# Patient Record
Sex: FEMALE | Race: BLACK OR AFRICAN AMERICAN | NOT HISPANIC OR LATINO | Employment: UNEMPLOYED | URBAN - METROPOLITAN AREA
[De-identification: names, ages, dates, MRNs, and addresses within clinical notes are randomized per-mention and may not be internally consistent; named-entity substitution may affect disease eponyms.]

---

## 2020-01-10 DIAGNOSIS — N60.02 SINGLE CYST OF LEFT BREAST: Primary | ICD-10-CM

## 2020-01-24 ENCOUNTER — HOSPITAL ENCOUNTER (OUTPATIENT)
Dept: RADIOLOGY | Facility: HOSPITAL | Age: 80
Discharge: HOME | End: 2020-01-24
Attending: FAMILY MEDICINE
Payer: MEDICARE

## 2020-01-24 DIAGNOSIS — N60.02 SINGLE CYST OF LEFT BREAST: ICD-10-CM

## 2020-01-24 PROCEDURE — 77062 BREAST TOMOSYNTHESIS BI: CPT | Mod: TC,PO

## 2020-09-18 ENCOUNTER — OFFICE VISIT (OUTPATIENT)
Dept: ENDOCRINOLOGY | Facility: CLINIC | Age: 80
End: 2020-09-18
Payer: MEDICARE

## 2020-09-18 VITALS
HEART RATE: 57 BPM | TEMPERATURE: 97 F | HEIGHT: 67 IN | OXYGEN SATURATION: 97 % | DIASTOLIC BLOOD PRESSURE: 70 MMHG | SYSTOLIC BLOOD PRESSURE: 122 MMHG | WEIGHT: 256.31 LBS | BODY MASS INDEX: 40.23 KG/M2

## 2020-09-18 DIAGNOSIS — E78.5 HYPERLIPIDEMIA, UNSPECIFIED HYPERLIPIDEMIA TYPE: ICD-10-CM

## 2020-09-18 DIAGNOSIS — Z78.0 POSTMENOPAUSAL: ICD-10-CM

## 2020-09-18 DIAGNOSIS — E55.9 HYPOVITAMINOSIS D: ICD-10-CM

## 2020-09-18 DIAGNOSIS — I10 HYPERTENSION, UNSPECIFIED TYPE: ICD-10-CM

## 2020-09-18 DIAGNOSIS — T24.232A: ICD-10-CM

## 2020-09-18 DIAGNOSIS — E11.69 TYPE 2 DIABETES MELLITUS WITH OTHER SPECIFIED COMPLICATION, WITHOUT LONG-TERM CURRENT USE OF INSULIN: Primary | ICD-10-CM

## 2020-09-18 DIAGNOSIS — B35.3 TINEA PEDIS, UNSPECIFIED LATERALITY: ICD-10-CM

## 2020-09-18 PROCEDURE — 99204 OFFICE O/P NEW MOD 45 MIN: CPT | Mod: S$GLB,,, | Performed by: PHYSICIAN ASSISTANT

## 2020-09-18 PROCEDURE — 99999 PR PBB SHADOW E&M-NEW PATIENT-LVL V: ICD-10-PCS | Mod: PBBFAC,,, | Performed by: PHYSICIAN ASSISTANT

## 2020-09-18 PROCEDURE — 1125F PR PAIN SEVERITY QUANTIFIED, PAIN PRESENT: ICD-10-PCS | Mod: S$GLB,,, | Performed by: PHYSICIAN ASSISTANT

## 2020-09-18 PROCEDURE — 1125F AMNT PAIN NOTED PAIN PRSNT: CPT | Mod: S$GLB,,, | Performed by: PHYSICIAN ASSISTANT

## 2020-09-18 PROCEDURE — 1101F PT FALLS ASSESS-DOCD LE1/YR: CPT | Mod: CPTII,S$GLB,, | Performed by: PHYSICIAN ASSISTANT

## 2020-09-18 PROCEDURE — 99204 PR OFFICE/OUTPT VISIT, NEW, LEVL IV, 45-59 MIN: ICD-10-PCS | Mod: S$GLB,,, | Performed by: PHYSICIAN ASSISTANT

## 2020-09-18 PROCEDURE — 1159F PR MEDICATION LIST DOCUMENTED IN MEDICAL RECORD: ICD-10-PCS | Mod: S$GLB,,, | Performed by: PHYSICIAN ASSISTANT

## 2020-09-18 PROCEDURE — 1101F PR PT FALLS ASSESS DOC 0-1 FALLS W/OUT INJ PAST YR: ICD-10-PCS | Mod: CPTII,S$GLB,, | Performed by: PHYSICIAN ASSISTANT

## 2020-09-18 PROCEDURE — 99999 PR PBB SHADOW E&M-NEW PATIENT-LVL V: CPT | Mod: PBBFAC,,, | Performed by: PHYSICIAN ASSISTANT

## 2020-09-18 PROCEDURE — 1159F MED LIST DOCD IN RCRD: CPT | Mod: S$GLB,,, | Performed by: PHYSICIAN ASSISTANT

## 2020-09-18 RX ORDER — LATANOPROST 50 UG/ML
SOLUTION/ DROPS OPHTHALMIC
COMMUNITY
Start: 2020-08-22

## 2020-09-18 RX ORDER — METFORMIN HYDROCHLORIDE 500 MG/1
500 TABLET ORAL 2 TIMES DAILY WITH MEALS
COMMUNITY
Start: 2020-07-06 | End: 2020-09-24

## 2020-09-18 RX ORDER — CICLOPIROX OLAMINE 7.7 MG/G
CREAM TOPICAL 2 TIMES DAILY
Qty: 30 G | Refills: 1 | Status: SHIPPED | OUTPATIENT
Start: 2020-09-18 | End: 2020-09-18

## 2020-09-18 RX ORDER — GLIMEPIRIDE 2 MG/1
2 TABLET ORAL
COMMUNITY
End: 2020-09-24 | Stop reason: SDUPTHER

## 2020-09-18 RX ORDER — CICLOPIROX OLAMINE 7.7 MG/G
CREAM TOPICAL 2 TIMES DAILY
Qty: 30 G | Refills: 1 | Status: SHIPPED | OUTPATIENT
Start: 2020-09-18 | End: 2022-01-25

## 2020-09-18 RX ORDER — FUROSEMIDE 20 MG/1
20 TABLET ORAL DAILY
COMMUNITY

## 2020-09-18 RX ORDER — TELMISARTAN 80 MG/1
80 TABLET ORAL DAILY
COMMUNITY
Start: 2020-09-05

## 2020-09-18 RX ORDER — AMLODIPINE BESYLATE 5 MG/1
5 TABLET ORAL DAILY
COMMUNITY
Start: 2020-08-24

## 2020-09-18 RX ORDER — POTASSIUM CHLORIDE 750 MG/1
10 TABLET, EXTENDED RELEASE ORAL ONCE
COMMUNITY

## 2020-09-18 RX ORDER — METOPROLOL SUCCINATE 50 MG/1
50 TABLET, EXTENDED RELEASE ORAL NIGHTLY
COMMUNITY
Start: 2020-08-24

## 2020-09-18 RX ORDER — LOVASTATIN 40 MG/1
40 TABLET ORAL DAILY
COMMUNITY
Start: 2020-07-08 | End: 2022-10-03
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
## 2020-09-23 ENCOUNTER — HOSPITAL ENCOUNTER (OUTPATIENT)
Dept: RADIOLOGY | Facility: CLINIC | Age: 80
Discharge: HOME | End: 2020-09-23
Attending: PHYSICIAN ASSISTANT
Payer: MEDICARE

## 2020-09-23 DIAGNOSIS — Z78.0 POSTMENOPAUSAL: ICD-10-CM

## 2020-09-23 PROCEDURE — 77080 DXA BONE DENSITY AXIAL: CPT | Mod: TC,PO

## 2020-09-23 PROCEDURE — 77080 DXA BONE DENSITY AXIAL: CPT | Mod: 26,,, | Performed by: RADIOLOGY

## 2020-09-23 PROCEDURE — 77080 DEXA BONE DENSITY SPINE HIP: ICD-10-PCS | Mod: 26,,, | Performed by: RADIOLOGY

## 2020-09-24 DIAGNOSIS — E11.69 TYPE 2 DIABETES MELLITUS WITH OTHER SPECIFIED COMPLICATION, WITHOUT LONG-TERM CURRENT USE OF INSULIN: Primary | ICD-10-CM

## 2020-09-24 RX ORDER — GLIMEPIRIDE 2 MG/1
2 TABLET ORAL
Qty: 90 TABLET | Refills: 3 | Status: SHIPPED | OUTPATIENT
Start: 2020-09-24 | End: 2020-12-18

## 2020-09-24 RX ORDER — METFORMIN HYDROCHLORIDE 1000 MG/1
1000 TABLET ORAL 2 TIMES DAILY WITH MEALS
Qty: 180 TABLET | Refills: 3 | Status: SHIPPED | OUTPATIENT
Start: 2020-09-24 | End: 2021-09-15

## 2020-10-13 DIAGNOSIS — M79.89 LEG SWELLING: Primary | ICD-10-CM

## 2020-10-14 DIAGNOSIS — M79.604 PAIN OF RIGHT LEG: ICD-10-CM

## 2020-10-14 DIAGNOSIS — M79.89 LEFT LEG SWELLING: Primary | ICD-10-CM

## 2020-10-14 DIAGNOSIS — M79.605 LEFT LEG PAIN: ICD-10-CM

## 2020-10-19 ENCOUNTER — OFFICE VISIT (OUTPATIENT)
Dept: PODIATRY | Facility: CLINIC | Age: 80
End: 2020-10-19
Payer: MEDICARE

## 2020-10-19 VITALS — BODY MASS INDEX: 38.37 KG/M2 | WEIGHT: 244.5 LBS | HEIGHT: 67 IN | TEMPERATURE: 96 F

## 2020-10-19 DIAGNOSIS — I83.12 VARICOSE VEINS OF BOTH LOWER EXTREMITIES WITH INFLAMMATION: ICD-10-CM

## 2020-10-19 DIAGNOSIS — E11.69 TYPE 2 DIABETES MELLITUS WITH OTHER SPECIFIED COMPLICATION, WITHOUT LONG-TERM CURRENT USE OF INSULIN: Primary | ICD-10-CM

## 2020-10-19 DIAGNOSIS — R60.0 BILATERAL EDEMA OF LOWER EXTREMITY: ICD-10-CM

## 2020-10-19 DIAGNOSIS — I83.11 VARICOSE VEINS OF BOTH LOWER EXTREMITIES WITH INFLAMMATION: ICD-10-CM

## 2020-10-19 PROCEDURE — 99203 PR OFFICE/OUTPT VISIT, NEW, LEVL III, 30-44 MIN: ICD-10-PCS | Mod: S$GLB,,, | Performed by: PODIATRIST

## 2020-10-19 PROCEDURE — 1101F PR PT FALLS ASSESS DOC 0-1 FALLS W/OUT INJ PAST YR: ICD-10-PCS | Mod: CPTII,S$GLB,, | Performed by: PODIATRIST

## 2020-10-19 PROCEDURE — 1159F MED LIST DOCD IN RCRD: CPT | Mod: S$GLB,,, | Performed by: PODIATRIST

## 2020-10-19 PROCEDURE — 1159F PR MEDICATION LIST DOCUMENTED IN MEDICAL RECORD: ICD-10-PCS | Mod: S$GLB,,, | Performed by: PODIATRIST

## 2020-10-19 PROCEDURE — 1101F PT FALLS ASSESS-DOCD LE1/YR: CPT | Mod: CPTII,S$GLB,, | Performed by: PODIATRIST

## 2020-10-19 PROCEDURE — 99203 OFFICE O/P NEW LOW 30 MIN: CPT | Mod: S$GLB,,, | Performed by: PODIATRIST

## 2020-10-19 NOTE — PATIENT INSTRUCTIONS
Your Diabetes Foot Care Program    Every day you depend on your feet to keep you moving. But when you have diabetes, your feet need special care. Even a small foot problem can become very serious. So dont take your feet for granted. By working with your diabetes healthcare team, you can learn how to protect your feet and keep them healthy.  Evaluating your feet  An evaluation helps your healthcare provider check the condition of your feet. The evaluation includes a review of your diabetes history and overall health. It may also include a foot exam, X-rays, or other tests. These can help show problems beneath the skin that you cant see or feel.  Medical history  You will be asked about your overall health and any history of foot problems. Youll also discuss your diabetes history, such as whether your blood sugar level has changed over time. It also includes questions about sensations of pain, tingling, pins and needles, or numbness. Your healthcare provider will also want to know if you have high blood pressure and heart disease, or if you smoke. Be sure to mention any medicines (including over-the-counter), supplements, or herbal remedies you take.  Foot exam  A foot exam checks the condition of different parts of your foot. First, your skin and nails are examined for any signs of infection. Blood flow is checked by feeling for the pulses in each foot. You may also have tests to study the nerves in the foot. These include using a small filament (wire) to see how sensitive your feet are. In certain cases, you will be asked to walk a short distance to check for bone, joint, and muscle problems.  Diagnostic tests  If needed, your healthcare provider will suggest certain tests to learn more about your feet. These include:  · Doppler tests to measure blood flow in the feet and lower leg.  · X-rays, which can show bone or joint problems.  · Other imaging tests, such as an MRI (magnetic resonance imaging), bone scan,  and CT (computed tomography) scan. These can help show bone infections.  · Other tests, such as vascular tests, which study the blood flow in your feet and legs. You may also have nerve studies to learn how sensitive your feet are.  Creating a foot care program  Based on the evaluation, your healthcare provider will create a foot care program for you. Your program may be as simple as starting a daily self-care routine and changing the types of shoes your wear. It may also involve treating minor foot problems, such as a corn or blister. In some cases, surgery will be needed to treat an infection or mechanical problems, such as hammer toes.  Preventing problems  When you have diabetes, its easier to prevent problems than to treat them later on. So see your healthcare team for regular checkups and foot care. Your healthcare team can also help you learn more about caring for your feet at home. For example, you may be told to avoid walking barefoot. Or you may be told that special footwear is needed to protect your feet.  Have regular checkups  Foot problems can develop quickly. So be sure to follow your healthcare teams schedule for regular checkups. During office visits, take off your shoes and socks as soon as you get in the exam room. Ask your healthcare provider to examine your feet for problems. This will make it easier to find and treat small skin irritations before they get worse. Regular checkups can also help keep track of the blood flow and feeling in your feet. If you have neuropathy (lack of feeling in your feet), you will need to have checkups more often.  Learn about self-care  The more you know about diabetes and your feet, the easier it will be to prevent problems. Members of your healthcare team can teach you how to inspect your feet and teach you to look for warning signs. They can also give you other foot care tips. During office visits, be sure to ask any questions you have.  Date Last Reviewed:  7/1/2016  © 0012-4669 [employer]. [address] [place] [address], [place] [address]. All rights reserved. This information is not intended as a substitute for professional medical care. Always follow your healthcare professional's instructions.

## 2020-10-19 NOTE — LETTER
October 19, 2020      MONTY Corona PA-C  [address] [facility]  [place] [address]           [facility] - Podiatry  [address] [facility] [address]  [place] [address]  Phone: [phone]  Fax: [phone]          Patient: Delilah Williamson   MR Number: [id number]   YOB: 1940   Date of Visit: 10/19/2020       Dear MONTY Corona:    Thank you for referring Delilah Williamson to me for evaluation. Attached you will find relevant portions of my assessment and plan of care.    If you have questions, please do not hesitate to call me. I look forward to following Delilah Williamson along with you.    Sincerely,    Tye Leung, YEMI    Enclosure  CC:  No Recipients    If you would like to receive this communication electronically, please contact [email or web address] or [phone] to request more information on [employer] access.    For providers and/or their staff who would like to refer a patient to [facility], please contact us through our one-stop-shop provider referral line, [facility], at [phone].    If you feel you have received this communication in error or would no longer like to receive these types of communications, please e-mail [email or web address]

## 2020-10-19 NOTE — PROGRESS NOTES
[address]  [place] [address]  Phone: [phone]   [phone]    Patient's PCP:Primary Doctor No  Referring Provider: MONTY Corona    Subjective:      Chief Complaint:: Diabetic Foot Exam    HPI  Delilah Williamson is a 80 y.o. female who presents to the clinic for a diabetic foot exam.   Pt has seen MONTY Corona PA-C on 09/18/2020 who treats them for their diabetes.  Pt has been a diabetic for about 5 years.  Taking Metformin to treat diabetes.  Denies calf pain while walking.  Blood sugar: 114  Hemoglobin A1C: 6.8 (09/23/2020)     Patient states occasional pain in feet.     Vitals:    10/19/20 1014   Temp: 96.4 °F (35.8 °C)     Shoe Size:     Past Surgical History:   Procedure Laterality Date    CATARACT EXTRACTION      ROTATOR CUFF REPAIR       Past Medical History:   Diagnosis Date    Diabetes mellitus, type 2     Glaucoma     Hyperlipidemia     Hypertension      Family History   Problem Relation Age of Onset    Breast cancer Paternal Aunt         Social History:   Marital Status: Single  Alcohol History:  has no history on file for alcohol.  Tobacco History:  reports that she has never smoked. She has never used smokeless tobacco.  Drug History:  has no history on file for drug.    Review of patient's allergies indicates:  No Known Allergies    Current Outpatient Medications   Medication Sig Dispense Refill    acac/inulin/c-lose/mv-mn/folic (FIBER PLUS MULITVITAMIN ORAL) Take by mouth.      amLODIPine (NORVASC) 5 MG tablet Take 5 mg by mouth once daily.      ciclopirox (LOPROX) 0.77 % Crea Apply topically 2 (two) times daily. 30 g 1    furosemide (LASIX) 20 MG tablet Take 20 mg by mouth once daily.      glimepiride (AMARYL) 2 MG tablet Take 1 tablet (2 mg total) by mouth before breakfast. 90 tablet 3    latanoprost 0.005 % ophthalmic solution INSTILL 1 DROP INTO BOTH EYES AT BEDTIME AS DIRECTED INSTILL 1 DROP INTO BOTH EYES AT BEDTIME      lovastatin (MEVACOR) 40 MG  tablet Take 40 mg by mouth once daily.      metFORMIN (GLUCOPHAGE) 1000 MG tablet Take 1 tablet (1,000 mg total) by mouth 2 (two) times daily with meals. 180 tablet 3    metoprolol succinate (TOPROL-XL) 50 MG 24 hr tablet Take 50 mg by mouth every evening.      potassium chloride (KLOR-CON) 10 MEQ TbSR Take 10 mEq by mouth once.      telmisartan (MICARDIS) 80 MG Tab Take 80 mg by mouth once daily.       No current facility-administered medications for this visit.        Review of Systems   Constitutional: Negative for chills, fatigue, fever and unexpected weight change.   HENT: Negative for hearing loss and trouble swallowing.    Eyes: Negative for photophobia and visual disturbance.   Respiratory: Negative for cough, shortness of breath and wheezing.    Cardiovascular: Positive for leg swelling. Negative for chest pain and palpitations.   Gastrointestinal: Negative for abdominal pain and nausea.   Genitourinary: Negative for dysuria and frequency.   Musculoskeletal: Negative for arthralgias, back pain and joint swelling.   Skin: Negative for rash.   Neurological: Negative for tremors, seizures, weakness, numbness and headaches.   Hematological: Does not bruise/bleed easily.         Objective:        Physical Exam:   Foot Exam    General  General Appearance: appears stated age and healthy   Orientation: alert and oriented to person, place, and time   Affect: appropriate   Gait: antalgic       Right Foot/Ankle     Inspection and Palpation  Ecchymosis: none  Tenderness: none   Swelling: (Plus four pitting edema of complete lower extremity)  Arch: pes planus  Skin Exam: skin intact; (Venous stasis dermatitis secondary to venous incompetency)    Neurovascular  Dorsalis pedis: 1+  Posterior tibial: 1+  Saphenous nerve sensation: normal  Tibial nerve sensation: normal  Superficial peroneal nerve sensation: normal  Deep peroneal nerve sensation: normal  Sural nerve sensation: normal    Muscle Strength  Ankle  dorsiflexion: 5  Ankle plantar flexion: 5  Ankle inversion: 5  Ankle eversion: 5  Great toe extension: 5  Great toe flexion: 5      Left Foot/Ankle      Inspection and Palpation  Ecchymosis: none  Tenderness: none   Swelling: (Plus four pitting edema of complete lower extremity)  Arch: pes planus  Skin Exam: skin intact; (Venous incompetency with venous stasis dermatitis.  Pre ulcerative lesion lower leg)    Neurovascular  Dorsalis pedis: 1+  Posterior tibial: 1+  Saphenous nerve sensation: normal  Tibial nerve sensation: normal  Superficial peroneal nerve sensation: normal  Deep peroneal nerve sensation: normal  Sural nerve sensation: normal    Muscle Strength  Ankle dorsiflexion: 5  Ankle plantar flexion: 5  Ankle inversion: 5  Ankle eversion: 5  Great toe extension: 5  Great toe flexion: 5          Physical Exam   Cardiovascular:   Pulses:       Dorsalis pedis pulses are 1+ on the right side and 1+ on the left side.        Posterior tibial pulses are 1+ on the right side and 1+ on the left side.       Imaging:            Assessment:       1. Type 2 diabetes mellitus with other specified complication, without long-term current use of insulin    2. Bilateral edema of lower extremity    3. Varicose veins of both lower extremities with inflammation      Plan:   Type 2 diabetes mellitus with other specified complication, without long-term current use of insulin  -     Ambulatory referral/consult to Podiatry    Bilateral edema of lower extremity    Varicose veins of both lower extremities with inflammation     1.  Evaluated patient today as far as diabetes concerned she has good circulation  good sensation which puts her at low risk of ulcerations secondary to diabetes.  She is predisposed to lower extremity problems because of the severe venous incompetency and preulcerative areas.  She is going to see the vascular surgeon Dr. Tuan Radford this week.  After she visits with him we will discuss with the she should go to  [facility]hedema compression program at [facility] and then follow up with prescription compression hose.  2.  Return to see me as needed  Follow up if symptoms worsen or fail to improve.    Procedures  No notes on file       Counseling:     I provided patient education verbally regarding:   Patient diagnosis, treatment options, as well as alternatives, risks, and benefits.     This note was created using Dragon voice recognition software that occasionally misinterpreted phrases or words.

## 2020-12-17 ENCOUNTER — LAB VISIT (OUTPATIENT)
Dept: LAB | Facility: HOSPITAL | Age: 80
End: 2020-12-17
Attending: PHYSICIAN ASSISTANT
Payer: MEDICARE

## 2020-12-17 DIAGNOSIS — E11.69 TYPE 2 DIABETES MELLITUS WITH OTHER SPECIFIED COMPLICATION, WITHOUT LONG-TERM CURRENT USE OF INSULIN: ICD-10-CM

## 2020-12-17 LAB
ALBUMIN SERPL BCP-MCNC: 3.8 G/DL (ref 3.5–5.2)
ANION GAP SERPL CALC-SCNC: 6 MMOL/L (ref 8–16)
BUN SERPL-MCNC: 18 MG/DL (ref 8–23)
CALCIUM SERPL-MCNC: 9.4 MG/DL (ref 8.7–10.5)
CHLORIDE SERPL-SCNC: 101 MMOL/L (ref 95–110)
CO2 SERPL-SCNC: 32 MMOL/L (ref 23–29)
CREAT SERPL-MCNC: 0.8 MG/DL (ref 0.5–1.4)
EST. GFR  (AFRICAN AMERICAN): >60 ML/MIN/1.73 M^2
EST. GFR  (NON AFRICAN AMERICAN): >60 ML/MIN/1.73 M^2
ESTIMATED AVG GLUCOSE: 143 MG/DL (ref 68–131)
GLUCOSE SERPL-MCNC: 98 MG/DL (ref 70–110)
HBA1C MFR BLD HPLC: 6.6 % (ref 4–5.6)
PHOSPHATE SERPL-MCNC: 3.2 MG/DL (ref 2.7–4.5)
POTASSIUM SERPL-SCNC: 4.5 MMOL/L (ref 3.5–5.1)
SODIUM SERPL-SCNC: 139 MMOL/L (ref 136–145)

## 2020-12-17 PROCEDURE — 36415 COLL VENOUS BLD VENIPUNCTURE: CPT | Mod: PO

## 2020-12-17 PROCEDURE — 80069 RENAL FUNCTION PANEL: CPT

## 2020-12-17 PROCEDURE — 83036 HEMOGLOBIN GLYCOSYLATED A1C: CPT

## 2020-12-18 ENCOUNTER — OFFICE VISIT (OUTPATIENT)
Dept: ENDOCRINOLOGY | Facility: CLINIC | Age: 80
End: 2020-12-18
Payer: MEDICARE

## 2020-12-18 VITALS
HEIGHT: 67 IN | SYSTOLIC BLOOD PRESSURE: 126 MMHG | OXYGEN SATURATION: 98 % | BODY MASS INDEX: 39.17 KG/M2 | HEART RATE: 69 BPM | WEIGHT: 249.56 LBS | DIASTOLIC BLOOD PRESSURE: 80 MMHG | TEMPERATURE: 97 F

## 2020-12-18 DIAGNOSIS — E11.69 TYPE 2 DIABETES MELLITUS WITH OTHER SPECIFIED COMPLICATION, WITHOUT LONG-TERM CURRENT USE OF INSULIN: Primary | ICD-10-CM

## 2020-12-18 DIAGNOSIS — Z78.0 POSTMENOPAUSAL: ICD-10-CM

## 2020-12-18 DIAGNOSIS — I10 HYPERTENSION, UNSPECIFIED TYPE: ICD-10-CM

## 2020-12-18 DIAGNOSIS — E78.5 HYPERLIPIDEMIA, UNSPECIFIED HYPERLIPIDEMIA TYPE: ICD-10-CM

## 2020-12-18 DIAGNOSIS — B35.3 TINEA PEDIS, UNSPECIFIED LATERALITY: ICD-10-CM

## 2020-12-18 DIAGNOSIS — E55.9 HYPOVITAMINOSIS D: ICD-10-CM

## 2020-12-18 PROCEDURE — 1159F PR MEDICATION LIST DOCUMENTED IN MEDICAL RECORD: ICD-10-PCS | Mod: S$GLB,,, | Performed by: PHYSICIAN ASSISTANT

## 2020-12-18 PROCEDURE — 3288F PR FALLS RISK ASSESSMENT DOCUMENTED: ICD-10-PCS | Mod: CPTII,S$GLB,, | Performed by: PHYSICIAN ASSISTANT

## 2020-12-18 PROCEDURE — 3288F FALL RISK ASSESSMENT DOCD: CPT | Mod: CPTII,S$GLB,, | Performed by: PHYSICIAN ASSISTANT

## 2020-12-18 PROCEDURE — 1159F MED LIST DOCD IN RCRD: CPT | Mod: S$GLB,,, | Performed by: PHYSICIAN ASSISTANT

## 2020-12-18 PROCEDURE — 1126F PR PAIN SEVERITY QUANTIFIED, NO PAIN PRESENT: ICD-10-PCS | Mod: S$GLB,,, | Performed by: PHYSICIAN ASSISTANT

## 2020-12-18 PROCEDURE — 99214 OFFICE O/P EST MOD 30 MIN: CPT | Mod: S$GLB,,, | Performed by: PHYSICIAN ASSISTANT

## 2020-12-18 PROCEDURE — 1126F AMNT PAIN NOTED NONE PRSNT: CPT | Mod: S$GLB,,, | Performed by: PHYSICIAN ASSISTANT

## 2020-12-18 PROCEDURE — 99999 PR PBB SHADOW E&M-EST. PATIENT-LVL IV: ICD-10-PCS | Mod: PBBFAC,,, | Performed by: PHYSICIAN ASSISTANT

## 2020-12-18 PROCEDURE — 99214 PR OFFICE/OUTPT VISIT, EST, LEVL IV, 30-39 MIN: ICD-10-PCS | Mod: S$GLB,,, | Performed by: PHYSICIAN ASSISTANT

## 2020-12-18 PROCEDURE — 99999 PR PBB SHADOW E&M-EST. PATIENT-LVL IV: CPT | Mod: PBBFAC,,, | Performed by: PHYSICIAN ASSISTANT

## 2020-12-18 PROCEDURE — 1101F PR PT FALLS ASSESS DOC 0-1 FALLS W/OUT INJ PAST YR: ICD-10-PCS | Mod: CPTII,S$GLB,, | Performed by: PHYSICIAN ASSISTANT

## 2020-12-18 PROCEDURE — 1101F PT FALLS ASSESS-DOCD LE1/YR: CPT | Mod: CPTII,S$GLB,, | Performed by: PHYSICIAN ASSISTANT

## 2020-12-18 RX ORDER — GLIMEPIRIDE 1 MG/1
1 TABLET ORAL
Qty: 90 TABLET | Refills: 3 | Status: SHIPPED | OUTPATIENT
Start: 2020-12-18 | End: 2021-12-18

## 2020-12-18 NOTE — PROGRESS NOTES
"CC: This 80 y.o. female presents for management of Diabetes    and chronic conditions pending review including HTN, HLP    HPI: was diagnosed with T2DM in ~15 years ago. Her  recently   from penumonia.     She is seeing Dr. Radford in vascular.     Has never been hospitalized r/t DM.  Family hx of DM: sister  Fhx of thyroid disease: mother  Denies missing doses of DM medication.   hypoglycemia at home: none  monitoring BG at home:  Fastin-140    Diet:   BF-egg, multigrain bread  LH-turkey, multigrain bread  DN-turtle stew w/out rice  Snacks on whole wheat crackers. Avoids sugary beverages.     Exercise: She has been active at home.     CURRENT DM MEDS: metformin 1000 mg bid, glimepiride 2 mg qd    Previous meds:  Jardiance    Standards of Care:  Eye exam:  q six months for Dr. Smith for glaucoma  Podiatry exam: n/a  DE: never    DEXA scan:  wnl  PMHx, PSHx: reviewed in epic.  Social Hx: no E/T use.    ROS:   Gen: Appetite good, no weight gain or loss, denies fatigue and weakness.  Skin: + lower left leg rash (PAD), Skin is intact and heals well, no rashes, no hair changes  Eyes: Denies visual disturbances  Resp: no SOB or TINSLEY, no cough  Cardiac: No palpitations, chest pain, no edema   GI: No nausea or vomiting, diarrhea, constipation, or abdominal pain.  /GYN: No nocturia, burning or pain.   MS/Neuro: Denies numbness/ tingling in BLE; Gait steady, speech clear  Psych: Denies drug/ETOH abuse, no hx of depression.  Other systems: negative.    /80 (BP Location: Left arm, Patient Position: Sitting, BP Method: Large (Manual))   Pulse 69   Temp 97 °F (36.1 °C) (Temporal)   Ht 5' 7" (1.702 m)   Wt 113.2 kg (249 lb 9 oz)   SpO2 98%   BMI 39.09 kg/m²      PE:  GENERAL: Well developed, well nourished.  PSYCH: AAOx3, appropriate mood and affect, pleasant expression, conversant, appears relaxed, well groomed.   EYES: Conjunctiva, corneas clear  NECK: Supple, trachea midline,no " thyromegaly or nodules  CHEST: Resp even and unlabored, CTA bilateral.  CARDIAC: RRR, S1, S2 heard, no murmurs  ABDOMEN: Soft, non-tender, non-distended   VASCULAR: DP pulses +2/4 bilaterally, no edema.  NEURO: Gait steady  SKIN: + slight pink erythema surrounded by blistering on lower left leg, Skin warm and dry no acanthosis nigracans.  9/20  Foot Exam: no sores or macerations noted. White exudate between all digits.    Protective Sensation (w/ 10 gram monofilament):  Right: Intact  Left: Intact    Visual Inspection:  Normal -  Bilateral, Nails Intact - without Evidence of Foot Deformity- Bilateral and Dry Skin -  Bilateral    Pedal Pulses:   Right: Present  Left: Present    Posterior tibialis:   Right:Present  Left: Present     Vibratory Sensation  Right:Absent  Left:Absent    Personally reviewed labs below:    Lab Visit on 12/17/2020   Component Date Value Ref Range Status    Glucose 12/17/2020 98  70 - 110 mg/dL Final    Sodium 12/17/2020 139  136 - 145 mmol/L Final    Potassium 12/17/2020 4.5  3.5 - 5.1 mmol/L Final    Chloride 12/17/2020 101  95 - 110 mmol/L Final    CO2 12/17/2020 32* 23 - 29 mmol/L Final    BUN 12/17/2020 18  8 - 23 mg/dL Final    Calcium 12/17/2020 9.4  8.7 - 10.5 mg/dL Final    Creatinine 12/17/2020 0.8  0.5 - 1.4 mg/dL Final    Albumin 12/17/2020 3.8  3.5 - 5.2 g/dL Final    Phosphorus 12/17/2020 3.2  2.7 - 4.5 mg/dL Final    eGFR if African American 12/17/2020 >60.0  >60 mL/min/1.73 m^2 Final    eGFR if non African American 12/17/2020 >60.0  >60 mL/min/1.73 m^2 Final    Comment: Calculation used to obtain the estimated glomerular filtration  rate (eGFR) is the CKD-EPI equation.       Anion Gap 12/17/2020 6* 8 - 16 mmol/L Final    Hemoglobin A1C 12/17/2020 6.6* 4.0 - 5.6 % Final    Comment: ADA Screening Guidelines:  5.7-6.4%  Consistent with prediabetes  >or=6.5%  Consistent with diabetes  High levels of fetal hemoglobin interfere with the HbA1C  assay. Heterozygous  hemoglobin variants (HbS, HgC, etc)do  not significantly interfere with this assay.   However, presence of multiple variants may affect accuracy.      Estimated Avg Glucose 12/17/2020 143* 68 - 131 mg/dL Final         ASSESSMENT and PLAN:    1. Type 2 diabetes mellitus with other specified complication, without long-term current use of insulin  Renal Function Panel    Hemoglobin A1C   2. Hypertension, unspecified type     3. Hyperlipidemia, unspecified hyperlipidemia type     4. Postmenopausal     5. Hypovitaminosis D     6. Tinea pedis, unspecified laterality        T2DM with hyperglycemia-A1c is below goal. Decrease glimepiride to 1 mg daily. Continue metformin.   Discussed A1c and BG goals.   Reviewed  hypoglycemia, s/s and appropriate tx.   Instructed to monitor BG and bring meter/ log to every clinic visit.   - takes ASA, ACEi, statin    HTN - controlled, continue meds as previously prescribed and monitor.     HLP - LDL today , on statin therapy, LFTs WNL  Postmenopausal-DEXA scan 9/22  Hypovitaminosis d-stable-check vitamin D  Tinea pedis-continue ciclopriox between toes twice daily.     Follow-up: in 3 months with lab prior

## 2021-01-12 ENCOUNTER — IMMUNIZATION (OUTPATIENT)
Dept: PRIMARY CARE CLINIC | Facility: CLINIC | Age: 81
End: 2021-01-12
Payer: MEDICARE

## 2021-01-12 DIAGNOSIS — Z23 NEED FOR VACCINATION: ICD-10-CM

## 2021-01-12 PROCEDURE — 0001A COVID-19, MRNA, LNP-S, PF, 30 MCG/0.3 ML DOSE VACCINE: ICD-10-PCS | Mod: S$GLB,,, | Performed by: FAMILY MEDICINE

## 2021-01-12 PROCEDURE — 91300 COVID-19, MRNA, LNP-S, PF, 30 MCG/0.3 ML DOSE VACCINE: CPT | Mod: S$GLB,,, | Performed by: FAMILY MEDICINE

## 2021-01-12 PROCEDURE — 91300 COVID-19, MRNA, LNP-S, PF, 30 MCG/0.3 ML DOSE VACCINE: ICD-10-PCS | Mod: S$GLB,,, | Performed by: FAMILY MEDICINE

## 2021-01-12 PROCEDURE — 0001A COVID-19, MRNA, LNP-S, PF, 30 MCG/0.3 ML DOSE VACCINE: CPT | Mod: S$GLB,,, | Performed by: FAMILY MEDICINE

## 2021-02-02 ENCOUNTER — IMMUNIZATION (OUTPATIENT)
Dept: PRIMARY CARE CLINIC | Facility: CLINIC | Age: 81
End: 2021-02-02
Payer: MEDICARE

## 2021-02-02 DIAGNOSIS — Z23 NEED FOR VACCINATION: Primary | ICD-10-CM

## 2021-02-02 PROCEDURE — 91300 COVID-19, MRNA, LNP-S, PF, 30 MCG/0.3 ML DOSE VACCINE: CPT | Mod: S$GLB,,, | Performed by: FAMILY MEDICINE

## 2021-02-02 PROCEDURE — 91300 COVID-19, MRNA, LNP-S, PF, 30 MCG/0.3 ML DOSE VACCINE: ICD-10-PCS | Mod: S$GLB,,, | Performed by: FAMILY MEDICINE

## 2021-02-02 PROCEDURE — 0002A COVID-19, MRNA, LNP-S, PF, 30 MCG/0.3 ML DOSE VACCINE: ICD-10-PCS | Mod: CV19,S$GLB,, | Performed by: FAMILY MEDICINE

## 2021-02-02 PROCEDURE — 0002A COVID-19, MRNA, LNP-S, PF, 30 MCG/0.3 ML DOSE VACCINE: CPT | Mod: CV19,S$GLB,, | Performed by: FAMILY MEDICINE

## 2021-02-11 DIAGNOSIS — Z12.31 ENCOUNTER FOR SCREENING MAMMOGRAM FOR MALIGNANT NEOPLASM OF BREAST: Primary | ICD-10-CM

## 2021-02-26 ENCOUNTER — HOSPITAL ENCOUNTER (OUTPATIENT)
Dept: RADIOLOGY | Facility: HOSPITAL | Age: 81
Discharge: HOME | End: 2021-02-26
Attending: NURSE PRACTITIONER
Payer: MEDICARE

## 2021-02-26 DIAGNOSIS — Z12.31 ENCOUNTER FOR SCREENING MAMMOGRAM FOR MALIGNANT NEOPLASM OF BREAST: ICD-10-CM

## 2021-02-26 PROCEDURE — 77067 SCR MAMMO BI INCL CAD: CPT | Mod: TC,PO

## 2021-03-11 ENCOUNTER — LAB VISIT (OUTPATIENT)
Dept: LAB | Facility: HOSPITAL | Age: 81
End: 2021-03-11
Attending: PHYSICIAN ASSISTANT
Payer: MEDICARE

## 2021-03-11 DIAGNOSIS — E11.69 TYPE 2 DIABETES MELLITUS WITH OTHER SPECIFIED COMPLICATION, WITHOUT LONG-TERM CURRENT USE OF INSULIN: ICD-10-CM

## 2021-03-11 PROCEDURE — 83036 HEMOGLOBIN GLYCOSYLATED A1C: CPT | Performed by: PHYSICIAN ASSISTANT

## 2021-03-11 PROCEDURE — 80069 RENAL FUNCTION PANEL: CPT | Performed by: PHYSICIAN ASSISTANT

## 2021-03-11 PROCEDURE — 36415 COLL VENOUS BLD VENIPUNCTURE: CPT | Mod: PO | Performed by: PHYSICIAN ASSISTANT

## 2021-03-12 LAB
ALBUMIN SERPL BCP-MCNC: 3.8 G/DL (ref 3.5–5.2)
ANION GAP SERPL CALC-SCNC: 11 MMOL/L (ref 8–16)
BUN SERPL-MCNC: 17 MG/DL (ref 8–23)
CALCIUM SERPL-MCNC: 9.3 MG/DL (ref 8.7–10.5)
CHLORIDE SERPL-SCNC: 105 MMOL/L (ref 95–110)
CO2 SERPL-SCNC: 27 MMOL/L (ref 23–29)
CREAT SERPL-MCNC: 0.8 MG/DL (ref 0.5–1.4)
EST. GFR  (AFRICAN AMERICAN): >60 ML/MIN/1.73 M^2
EST. GFR  (NON AFRICAN AMERICAN): >60 ML/MIN/1.73 M^2
ESTIMATED AVG GLUCOSE: 146 MG/DL (ref 68–131)
GLUCOSE SERPL-MCNC: 115 MG/DL (ref 70–110)
HBA1C MFR BLD: 6.7 % (ref 4–5.6)
PHOSPHATE SERPL-MCNC: 2.7 MG/DL (ref 2.7–4.5)
POTASSIUM SERPL-SCNC: 4.3 MMOL/L (ref 3.5–5.1)
SODIUM SERPL-SCNC: 143 MMOL/L (ref 136–145)

## 2021-03-18 ENCOUNTER — OFFICE VISIT (OUTPATIENT)
Dept: ENDOCRINOLOGY | Facility: CLINIC | Age: 81
End: 2021-03-18
Payer: MEDICARE

## 2021-03-18 VITALS
SYSTOLIC BLOOD PRESSURE: 150 MMHG | WEIGHT: 256.19 LBS | TEMPERATURE: 97 F | OXYGEN SATURATION: 96 % | BODY MASS INDEX: 40.21 KG/M2 | DIASTOLIC BLOOD PRESSURE: 70 MMHG | HEART RATE: 71 BPM | HEIGHT: 67 IN

## 2021-03-18 DIAGNOSIS — R30.0 DYSURIA: ICD-10-CM

## 2021-03-18 DIAGNOSIS — Z78.0 POSTMENOPAUSAL: ICD-10-CM

## 2021-03-18 DIAGNOSIS — I10 HYPERTENSION, UNSPECIFIED TYPE: ICD-10-CM

## 2021-03-18 DIAGNOSIS — E78.5 HYPERLIPIDEMIA, UNSPECIFIED HYPERLIPIDEMIA TYPE: ICD-10-CM

## 2021-03-18 DIAGNOSIS — E11.69 TYPE 2 DIABETES MELLITUS WITH OTHER SPECIFIED COMPLICATION, WITHOUT LONG-TERM CURRENT USE OF INSULIN: Primary | ICD-10-CM

## 2021-03-18 DIAGNOSIS — E55.9 HYPOVITAMINOSIS D: ICD-10-CM

## 2021-03-18 DIAGNOSIS — B35.3 TINEA PEDIS, UNSPECIFIED LATERALITY: ICD-10-CM

## 2021-03-18 PROCEDURE — 3077F SYST BP >= 140 MM HG: CPT | Mod: CPTII,S$GLB,, | Performed by: PHYSICIAN ASSISTANT

## 2021-03-18 PROCEDURE — 1101F PR PT FALLS ASSESS DOC 0-1 FALLS W/OUT INJ PAST YR: ICD-10-PCS | Mod: CPTII,S$GLB,, | Performed by: PHYSICIAN ASSISTANT

## 2021-03-18 PROCEDURE — 3078F PR MOST RECENT DIASTOLIC BLOOD PRESSURE < 80 MM HG: ICD-10-PCS | Mod: CPTII,S$GLB,, | Performed by: PHYSICIAN ASSISTANT

## 2021-03-18 PROCEDURE — 1159F PR MEDICATION LIST DOCUMENTED IN MEDICAL RECORD: ICD-10-PCS | Mod: S$GLB,,, | Performed by: PHYSICIAN ASSISTANT

## 2021-03-18 PROCEDURE — 99214 PR OFFICE/OUTPT VISIT, EST, LEVL IV, 30-39 MIN: ICD-10-PCS | Mod: S$GLB,,, | Performed by: PHYSICIAN ASSISTANT

## 2021-03-18 PROCEDURE — 1126F AMNT PAIN NOTED NONE PRSNT: CPT | Mod: S$GLB,,, | Performed by: PHYSICIAN ASSISTANT

## 2021-03-18 PROCEDURE — 1101F PT FALLS ASSESS-DOCD LE1/YR: CPT | Mod: CPTII,S$GLB,, | Performed by: PHYSICIAN ASSISTANT

## 2021-03-18 PROCEDURE — 3077F PR MOST RECENT SYSTOLIC BLOOD PRESSURE >= 140 MM HG: ICD-10-PCS | Mod: CPTII,S$GLB,, | Performed by: PHYSICIAN ASSISTANT

## 2021-03-18 PROCEDURE — 99999 PR PBB SHADOW E&M-EST. PATIENT-LVL IV: ICD-10-PCS | Mod: PBBFAC,,, | Performed by: PHYSICIAN ASSISTANT

## 2021-03-18 PROCEDURE — 1159F MED LIST DOCD IN RCRD: CPT | Mod: S$GLB,,, | Performed by: PHYSICIAN ASSISTANT

## 2021-03-18 PROCEDURE — 99214 OFFICE O/P EST MOD 30 MIN: CPT | Mod: S$GLB,,, | Performed by: PHYSICIAN ASSISTANT

## 2021-03-18 PROCEDURE — 3078F DIAST BP <80 MM HG: CPT | Mod: CPTII,S$GLB,, | Performed by: PHYSICIAN ASSISTANT

## 2021-03-18 PROCEDURE — 1126F PR PAIN SEVERITY QUANTIFIED, NO PAIN PRESENT: ICD-10-PCS | Mod: S$GLB,,, | Performed by: PHYSICIAN ASSISTANT

## 2021-03-18 PROCEDURE — 99999 PR PBB SHADOW E&M-EST. PATIENT-LVL IV: CPT | Mod: PBBFAC,,, | Performed by: PHYSICIAN ASSISTANT

## 2021-03-18 PROCEDURE — 3288F FALL RISK ASSESSMENT DOCD: CPT | Mod: CPTII,S$GLB,, | Performed by: PHYSICIAN ASSISTANT

## 2021-03-18 PROCEDURE — 3288F PR FALLS RISK ASSESSMENT DOCUMENTED: ICD-10-PCS | Mod: CPTII,S$GLB,, | Performed by: PHYSICIAN ASSISTANT

## 2021-03-19 ENCOUNTER — TELEPHONE (OUTPATIENT)
Dept: ENDOCRINOLOGY | Facility: CLINIC | Age: 81
End: 2021-03-19

## 2021-03-19 DIAGNOSIS — N30.00 ACUTE CYSTITIS WITHOUT HEMATURIA: Primary | ICD-10-CM

## 2021-03-19 RX ORDER — LEVOFLOXACIN 500 MG/1
500 TABLET, FILM COATED ORAL DAILY
Qty: 3 TABLET | Refills: 0 | Status: SHIPPED | OUTPATIENT
Start: 2021-03-19 | End: 2021-06-24

## 2021-06-17 ENCOUNTER — LAB VISIT (OUTPATIENT)
Dept: LAB | Facility: HOSPITAL | Age: 81
End: 2021-06-17
Attending: PHYSICIAN ASSISTANT
Payer: MEDICARE

## 2021-06-17 DIAGNOSIS — E11.69 TYPE 2 DIABETES MELLITUS WITH OTHER SPECIFIED COMPLICATION, WITHOUT LONG-TERM CURRENT USE OF INSULIN: ICD-10-CM

## 2021-06-17 LAB
ALBUMIN SERPL BCP-MCNC: 3.8 G/DL (ref 3.5–5.2)
ALP SERPL-CCNC: 70 U/L (ref 55–135)
ALT SERPL W/O P-5'-P-CCNC: 11 U/L (ref 10–44)
ANION GAP SERPL CALC-SCNC: 12 MMOL/L (ref 8–16)
AST SERPL-CCNC: 14 U/L (ref 10–40)
BILIRUB SERPL-MCNC: 0.5 MG/DL (ref 0.1–1)
BUN SERPL-MCNC: 16 MG/DL (ref 8–23)
CALCIUM SERPL-MCNC: 9.5 MG/DL (ref 8.7–10.5)
CHLORIDE SERPL-SCNC: 106 MMOL/L (ref 95–110)
CHOLEST SERPL-MCNC: 112 MG/DL (ref 120–199)
CHOLEST/HDLC SERPL: 3.5 {RATIO} (ref 2–5)
CO2 SERPL-SCNC: 23 MMOL/L (ref 23–29)
CREAT SERPL-MCNC: 0.8 MG/DL (ref 0.5–1.4)
EST. GFR  (AFRICAN AMERICAN): >60 ML/MIN/1.73 M^2
EST. GFR  (NON AFRICAN AMERICAN): >60 ML/MIN/1.73 M^2
ESTIMATED AVG GLUCOSE: 143 MG/DL (ref 68–131)
GLUCOSE SERPL-MCNC: 132 MG/DL (ref 70–110)
HBA1C MFR BLD: 6.6 % (ref 4–5.6)
HDLC SERPL-MCNC: 32 MG/DL (ref 40–75)
HDLC SERPL: 28.6 % (ref 20–50)
LDLC SERPL CALC-MCNC: 65 MG/DL (ref 63–159)
NONHDLC SERPL-MCNC: 80 MG/DL
POTASSIUM SERPL-SCNC: 4.4 MMOL/L (ref 3.5–5.1)
PROT SERPL-MCNC: 7.1 G/DL (ref 6–8.4)
SODIUM SERPL-SCNC: 141 MMOL/L (ref 136–145)
TRIGL SERPL-MCNC: 75 MG/DL (ref 30–150)

## 2021-06-17 PROCEDURE — 80061 LIPID PANEL: CPT | Performed by: PHYSICIAN ASSISTANT

## 2021-06-17 PROCEDURE — 83036 HEMOGLOBIN GLYCOSYLATED A1C: CPT | Performed by: PHYSICIAN ASSISTANT

## 2021-06-17 PROCEDURE — 80053 COMPREHEN METABOLIC PANEL: CPT | Performed by: PHYSICIAN ASSISTANT

## 2021-06-17 PROCEDURE — 36415 COLL VENOUS BLD VENIPUNCTURE: CPT | Mod: PO | Performed by: PHYSICIAN ASSISTANT

## 2021-06-24 ENCOUNTER — OFFICE VISIT (OUTPATIENT)
Dept: ENDOCRINOLOGY | Facility: CLINIC | Age: 81
End: 2021-06-24
Payer: MEDICARE

## 2021-06-24 VITALS
TEMPERATURE: 98 F | HEIGHT: 67 IN | OXYGEN SATURATION: 98 % | WEIGHT: 251.56 LBS | DIASTOLIC BLOOD PRESSURE: 72 MMHG | SYSTOLIC BLOOD PRESSURE: 140 MMHG | BODY MASS INDEX: 39.48 KG/M2 | HEART RATE: 78 BPM

## 2021-06-24 DIAGNOSIS — E11.69 TYPE 2 DIABETES MELLITUS WITH OTHER SPECIFIED COMPLICATION, WITHOUT LONG-TERM CURRENT USE OF INSULIN: Primary | ICD-10-CM

## 2021-06-24 DIAGNOSIS — E55.9 HYPOVITAMINOSIS D: ICD-10-CM

## 2021-06-24 DIAGNOSIS — E78.5 HYPERLIPIDEMIA, UNSPECIFIED HYPERLIPIDEMIA TYPE: ICD-10-CM

## 2021-06-24 DIAGNOSIS — B37.31 VAGINAL YEAST INFECTION: ICD-10-CM

## 2021-06-24 DIAGNOSIS — B35.3 TINEA PEDIS, UNSPECIFIED LATERALITY: ICD-10-CM

## 2021-06-24 DIAGNOSIS — I10 HYPERTENSION, UNSPECIFIED TYPE: ICD-10-CM

## 2021-06-24 DIAGNOSIS — Z78.0 POSTMENOPAUSAL: ICD-10-CM

## 2021-06-24 PROCEDURE — 1159F MED LIST DOCD IN RCRD: CPT | Mod: S$GLB,,, | Performed by: PHYSICIAN ASSISTANT

## 2021-06-24 PROCEDURE — 1159F PR MEDICATION LIST DOCUMENTED IN MEDICAL RECORD: ICD-10-PCS | Mod: S$GLB,,, | Performed by: PHYSICIAN ASSISTANT

## 2021-06-24 PROCEDURE — 99214 OFFICE O/P EST MOD 30 MIN: CPT | Mod: S$GLB,,, | Performed by: PHYSICIAN ASSISTANT

## 2021-06-24 PROCEDURE — 3288F FALL RISK ASSESSMENT DOCD: CPT | Mod: CPTII,S$GLB,, | Performed by: PHYSICIAN ASSISTANT

## 2021-06-24 PROCEDURE — 1126F PR PAIN SEVERITY QUANTIFIED, NO PAIN PRESENT: ICD-10-PCS | Mod: S$GLB,,, | Performed by: PHYSICIAN ASSISTANT

## 2021-06-24 PROCEDURE — 3288F PR FALLS RISK ASSESSMENT DOCUMENTED: ICD-10-PCS | Mod: CPTII,S$GLB,, | Performed by: PHYSICIAN ASSISTANT

## 2021-06-24 PROCEDURE — 1101F PR PT FALLS ASSESS DOC 0-1 FALLS W/OUT INJ PAST YR: ICD-10-PCS | Mod: CPTII,S$GLB,, | Performed by: PHYSICIAN ASSISTANT

## 2021-06-24 PROCEDURE — 1126F AMNT PAIN NOTED NONE PRSNT: CPT | Mod: S$GLB,,, | Performed by: PHYSICIAN ASSISTANT

## 2021-06-24 PROCEDURE — 1101F PT FALLS ASSESS-DOCD LE1/YR: CPT | Mod: CPTII,S$GLB,, | Performed by: PHYSICIAN ASSISTANT

## 2021-06-24 PROCEDURE — 99999 PR PBB SHADOW E&M-EST. PATIENT-LVL IV: ICD-10-PCS | Mod: PBBFAC,,, | Performed by: PHYSICIAN ASSISTANT

## 2021-06-24 PROCEDURE — 99999 PR PBB SHADOW E&M-EST. PATIENT-LVL IV: CPT | Mod: PBBFAC,,, | Performed by: PHYSICIAN ASSISTANT

## 2021-06-24 PROCEDURE — 99214 PR OFFICE/OUTPT VISIT, EST, LEVL IV, 30-39 MIN: ICD-10-PCS | Mod: S$GLB,,, | Performed by: PHYSICIAN ASSISTANT

## 2021-06-24 RX ORDER — FLUCONAZOLE 150 MG/1
TABLET ORAL
Qty: 1 TABLET | Refills: 1 | Status: SHIPPED | OUTPATIENT
Start: 2021-06-24

## 2021-08-16 LAB
LEFT EYE DM RETINOPATHY: NEGATIVE
RIGHT EYE DM RETINOPATHY: NEGATIVE

## 2021-09-07 ENCOUNTER — PATIENT OUTREACH (OUTPATIENT)
Dept: ADMINISTRATIVE | Facility: HOSPITAL | Age: 81
End: 2021-09-07

## 2021-09-07 DIAGNOSIS — E11.69 TYPE 2 DIABETES MELLITUS WITH OTHER SPECIFIED COMPLICATION, WITHOUT LONG-TERM CURRENT USE OF INSULIN: Primary | ICD-10-CM

## 2021-10-07 ENCOUNTER — IMMUNIZATION (OUTPATIENT)
Dept: PRIMARY CARE CLINIC | Facility: CLINIC | Age: 81
End: 2021-10-07
Payer: MEDICARE

## 2021-10-07 DIAGNOSIS — Z23 NEED FOR VACCINATION: Primary | ICD-10-CM

## 2021-10-07 PROCEDURE — 91300 COVID-19, MRNA, LNP-S, PF, 30 MCG/0.3 ML DOSE VACCINE: ICD-10-PCS | Mod: S$GLB,,, | Performed by: FAMILY MEDICINE

## 2021-10-07 PROCEDURE — 0003A COVID-19, MRNA, LNP-S, PF, 30 MCG/0.3 ML DOSE VACCINE: CPT | Mod: S$GLB,,, | Performed by: FAMILY MEDICINE

## 2021-10-07 PROCEDURE — 0003A COVID-19, MRNA, LNP-S, PF, 30 MCG/0.3 ML DOSE VACCINE: ICD-10-PCS | Mod: S$GLB,,, | Performed by: FAMILY MEDICINE

## 2021-10-07 PROCEDURE — 91300 COVID-19, MRNA, LNP-S, PF, 30 MCG/0.3 ML DOSE VACCINE: CPT | Mod: S$GLB,,, | Performed by: FAMILY MEDICINE

## 2021-12-20 ENCOUNTER — LAB VISIT (OUTPATIENT)
Dept: LAB | Facility: HOSPITAL | Age: 81
End: 2021-12-20
Attending: PHYSICIAN ASSISTANT
Payer: MEDICARE

## 2021-12-20 DIAGNOSIS — E11.69 TYPE 2 DIABETES MELLITUS WITH OTHER SPECIFIED COMPLICATION, WITHOUT LONG-TERM CURRENT USE OF INSULIN: ICD-10-CM

## 2021-12-20 DIAGNOSIS — E55.9 HYPOVITAMINOSIS D: ICD-10-CM

## 2021-12-20 LAB
25(OH)D3+25(OH)D2 SERPL-MCNC: 39 NG/ML (ref 30–96)
ALBUMIN SERPL BCP-MCNC: 3.6 G/DL (ref 3.5–5.2)
ANION GAP SERPL CALC-SCNC: 7 MMOL/L (ref 8–16)
BUN SERPL-MCNC: 16 MG/DL (ref 8–23)
CALCIUM SERPL-MCNC: 9.4 MG/DL (ref 8.7–10.5)
CHLORIDE SERPL-SCNC: 105 MMOL/L (ref 95–110)
CO2 SERPL-SCNC: 24 MMOL/L (ref 23–29)
CREAT SERPL-MCNC: 0.7 MG/DL (ref 0.5–1.4)
EST. GFR  (AFRICAN AMERICAN): >60 ML/MIN/1.73 M^2
EST. GFR  (NON AFRICAN AMERICAN): >60 ML/MIN/1.73 M^2
ESTIMATED AVG GLUCOSE: 134 MG/DL (ref 68–131)
GLUCOSE SERPL-MCNC: 112 MG/DL (ref 70–110)
HBA1C MFR BLD: 6.3 % (ref 4–5.6)
PHOSPHATE SERPL-MCNC: 2.7 MG/DL (ref 2.7–4.5)
POTASSIUM SERPL-SCNC: 4.3 MMOL/L (ref 3.5–5.1)
SODIUM SERPL-SCNC: 136 MMOL/L (ref 136–145)
T4 FREE SERPL-MCNC: 0.93 NG/DL (ref 0.71–1.51)
TSH SERPL DL<=0.005 MIU/L-ACNC: 1.43 UIU/ML (ref 0.4–4)

## 2021-12-20 PROCEDURE — 84439 ASSAY OF FREE THYROXINE: CPT | Performed by: PHYSICIAN ASSISTANT

## 2021-12-20 PROCEDURE — 36415 COLL VENOUS BLD VENIPUNCTURE: CPT | Mod: PO | Performed by: PHYSICIAN ASSISTANT

## 2021-12-20 PROCEDURE — 82306 VITAMIN D 25 HYDROXY: CPT | Performed by: PHYSICIAN ASSISTANT

## 2021-12-20 PROCEDURE — 84443 ASSAY THYROID STIM HORMONE: CPT | Performed by: PHYSICIAN ASSISTANT

## 2021-12-20 PROCEDURE — 80069 RENAL FUNCTION PANEL: CPT | Performed by: PHYSICIAN ASSISTANT

## 2021-12-20 PROCEDURE — 83036 HEMOGLOBIN GLYCOSYLATED A1C: CPT | Performed by: PHYSICIAN ASSISTANT

## 2022-01-25 ENCOUNTER — OFFICE VISIT (OUTPATIENT)
Dept: ENDOCRINOLOGY | Facility: CLINIC | Age: 82
End: 2022-01-25
Payer: MEDICARE

## 2022-01-25 VITALS
OXYGEN SATURATION: 97 % | DIASTOLIC BLOOD PRESSURE: 70 MMHG | BODY MASS INDEX: 37.44 KG/M2 | HEART RATE: 72 BPM | WEIGHT: 238.56 LBS | TEMPERATURE: 98 F | HEIGHT: 67 IN | SYSTOLIC BLOOD PRESSURE: 130 MMHG

## 2022-01-25 DIAGNOSIS — E55.9 HYPOVITAMINOSIS D: ICD-10-CM

## 2022-01-25 DIAGNOSIS — B37.31 VAGINAL YEAST INFECTION: ICD-10-CM

## 2022-01-25 DIAGNOSIS — I10 HYPERTENSION, UNSPECIFIED TYPE: ICD-10-CM

## 2022-01-25 DIAGNOSIS — B35.3 TINEA PEDIS, UNSPECIFIED LATERALITY: ICD-10-CM

## 2022-01-25 DIAGNOSIS — Z78.0 POSTMENOPAUSAL: ICD-10-CM

## 2022-01-25 DIAGNOSIS — E78.5 HYPERLIPIDEMIA, UNSPECIFIED HYPERLIPIDEMIA TYPE: ICD-10-CM

## 2022-01-25 DIAGNOSIS — E11.69 TYPE 2 DIABETES MELLITUS WITH OTHER SPECIFIED COMPLICATION, WITHOUT LONG-TERM CURRENT USE OF INSULIN: Primary | ICD-10-CM

## 2022-01-25 PROCEDURE — 3078F PR MOST RECENT DIASTOLIC BLOOD PRESSURE < 80 MM HG: ICD-10-PCS | Mod: CPTII,S$GLB,, | Performed by: PHYSICIAN ASSISTANT

## 2022-01-25 PROCEDURE — 1160F PR REVIEW ALL MEDS BY PRESCRIBER/CLIN PHARMACIST DOCUMENTED: ICD-10-PCS | Mod: CPTII,S$GLB,, | Performed by: PHYSICIAN ASSISTANT

## 2022-01-25 PROCEDURE — 3075F SYST BP GE 130 - 139MM HG: CPT | Mod: CPTII,S$GLB,, | Performed by: PHYSICIAN ASSISTANT

## 2022-01-25 PROCEDURE — 3288F FALL RISK ASSESSMENT DOCD: CPT | Mod: CPTII,S$GLB,, | Performed by: PHYSICIAN ASSISTANT

## 2022-01-25 PROCEDURE — 1101F PT FALLS ASSESS-DOCD LE1/YR: CPT | Mod: CPTII,S$GLB,, | Performed by: PHYSICIAN ASSISTANT

## 2022-01-25 PROCEDURE — 99999 PR PBB SHADOW E&M-EST. PATIENT-LVL IV: ICD-10-PCS | Mod: PBBFAC,,, | Performed by: PHYSICIAN ASSISTANT

## 2022-01-25 PROCEDURE — 1126F PR PAIN SEVERITY QUANTIFIED, NO PAIN PRESENT: ICD-10-PCS | Mod: CPTII,S$GLB,, | Performed by: PHYSICIAN ASSISTANT

## 2022-01-25 PROCEDURE — 99999 PR PBB SHADOW E&M-EST. PATIENT-LVL IV: CPT | Mod: PBBFAC,,, | Performed by: PHYSICIAN ASSISTANT

## 2022-01-25 PROCEDURE — 1159F MED LIST DOCD IN RCRD: CPT | Mod: CPTII,S$GLB,, | Performed by: PHYSICIAN ASSISTANT

## 2022-01-25 PROCEDURE — 3075F PR MOST RECENT SYSTOLIC BLOOD PRESS GE 130-139MM HG: ICD-10-PCS | Mod: CPTII,S$GLB,, | Performed by: PHYSICIAN ASSISTANT

## 2022-01-25 PROCEDURE — 99214 PR OFFICE/OUTPT VISIT, EST, LEVL IV, 30-39 MIN: ICD-10-PCS | Mod: S$GLB,,, | Performed by: PHYSICIAN ASSISTANT

## 2022-01-25 PROCEDURE — 99214 OFFICE O/P EST MOD 30 MIN: CPT | Mod: S$GLB,,, | Performed by: PHYSICIAN ASSISTANT

## 2022-01-25 PROCEDURE — 3288F PR FALLS RISK ASSESSMENT DOCUMENTED: ICD-10-PCS | Mod: CPTII,S$GLB,, | Performed by: PHYSICIAN ASSISTANT

## 2022-01-25 PROCEDURE — 1159F PR MEDICATION LIST DOCUMENTED IN MEDICAL RECORD: ICD-10-PCS | Mod: CPTII,S$GLB,, | Performed by: PHYSICIAN ASSISTANT

## 2022-01-25 PROCEDURE — 1160F RVW MEDS BY RX/DR IN RCRD: CPT | Mod: CPTII,S$GLB,, | Performed by: PHYSICIAN ASSISTANT

## 2022-01-25 PROCEDURE — 3078F DIAST BP <80 MM HG: CPT | Mod: CPTII,S$GLB,, | Performed by: PHYSICIAN ASSISTANT

## 2022-01-25 PROCEDURE — 1101F PR PT FALLS ASSESS DOC 0-1 FALLS W/OUT INJ PAST YR: ICD-10-PCS | Mod: CPTII,S$GLB,, | Performed by: PHYSICIAN ASSISTANT

## 2022-01-25 PROCEDURE — 1126F AMNT PAIN NOTED NONE PRSNT: CPT | Mod: CPTII,S$GLB,, | Performed by: PHYSICIAN ASSISTANT

## 2022-01-25 RX ORDER — METFORMIN HYDROCHLORIDE 1000 MG/1
1000 TABLET ORAL
Qty: 90 TABLET | Refills: 3
Start: 2022-01-25

## 2022-01-25 NOTE — PROGRESS NOTES
"CC: This 81 y.o. female presents for management of Diabetes Mellitus and chronic conditions pending review including HTN, HLP    HPI: was diagnosed with T2DM in ~15 years ago. Her    from penumonia.     She is seeing Dr. Radford in vascular. She started wearing compression stockings.    Has never been hospitalized r/t DM.  Family hx of DM: sister  Fhx of thyroid disease: mother  Denies missing doses of DM medication.   hypoglycemia at home: none  monitoring BG at home:  Fastins    Diet: 3 meals daily. She has been watching her diet. Snacks on skinny popcorn. Avoids sugary beverages.     Exercise: She has been active at home.     CURRENT DM MEDS: metformin 1000 mg bid, glimepiride 1 mg qd    Previous meds:  Jardiance    Standards of Care:  Eye exam:  q six months for Dr. Demarco for glaucoma  Podiatry exam: n/a  DE: never    DEXA scan:  wnl  PMHx, PSHx: reviewed in epic.  Social Hx: no E/T use.    Wt Readings from Last 6 Encounters:   22 108.2 kg (238 lb 8.6 oz)   21 114.1 kg (251 lb 8.7 oz)   21 116.2 kg (256 lb 2.8 oz)   20 113.2 kg (249 lb 9 oz)   10/19/20 110.9 kg (244 lb 8 oz)   20 116.3 kg (256 lb 4.6 oz)      ROS:   Gen: Appetite good, + wt loss (13 lbs since ), denies fatigue and weakness.  Skin: + lower left leg rash (PAD), Skin is intact and heals well, no rashes, no hair changes  Eyes: Denies visual disturbances  Resp: no SOB or TINSLEY, no cough  Cardiac: No palpitations, chest pain, no edema   GI: No nausea or vomiting, diarrhea, constipation, or abdominal pain.  /GYN: + vaginal itching, No nocturia, burning or pain.   MS/Neuro: Denies numbness/ tingling in BLE; Gait steady, speech clear  Psych: Denies drug/ETOH abuse, no hx of depression.  Other systems: negative.    /70 (BP Location: Left arm, Patient Position: Sitting, BP Method: Large (Manual))   Pulse 72   Temp 97.9 °F (36.6 °C) (Oral)   Ht 5' 7" (1.702 m)   Wt 108.2 kg (238 lb " 8.6 oz)   SpO2 97%   BMI 37.36 kg/m²      PE:  GENERAL: elderly female, well developed, well nourished.  PSYCH: AAOx3, appropriate mood and affect, pleasant expression, conversant, appears relaxed, well groomed.   EYES: Conjunctiva, corneas clear  NECK: Supple, trachea midline,no thyromegaly or nodules  CHEST: Resp even and unlabored, CTA bilateral.  CARDIAC: RRR, S1, S2 heard, no murmurs, + gallop  VASCULAR: DP pulses +2/4 bilaterally, +1 edema bl.  NEURO: Gait steady, CN ll-Xll grossly intact  SKIN: Skin warm and dry no acanthosis nigracans.  9/20  Foot Exam: Declines foot exam. Plans to make an apt.    Personally reviewed labs below:    No visits with results within 1 Month(s) from this visit.   Latest known visit with results is:   Lab Visit on 12/20/2021   Component Date Value Ref Range Status    TSH 12/20/2021 1.427  0.400 - 4.000 uIU/mL Final    Free T4 12/20/2021 0.93  0.71 - 1.51 ng/dL Final    Glucose 12/20/2021 112* 70 - 110 mg/dL Final    Sodium 12/20/2021 136  136 - 145 mmol/L Final    Potassium 12/20/2021 4.3  3.5 - 5.1 mmol/L Final    Chloride 12/20/2021 105  95 - 110 mmol/L Final    CO2 12/20/2021 24  23 - 29 mmol/L Final    BUN 12/20/2021 16  8 - 23 mg/dL Final    Calcium 12/20/2021 9.4  8.7 - 10.5 mg/dL Final    Creatinine 12/20/2021 0.7  0.5 - 1.4 mg/dL Final    Albumin 12/20/2021 3.6  3.5 - 5.2 g/dL Final    Phosphorus 12/20/2021 2.7  2.7 - 4.5 mg/dL Final    eGFR if African American 12/20/2021 >60.0  >60 mL/min/1.73 m^2 Final    eGFR if non African American 12/20/2021 >60.0  >60 mL/min/1.73 m^2 Final    Comment: Calculation used to obtain the estimated glomerular filtration  rate (eGFR) is the CKD-EPI equation.       Anion Gap 12/20/2021 7* 8 - 16 mmol/L Final    Hemoglobin A1C 12/20/2021 6.3* 4.0 - 5.6 % Final    Comment: ADA Screening Guidelines:  5.7-6.4%  Consistent with prediabetes  >or=6.5%  Consistent with diabetes    High levels of fetal hemoglobin interfere with the  HbA1C  assay. Heterozygous hemoglobin variants (HbS, HgC, etc)do  not significantly interfere with this assay.   However, presence of multiple variants may affect accuracy.      Estimated Avg Glucose 12/20/2021 134* 68 - 131 mg/dL Final    Vit D, 25-Hydroxy 12/20/2021 39  30 - 96 ng/mL Final    Comment: Vitamin D deficiency.........<10 ng/mL                              Vitamin D insufficiency......10-29 ng/mL       Vitamin D sufficiency........> or equal to 30 ng/mL  Vitamin D toxicity............>100 ng/mL           ASSESSMENT and PLAN:    1. Type 2 diabetes mellitus with other specified complication, without long-term current use of insulin  Hemoglobin A1C    Comprehensive Metabolic Panel    Lipid Panel    metFORMIN (GLUCOPHAGE) 1000 MG tablet   2. Hypertension, unspecified type     3. Hyperlipidemia, unspecified hyperlipidemia type     4. Postmenopausal  DXA Bone Density Spine And Hip   5. Hypovitaminosis D     6. Tinea pedis, unspecified laterality     7. Vaginal yeast infection        T2DM with hyperglycemia-A1c is below goal. Decrease Metformin to 1000 mg every morning. Continue Glimepiride. Schedule a foot exam. Discussed A1c and BG goals.   Reviewed  hypoglycemia, s/s and appropriate tx.   Instructed to monitor BG and bring meter/ log to every clinic visit.   - takes ASA, ACEi, statin  HTN - controlled, continue meds as previously prescribed and monitor.   HLP - LDL today , on statin therapy, LFTs WNL  Postmenopausal-DEXA scan 9/22.  Hypovitaminosis [id number] vitamin D.     Follow-up: in 6 mths w/ labs and dexa prior

## 2022-03-28 ENCOUNTER — HOSPITAL ENCOUNTER (OUTPATIENT)
Dept: RADIOLOGY | Facility: HOSPITAL | Age: 82
Discharge: HOME | End: 2022-03-28
Attending: INTERNAL MEDICINE
Payer: MEDICARE

## 2022-03-28 DIAGNOSIS — Z12.31 VISIT FOR SCREENING MAMMOGRAM: ICD-10-CM

## 2022-03-28 PROCEDURE — 77067 SCR MAMMO BI INCL CAD: CPT | Mod: TC,PO

## 2022-03-28 PROCEDURE — 77063 BREAST TOMOSYNTHESIS BI: CPT | Mod: TC,PO

## 2022-06-09 ENCOUNTER — IMMUNIZATION (OUTPATIENT)
Dept: PRIMARY CARE CLINIC | Facility: CLINIC | Age: 82
End: 2022-06-09
Payer: MEDICARE

## 2022-06-09 DIAGNOSIS — Z23 NEED FOR VACCINATION: Primary | ICD-10-CM

## 2022-06-09 PROCEDURE — 0054A PR IMMUNIZ ADMIN, SARS-COV-2 COVID-19 VACC, TRI SUCROSE, 30MCG/0.3ML, BOOSTER DOSE: CPT | Mod: S$GLB,,, | Performed by: FAMILY MEDICINE

## 2022-06-09 PROCEDURE — 0054A PR IMMUNIZ ADMIN, SARS-COV-2 COVID-19 VACC, TRI SUCROSE, 30MCG/0.3ML, BOOSTER DOSE: ICD-10-PCS | Mod: S$GLB,,, | Performed by: FAMILY MEDICINE

## 2022-06-09 PROCEDURE — 91305 COVID-19, MRNA, LNP-S, PF, 30 MCG/0.3 ML DOSE VACCINE (PFIZER): CPT | Mod: S$GLB,,, | Performed by: FAMILY MEDICINE

## 2022-06-09 PROCEDURE — 91305 COVID-19, MRNA, LNP-S, PF, 30 MCG/0.3 ML DOSE VACCINE (PFIZER): ICD-10-PCS | Mod: S$GLB,,, | Performed by: FAMILY MEDICINE

## 2022-09-26 ENCOUNTER — HOSPITAL ENCOUNTER (OUTPATIENT)
Dept: RADIOLOGY | Facility: CLINIC | Age: 82
Discharge: HOME | End: 2022-09-26
Attending: PHYSICIAN ASSISTANT
Payer: MEDICARE

## 2022-09-26 DIAGNOSIS — Z78.0 POSTMENOPAUSAL: ICD-10-CM

## 2022-09-26 PROCEDURE — 77080 DXA BONE DENSITY AXIAL: CPT | Mod: 26,,, | Performed by: RADIOLOGY

## 2022-09-26 PROCEDURE — 77080 DXA BONE DENSITY AXIAL: CPT | Mod: TC,PO

## 2022-09-26 PROCEDURE — 77080 DEXA BONE DENSITY SPINE HIP: ICD-10-PCS | Mod: 26,,, | Performed by: RADIOLOGY

## 2022-10-03 ENCOUNTER — OFFICE VISIT (OUTPATIENT)
Dept: ENDOCRINOLOGY | Facility: CLINIC | Age: 82
End: 2022-10-03
Payer: MEDICARE

## 2022-10-03 VITALS
BODY MASS INDEX: 35.19 KG/M2 | HEART RATE: 69 BPM | SYSTOLIC BLOOD PRESSURE: 140 MMHG | WEIGHT: 224.19 LBS | TEMPERATURE: 98 F | DIASTOLIC BLOOD PRESSURE: 60 MMHG | HEIGHT: 67 IN | OXYGEN SATURATION: 97 %

## 2022-10-03 DIAGNOSIS — E78.5 HYPERLIPIDEMIA, UNSPECIFIED HYPERLIPIDEMIA TYPE: ICD-10-CM

## 2022-10-03 DIAGNOSIS — I10 HYPERTENSION, UNSPECIFIED TYPE: ICD-10-CM

## 2022-10-03 DIAGNOSIS — E55.9 HYPOVITAMINOSIS D: ICD-10-CM

## 2022-10-03 DIAGNOSIS — E11.65 TYPE 2 DIABETES MELLITUS WITH HYPERGLYCEMIA, WITHOUT LONG-TERM CURRENT USE OF INSULIN: Primary | ICD-10-CM

## 2022-10-03 DIAGNOSIS — Z78.0 POSTMENOPAUSAL: ICD-10-CM

## 2022-10-03 PROCEDURE — 3288F FALL RISK ASSESSMENT DOCD: CPT | Mod: CPTII,S$GLB,, | Performed by: PHYSICIAN ASSISTANT

## 2022-10-03 PROCEDURE — 3078F DIAST BP <80 MM HG: CPT | Mod: CPTII,S$GLB,, | Performed by: PHYSICIAN ASSISTANT

## 2022-10-03 PROCEDURE — 99214 OFFICE O/P EST MOD 30 MIN: CPT | Mod: S$GLB,,, | Performed by: PHYSICIAN ASSISTANT

## 2022-10-03 PROCEDURE — 3077F SYST BP >= 140 MM HG: CPT | Mod: CPTII,S$GLB,, | Performed by: PHYSICIAN ASSISTANT

## 2022-10-03 PROCEDURE — 3288F PR FALLS RISK ASSESSMENT DOCUMENTED: ICD-10-PCS | Mod: CPTII,S$GLB,, | Performed by: PHYSICIAN ASSISTANT

## 2022-10-03 PROCEDURE — 1101F PT FALLS ASSESS-DOCD LE1/YR: CPT | Mod: CPTII,S$GLB,, | Performed by: PHYSICIAN ASSISTANT

## 2022-10-03 PROCEDURE — 3078F PR MOST RECENT DIASTOLIC BLOOD PRESSURE < 80 MM HG: ICD-10-PCS | Mod: CPTII,S$GLB,, | Performed by: PHYSICIAN ASSISTANT

## 2022-10-03 PROCEDURE — 3077F PR MOST RECENT SYSTOLIC BLOOD PRESSURE >= 140 MM HG: ICD-10-PCS | Mod: CPTII,S$GLB,, | Performed by: PHYSICIAN ASSISTANT

## 2022-10-03 PROCEDURE — 1125F PR PAIN SEVERITY QUANTIFIED, PAIN PRESENT: ICD-10-PCS | Mod: CPTII,S$GLB,, | Performed by: PHYSICIAN ASSISTANT

## 2022-10-03 PROCEDURE — 1160F RVW MEDS BY RX/DR IN RCRD: CPT | Mod: CPTII,S$GLB,, | Performed by: PHYSICIAN ASSISTANT

## 2022-10-03 PROCEDURE — 99214 PR OFFICE/OUTPT VISIT, EST, LEVL IV, 30-39 MIN: ICD-10-PCS | Mod: S$GLB,,, | Performed by: PHYSICIAN ASSISTANT

## 2022-10-03 PROCEDURE — 1160F PR REVIEW ALL MEDS BY PRESCRIBER/CLIN PHARMACIST DOCUMENTED: ICD-10-PCS | Mod: CPTII,S$GLB,, | Performed by: PHYSICIAN ASSISTANT

## 2022-10-03 PROCEDURE — 1159F PR MEDICATION LIST DOCUMENTED IN MEDICAL RECORD: ICD-10-PCS | Mod: CPTII,S$GLB,, | Performed by: PHYSICIAN ASSISTANT

## 2022-10-03 PROCEDURE — 1125F AMNT PAIN NOTED PAIN PRSNT: CPT | Mod: CPTII,S$GLB,, | Performed by: PHYSICIAN ASSISTANT

## 2022-10-03 PROCEDURE — 1159F MED LIST DOCD IN RCRD: CPT | Mod: CPTII,S$GLB,, | Performed by: PHYSICIAN ASSISTANT

## 2022-10-03 PROCEDURE — 99999 PR PBB SHADOW E&M-EST. PATIENT-LVL IV: CPT | Mod: PBBFAC,,, | Performed by: PHYSICIAN ASSISTANT

## 2022-10-03 PROCEDURE — 1101F PR PT FALLS ASSESS DOC 0-1 FALLS W/OUT INJ PAST YR: ICD-10-PCS | Mod: CPTII,S$GLB,, | Performed by: PHYSICIAN ASSISTANT

## 2022-10-03 PROCEDURE — 99999 PR PBB SHADOW E&M-EST. PATIENT-LVL IV: ICD-10-PCS | Mod: PBBFAC,,, | Performed by: PHYSICIAN ASSISTANT

## 2022-10-03 RX ORDER — EZETIMIBE 10 MG/1
10 TABLET ORAL DAILY
Qty: 90 TABLET | Refills: 3 | Status: SHIPPED | OUTPATIENT
Start: 2022-10-03 | End: 2023-10-03

## 2022-10-03 RX ORDER — GABAPENTIN 300 MG/1
CAPSULE ORAL
Qty: 90 CAPSULE | Refills: 3 | Status: SHIPPED | OUTPATIENT
Start: 2022-10-03

## 2022-10-03 NOTE — PATIENT INSTRUCTIONS
Start Gabapentin 300 mg nightly for nerve pain. Start zetia 10 mg daily to decrease cholesterol. Start vitamin d 2000 IU daily.

## 2022-10-03 NOTE — PROGRESS NOTES
"CC: This 82 y.o. female presents for management of Diabetes Mellitus and chronic conditions pending review including HTN, HLP    HPI: was diagnosed with T2DM in ~15 years ago. Her    from penumonia.     She is seeing Dr. Radford in vascular. She started wearing compression stockings.    Has never been hospitalized r/t DM.  Family hx of DM: sister  Fhx of thyroid disease: mother  Denies missing doses of DM medication.   hypoglycemia at home: none  monitoring BG at home:  Fastins    Diet: 3 meals daily. She has been watching her diet. Snacks on skinny popcorn. Avoids sugary beverages.     Exercise: She has been active at home.     CURRENT DM MEDS: metformin 1000 mg bid, glimepiride 1 mg qd    Previous meds:  Jardiance    Standards of Care:  Eye exam:   Dr. Demarco for glaucoma  Podiatry exam: n/a  DE: never    DEXA scan:  wnl  PMHx, PSHx: reviewed in epic.  Social Hx: no E/T use.    Wt Readings from Last 6 Encounters:   10/03/22 101.7 kg (224 lb 3.3 oz)   22 108.2 kg (238 lb 8.6 oz)   21 114.1 kg (251 lb 8.7 oz)   21 116.2 kg (256 lb 2.8 oz)   20 113.2 kg (249 lb 9 oz)   10/19/20 110.9 kg (244 lb 8 oz)      ROS:   Gen: Appetite good, + wt loss (14 lbs since ), denies fatigue and weakness.  Skin: + lower left leg rash (PAD), Skin is intact and heals well, no rashes, no hair changes  Eyes: Denies visual disturbances  Resp: no SOB or TINSLEY, no cough  Cardiac: No palpitations, chest pain, no edema   GI: No nausea or vomiting, diarrhea, constipation, or abdominal pain.  /GYN: + vaginal itching, No nocturia, burning or pain.   MS/Neuro: + numbness/ tingling in BLE; Gait steady, speech clear  Psych: Denies drug/ETOH abuse, no hx of depression.  Other systems: negative.    BP (!) 140/60 (BP Location: Left arm, Patient Position: Sitting, BP Method: Large (Manual))   Pulse 69   Temp 98.3 °F (36.8 °C) (Oral)   Ht 5' 7" (1.702 m)   Wt 101.7 kg (224 lb 3.3 oz)   SpO2 " 97%   BMI 35.12 kg/m²      PE:  GENERAL: elderly female, well developed, well nourished.  PSYCH: AAOx3, appropriate mood and affect, pleasant expression, conversant, appears relaxed, well groomed.   EYES: Conjunctiva, corneas clear  NECK: Supple, trachea midline,no thyromegaly or nodules  CHEST: Resp even and unlabored, CTA bilateral.  CARDIAC: RRR, S1, S2 heard, no murmurs, + gallop  VASCULAR: DP pulses +2/4 bilaterally, +1 edema bl.  NEURO: Gait steady, CN ll-Xll grossly intact  SKIN: Skin warm and dry no acanthosis nigracans.  9/22  Foot Exam: no sores or macerations noted. Right hallux nail is bent back and growing toward her second toe. Left hallux has an ingrown nail.     Protective Sensation (w/ 10 gram monofilament):  Right: Intact  Left: Intact    Visual Inspection:  Normal -  Bilateral, Nails Intact - without Evidence of Foot Deformity- Bilateral, Dry Skin -  Bilateral, and Onychomycosis -  Bilateral    Pedal Pulses:   Right: Present  Left: Present    Posterior tibialis:   Right:Present  Left: Present     Vibratory Sensation  Right:Positive  Left:Positive     Personally reviewed labs below:    9/20/22 Dr. Abarca  CMP wnl  CBC wnl  VD 37  PTH 16  TSH w/ reflex to FT4 0.83  Chol 190  HDL 37    MAC 4    No visits with results within 1 Month(s) from this visit.   Latest known visit with results is:   Lab Visit on 12/20/2021   Component Date Value Ref Range Status    TSH 12/20/2021 1.427  0.400 - 4.000 uIU/mL Final    Free T4 12/20/2021 0.93  0.71 - 1.51 ng/dL Final    Glucose 12/20/2021 112 (H)  70 - 110 mg/dL Final    Sodium 12/20/2021 136  136 - 145 mmol/L Final    Potassium 12/20/2021 4.3  3.5 - 5.1 mmol/L Final    Chloride 12/20/2021 105  95 - 110 mmol/L Final    CO2 12/20/2021 24  23 - 29 mmol/L Final    BUN 12/20/2021 16  8 - 23 mg/dL Final    Calcium 12/20/2021 9.4  8.7 - 10.5 mg/dL Final    Creatinine 12/20/2021 0.7  0.5 - 1.4 mg/dL Final    Albumin 12/20/2021 3.6  3.5 - 5.2 g/dL Final     Phosphorus 12/20/2021 2.7  2.7 - 4.5 mg/dL Final    eGFR if African American 12/20/2021 >60.0  >60 mL/min/1.73 m^2 Final    eGFR if non African American 12/20/2021 >60.0  >60 mL/min/1.73 m^2 Final    Comment: Calculation used to obtain the estimated glomerular filtration  rate (eGFR) is the CKD-EPI equation.       Anion Gap 12/20/2021 7 (L)  8 - 16 mmol/L Final    Hemoglobin A1C 12/20/2021 6.3 (H)  4.0 - 5.6 % Final    Comment: ADA Screening Guidelines:  5.7-6.4%  Consistent with prediabetes  >or=6.5%  Consistent with diabetes    High levels of fetal hemoglobin interfere with the HbA1C  assay. Heterozygous hemoglobin variants (HbS, HgC, etc)do  not significantly interfere with this assay.   However, presence of multiple variants may affect accuracy.      Estimated Avg Glucose 12/20/2021 134 (H)  68 - 131 mg/dL Final    Vit D, 25-Hydroxy 12/20/2021 39  30 - 96 ng/mL Final    Comment: Vitamin D deficiency.........<10 ng/mL                              Vitamin D insufficiency......10-29 ng/mL       Vitamin D sufficiency........> or equal to 30 ng/mL  Vitamin D toxicity............>100 ng/mL         EXAMINATION:  DEXA BONE DENSITY SPINE HIP     CLINICAL HISTORY:  Asymptomatic menopausal state     TECHNIQUE:  DXA scanning was performed over the left hip and lumbar spine.  Review of the images confirms satisfactory positioning and technique.     COMPARISON:  09/23/2020     FINDINGS:  The L1 to L4 vertebral bone mineral density is equal to 1.415 g/cm squared with a T score of 2.4.     The left femoral neck bone mineral density is equal to 1.121 g/cm squared with a T score of 1.2.     There is a 1.8% risk of a major osteoporotic fracture and a 0.1% risk of hip fracture in the next 10 years (FRAX).     Impression:     No evidence of significant bone density loss.     Consider FDA approved medical therapies in postmenopausal women and men aged 50 years and older, based on the following:     *A hip or vertebral (clinical or  morphometric) fracture  *T score less than or equal to -2.5 at the femoral neck or spine after appropriate evaluation to exclude secondary causes.  *Low bone mass -- also known as osteopenia (T score between -1.0 and -2.5 at the femoral neck or spine) and a 10 year probability of hip fracture greater than or equal to 3% or a 10 year probability of major osteoporosis-related fracture greater than or equal to 20% based on the [place]-adapted WHO algorithm.  *Clinicians judgment and/or patient preference may indicate treatment for people with 10 year fracture probabilities is above or below these levels.        Electronically signed by: Zacarias Duran  Date:                                            09/26/2022  Time:                                           09:15  ASSESSMENT and PLAN:    1. Type 2 diabetes mellitus with hyperglycemia, without long-term current use of insulin  gabapentin (NEURONTIN) 300 MG capsule    Hemoglobin A1C    Renal Function Panel    Ambulatory referral/consult to Podiatry      2. Hypertension, unspecified type        3. Hyperlipidemia, unspecified hyperlipidemia type        4. Postmenopausal        5. Hypovitaminosis D           T2DM with hyperglycemia, neuropathy-A1c is below goal. Decrease Metformin to 1000 mg every morning. Continue Glimepiride. Referral to podiatry. Start gabapentin 300 mg nightly. Discussed A1c and BG goals.   Reviewed  hypoglycemia, s/s and appropriate tx.   Instructed to monitor BG and bring meter/ log to every clinic visit.   - takes ASA, ACEi,   HTN - controlled, continue meds as previously prescribed and monitor.   HLP - elevated LDL , did not tolerate statin, LFTs WNL. Start zetia 10 mg daily.  Postmenopausal-DEXA scan 9/24.  Hypovitaminosis d-low normal-start taking 2000 IU of vd daily.    Cmp, lp in two months  Referral to podiatry   Follow-up: in 6 mths w/ labs

## 2022-10-07 NOTE — PROGRESS NOTES
Spoke with Ms. Williamson & informed her that bempedoic acid 180 mg Tab has been sent to the [facility] & she verbalized an understanding.

## 2022-10-11 ENCOUNTER — TELEPHONE (OUTPATIENT)
Dept: PHARMACY | Facility: CLINIC | Age: 82
End: 2022-10-11
Payer: MEDICARE

## 2022-10-11 DIAGNOSIS — E78.5 HYPERLIPIDEMIA, UNSPECIFIED HYPERLIPIDEMIA TYPE: Primary | ICD-10-CM

## 2022-10-12 ENCOUNTER — TELEPHONE (OUTPATIENT)
Dept: ENDOCRINOLOGY | Facility: CLINIC | Age: 82
End: 2022-10-12
Payer: MEDICARE

## 2022-10-12 ENCOUNTER — TELEPHONE (OUTPATIENT)
Dept: PHARMACY | Facility: CLINIC | Age: 82
End: 2022-10-12
Payer: MEDICARE

## 2022-10-12 NOTE — TELEPHONE ENCOUNTER
Read message to patient about medication being sent to pharmacy assistance. Advised they we are trying to get this for her free or at a cheaper price. We will reach back out to her as we hear more information.

## 2022-10-12 NOTE — TELEPHONE ENCOUNTER
----- Message from MONTY Corona PA-C sent at 10/11/2022 10:34 PM CDT -----  They sent for pt assistance.  ----- Message -----  From: Leana Hagan MA  Sent: 10/11/2022   4:31 PM CDT  To: MONTY Corona PA-C    Patient asked if something cheaper , the medication was 100.00  ----- Message -----  From: Chrissy Hamlin  Sent: 10/11/2022  12:02 PM CDT  To: Chloe Ramos Staff    .Type:  Patient Call Back    Who Called: PT       Does the patient know what this is regarding?: Pt needs to speak with the office about the medication bempedoic acid 180 mg Tab    Would the patient rather a call back yes     Best Call Back Number: [phone]    Additional Information: Thank You

## 2022-10-12 NOTE — TELEPHONE ENCOUNTER
Spoke with patient about referral for Nexitol.  Explained to the patient the only program available for this medication is the [employer] [facility].  The program is closed at the moment.  I was able to put the patient on the waiting list and as soon as it opens back up I will receive an email to put the patient on it.Patient understands.

## 2022-10-18 ENCOUNTER — TELEPHONE (OUTPATIENT)
Dept: ENDOCRINOLOGY | Facility: CLINIC | Age: 82
End: 2022-10-18
Payer: MEDICARE

## 2022-10-18 NOTE — TELEPHONE ENCOUNTER
----- Message from Rosibel Caldwell sent at 10/18/2022  1:33 PM CDT -----  Contact: Patient  Type:  Needs Medical Advice    Who Called:  Patient     Would the patient rather a call back or a response via [facility]? Call     Best Call Back Number: [phone] (home)      Additional Information:  Patient called and would like to speak with nurse in regards to a question that she has about a medication.     Please call to advise

## 2022-11-08 ENCOUNTER — OFFICE VISIT (OUTPATIENT)
Dept: PODIATRY | Facility: CLINIC | Age: 82
End: 2022-11-08
Payer: MEDICARE

## 2022-11-08 VITALS — WEIGHT: 216 LBS | HEIGHT: 67 IN | BODY MASS INDEX: 33.9 KG/M2

## 2022-11-08 DIAGNOSIS — E11.51 TYPE II DIABETES MELLITUS WITH PERIPHERAL CIRCULATORY DISORDER: ICD-10-CM

## 2022-11-08 DIAGNOSIS — E11.49 TYPE II DIABETES MELLITUS WITH NEUROLOGICAL MANIFESTATIONS: Primary | ICD-10-CM

## 2022-11-08 DIAGNOSIS — I83.893 VARICOSE VEINS OF LEG WITH EDEMA, BILATERAL: ICD-10-CM

## 2022-11-08 PROCEDURE — 1101F PR PT FALLS ASSESS DOC 0-1 FALLS W/OUT INJ PAST YR: ICD-10-PCS | Mod: CPTII,S$GLB,, | Performed by: PODIATRIST

## 2022-11-08 PROCEDURE — 3288F FALL RISK ASSESSMENT DOCD: CPT | Mod: CPTII,S$GLB,, | Performed by: PODIATRIST

## 2022-11-08 PROCEDURE — 99213 PR OFFICE/OUTPT VISIT, EST, LEVL III, 20-29 MIN: ICD-10-PCS | Mod: S$GLB,,, | Performed by: PODIATRIST

## 2022-11-08 PROCEDURE — 99213 OFFICE O/P EST LOW 20 MIN: CPT | Mod: S$GLB,,, | Performed by: PODIATRIST

## 2022-11-08 PROCEDURE — 1159F MED LIST DOCD IN RCRD: CPT | Mod: CPTII,S$GLB,, | Performed by: PODIATRIST

## 2022-11-08 PROCEDURE — 1160F RVW MEDS BY RX/DR IN RCRD: CPT | Mod: CPTII,S$GLB,, | Performed by: PODIATRIST

## 2022-11-08 PROCEDURE — 1126F PR PAIN SEVERITY QUANTIFIED, NO PAIN PRESENT: ICD-10-PCS | Mod: CPTII,S$GLB,, | Performed by: PODIATRIST

## 2022-11-08 PROCEDURE — 1160F PR REVIEW ALL MEDS BY PRESCRIBER/CLIN PHARMACIST DOCUMENTED: ICD-10-PCS | Mod: CPTII,S$GLB,, | Performed by: PODIATRIST

## 2022-11-08 PROCEDURE — 1126F AMNT PAIN NOTED NONE PRSNT: CPT | Mod: CPTII,S$GLB,, | Performed by: PODIATRIST

## 2022-11-08 PROCEDURE — 1159F PR MEDICATION LIST DOCUMENTED IN MEDICAL RECORD: ICD-10-PCS | Mod: CPTII,S$GLB,, | Performed by: PODIATRIST

## 2022-11-08 PROCEDURE — 1101F PT FALLS ASSESS-DOCD LE1/YR: CPT | Mod: CPTII,S$GLB,, | Performed by: PODIATRIST

## 2022-11-08 PROCEDURE — 3288F PR FALLS RISK ASSESSMENT DOCUMENTED: ICD-10-PCS | Mod: CPTII,S$GLB,, | Performed by: PODIATRIST

## 2022-11-08 RX ORDER — ATORVASTATIN CALCIUM 20 MG/1
20 TABLET, FILM COATED ORAL DAILY
COMMUNITY
Start: 2022-10-20

## 2022-11-08 NOTE — PROGRESS NOTES
"  [address] [place] [address]  Phone: [phone]   [phone]    Patient's PCP:Primary Doctor No  Referring Provider: No ref. provider found    Subjective:      Chief Complaint:: Diabetic Foot Exam    HPI  Delilah Williamson is a 82 y.o. female who presents today for a diabetic foot exam.  Pt has seen Dr. Torre who treats them for their diabetes.  Pt has been a diabetic for 20 years.  Taking Metformin to treat diabetes.    Blood sugar: 115  Hemoglobin A1C: 6.3        Vitals:    11/08/22 1117   Weight: 98 kg (216 lb)   Height: 5' 7" (1.702 m)   PainSc: 0-No pain      Shoe Size: 10    Past Surgical History:   Procedure Laterality Date    CATARACT EXTRACTION      ROTATOR CUFF REPAIR       Past Medical History:   Diagnosis Date    Diabetes mellitus, type 2     Glaucoma     Hyperlipidemia     Hypertension      Family History   Problem Relation Age of Onset    Breast cancer Paternal Aunt         Social History:   Marital Status: Single  Alcohol History:  has no history on file for alcohol use.  Tobacco History:  reports that she has never smoked. She has never used smokeless tobacco.  Drug History:  has no history on file for drug use.    Review of patient's allergies indicates:  No Known Allergies    Current Outpatient Medications   Medication Sig Dispense Refill    acac/inulin/c-lose/mv-mn/folic (FIBER PLUS MULITVITAMIN ORAL) Take by mouth.      amLODIPine (NORVASC) 5 MG tablet Take 5 mg by mouth once daily.      atorvastatin (LIPITOR) 20 MG tablet Take 20 mg by mouth once daily.      bempedoic acid 180 mg Tab Take 1 tablet (180 mg total) by mouth once daily. 90 tablet 0    ezetimibe (ZETIA) 10 mg tablet Take 1 tablet (10 mg total) by mouth once daily. 90 tablet 3    fluconazole (DIFLUCAN) 150 MG Tab Take one tablet once. Repeat in seven days if symptoms continue. 1 tablet 1    furosemide (LASIX) 20 MG tablet Take 20 mg by mouth once daily.      gabapentin (NEURONTIN) 300 MG capsule Take one " capsule nightly for nerve pain. 90 capsule 3    latanoprost 0.005 % ophthalmic solution INSTILL 1 DROP INTO BOTH EYES AT BEDTIME AS DIRECTED INSTILL 1 DROP INTO BOTH EYES AT BEDTIME      metFORMIN (GLUCOPHAGE) 1000 MG tablet Take 1 tablet (1,000 mg total) by mouth daily with breakfast. 90 tablet 3    metoprolol succinate (TOPROL-XL) 50 MG 24 hr tablet Take 50 mg by mouth every evening.      potassium chloride (KLOR-CON) 10 MEQ TbSR Take 10 mEq by mouth once.      telmisartan (MICARDIS) 80 MG Tab Take 80 mg by mouth once daily.      glimepiride (AMARYL) 1 MG tablet Take 1 tablet (1 mg total) by mouth before breakfast. 90 tablet 3     No current facility-administered medications for this visit.       Review of Systems      Objective:        Physical Exam:   Foot Exam    General  General Appearance: appears stated age and healthy   Orientation: alert and oriented to person, place, and time   Affect: appropriate   Gait: antalgic       Right Foot/Ankle     Inspection and Palpation  Ecchymosis: none  Tenderness: none   Swelling: (Pitting edema lower extremity)  Arch: pes planus  Hammertoes: second toe, third toe and fourth toe  Hallux valgus: yes  Skin Exam: skin intact;   Fungus Toenails: present    Neurovascular  Dorsalis pedis: absent  Posterior tibial: 1+  Capillary Refill: 3+  Varicose veins: present  Saphenous nerve sensation: diminished  Tibial nerve sensation: diminished  Superficial peroneal nerve sensation: diminished  Deep peroneal nerve sensation: diminished  Sural nerve sensation: diminished      Left Foot/Ankle      Inspection and Palpation  Ecchymosis: none  Tenderness: none   Swelling: (Pitting edema lower extremity)  Arch: pes planus  Hammertoes: second toe, third toe and fourth toe  Hallux valgus: yes  Skin Exam: skin intact;   Fungus Toenails: present    Neurovascular  Dorsalis pedis: absent  Posterior tibial: 1+  Capillary refill: 3+  Varicose veins: present  Saphenous nerve sensation:  diminished  Tibial nerve sensation: diminished  Superficial peroneal nerve sensation: diminished  Deep peroneal nerve sensation: diminished  Sural nerve sensation: diminished      Physical Exam  Cardiovascular:      Pulses:           Dorsalis pedis pulses are absent on the right side and absent on the left side.        Posterior tibial pulses are 1+ on the right side and 1+ on the left side.   Musculoskeletal:      Right foot: Bunion present.      Left foot: Bunion present.   Feet:      Right foot:      Toenail Condition: Fungal disease present.     Left foot:      Toenail Condition: Fungal disease present.      Imaging:            Assessment:       1. Type II diabetes mellitus with neurological manifestations    2. Type II diabetes mellitus with peripheral circulatory disorder    3. Varicose veins of leg with edema, bilateral      Plan:   Type II diabetes mellitus with neurological manifestations    Type II diabetes mellitus with peripheral circulatory disorder    Varicose veins of leg with edema, bilateral    1. Evaluated patient today she has no pre ulcerative lesions she does have weak pulses and some loss of sensation.  I see no high risk problems with her feet today.  She is to monitor for any changes she is continue wide mouth shoes diabetic shoes no barefoot.  She is return 1 year or as needed for evaluation.    Counseling/Education:  I provided patient education verbally regarding:   The aspects of diabetes and how it pertains to the feet. I explained the importance of proper diabetic foot care and how it is essential for the health of their feet.    I discussed the importance of knowing their Hemoglobin A1c and that the level needs to be as close to 6 as possible. I discussed the increase complications of high blood sugar including stroke, blindness, heart attack, kidney failure and loss of limb secondary to neuropathy and PVD.     With neuropathy, beware of any breaks in the skin or redness. These areas  are not recognized early due to the numbness.    I discussed Diabetes, lower back issues, metabolic disorders, systemic causes, chemotherapy, vitamin deficiency, heavy metal exposure, as some of the causes. I also explained that as much as 40% of the time we can not find a cause. I discussed different treatments available to control the symptoms but which may not cure the problem.         Procedures          Counseling:     I provided patient education verbally regarding:   Patient diagnosis, treatment options, as well as alternatives, risks, and benefits.     This note was created using Dragon voice recognition software that occasionally misinterpreted phrases or words.

## 2023-05-03 DIAGNOSIS — Z12.31 ENCOUNTER FOR SCREENING MAMMOGRAM FOR MALIGNANT NEOPLASM OF BREAST: Primary | ICD-10-CM

## 2023-05-10 ENCOUNTER — HOSPITAL ENCOUNTER (OUTPATIENT)
Dept: RADIOLOGY | Facility: HOSPITAL | Age: 83
Discharge: HOME | End: 2023-05-10
Attending: FAMILY MEDICINE
Payer: MEDICARE

## 2023-05-10 VITALS — HEIGHT: 67 IN | BODY MASS INDEX: 33.91 KG/M2 | WEIGHT: 216.06 LBS

## 2023-05-10 DIAGNOSIS — Z12.31 ENCOUNTER FOR SCREENING MAMMOGRAM FOR MALIGNANT NEOPLASM OF BREAST: ICD-10-CM

## 2023-05-10 PROCEDURE — 77067 SCR MAMMO BI INCL CAD: CPT | Mod: TC,PO

## 2023-10-05 ENCOUNTER — NURSE TRIAGE (OUTPATIENT)
Dept: ADMINISTRATIVE | Facility: CLINIC | Age: 83
End: 2023-10-05
Payer: MEDICARE

## 2023-10-05 NOTE — TELEPHONE ENCOUNTER
Patient called to schedule a new Covid-19 Booster. Patient advised that the supply of the new Covid-19 Booster is not available at this time. Patient advised to call back in mid-late October, 2023. Patient states understanding at this time.       Reason for Disposition   General information question, no triage required and triager able to answer question    Additional Information   Negative: New-onset or worsening symptoms, see that protocol (e.g., diarrhea, runny nose, sore throat)   Negative: Medicine question not related to refill or renewal   Negative: Requesting a renewal or refill of a medicine patient is currently taking   Negative: Questions or concerns about high blood pressure   Negative: Nursing judgment   Negative: Nursing judgment   Negative: Nursing judgment   Negative: Requesting lab results and adult stable (no new symptoms, not worsening)   Negative: Requesting referral to a specialist   Negative: Questions about durable medical equipment ordered and triager unable to answer   Negative: Requesting regular office appointment and adult stable (no new symptoms, not worsening)   Negative: Health information question, no triage required and triager able to answer question    Protocols used: Information Only Call - No Triage-A-[place]

## 2023-12-04 ENCOUNTER — PATIENT OUTREACH (OUTPATIENT)
Dept: ADMINISTRATIVE | Facility: HOSPITAL | Age: 83
End: 2023-12-04
Payer: MEDICARE

## 2023-12-04 LAB
LEFT EYE DM RETINOPATHY: NEGATIVE
RIGHT EYE DM RETINOPATHY: NEGATIVE

## 2024-04-17 DIAGNOSIS — Z12.31 BREAST CANCER SCREENING BY MAMMOGRAM: Primary | ICD-10-CM

## 2024-05-20 ENCOUNTER — TELEPHONE (OUTPATIENT)
Dept: PODIATRY | Facility: CLINIC | Age: 84
End: 2024-05-20
Payer: MEDICARE

## 2024-05-20 NOTE — TELEPHONE ENCOUNTER
----- Message from Najma Dash sent at 5/20/2024  8:33 AM CDT -----  Type: Needs Medical Advice  Who Called: pt  Best Call Back Number: [phone]    Additional Information: Pt is calling the office wont be able to make appt today needs to speak with the nurse to reschedule.Please call back and advise.

## 2024-05-27 NOTE — PATIENT INSTRUCTIONS
Your Diabetes Foot Care Program    Every day you depend on your feet to keep you moving. But when you have diabetes, your feet need special care. Even a small foot problem can become very serious. So dont take your feet for granted. By working with your diabetes healthcare team, you can learn how to protect your feet and keep them healthy.  Evaluating your feet  An evaluation helps your healthcare provider check the condition of your feet. The evaluation includes a review of your diabetes history and overall health. It may also include a foot exam, X-rays, or other tests. These can help show problems beneath the skin that you cant see or feel.  Medical history  You will be asked about your overall health and any history of foot problems. Youll also discuss your diabetes history, such as whether your blood sugar level has changed over time. It also includes questions about sensations of pain, tingling, pins and needles, or numbness. Your healthcare provider will also want to know if you have high blood pressure and heart disease, or if you smoke. Be sure to mention any medicines (including over-the-counter), supplements, or herbal remedies you take.  Foot exam  A foot exam checks the condition of different parts of your foot. First, your skin and nails are examined for any signs of infection. Blood flow is checked by feeling for the pulses in each foot. You may also have tests to study the nerves in the foot. These include using a small filament (wire) to see how sensitive your feet are. In certain cases, you will be asked to walk a short distance to check for bone, joint, and muscle problems.  Diagnostic tests  If needed, your healthcare provider will suggest certain tests to learn more about your feet. These include:  Doppler tests to measure blood flow in the feet and lower leg.  X-rays, which can show bone or joint problems.  Other imaging tests, such as an MRI (magnetic resonance imaging), bone scan, and  CT (computed tomography) scan. These can help show bone infections.  Other tests, such as vascular tests, which study the blood flow in your feet and legs. You may also have nerve studies to learn how sensitive your feet are.  Creating a foot care program  Based on the evaluation, your healthcare provider will create a foot care program for you. Your program may be as simple as starting a daily self-care routine and changing the types of shoes your wear. It may also involve treating minor foot problems, such as a corn or blister. In some cases, surgery will be needed to treat an infection or mechanical problems, such as hammer toes.  Preventing problems  When you have diabetes, its easier to prevent problems than to treat them later on. So see your healthcare team for regular checkups and foot care. Your healthcare team can also help you learn more about caring for your feet at home. For example, you may be told to avoid walking barefoot. Or you may be told that special footwear is needed to protect your feet.  Have regular checkups  Foot problems can develop quickly. So be sure to follow your healthcare teams schedule for regular checkups. During office visits, take off your shoes and socks as soon as you get in the exam room. Ask your healthcare provider to examine your feet for problems. This will make it easier to find and treat small skin irritations before they get worse. Regular checkups can also help keep track of the blood flow and feeling in your feet. If you have neuropathy (lack of feeling in your feet), you will need to have checkups more often.  Learn about self-care  The more you know about diabetes and your feet, the easier it will be to prevent problems. Members of your healthcare team can teach you how to inspect your feet and teach you to look for warning signs. They can also give you other foot care tips. During office visits, be sure to ask any questions you have.  Date Last Reviewed:  7/1/2016  © 2411-5237 [employer]. [address] [place] [address], [place] [address]. All rights reserved. This information is not intended as a substitute for professional medical care. Always follow your healthcare professional's instructions.

## 2024-06-06 ENCOUNTER — HOSPITAL ENCOUNTER (OUTPATIENT)
Dept: RADIOLOGY | Facility: HOSPITAL | Age: 84
Discharge: HOME | End: 2024-06-06
Attending: NURSE PRACTITIONER
Payer: MEDICARE

## 2024-06-06 VITALS — HEIGHT: 67 IN | WEIGHT: 216 LBS | BODY MASS INDEX: 33.9 KG/M2

## 2024-06-06 DIAGNOSIS — Z12.31 BREAST CANCER SCREENING BY MAMMOGRAM: ICD-10-CM

## 2024-06-06 PROCEDURE — 77067 SCR MAMMO BI INCL CAD: CPT | Mod: TC,PO

## 2024-06-06 PROCEDURE — 77067 SCR MAMMO BI INCL CAD: CPT | Mod: 26,,, | Performed by: RADIOLOGY

## 2024-06-06 PROCEDURE — 77063 BREAST TOMOSYNTHESIS BI: CPT | Mod: 26,,, | Performed by: RADIOLOGY

## 2024-06-07 RX ORDER — CEFUROXIME AXETIL 250 MG/1
250 TABLET ORAL EVERY 12 HOURS
COMMUNITY
Start: 2024-04-22

## 2024-06-07 RX ORDER — NITROFURANTOIN MONOHYDRATE/MACROCRYSTALLINE 25; 75 MG/1; MG/1
CAPSULE ORAL
COMMUNITY
Start: 2024-06-04 | End: 2024-06-14

## 2024-06-10 ENCOUNTER — OFFICE VISIT (OUTPATIENT)
Dept: PODIATRY | Facility: CLINIC | Age: 84
End: 2024-06-10
Payer: MEDICARE

## 2024-06-10 VITALS — HEART RATE: 64 BPM | OXYGEN SATURATION: 97 % | HEIGHT: 67 IN | WEIGHT: 213.88 LBS | BODY MASS INDEX: 33.57 KG/M2

## 2024-06-10 DIAGNOSIS — I83.893 VARICOSE VEINS OF LEG WITH EDEMA, BILATERAL: ICD-10-CM

## 2024-06-10 DIAGNOSIS — E11.9 ENCOUNTER FOR DIABETIC FOOT EXAM: ICD-10-CM

## 2024-06-10 DIAGNOSIS — E11.51 TYPE II DIABETES MELLITUS WITH PERIPHERAL CIRCULATORY DISORDER: ICD-10-CM

## 2024-06-10 DIAGNOSIS — M20.41 HAMMER TOES OF BOTH FEET: ICD-10-CM

## 2024-06-10 DIAGNOSIS — M20.42 HAMMER TOES OF BOTH FEET: ICD-10-CM

## 2024-06-10 DIAGNOSIS — E11.49 TYPE II DIABETES MELLITUS WITH NEUROLOGICAL MANIFESTATIONS: Primary | ICD-10-CM

## 2024-06-10 PROCEDURE — 99999 PR PBB SHADOW E&M-EST. PATIENT-LVL IV: CPT | Mod: PBBFAC,,, | Performed by: PODIATRIST

## 2024-06-10 NOTE — PROGRESS NOTES
"    [address]  [place] [address]  Phone: [phone]   [phone]    Patient's PCP:No, Primary Doctor  Referring Provider: No ref. provider found    Subjective:      Chief Complaint:: Diabetic Foot Exam    HPI  Delilah Williamson is a 84 y.o. female who presents today for a diabetic foot exam.  Pt has seen MONTY Corona PA-C  on 10/18/2022 who treats them for their diabetes.  Pt has been a diabetic for a long time.  Taking Glimepiride and metformin to treat diabetes.    Blood sugar: 86  Hemoglobin A1C: 6.3 (12/20/2021)        Vitals:    06/10/24 0937   Pulse: 64   SpO2: 97%   Weight: 97 kg (213 lb 13.5 oz)   Height: 5' 7" (1.702 m)   PainSc: 0-No pain      Shoe Size: 10    Past Surgical History:   Procedure Laterality Date    CATARACT EXTRACTION      ROTATOR CUFF REPAIR       Past Medical History:   Diagnosis Date    Diabetes mellitus, type 2     Glaucoma     Hyperlipidemia     Hypertension      Family History   Problem Relation Name Age of Onset    Breast cancer Paternal Aunt          Social History:   Marital Status: Single  Alcohol History:  has no history on file for alcohol use.  Tobacco History:  reports that she has never smoked. She has never used smokeless tobacco.  Drug History:  has no history on file for drug use.    Review of patient's allergies indicates:  No Known Allergies    Current Outpatient Medications   Medication Sig Dispense Refill    acac/inulin/c-lose/mv-mn/folic (FIBER PLUS MULITVITAMIN ORAL) Take by mouth.      amLODIPine (NORVASC) 5 MG tablet Take 5 mg by mouth once daily.      atorvastatin (LIPITOR) 20 MG tablet Take 20 mg by mouth once daily.      furosemide (LASIX) 20 MG tablet Take 20 mg by mouth once daily.      glimepiride (AMARYL) 1 MG tablet Take 1 tablet (1 mg total) by mouth before breakfast. 90 tablet 3    latanoprost 0.005 % ophthalmic solution INSTILL 1 DROP INTO BOTH EYES AT BEDTIME AS DIRECTED INSTILL 1 DROP INTO BOTH EYES AT BEDTIME      MACROBID " 100 mg capsule 1 capsule with food Orally every 12 hrs for 10 days      metFORMIN (GLUCOPHAGE) 1000 MG tablet Take 1 tablet (1,000 mg total) by mouth daily with breakfast. 90 tablet 3    metoprolol succinate (TOPROL-XL) 50 MG 24 hr tablet Take 50 mg by mouth every evening.      potassium chloride (KLOR-CON) 10 MEQ TbSR Take 10 mEq by mouth once.      telmisartan (MICARDIS) 80 MG Tab Take 80 mg by mouth once daily.      bempedoic acid 180 mg Tab Take 1 tablet (180 mg total) by mouth once daily. (Patient not taking: Reported on 6/10/2024) 90 tablet 0    cefUROXime (CEFTIN) 250 MG tablet Take 250 mg by mouth every 12 (twelve) hours. (Patient not taking: Reported on 6/10/2024)      ezetimibe (ZETIA) 10 mg tablet Take 1 tablet (10 mg total) by mouth once daily. 90 tablet 3    fluconazole (DIFLUCAN) 150 MG Tab Take one tablet once. Repeat in seven days if symptoms continue. (Patient not taking: Reported on 6/10/2024) 1 tablet 1    gabapentin (NEURONTIN) 300 MG capsule Take one capsule nightly for nerve pain. (Patient not taking: Reported on 6/10/2024) 90 capsule 3     No current facility-administered medications for this visit.       Review of Systems   Constitutional:  Negative for chills, fatigue, fever and unexpected weight change.   HENT:  Negative for hearing loss and trouble swallowing.    Eyes:  Negative for photophobia and visual disturbance.   Respiratory:  Negative for cough, shortness of breath and wheezing.    Cardiovascular:  Positive for leg swelling. Negative for chest pain and palpitations.   Gastrointestinal:  Negative for abdominal pain and nausea.   Genitourinary:  Negative for dysuria and frequency.   Musculoskeletal:  Negative for arthralgias, back pain, gait problem, joint swelling and myalgias.   Skin:  Negative for rash and wound.   Neurological:  Negative for tremors, seizures, speech difficulty, weakness and headaches.   Hematological:  Does not bruise/bleed easily.         Objective:         Physical Exam:   Foot Exam    General  General Appearance: appears stated age and healthy   Orientation: alert and oriented to person, place, and time   Affect: appropriate   Gait: antalgic       Right Foot/Ankle     Inspection and Palpation  Ecchymosis: none  Tenderness: none   Swelling: (Pitting edema lower extremity)  Arch: pes planus  Hammertoes: second toe, third toe and fourth toe  Hallux valgus: yes  Skin Exam: skin intact;   Fungus Toenails: present    Neurovascular  Dorsalis pedis: absent  Posterior tibial: 1+  Capillary Refill: 3+  Varicose veins: present  Saphenous nerve sensation: diminished  Tibial nerve sensation: diminished  Superficial peroneal nerve sensation: diminished  Deep peroneal nerve sensation: diminished  Sural nerve sensation: diminished    Edema  Type of edema: pitting  Edema grade: 2+     Muscle Strength  Ankle dorsiflexion: 5  Ankle plantar flexion: 5  Ankle inversion: 5  Ankle eversion: 5  Great toe extension: 5  Great toe flexion: 5    Tests  Anterior drawer: negative   Talar tilt: negative   Too many toes: negative   Paresthesia: positive    Left Foot/Ankle      Inspection and Palpation  Ecchymosis: none  Tenderness: none   Swelling: (Pitting edema lower extremity)  Arch: pes planus  Hammertoes: second toe, third toe and fourth toe  Hallux valgus: yes  Skin Exam: skin intact;   Fungus Toenails: present    Neurovascular  Dorsalis pedis: absent  Posterior tibial: 1+  Capillary refill: 3+  Varicose veins: present  Saphenous nerve sensation: diminished  Tibial nerve sensation: diminished  Superficial peroneal nerve sensation: diminished  Deep peroneal nerve sensation: diminished  Sural nerve sensation: diminished    Edema  Type of edema: pitting  Edema grade: 2+    Muscle Strength  Ankle dorsiflexion: 5  Ankle plantar flexion: 5  Ankle inversion: 5  Ankle eversion: 5  Great toe extension: 5  Great toe flexion: 5    Tests  Anterior drawer: negative   Talar tilt: negative   Too many  toes: negative   Paresthesia: positive      Physical Exam  Cardiovascular:      Pulses:           Dorsalis pedis pulses are absent on the right side and absent on the left side.        Posterior tibial pulses are 1+ on the right side and 1+ on the left side.   Musculoskeletal:      Right foot: Bunion present.      Left foot: Bunion present.   Feet:      Right foot:      Toenail Condition: Fungal disease present.     Left foot:      Toenail Condition: Fungal disease present.        Imaging: none            Assessment:       1. Type II diabetes mellitus with neurological manifestations    2. Type II diabetes mellitus with peripheral circulatory disorder    3. Varicose veins of leg with edema, bilateral    4. Hammer toes of both feet    5. Encounter for diabetic foot exam      Plan:   Type II diabetes mellitus with neurological manifestations  -      DIABETES FOOT EXAM  -     DIABETIC SHOES FOR HOME USE    Type II diabetes mellitus with peripheral circulatory disorder  -      DIABETES FOOT EXAM  -     DIABETIC SHOES FOR HOME USE    Varicose veins of leg with edema, bilateral  -     DIABETIC SHOES FOR HOME USE    Hammer toes of both feet  -     DIABETIC SHOES FOR HOME USE    Encounter for diabetic foot exam  -      DIABETES FOOT EXAM  -     DIABETIC SHOES FOR HOME USE      Follow up in about 1 year (around 6/10/2025), or if symptoms worsen or fail to improve.    Procedures        Counseled patient on the aspects of diabetes and how it pertains to the feet.  I explained the importance of proper diabetic foot care and how it is essential for the health of their feet.    I discussed the importance of knowing their HGA1c and that the level needs to be as close to 6 as possible.  I discussed the increase complications of high blood sugar including stroke, blindness, heart attack, kidney failure and loss of limb secondary to neuropathy and PVD.    Patient  was made aware of inspecting their feet.  Patient was told to be  aware of any breaks in the skin or redness.  With neuropathy, these areas are not recognized early due to the numbness.  I discussed different treatments available to control the symptoms but whcih may not cure the problem.      Shoe inspection. Patient instructed on proper foot hygeine. We discussed wearing proper shoe gear, daily foot inspections, never walking without protective shoe gear, never putting sharp instruments to feet.      Counseling:     I provided patient education verbally regarding:   Patient diagnosis, treatment options, as well as alternatives, risks, and benefits.     This note was created using Dragon voice recognition software that occasionally misinterpreted phrases or words.

## 2024-06-21 NOTE — PROGRESS NOTES
"[facility] Note    PCP: No, Primary Doctor    Chief Complaint: OAB    SUBJECTIVE:       History of Present Illness:  Delilah Williamson is a 84 y.o. female who presents to clinic for OAB. She is New  to our clinic.     Had a UTI on 5/30/24 which grew Klebsiella. She was treated with 2 rounds of abx, most recently macrobid.   This was discovered after sustaining a fall.   No gross hematuria.   No dysuria.   No lower abdominal pressure.     She does have baseline urinary urgency and some urge incontinence. Complains of frequency as well.   She states that she leaks all the time without awareness.   No definitive ANTONIETA.   She does leak at night.   She wears adult diapers.     She does have a BM every day.   Drinks 4 bottles of water, and cranberry juice. 1 cup of coffee in the am.       UA today: negative for blood, small - asymptomatic   PVR today: 0 cc    Last urine culture: no documented UTIs    Lab Results   Component Value Date    CREATININE 0.7 12/20/2021     Hx of DM, HTN, HLD    Past medical, family, and social history reviewed as documented in chart with pertinent positive medical, family, and social history detailed in HPI.    Review of patient's allergies indicates:  No Known Allergies    Past Medical History:   Diagnosis Date    Diabetes mellitus, type 2     Glaucoma     Hyperlipidemia     Hypertension      Past Surgical History:   Procedure Laterality Date    CATARACT EXTRACTION      ROTATOR CUFF REPAIR       Family History   Problem Relation Name Age of Onset    Breast cancer Paternal Aunt       Social History     Tobacco Use    Smoking status: Never    Smokeless tobacco: Never        Review of Systems    OBJECTIVE:     Anticoagulation:  no    Estimated body mass index is 33.49 kg/m² as calculated from the following:    Height as of 6/10/24: 5' 7" (1.702 m).    Weight as of 6/10/24: 97 kg (213 lb 13.5 oz).    Vital Signs (Most Recent)       Physical Exam  Vitals reviewed. "   Constitutional:       General: She is not in acute distress.     Appearance: Normal appearance. She is obese. She is not ill-appearing.   HENT:      Head: Normocephalic and atraumatic.   Eyes:      General: No scleral icterus.  Pulmonary:      Effort: Pulmonary effort is normal. No respiratory distress.   Skin:     Coloration: Skin is not jaundiced.   Neurological:      General: No focal deficit present.      Mental Status: She is alert and oriented to person, place, and time.   Psychiatric:         Mood and Affect: Mood normal.         Behavior: Behavior normal.         BMP  Lab Results   Component Value Date     12/20/2021    K 4.3 12/20/2021     12/20/2021    CO2 24 12/20/2021    BUN 16 12/20/2021    CREATININE 0.7 12/20/2021    CALCIUM 9.4 12/20/2021    ANIONGAP 7 (L) 12/20/2021    ESTGFRAFRICA >60.0 12/20/2021    EGFRNONAA >60.0 12/20/2021       Lab Results   Component Value Date    WBC 9.06 09/23/2020    HGB 11.8 (L) 09/23/2020    HCT 40.7 09/23/2020    MCV 87 09/23/2020     09/23/2020       Imaging:  No pertinent recent imaging available.    ASSESSMENT     1. Overactive bladder    2. Urge incontinence of urine      PLAN:     - Appears as though she had 1 UTI which was discovered following a fall and she was asymptomatic from this   - Urine should not be checked in absence of UTI symptoms.   - We discussed prevention of UTIs including making sure her bowels are moving regularly, drinking at least 2 L of water a day and probotics   - Cut our cranberry juice   - She would like to try OAB medication for her incontinence   - New script for Gemtesa 75 mg sent to pharmacy. She is on BP medication, do not want to start anticholinergic given her age and hx of falls.   - Will have her follow up with NP in 3 months        Kacey Stallings MD     Letter to Juan Abarca MD

## 2024-06-24 ENCOUNTER — TELEPHONE (OUTPATIENT)
Dept: UROLOGY | Facility: CLINIC | Age: 84
End: 2024-06-24

## 2024-06-24 ENCOUNTER — OFFICE VISIT (OUTPATIENT)
Dept: UROLOGY | Facility: CLINIC | Age: 84
End: 2024-06-24
Payer: MEDICARE

## 2024-06-24 DIAGNOSIS — N32.81 OVERACTIVE BLADDER: Primary | ICD-10-CM

## 2024-06-24 DIAGNOSIS — N39.41 URGE INCONTINENCE OF URINE: ICD-10-CM

## 2024-06-24 LAB
BILIRUBIN, UA POC OHS: NEGATIVE
BLOOD, UA POC OHS: NEGATIVE
CLARITY, UA POC OHS: CLEAR
COLOR, UA POC OHS: YELLOW
GLUCOSE, UA POC OHS: NEGATIVE
KETONES, UA POC OHS: NEGATIVE
LEUKOCYTES, UA POC OHS: ABNORMAL
NITRITE, UA POC OHS: POSITIVE
PH, UA POC OHS: 5.5
PROTEIN, UA POC OHS: NEGATIVE
SPECIFIC GRAVITY, UA POC OHS: >=1.03
UROBILINOGEN, UA POC OHS: 1

## 2024-06-24 PROCEDURE — 1159F MED LIST DOCD IN RCRD: CPT | Mod: CPTII,S$GLB,, | Performed by: STUDENT IN AN ORGANIZED HEALTH CARE EDUCATION/TRAINING PROGRAM

## 2024-06-24 PROCEDURE — 3288F FALL RISK ASSESSMENT DOCD: CPT | Mod: CPTII,S$GLB,, | Performed by: STUDENT IN AN ORGANIZED HEALTH CARE EDUCATION/TRAINING PROGRAM

## 2024-06-24 PROCEDURE — 81003 URINALYSIS AUTO W/O SCOPE: CPT | Mod: QW,S$GLB,, | Performed by: STUDENT IN AN ORGANIZED HEALTH CARE EDUCATION/TRAINING PROGRAM

## 2024-06-24 PROCEDURE — 99999 PR PBB SHADOW E&M-EST. PATIENT-LVL III: CPT | Mod: PBBFAC,,, | Performed by: STUDENT IN AN ORGANIZED HEALTH CARE EDUCATION/TRAINING PROGRAM

## 2024-06-24 PROCEDURE — 99204 OFFICE O/P NEW MOD 45 MIN: CPT | Mod: S$GLB,,, | Performed by: STUDENT IN AN ORGANIZED HEALTH CARE EDUCATION/TRAINING PROGRAM

## 2024-06-24 PROCEDURE — 1101F PT FALLS ASSESS-DOCD LE1/YR: CPT | Mod: CPTII,S$GLB,, | Performed by: STUDENT IN AN ORGANIZED HEALTH CARE EDUCATION/TRAINING PROGRAM

## 2024-06-24 PROCEDURE — 1126F AMNT PAIN NOTED NONE PRSNT: CPT | Mod: CPTII,S$GLB,, | Performed by: STUDENT IN AN ORGANIZED HEALTH CARE EDUCATION/TRAINING PROGRAM

## 2024-06-24 RX ORDER — VIBEGRON 75 MG/1
75 TABLET, FILM COATED ORAL DAILY
Qty: 30 TABLET | Refills: 11 | Status: SHIPPED | OUTPATIENT
Start: 2024-06-24 | End: 2025-06-24

## 2024-06-24 NOTE — TELEPHONE ENCOUNTER
Spoke with patient informed her tier exception submitted to insurance awaiting response. Patient verbally voiced understanding.

## 2024-06-24 NOTE — TELEPHONE ENCOUNTER
----- Message from Lewis Skaggs sent at 6/24/2024 12:22 PM CDT -----  Type: Needs Medical Advice  Who Called:  pt  Symptoms (please be specific):  pt said she need to speak the provider-- the meds she gave her today was $300 and she said she need to see if she can get w/ the insurance co to lower the price---said it's the vibegron (GEMTESA) 75 mg Tab for 3 months--please call and advise--said the provider told her to call if it was to high  Pharmacy name and phone #:    [id number] - [place], [place] - [address]  [address] [address]  Phone: [phone] Fax: [phone]      Best Call Back Number: [phone] (home)     Additional Information: thank you

## 2024-06-24 NOTE — TELEPHONE ENCOUNTER
Phoned patient no answer no voicemail to leave a message.  Tier reduction formcompleted and faxed awaiting decision.

## 2024-06-25 ENCOUNTER — TELEPHONE (OUTPATIENT)
Dept: UROLOGY | Facility: CLINIC | Age: 84
End: 2024-06-25
Payer: MEDICARE

## 2024-06-25 NOTE — TELEPHONE ENCOUNTER
Spoke w pt she was informed no answer will call as soon as received by fax for approval    Maldonado garcía

## 2024-06-25 NOTE — TELEPHONE ENCOUNTER
----- Message from Darcy Elise sent at 6/25/2024  4:25 PM CDT -----  Contact: Patient  Type:  Needs Medical Advice    Who Called: Patient    Pharmacy name and phone #:    [id number] - [place] - [address]  [address] [address]  Phone: [phone] Fax: [phone]      Would the patient rather a call back or a response via [facility]? Call    Best Call Back Number: [phone] (home)     Additional Information: Patient is requesting a call regarding a call to her insurance for meds.

## 2024-09-27 ENCOUNTER — TELEPHONE (OUTPATIENT)
Dept: UROLOGY | Facility: CLINIC | Age: 84
End: 2024-09-27
Payer: MEDICARE

## 2024-09-27 NOTE — TELEPHONE ENCOUNTER
----- Message from Miley Trujillo sent at 9/27/2024 11:13 AM CDT -----  Type:  Needs Medical Advice    Who Called: ptb  Would the patient rather a call back or a response via [facility]? call  Best Call Back Number:  [phone]  Additional Information: pt would like to speak with office regarding questions about medication vibegron (GEMTESA) 75 mg Tab

## 2024-09-27 NOTE — TELEPHONE ENCOUNTER
Spoke with patient, she states the Gemtesa is making her very lifeless and zombie like. She wants to stop taking it. Informed will give message to provider and contact her back with further advisement, she verbally understood.

## 2024-09-30 ENCOUNTER — TELEPHONE (OUTPATIENT)
Dept: UROLOGY | Facility: CLINIC | Age: 84
End: 2024-09-30
Payer: MEDICARE

## 2024-09-30 NOTE — TELEPHONE ENCOUNTER
Call placed to give patient advisement to make an appt to discuss treatment options since unable to take medication, no answer, unable to leave a message.

## 2024-10-02 ENCOUNTER — TELEPHONE (OUTPATIENT)
Dept: UROLOGY | Facility: CLINIC | Age: 84
End: 2024-10-02
Payer: MEDICARE

## 2024-10-02 NOTE — TELEPHONE ENCOUNTER
"----- Message from Joanne sent at 10/2/2024  1:45 PM CDT -----  Contact: self  Type: Patient Returning Call    Who Called: pt    Who Left Message for Patient: Sophie    Does the patient know what this is regarding?: medication    Would the patient rather a call back or a response via [facility]? Call back    Best Call Back Number:     Additional Information: in first message her house number was given, called to update to her cell phone #.medication makes her "feel like she is losing her mind"    Please call to advise    Thanks  "

## 2024-10-02 NOTE — TELEPHONE ENCOUNTER
Return calls, no answer, unable to leave message, will attempt again. Patient needs appt made with the doctor.

## 2024-10-04 ENCOUNTER — TELEPHONE (OUTPATIENT)
Dept: UROLOGY | Facility: CLINIC | Age: 84
End: 2024-10-04
Payer: MEDICARE

## 2024-10-04 NOTE — TELEPHONE ENCOUNTER
----- Message from Gena sent at 10/4/2024 11:23 AM CDT -----  Type: Needs Medical Advice  Who Called:  pt     Best Call Back Number: [phone] (home)     Additional Information: pt requesting call back in regards to medication she does not want to take please advise

## 2024-10-04 NOTE — TELEPHONE ENCOUNTER
Spoke with patient she states she can not take gemtesa due to her not liking the way it makes her feel, it makes her feel like a zombie. Patient also states medication is too expensive

## 2024-10-07 ENCOUNTER — TELEPHONE (OUTPATIENT)
Dept: UROLOGY | Facility: CLINIC | Age: 84
End: 2024-10-07
Payer: MEDICARE

## 2024-10-07 NOTE — TELEPHONE ENCOUNTER
----- Message from Darcy sent at 10/7/2024  1:07 PM CDT -----  Contact: Patient  Type:  Needs Medical Advice    Who Called: Patient      Would the patient rather a call back or a response via [facility]? Call    Best Call Back Number:  [phone]        [phone] (home)     Additional Information: Patient states that she has been waiting for a call back from the office. Please call to advise

## 2024-11-04 NOTE — TELEPHONE ENCOUNTER
----- Message from Melanie sent at 11/4/2024 10:27 AM CST -----  Regarding: Refill request  Contact: pt  Type:  RX Refill Request    Who Called: pt    Refill or New Rx:   refill    RX Name and Strength   vibegron (GEMTESA) 75 mg Tab    How is the patient currently taking it? (ex. 1XDay):1/day    Is this a 30 day or 90 day RX:30    Preferred Pharmacy with phone number:  [id number] - [place] - [address] [facility]  [address] [address]  Phone: [phone] Fax: [phone]    Local or Mail Order:local    Ordering Provider:axel     Would the patient rather a call back or a response via [facility]? Call back    Best Call Back Number:[phone]    Additional Information: pt unable to take the vibegron (GEMTESA)  pt also sts that the cost is too high.  The rx makes her feel like a zombie.  Requesting an alternative more affordable medicine.

## 2024-11-05 RX ORDER — VIBEGRON 75 MG/1
75 TABLET, FILM COATED ORAL DAILY
Qty: 30 TABLET | Refills: 11 | OUTPATIENT
Start: 2024-11-05 | End: 2025-11-05

## 2024-11-11 ENCOUNTER — HOSPITAL ENCOUNTER (EMERGENCY)
Facility: HOSPITAL | Age: 84
Discharge: HOME | End: 2024-11-12
Attending: EMERGENCY MEDICINE
Payer: MEDICARE
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
## 2024-11-11 PROCEDURE — 25000003 PHARM REV CODE 250: Performed by: EMERGENCY MEDICINE

## 2024-11-11 PROCEDURE — 99283 EMERGENCY DEPT VISIT LOW MDM: CPT | Mod: 25

## 2024-11-11 RX ORDER — METOPROLOL SUCCINATE 50 MG/1
50 TABLET, EXTENDED RELEASE ORAL ONCE
Status: COMPLETED | OUTPATIENT
Start: 2024-11-11 | End: 2024-11-11

## 2024-11-11 RX ADMIN — METOPROLOL SUCCINATE 50 MG: 50 TABLET, FILM COATED, EXTENDED RELEASE ORAL at 08:11

## 2024-11-12 VITALS
WEIGHT: 226 LBS | OXYGEN SATURATION: 99 % | HEIGHT: 67 IN | TEMPERATURE: 99 F | DIASTOLIC BLOOD PRESSURE: 70 MMHG | RESPIRATION RATE: 17 BRPM | SYSTOLIC BLOOD PRESSURE: 161 MMHG | BODY MASS INDEX: 35.47 KG/M2 | HEART RATE: 60 BPM
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
## 2024-11-12 NOTE — DISCHARGE INSTRUCTIONS
Please follow-up with your primary care physician within the week for a recheck.  You may take over-the-counter Tylenol as needed, as directed for aches and pains.

## 2025-04-23 ENCOUNTER — ANESTHESIA EVENT (OUTPATIENT)
Dept: INTERVENTIONAL RADIOLOGY/VASCULAR | Facility: HOSPITAL | Age: 85
End: 2025-04-23
Payer: MEDICARE

## 2025-04-23 ENCOUNTER — HOSPITAL ENCOUNTER (EMERGENCY)
Facility: HOSPITAL | Age: 85
Discharge: ANOTHER HOSPITAL | End: 2025-04-23
Attending: EMERGENCY MEDICINE
Payer: MEDICARE

## 2025-04-23 ENCOUNTER — HOSPITAL ENCOUNTER (INPATIENT)
Facility: HOSPITAL | Age: 85
LOS: 7 days | Discharge: NURSING, REHAB OR LONG-TERM CARE | DRG: 023 | End: 2025-04-30
Attending: STUDENT IN AN ORGANIZED HEALTH CARE EDUCATION/TRAINING PROGRAM | Admitting: PSYCHIATRY & NEUROLOGY
Payer: MEDICARE

## 2025-04-23 VITALS
SYSTOLIC BLOOD PRESSURE: 174 MMHG | BODY MASS INDEX: 35.24 KG/M2 | OXYGEN SATURATION: 99 % | RESPIRATION RATE: 18 BRPM | WEIGHT: 225 LBS | DIASTOLIC BLOOD PRESSURE: 76 MMHG | HEART RATE: 64 BPM

## 2025-04-23 DIAGNOSIS — I63.412 EMBOLIC STROKE INVOLVING LEFT MIDDLE CEREBRAL ARTERY: ICD-10-CM

## 2025-04-23 DIAGNOSIS — R29.818 ACUTE FOCAL NEUROLOGICAL DEFICIT: ICD-10-CM

## 2025-04-23 DIAGNOSIS — I63.9 STROKE: ICD-10-CM

## 2025-04-23 DIAGNOSIS — I63.9 ACUTE CVA (CEREBROVASCULAR ACCIDENT): Primary | ICD-10-CM

## 2025-04-23 DIAGNOSIS — I63.9 CEREBRAL INFARCTION, UNSPECIFIED MECHANISM: ICD-10-CM

## 2025-04-23 DIAGNOSIS — N20.1 URETEROLITHIASIS: ICD-10-CM

## 2025-04-23 PROBLEM — G93.6 CYTOTOXIC BRAIN EDEMA: Status: ACTIVE | Noted: 2025-04-23

## 2025-04-23 PROBLEM — R47.01 APHASIA: Status: ACTIVE | Noted: 2025-04-23

## 2025-04-23 PROBLEM — E78.5 HYPERLIPIDEMIA: Status: ACTIVE | Noted: 2025-04-23

## 2025-04-23 PROBLEM — R53.1 ACUTE RIGHT-SIDED WEAKNESS: Status: ACTIVE | Noted: 2025-04-23

## 2025-04-23 PROBLEM — I10 HTN (HYPERTENSION): Status: ACTIVE | Noted: 2025-04-23

## 2025-04-23 PROBLEM — E11.9 TYPE 2 DIABETES MELLITUS: Status: ACTIVE | Noted: 2025-04-23

## 2025-04-23 LAB
ABORH RETYPE: NORMAL
ABSOLUTE EOSINOPHIL (SMH): 0.05 K/UL
ABSOLUTE MONOCYTE (SMH): 0.52 K/UL (ref 0.3–1)
ABSOLUTE NEUTROPHIL COUNT (SMH): 6.1 K/UL (ref 1.8–7.7)
ALBUMIN SERPL-MCNC: 3.6 G/DL (ref 3.5–5.2)
ALP SERPL-CCNC: 52 UNIT/L (ref 55–135)
ALT SERPL-CCNC: 52 UNIT/L (ref 10–44)
ANION GAP (SMH): 8 MMOL/L (ref 8–16)
ASCENDING AORTA: 3 CM
AST SERPL-CCNC: 64 UNIT/L (ref 10–40)
AV AREA BY CONTINUOUS VTI: 2.4 CM2
AV INDEX (PROSTH): 0.85
AV LVOT MEAN GRADIENT: 3 MMHG
AV LVOT PEAK GRADIENT: 6 MMHG
AV MEAN GRADIENT: 5 MMHG
AV PEAK GRADIENT: 9 MMHG
AV VALVE AREA BY VELOCITY RATIO: 2.3 CM²
AV VALVE AREA: 2.4 CM2
AV VELOCITY RATIO: 0.8
BACTERIA #/AREA URNS AUTO: ABNORMAL /HPF
BASOPHILS # BLD AUTO: 0.02 K/UL
BASOPHILS NFR BLD AUTO: 0.3 %
BILIRUB SERPL-MCNC: 0.7 MG/DL (ref 0.1–1)
BILIRUB UR QL STRIP.AUTO: NEGATIVE
BUN SERPL-MCNC: 33 MG/DL (ref 8–23)
CALCIUM SERPL-MCNC: 10.6 MG/DL (ref 8.7–10.5)
CHLORIDE SERPL-SCNC: 104 MMOL/L (ref 95–110)
CHOLEST SERPL-MCNC: 129 MG/DL (ref 120–199)
CHOLEST SERPL-MCNC: 130 MG/DL (ref 120–199)
CHOLEST/HDLC SERPL: 3.3 {RATIO} (ref 2–5)
CHOLEST/HDLC SERPL: 3.8 {RATIO} (ref 2–5)
CLARITY UR: CLEAR
CO2 SERPL-SCNC: 25 MMOL/L (ref 23–29)
COLOR UR AUTO: YELLOW
CREAT SERPL-MCNC: 1.1 MG/DL (ref 0.5–1.4)
CREAT SERPL-MCNC: 1.2 MG/DL (ref 0.5–1.4)
CV ECHO LV RWT: 0.28 CM
DOP CALC AO PEAK VEL: 1.5 M/S
DOP CALC AO VTI: 32.4 CM
DOP CALC LVOT AREA: 2.8 CM2
DOP CALC LVOT DIAMETER: 1.9 CM
DOP CALC LVOT PEAK VEL: 1.2 M/S
DOP CALC LVOT STROKE VOLUME: 78.2 CM3
DOP CALCLVOT PEAK VEL VTI: 27.6 CM
E WAVE DECELERATION TIME: 227 MS
E/A RATIO: 1.17
E/E' RATIO: 7 M/S
EAG (OHS): 94 MG/DL (ref 68–131)
ECHO EF ESTIMATED: 75 %
ECHO LV POSTERIOR WALL: 0.7 CM (ref 0.6–1.1)
ERYTHROCYTE [DISTWIDTH] IN BLOOD BY AUTOMATED COUNT: 14.5 % (ref 11.5–14.5)
FRACTIONAL SHORTENING: 44 % (ref 28–44)
GFR SERPLBLD CREATININE-BSD FMLA CKD-EPI: 49 ML/MIN/1.73/M2
GLUCOSE SERPL-MCNC: 74 MG/DL (ref 70–110)
GLUCOSE UR QL STRIP: NEGATIVE
HBA1C MFR BLD: 4.9 % (ref 4–5.6)
HCT VFR BLD AUTO: 36.9 % (ref 37–48.5)
HDLC SERPL-MCNC: 34 MG/DL (ref 40–75)
HDLC SERPL-MCNC: 39 MG/DL (ref 40–75)
HDLC SERPL: 26.2 % (ref 20–50)
HDLC SERPL: 30.2 % (ref 20–50)
HGB BLD-MCNC: 11.7 GM/DL (ref 12–16)
HGB UR QL STRIP: ABNORMAL
HOLD SPECIMEN: NORMAL
IMM GRANULOCYTES # BLD AUTO: 0.05 K/UL (ref 0–0.04)
IMM GRANULOCYTES NFR BLD AUTO: 0.6 % (ref 0–0.5)
INDIRECT COOMBS: NORMAL
INR PPP: 1 (ref 0.8–1.2)
INTERVENTRICULAR SEPTUM: 0.9 CM (ref 0.6–1.1)
IVC DIAMETER: 1.22 CM
KETONES UR QL STRIP: ABNORMAL
LA MAJOR: 6.1 CM
LA MINOR: 5.8 CM
LA WIDTH: 5 CM
LDLC SERPL CALC-MCNC: 72.6 MG/DL (ref 63–159)
LDLC SERPL CALC-MCNC: 77.6 MG/DL (ref 63–159)
LEFT ATRIUM SIZE: 2.8 CM
LEFT ATRIUM VOLUME MOD: 87 ML
LEFT ATRIUM VOLUME: 71 CM3
LEFT INTERNAL DIMENSION IN SYSTOLE: 2.8 CM (ref 2.1–4)
LEFT VENTRICLE DIASTOLIC VOLUME: 120 ML
LEFT VENTRICLE SYSTOLIC VOLUME: 30 ML
LEFT VENTRICULAR INTERNAL DIMENSION IN DIASTOLE: 5 CM (ref 3.5–6)
LEFT VENTRICULAR MASS: 135.8 G
LEUKOCYTE ESTERASE UR QL STRIP: ABNORMAL
LV LATERAL E/E' RATIO: 6
LV SEPTAL E/E' RATIO: 7.7
LYMPHOCYTES # BLD AUTO: 1.15 K/UL (ref 1–4.8)
MCH RBC QN AUTO: 26.1 PG (ref 27–31)
MCHC RBC AUTO-ENTMCNC: 31.7 G/DL (ref 32–36)
MCV RBC AUTO: 82 FL (ref 82–98)
MICROSCOPIC COMMENT: ABNORMAL
MV PEAK A VEL: 0.46 M/S
MV PEAK E VEL: 0.54 M/S
NITRITE UR QL STRIP: POSITIVE
NONHDLC SERPL-MCNC: 90 MG/DL
NONHDLC SERPL-MCNC: 96 MG/DL
NUCLEATED RBC (/100WBC) (SMH): 0 /100 WBC
PH UR STRIP: 6 [PH]
PISA TR MAX VEL: 2.9 M/S
PLATELET # BLD AUTO: 281 K/UL (ref 150–450)
PMV BLD AUTO: 10.4 FL (ref 9.2–12.9)
POCT GLUCOSE: 125 MG/DL (ref 70–110)
POCT GLUCOSE: 68 MG/DL (ref 70–110)
POCT GLUCOSE: 81 MG/DL (ref 70–110)
POCT GLUCOSE: 90 MG/DL (ref 70–110)
POTASSIUM SERPL-SCNC: 4 MMOL/L (ref 3.5–5.1)
PROT SERPL-MCNC: 6.7 GM/DL (ref 6–8.4)
PROT UR QL STRIP: NEGATIVE
PROTHROMBIN TIME: 11.4 SECONDS (ref 9–12.5)
RA MAJOR: 4.52 CM
RA PRESSURE ESTIMATED: 3 MMHG
RA WIDTH: 3.65 CM
RBC # BLD AUTO: 4.48 M/UL (ref 4–5.4)
RBC #/AREA URNS AUTO: 10 /HPF (ref 0–4)
RELATIVE EOSINOPHIL (SMH): 0.6 % (ref 0–8)
RELATIVE LYMPHOCYTE (SMH): 14.5 % (ref 18–48)
RELATIVE MONOCYTE (SMH): 6.6 % (ref 4–15)
RELATIVE NEUTROPHIL (SMH): 77.4 % (ref 38–73)
RH BLD: NORMAL
RIGHT ATRIAL AREA: 14.2 CM2
RV TB RVSP: 6 MMHG
RV TISSUE DOPPLER FREE WALL SYSTOLIC VELOCITY 1 (APICAL 4 CHAMBER VIEW): 27.41 CM/S
SAMPLE: NORMAL
SINUS: 3.37 CM
SODIUM SERPL-SCNC: 137 MMOL/L (ref 136–145)
SP GR UR STRIP: 1.01
SPECIMEN OUTDATE: NORMAL
STJ: 2.5 CM
TDI LATERAL: 0.09 M/S
TDI SEPTAL: 0.07 M/S
TDI: 0.08 M/S
TRICUSPID ANNULAR PLANE SYSTOLIC EXCURSION: 3.2 CM
TRIGL SERPL-MCNC: 87 MG/DL (ref 30–150)
TRIGL SERPL-MCNC: 92 MG/DL (ref 30–150)
TSH SERPL-ACNC: 1.75 UIU/ML (ref 0.34–5.6)
TSH SERPL-ACNC: 1.75 UIU/ML (ref 0.4–4)
TV PEAK GRADIENT: 33 MMHG
TV REST PULMONARY ARTERY PRESSURE: 37 MMHG
UROBILINOGEN UR STRIP-ACNC: NEGATIVE EU/DL
WBC # BLD AUTO: 7.91 K/UL (ref 3.9–12.7)
WBC #/AREA URNS AUTO: 55 /HPF (ref 0–5)

## 2025-04-23 PROCEDURE — 99285 EMERGENCY DEPT VISIT HI MDM: CPT | Mod: 25

## 2025-04-23 PROCEDURE — 82565 ASSAY OF CREATININE: CPT

## 2025-04-23 PROCEDURE — 25500020 PHARM REV CODE 255: Performed by: STUDENT IN AN ORGANIZED HEALTH CARE EDUCATION/TRAINING PROGRAM

## 2025-04-23 PROCEDURE — 93005 ELECTROCARDIOGRAM TRACING: CPT | Performed by: INTERNAL MEDICINE

## 2025-04-23 PROCEDURE — 63600175 PHARM REV CODE 636 W HCPCS: Performed by: PHYSICIAN ASSISTANT

## 2025-04-23 PROCEDURE — 84443 ASSAY THYROID STIM HORMONE: CPT | Performed by: EMERGENCY MEDICINE

## 2025-04-23 PROCEDURE — 87088 URINE BACTERIA CULTURE: CPT | Performed by: PHYSICIAN ASSISTANT

## 2025-04-23 PROCEDURE — 99291 CRITICAL CARE FIRST HOUR: CPT | Mod: FS,,, | Performed by: STUDENT IN AN ORGANIZED HEALTH CARE EDUCATION/TRAINING PROGRAM

## 2025-04-23 PROCEDURE — 99900035 HC TECH TIME PER 15 MIN (STAT)

## 2025-04-23 PROCEDURE — B31DYZZ FLUOROSCOPY OF RIGHT VERTEBRAL ARTERY USING OTHER CONTRAST: ICD-10-PCS | Performed by: PSYCHIATRY & NEUROLOGY

## 2025-04-23 PROCEDURE — 93010 ELECTROCARDIOGRAM REPORT: CPT | Mod: ,,, | Performed by: INTERNAL MEDICINE

## 2025-04-23 PROCEDURE — 80061 LIPID PANEL: CPT | Performed by: PHYSICIAN ASSISTANT

## 2025-04-23 PROCEDURE — 25500020 PHARM REV CODE 255: Performed by: EMERGENCY MEDICINE

## 2025-04-23 PROCEDURE — 94761 N-INVAS EAR/PLS OXIMETRY MLT: CPT

## 2025-04-23 PROCEDURE — 25000003 PHARM REV CODE 250: Performed by: PHYSICIAN ASSISTANT

## 2025-04-23 PROCEDURE — B313YZZ FLUOROSCOPY OF RIGHT COMMON CAROTID ARTERY USING OTHER CONTRAST: ICD-10-PCS | Performed by: PSYCHIATRY & NEUROLOGY

## 2025-04-23 PROCEDURE — D9220A PRA ANESTHESIA: Mod: ANES,,, | Performed by: ANESTHESIOLOGY

## 2025-04-23 PROCEDURE — 84443 ASSAY THYROID STIM HORMONE: CPT | Performed by: PHYSICIAN ASSISTANT

## 2025-04-23 PROCEDURE — 81003 URINALYSIS AUTO W/O SCOPE: CPT | Performed by: PHYSICIAN ASSISTANT

## 2025-04-23 PROCEDURE — 63600175 PHARM REV CODE 636 W HCPCS: Performed by: NURSE ANESTHETIST, CERTIFIED REGISTERED

## 2025-04-23 PROCEDURE — 85610 PROTHROMBIN TIME: CPT | Performed by: EMERGENCY MEDICINE

## 2025-04-23 PROCEDURE — 20000000 HC ICU ROOM

## 2025-04-23 PROCEDURE — G0425 INPT/ED TELECONSULT30: HCPCS | Mod: GT,,, | Performed by: PSYCHIATRY & NEUROLOGY

## 2025-04-23 PROCEDURE — 36415 COLL VENOUS BLD VENIPUNCTURE: CPT | Performed by: EMERGENCY MEDICINE

## 2025-04-23 PROCEDURE — 82465 ASSAY BLD/SERUM CHOLESTEROL: CPT | Performed by: EMERGENCY MEDICINE

## 2025-04-23 PROCEDURE — 82962 GLUCOSE BLOOD TEST: CPT

## 2025-04-23 PROCEDURE — 99499 UNLISTED E&M SERVICE: CPT | Mod: ,,, | Performed by: PSYCHIATRY & NEUROLOGY

## 2025-04-23 PROCEDURE — 83036 HEMOGLOBIN GLYCOSYLATED A1C: CPT | Performed by: PHYSICIAN ASSISTANT

## 2025-04-23 PROCEDURE — 03CG3ZZ EXTIRPATION OF MATTER FROM INTRACRANIAL ARTERY, PERCUTANEOUS APPROACH: ICD-10-PCS | Performed by: PSYCHIATRY & NEUROLOGY

## 2025-04-23 PROCEDURE — 99291 CRITICAL CARE FIRST HOUR: CPT | Mod: ,,, | Performed by: PHYSICIAN ASSISTANT

## 2025-04-23 PROCEDURE — D9220A PRA ANESTHESIA: Mod: CRNA,,, | Performed by: NURSE ANESTHETIST, CERTIFIED REGISTERED

## 2025-04-23 PROCEDURE — 25000003 PHARM REV CODE 250: Performed by: NURSE ANESTHETIST, CERTIFIED REGISTERED

## 2025-04-23 PROCEDURE — 84132 ASSAY OF SERUM POTASSIUM: CPT | Performed by: EMERGENCY MEDICINE

## 2025-04-23 PROCEDURE — 85025 COMPLETE CBC W/AUTO DIFF WBC: CPT | Performed by: EMERGENCY MEDICINE

## 2025-04-23 PROCEDURE — 27000221 HC OXYGEN, UP TO 24 HOURS

## 2025-04-23 PROCEDURE — 86900 BLOOD TYPING SEROLOGIC ABO: CPT | Performed by: PHYSICIAN ASSISTANT

## 2025-04-23 PROCEDURE — B31BYZZ FLUOROSCOPY OF LEFT EXTERNAL CAROTID ARTERY USING OTHER CONTRAST: ICD-10-PCS | Performed by: PSYCHIATRY & NEUROLOGY

## 2025-04-23 RX ORDER — SODIUM CHLORIDE 0.9 % (FLUSH) 0.9 %
10 SYRINGE (ML) INJECTION
Status: DISCONTINUED | OUTPATIENT
Start: 2025-04-23 | End: 2025-04-30 | Stop reason: HOSPADM

## 2025-04-23 RX ORDER — NAPROXEN SODIUM 220 MG/1
81 TABLET, FILM COATED ORAL EVERY 24 HOURS
Status: DISCONTINUED | OUTPATIENT
Start: 2025-04-24 | End: 2025-04-23

## 2025-04-23 RX ORDER — ONDANSETRON HYDROCHLORIDE 2 MG/ML
INJECTION, SOLUTION INTRAVENOUS
Status: DISCONTINUED | OUTPATIENT
Start: 2025-04-23 | End: 2025-04-23

## 2025-04-23 RX ORDER — LABETALOL HCL 20 MG/4 ML
10 SYRINGE (ML) INTRAVENOUS EVERY 4 HOURS PRN
Status: DISCONTINUED | OUTPATIENT
Start: 2025-04-24 | End: 2025-04-30 | Stop reason: HOSPADM

## 2025-04-23 RX ORDER — POTASSIUM CHLORIDE 750 MG/1
10 TABLET, EXTENDED RELEASE ORAL DAILY
Status: ON HOLD | COMMUNITY
Start: 2025-04-07

## 2025-04-23 RX ORDER — HYDRALAZINE HYDROCHLORIDE 20 MG/ML
10 INJECTION INTRAMUSCULAR; INTRAVENOUS EVERY 4 HOURS PRN
Status: DISCONTINUED | OUTPATIENT
Start: 2025-04-24 | End: 2025-04-30 | Stop reason: HOSPADM

## 2025-04-23 RX ORDER — AMOXICILLIN 250 MG
1 CAPSULE ORAL 2 TIMES DAILY
Status: DISCONTINUED | OUTPATIENT
Start: 2025-04-23 | End: 2025-04-30 | Stop reason: HOSPADM

## 2025-04-23 RX ORDER — ROCURONIUM BROMIDE 10 MG/ML
INJECTION, SOLUTION INTRAVENOUS
Status: DISCONTINUED | OUTPATIENT
Start: 2025-04-23 | End: 2025-04-23

## 2025-04-23 RX ORDER — ATORVASTATIN CALCIUM 40 MG/1
40 TABLET, FILM COATED ORAL DAILY
Status: DISCONTINUED | OUTPATIENT
Start: 2025-04-23 | End: 2025-04-30 | Stop reason: HOSPADM

## 2025-04-23 RX ORDER — LATANOPROST 50 UG/ML
1 SOLUTION/ DROPS OPHTHALMIC NIGHTLY
Status: DISCONTINUED | OUTPATIENT
Start: 2025-04-23 | End: 2025-04-30 | Stop reason: HOSPADM

## 2025-04-23 RX ORDER — LABETALOL HYDROCHLORIDE 5 MG/ML
INJECTION, SOLUTION INTRAVENOUS
Status: DISCONTINUED | OUTPATIENT
Start: 2025-04-23 | End: 2025-04-23

## 2025-04-23 RX ORDER — NAPROXEN SODIUM 220 MG/1
81 TABLET, FILM COATED ORAL DAILY
Status: DISCONTINUED | OUTPATIENT
Start: 2025-04-23 | End: 2025-04-23

## 2025-04-23 RX ORDER — NAPROXEN SODIUM 220 MG/1
81 TABLET, FILM COATED ORAL EVERY 24 HOURS
Status: DISCONTINUED | OUTPATIENT
Start: 2025-04-23 | End: 2025-04-23

## 2025-04-23 RX ORDER — HYDRALAZINE HYDROCHLORIDE 20 MG/ML
10 INJECTION INTRAMUSCULAR; INTRAVENOUS EVERY 6 HOURS PRN
Status: DISCONTINUED | OUTPATIENT
Start: 2025-04-23 | End: 2025-04-23

## 2025-04-23 RX ORDER — PROPOFOL 10 MG/ML
VIAL (ML) INTRAVENOUS
Status: DISCONTINUED | OUTPATIENT
Start: 2025-04-23 | End: 2025-04-23

## 2025-04-23 RX ORDER — LIDOCAINE HYDROCHLORIDE 20 MG/ML
INJECTION, SOLUTION EPIDURAL; INFILTRATION; INTRACAUDAL; PERINEURAL
Status: DISCONTINUED | OUTPATIENT
Start: 2025-04-23 | End: 2025-04-23

## 2025-04-23 RX ORDER — LABETALOL HCL 20 MG/4 ML
10 SYRINGE (ML) INTRAVENOUS EVERY 6 HOURS PRN
Status: DISCONTINUED | OUTPATIENT
Start: 2025-04-23 | End: 2025-04-23

## 2025-04-23 RX ORDER — FENTANYL CITRATE 50 UG/ML
INJECTION, SOLUTION INTRAMUSCULAR; INTRAVENOUS
Status: DISCONTINUED | OUTPATIENT
Start: 2025-04-23 | End: 2025-04-23

## 2025-04-23 RX ORDER — INSULIN ASPART 100 [IU]/ML
0-10 INJECTION, SOLUTION INTRAVENOUS; SUBCUTANEOUS EVERY 6 HOURS PRN
Status: DISCONTINUED | OUTPATIENT
Start: 2025-04-23 | End: 2025-04-30 | Stop reason: HOSPADM

## 2025-04-23 RX ORDER — DEXAMETHASONE SODIUM PHOSPHATE 4 MG/ML
INJECTION, SOLUTION INTRA-ARTICULAR; INTRALESIONAL; INTRAMUSCULAR; INTRAVENOUS; SOFT TISSUE
Status: DISCONTINUED | OUTPATIENT
Start: 2025-04-23 | End: 2025-04-23

## 2025-04-23 RX ORDER — GLUCAGON 1 MG
1 KIT INJECTION
Status: DISCONTINUED | OUTPATIENT
Start: 2025-04-23 | End: 2025-04-30 | Stop reason: HOSPADM

## 2025-04-23 RX ORDER — ONDANSETRON HYDROCHLORIDE 2 MG/ML
4 INJECTION, SOLUTION INTRAVENOUS EVERY 8 HOURS PRN
Status: DISCONTINUED | OUTPATIENT
Start: 2025-04-23 | End: 2025-04-25

## 2025-04-23 RX ORDER — IODIXANOL 320 MG/ML
100 INJECTION, SOLUTION INTRAVASCULAR
Status: COMPLETED | OUTPATIENT
Start: 2025-04-23 | End: 2025-04-23

## 2025-04-23 RX ORDER — ATORVASTATIN CALCIUM 40 MG/1
40 TABLET, FILM COATED ORAL DAILY
Status: ON HOLD | COMMUNITY
Start: 2025-03-27

## 2025-04-23 RX ADMIN — PROPOFOL 80 MG: 10 INJECTION, EMULSION INTRAVENOUS at 11:04

## 2025-04-23 RX ADMIN — ROCURONIUM BROMIDE 50 MG: 10 INJECTION INTRAVENOUS at 11:04

## 2025-04-23 RX ADMIN — SODIUM CHLORIDE, SODIUM GLUCONATE, SODIUM ACETATE, POTASSIUM CHLORIDE, MAGNESIUM CHLORIDE, SODIUM PHOSPHATE, DIBASIC, AND POTASSIUM PHOSPHATE: .53; .5; .37; .037; .03; .012; .00082 INJECTION, SOLUTION INTRAVENOUS at 11:04

## 2025-04-23 RX ADMIN — DEXAMETHASONE SODIUM PHOSPHATE 4 MG: 4 INJECTION, SOLUTION INTRAMUSCULAR; INTRAVENOUS at 11:04

## 2025-04-23 RX ADMIN — LATANOPROST 1 DROP: 50 SOLUTION OPHTHALMIC at 08:04

## 2025-04-23 RX ADMIN — IOHEXOL 100 ML: 350 INJECTION, SOLUTION INTRAVENOUS at 09:04

## 2025-04-23 RX ADMIN — LABETALOL HYDROCHLORIDE 5 MG: 5 INJECTION, SOLUTION INTRAVENOUS at 12:04

## 2025-04-23 RX ADMIN — FENTANYL CITRATE 100 MCG: 50 INJECTION, SOLUTION INTRAMUSCULAR; INTRAVENOUS at 11:04

## 2025-04-23 RX ADMIN — IODIXANOL 100 ML: 320 INJECTION, SOLUTION INTRAVASCULAR at 12:04

## 2025-04-23 RX ADMIN — LIDOCAINE HYDROCHLORIDE 100 MG: 20 INJECTION, SOLUTION EPIDURAL; INFILTRATION; INTRACAUDAL; PERINEURAL at 11:04

## 2025-04-23 RX ADMIN — ONDANSETRON 4 MG: 2 INJECTION INTRAMUSCULAR; INTRAVENOUS at 11:04

## 2025-04-23 RX ADMIN — HYDRALAZINE HYDROCHLORIDE 10 MG: 20 INJECTION, SOLUTION INTRAMUSCULAR; INTRAVENOUS at 01:04

## 2025-04-23 NOTE — H&P
"Interventional Neuroradiology Pre-procedure Note    History of Present Illness:  Per chart" 85 y.o. female with HTN, HLD, DM, brought in with acute onset R sided weakness, R face droop, L gaze preference, and inability to talk. "   "Head CT and CTA Head & Neck, and MRI brain  Study Interpretation: Ill defined low attenuation L insula. L M2 occlusion.  MRI brain with acute ischemic changes L frontal and BG regions with some corresponding changes already noted in FLAIR images.   "    Past Medical History:   Diagnosis Date    Diabetes mellitus, type 2     Glaucoma     Hyperlipidemia     Hypertension      Past Surgical History:   Procedure Laterality Date    CATARACT EXTRACTION      ROTATOR CUFF REPAIR         Review of Systems:   As documented in primary team H&P    Home Meds:   Prior to Admission medications    Medication Sig Start Date End Date Taking? Authorizing Provider   acac/inulin/c-lose/mv-mn/folic (FIBER PLUS MULITVITAMIN ORAL) Take by mouth.    Provider, Historical   amLODIPine (NORVASC) 5 MG tablet Take 5 mg by mouth once daily. 8/24/20   Provider, Historical   atorvastatin (LIPITOR) 20 MG tablet Take 20 mg by mouth once daily. 10/20/22   Provider, Historical   bempedoic acid 180 mg Tab Take 1 tablet (180 mg total) by mouth once daily. 10/7/22   MONTY Corona PA-C   cefUROXime (CEFTIN) 250 MG tablet Take 250 mg by mouth every 12 (twelve) hours. 4/22/24   Provider, Historical   ezetimibe (ZETIA) 10 mg tablet Take 1 tablet (10 mg total) by mouth once daily. 10/3/22 10/3/23  MONTY Corona PA-C   fluconazole (DIFLUCAN) 150 MG Tab Take one tablet once. Repeat in seven days if symptoms continue. 6/24/21   MONTY Corona PA-C   furosemide (LASIX) 20 MG tablet Take 20 mg by mouth once daily.    Provider, Historical   gabapentin (NEURONTIN) 300 MG capsule Take one capsule nightly for nerve pain. 10/3/22   MONTY Corona PA-C   glimepiride (AMARYL) 1 MG tablet Take 1 tablet (1 mg total) by mouth before " breakfast. 12/18/20 6/10/24  MONTY Corona PA-C   latanoprost 0.005 % ophthalmic solution INSTILL 1 DROP INTO BOTH EYES AT BEDTIME AS DIRECTED INSTILL 1 DROP INTO BOTH EYES AT BEDTIME 8/22/20   Provider, Historical   metFORMIN (GLUCOPHAGE) 1000 MG tablet Take 1 tablet (1,000 mg total) by mouth daily with breakfast. 1/25/22   MONTY Corona PA-C   metoprolol succinate (TOPROL-XL) 50 MG 24 hr tablet Take 50 mg by mouth every evening. 8/24/20   Provider, Historical   potassium chloride (KLOR-CON) 10 MEQ TbSR Take 10 mEq by mouth once.    Provider, Historical   telmisartan (MICARDIS) 80 MG Tab Take 80 mg by mouth once daily. 9/5/20   Provider, Historical   vibegron (GEMTESA) 75 mg Tab Take 1 tablet (75 mg total) by mouth once daily. 6/24/24 6/24/25  Kacey Stallings MD     Scheduled Meds:   Continuous Infusions:   PRN Meds:  Anticoagulants/Antiplatelets: no anticoagulation    Allergies: Review of patient's allergies indicates:  No Known Allergies  Sedation Hx: have not been any systemic reactions    Labs:  Recent Labs   Lab 04/23/25  0925   INR 1.0       Recent Labs   Lab 04/23/25  0925   WBC 7.91   HGB 11.7*   HCT 36.9*   MCV 82         Recent Labs   Lab 04/23/25  1024      K 4.0   CO2 25   BUN 33*   CREATININE 1.1   CALCIUM 10.6*   ALT 52*   AST 64*   ALBUMIN 3.6   BILITOT 0.7         Vitals:  Pulse: 64 (04/23/25 1011)  Resp: 18 (04/23/25 0913)  BP: (!) 174/76 (04/23/25 1011)  SpO2: 99 % (04/23/25 1011)     Physical Exam:  ASA: 3  Mallampati: 2  Per initial NIHSS- 1a. Level of Consciousness: 1-->Not alert, but arousable by minor stimulation to obey, answer, or respond  1b. LOC Questions: 1-->Answers one question correctly  1c. LOC Commands: 0-->Performs both tasks correctly  2. Best Gaze: 2-->Forced deviation, or total gaze paresis not overcome by the oculocephalic maneuver  3. Visual: 0-->No visual loss  4. Facial Palsy: 2-->Partial paralysis (total or near-total paralysis of lower face)  5a.  Motor Arm, Left: 0-->No drift, limb holds 90 (or 45) degrees for full 10 secs  5b. Motor Arm, Right: 1-->Drift, limb holds 90 (or 45) degrees, but drifts down before full 10 secs, does not hit bed or other support  6a. Motor Leg, Left: 0-->No drift, leg holds 30 degree position for full 5 secs  6b. Motor Leg, Right: 3-->No effort against gravity, leg falls to bed immediately  7. Limb Ataxia: 0-->Absent  8. Sensory: 0-->Normal, no sensory loss  9. Best Language: 2-->Severe aphasia, all communication is through fragmentary expression, great need for inference, questioning, and guessing by the listener. Range of information that can be exchanged is limited, listener carries burden of. . . (see row details)  10. Dysarthria: 0-->Normal  11. Extinction and Inattention (formerly Neglect): 0-->No abnormality  Total (NIH Stroke Scale): 12     NIHSS-14 on arrival here- 0/5 RUE/RLE      Plan:  Sedation Plan: general anesthesia  Patient will undergo: Patient will undergo cerebral angiogram +/  thrombectomy    Informed consent obtained from patient's sister. Risk vs benefits explained. She agrees to procedure.      Shankar Leyva MD  Fellow, NeuroEndovascular Surgery, [facility] [place] [facility]

## 2025-04-23 NOTE — ED PROVIDER NOTES
Encounter Date: 4/23/2025       History     Chief Complaint   Patient presents with    Transfer     Arrives via EMS coming [facility] for IR, right sided weakness, aphasia, and right facial droop      85-year-old female with a history of type 2 diabetes, hypertension, hyperlipidemia, and glaucoma presents as a transfer from [facility] for concern of acute CVA.  Upon arrival here by EMS she has a right-sided facial droop, expressive aphasia, left gaze deviation, and right arm and leg weakness.  Initial NIH performed at outside facility is 12.  I evaluated the patient in conjunction with the stroke team upon arrival here by EMS.  EMS reports no changes in her mentation or her overall status during transport.        Review of patient's allergies indicates:  No Known Allergies  Past Medical History:   Diagnosis Date    Diabetes mellitus, type 2     Glaucoma     Hyperlipidemia     Hypertension      Past Surgical History:   Procedure Laterality Date    CATARACT EXTRACTION      ROTATOR CUFF REPAIR       Family History   Problem Relation Name Age of Onset    Breast cancer Paternal Aunt       Social History[1]  Review of Systems   Unable to perform ROS: Mental status change       Physical Exam     Initial Vitals [04/23/25 1103]   BP Pulse Resp Temp SpO2   (!) 167/67 64 16 -- 99 %      MAP       --         Physical Exam    Nursing note and vitals reviewed.  Constitutional: She appears well-developed and well-nourished. She is not diaphoretic. No distress.   HENT:   Head: Normocephalic and atraumatic.   Eyes: EOM are normal. Pupils are equal, round, and reactive to light.   Neck: Neck supple.   Normal range of motion.  Cardiovascular:  Normal rate and regular rhythm.           Pulmonary/Chest: Breath sounds normal.   Abdominal: Abdomen is soft. Bowel sounds are normal. She exhibits no distension. There is no abdominal tenderness. There is no rebound.   Musculoskeletal:         General: No tenderness or  edema. Normal range of motion.      Cervical back: Normal range of motion and neck supple.     Neurological: She is alert and oriented to person, place, and time.   Right sided facial droop. Left gaze deviation, does not cross midline with prompting or command. Follows commands. Left leg and arm with mild drift. Right leg no movement. Right arm resists gravity briefly. Productive aphasia   Skin: Skin is warm and dry. Capillary refill takes less than 2 seconds.         ED Course   Critical Care    Date/Time: 4/23/2025 11:17 AM    Performed by: Magen Orona MD  Authorized by: Magen Orona MD  Total critical care time (exclusive of procedural time) : 10 minutes  Critical care time was exclusive of separately billable procedures and treating other patients and teaching time.  Critical care was necessary to treat or prevent imminent or life-threatening deterioration of the following conditions: CNS failure or compromise.  Critical care was time spent personally by me on the following activities: discussions with consultants, examination of patient, ordering and review of radiographic studies, ordering and review of laboratory studies, pulse oximetry and review of old charts.        Labs Reviewed - No data to display       Imaging Results    None          Medications - No data to display  Medical Decision Making  85-year-old female with a history of type 2 diabetes, hypertension, hyperlipidemia, and glaucoma presents as a transfer from [facility] for concern of acute CVA. Imaging results as below    CTA head/neck    CLINICAL HISTORY:  ([id number])86 y/o  (1940) F     Neuro deficit, acute, stroke suspected;     TECHNIQUE:  (A#[id number], exam time 4/23/2025 9:37)     CTA HEAD AND NECK (XPD) [id number]     CT angiography of the head and neck was performed using thin axial slices respectively and reviewed in multiple planes. Two and three dimensional multiplanar reformatted images were  generated and submitted to PACS.     POST PROCESSING:     (Vitrea) 3D advanced post-processing was performed by the interpreting physician using an independent workstation utilizing a combination of MIP and MPR techniques to better evaluate anatomy and disease process. 3D rendering was performed with physician participation and supervision.     CONTRAST:     100  mL Omni 350 was injected intravenously.     CMS MANDATED QUALITY DATA - CT RADIATION - 436     All CT scans at this facility utilize dose modulation, iterative reconstruction, and/or weight based dosing when appropriate to reduce radiation dose to as low as reasonably achievable.     CMS MANDATED QUALITY DATA - CAROTID - 195     All measurements and percent stenosis described below were determined using NASCET criteria or criteria similar to NASCET, as defined by the Society of Radiologists in Ultrasound Consensus Conference, Radiology, 2003     COMPARISON:  None available.     FINDINGS:  Limited essentially nondiagnostic study for evaluation of the vascular structures; the technologist reported that the IV tubing came undone, contrast leak, and an indeterminate suboptimal bolus of IV contrast was administered during the initial scan, subsequently a repeat CTA was performed, with adequate contrast bolus and timing.     CTA of the [place]: There are calcified mural plaques in the intracranial internal carotid arteries, causing less than 20% stenosis in any given area.  There is patency of the intracranial circulation including the bilateral internal carotid arteries, the carotid terminus, the A1 and M1 segments, the bilateral intracranial vertebral arteries, the basilar artery and its distal branches.  There is a likely focal high-grade stenosis/narrowing/thrombus in the proximal M2 segment of the left MCA (axial image 267 series 12), this is best seen on the reconstructed coronal image 177 series 12).  No aneurysm is identified within the limits of  the technique. Conventional angiography is more sensitive for the detection of small aneurysms.     CTA of the Neck:     There is a three-vessel aortic arch. CTA of the neck demonstrates that the origins of the great vessels are widely patent. No aneurysm is seen.     RIGHT:     Common carotid artery origin: Patent.     Cervical segment: Patent.     External carotid origin characteristics: Patent.     Carotid bifurcation: Patent.     Internal carotid origin characteristics: No focal stenosis.  Calcified plaque is seen in the intracranial internal carotid artery with no measurable stenosis.     Vertebral artery: within normal limits.     LEFT     Common carotid artery origin: Patent.     Cervical segment: Patent.     External carotid origin characteristics: Patent.     Carotid bifurcation: Patent.  Calcified plaque is seen in the left carotid bulb extending into the proximal left internal carotid artery with no measurable stenosis.     Internal carotid origin characteristics: No focal stenosis.     Vertebral artery: within normal limits.     Additional:     -mediastinal lymphadenopathy is present, for example:     -22 x 16 mm pretracheal node (image 42)     -27 x 18 mm precarinal node (image 13)     -17 x 12 mm prevascular node (image 16)     -15 x 10 mm left hilar node (image 1)     -degenerative change throughout the spine.     Impression:     1.  Focal high-grade stenosis/narrowing, with suspected thrombus/soft plaque in the left MCA     2.  Suspicious/pathologic mediastinal lymphadenopathy, while this may be related to granulomatous disease, lymphoma, metastasis/malignancy is a consideration and correlation with the laboratory values, history and CT chest follow-up would be warranted.     3.  No clinically significant stenosis or large vessel occlusion in the neck.     RESULT NOTIFICATION: These observations were discussed by the dictating physician, by phone with, and acknowledged by Max Abad at 09:48  on 4/23/2025.        Electronically signed by:Tye Jenkins  Date:                                            04/23/2025  Time:                                           09:53      Exam Ended: 04/23/25 09:35 CDT Last Resulted: 04/23/25 09:53 CDT    MRI Brain without Contrast    EXAMINATION:  MRI BRAIN WITHOUT CONTRAST     CLINICAL HISTORY:  Neuro deficit, acute, stroke suspected;.     TECHNIQUE:  Multiplanar noninfusion imaging was performed.     COMPARISON:  CT brain, April 23, 2025     FINDINGS:  Diffusion-weighted images demonstrate regions of diffusion restriction involving the left basal ganglia and left insular cortex compatible with acute ischemic involvement.  There is no evidence of associated hemorrhage or mass effect.     There is no midline shift.  Ventricles and sulci are mildly prominent.  There are no pathologic extra-axial fluid collections.     FLAIR images demonstrate findings of mild chronic microvascular white matter disease.  The cerebellum and brainstem are unremarkable.     The orbits, paranasal sinuses, skull base, and sella turcica are within normal limits.     Impression:     1. Regions of diffusion restriction involving the left basal ganglia and left insular cortex compatible with acute ischemia.  No evidence of associated hemorrhage or mass effect.  2. Involutional changes as above.        Electronically signed by:Felipe Hess  Date:                                            04/23/2025  Time:                                           10:17    Risk  Decision regarding hospitalization.               ED Course as of 04/23/25 1119   Wed Apr 23, 2025   1116 Vascular neurology team at bedside, evaluated the patient and agree with plans for emergent IR intervention.  IR suite is now ready for the patient and she is going upstairs. [MB]      ED Course User Index  [MB] Magen Orona MD                           Clinical Impression:  Final diagnoses:  [I63.9] Acute CVA  (cerebrovascular accident) (Primary)          ED Disposition Condition    Admit                   [1]   Social History  Tobacco Use    Smoking status: Never    Smokeless tobacco: Never   Substance Use Topics    Alcohol use: Not Currently    Drug use: Never        Magen Orona MD  04/23/25 1117

## 2025-04-23 NOTE — PROCEDURES
Interventional Neuroradiology Post-Procedure Note    Pre Op Diagnosis: Left MCA ischemic stroke    Post Op Diagnosis: Same    Procedure: Thrombectomy + Diagnostic cerebral angiogram    Procedure performed by: Mary TRACY, Theo; Sweetie TRACY, Skip; Gordon TRACY, Shankar    Written Informed Consent Obtained: Yes    Specimen Removed: NO    Estimated Blood Loss: Minimal    Procedure report:   An 8 F sheath was placed into the right femoral artery and a 5F Roel catheter within access catheter was advanced into the aortic arch.  Bilateral CCA/ICA and right vertebral arteries were subselected and angiography of the brain was performed after injection into each of these vessels.    Preliminary interpretation: Left M1 occlusion s/p 2 passes resulting in TICI 2-C recanalization.      Left M 1- 1 st pass- BMX 91, [id number]  Secondary M2-MeVO- 2nd pass- BMX91, RED43 and Synchro    Please see Imaging report for full details.    A right femoral artery angiogram was performed, the sheath removed and hemostasis achieved using 8F Angioseal device.  No hematoma was present at the time of hemostasis.    The patient tolerated the procedure well.     Plan:  -To [facility] for monitoring  -Bed rest for 2h  -Groin check and pulse check q2h   -Avoid carrying heavy weights > 10 lbs x 24 hrs   -Remove groin dressing tomorrow       Shankar Leyva MD  Fellow, NeuroEndovascular Surgery, [facility] [place] [facility]

## 2025-04-23 NOTE — HPI
86 y/o female with HTN, HLD, DM presents to [facility] as an emergent transfer from [facility] due to a L M1-M2 occlusion requiring thrombectomy. The patient initially presented to the OSH today, 4/23 with acute onset R sided weakness, R face droop, L gaze preference, and inability to talk. LKN 1700 yesterday, 4/22.

## 2025-04-23 NOTE — ED NOTES
Pt returned to er 11. Tele neuro on monitor. Pt on cm, pulse ox, nibp. Redraw for lab in progress.

## 2025-04-23 NOTE — PLAN OF CARE
Pt arrived to IR 4 (200)  for cerebral angiogram +/- thrombectomy before procedure nurse was in room. IR procedure nurse unable to complete neuro assessment before patient was intubated  because anesthesia had begun before IR nurse arrived to the room. MD completed neuro assessment prior to anesthesia. Pt prepped and draped utilizing standard sterile technique. Timeouts completed utilizing standard universal time-out, per department and facility policy.  Anesthesia at bedside; Refer to anesthesia record regarding sedation and vital signs.

## 2025-04-23 NOTE — SUBJECTIVE & OBJECTIVE
Past Medical History:   Diagnosis Date    Diabetes mellitus, type 2     Glaucoma     Hyperlipidemia     Hypertension      Past Surgical History:   Procedure Laterality Date    CATARACT EXTRACTION      ROTATOR CUFF REPAIR        Medications Ordered Prior to Encounter[1]   Allergies: Patient has no known allergies.  Family History   Problem Relation Name Age of Onset    Breast cancer Paternal Aunt       Social History[2]  Review of Systems   Reason unable to perform ROS: aphasia, dysarthria.     Objective:     Vitals:    Pulse: 64  BP: (!) 167/67  Resp: 16  SpO2: 99 %    Pulse  Min: 62  Max: 73  BP  Min: 150/54  Max: 188/76  MAP (mmHg)  Min: 109  Max: 109  Resp  Min: 16  Max: 18  SpO2  Min: 98 %  Max: 100 %    No intake/output data recorded.            Physical Exam  Vitals and nursing note reviewed.   HENT:      Head: Normocephalic and atraumatic.      Mouth/Throat:      Mouth: Mucous membranes are moist.   Eyes:      Extraocular Movements: Extraocular movements intact.      Conjunctiva/sclera: Conjunctivae normal.      Pupils: Pupils are equal, round, and reactive to light.   Cardiovascular:      Rate and Rhythm: Normal rate and regular rhythm.   Pulmonary:      Effort: Pulmonary effort is normal.   Abdominal:      General: Abdomen is flat. There is no distension.   Skin:     General: Skin is warm.      Findings: Bruising present.   Neurological:      Mental Status: She is alert.      Comments:   --GCS:  15  -- severe dysarthria, difficult to assess speech but able to say yes/no and name some objects  --PERRL   --Motor: RSW but antigravity   --sensory: SILT              Current NIH Stroke Score Values      Flowsheet Row Most Recent Value   Interval --  [NCC arrival]   1a. Level of Consciousness 0-->Alert, keenly responsive   1b. LOC Questions 1-->Answers one question correctly   1c. LOC Commands 0-->Performs both tasks correctly   2. Best Gaze 0-->Normal   3. Visual 1-->Partial hemianopia   4. Facial Palsy  2-->Partial paralysis (total or near-total paralysis of lower face)   5a. Motor Arm, Left 0-->No drift, limb holds 90 (or 45) degrees for full 10 secs   5b. Motor Arm, Right 1-->Drift, limb holds 90 (or 45) degrees, but drifts down before full 10 secs, does not hit bed or other support   6a. Motor Leg, Left 0-->No drift, leg holds 30 degree position for full 5 secs   6b. Motor Leg, Right 2-->Some effort against gravity, leg falls to bed by 5 secs, but has some effort against gravity   7. Limb Ataxia 0-->Absent   8. Sensory 0-->Normal, no sensory loss   9. Best Language 1-->Mild-to-moderate aphasia, some obvious loss of fluency or facility of comprehension, without significant limitation on ideas expressed or form of expression. Reduction of speech and/or comprehension, however, makes conversation. . . (see row details)   10. Dysarthria 2-->Severe dysarthria, patients speech is so slurred as to be unintelligible in the absence of or out of proportion to any dysphasia, or is mute/anarthric   11. Extinction and Inattention (formerly Neglect) 0-->No abnormality   Total (NIH Stroke Scale) 10            Unable to test language, memory, judgment, insight, fund of knowledge, hearing, shoulder shrug, tongue protrusion, coordination, gait due to level of consciousness.       Today I personally reviewed pertinent medications, lines/drains/airways, imaging, cardiology results, laboratory results,             [1]   Current Facility-Administered Medications on File Prior to Encounter   Medication Dose Route Frequency Provider Last Rate Last Admin    [COMPLETED] iohexoL (OMNIPAQUE 350) injection 100 mL  100 mL Intravenous ONCE PRN Max Abad MD   100 mL at 04/23/25 1397     Current Outpatient Medications on File Prior to Encounter   Medication Sig Dispense Refill    atorvastatin (LIPITOR) 40 MG tablet Take 40 mg by mouth once daily.      potassium chloride SA (K-DUR,KLOR-CON M) 10 MEQ tablet Take 10 mEq by mouth once  daily.      acac/inulin/c-lose/mv-mn/folic (FIBER PLUS MULITVITAMIN ORAL) Take by mouth.      amLODIPine (NORVASC) 5 MG tablet Take 5 mg by mouth once daily.      ezetimibe (ZETIA) 10 mg tablet Take 1 tablet (10 mg total) by mouth once daily. 90 tablet 3    furosemide (LASIX) 20 MG tablet Take 20 mg by mouth once daily.      glimepiride (AMARYL) 1 MG tablet Take 1 tablet (1 mg total) by mouth before breakfast. 90 tablet 3    latanoprost 0.005 % ophthalmic solution INSTILL 1 DROP INTO BOTH EYES AT BEDTIME AS DIRECTED INSTILL 1 DROP INTO BOTH EYES AT BEDTIME      metoprolol succinate (TOPROL-XL) 50 MG 24 hr tablet Take 50 mg by mouth every evening.      telmisartan (MICARDIS) 80 MG Tab Take 80 mg by mouth once daily.      [DISCONTINUED] atorvastatin (LIPITOR) 20 MG tablet Take 20 mg by mouth once daily.      [DISCONTINUED] bempedoic acid 180 mg Tab Take 1 tablet (180 mg total) by mouth once daily. 90 tablet 0    [DISCONTINUED] cefUROXime (CEFTIN) 250 MG tablet Take 250 mg by mouth every 12 (twelve) hours.      [DISCONTINUED] fluconazole (DIFLUCAN) 150 MG Tab Take one tablet once. Repeat in seven days if symptoms continue. 1 tablet 1    [DISCONTINUED] gabapentin (NEURONTIN) 300 MG capsule Take one capsule nightly for nerve pain. 90 capsule 3    [DISCONTINUED] metFORMIN (GLUCOPHAGE) 1000 MG tablet Take 1 tablet (1,000 mg total) by mouth daily with breakfast. 90 tablet 3    [DISCONTINUED] potassium chloride (KLOR-CON) 10 MEQ TbSR Take 10 mEq by mouth once.      [DISCONTINUED] vibegron (GEMTESA) 75 mg Tab Take 1 tablet (75 mg total) by mouth once daily. 30 tablet 11   [2]   Social History  Tobacco Use    Smoking status: Never    Smokeless tobacco: Never   Substance Use Topics    Alcohol use: Not Currently    Drug use: Never

## 2025-04-23 NOTE — SUBJECTIVE & OBJECTIVE
HPI:  85 y.o. female with HTN, HLD, DM, brought in with acute onset R sided weakness, R face droop, L gaze preference, and inability to talk.  No improving.     Images personally reviewed and interpreted:  Study: Head CT and CTA Head & Neck, and MRI brain  Study Interpretation: Ill defined low attenuation L insula. L M2 occlusion.  MRI brain with acute ischemic changes L frontal and BG regions with some corresponding changes already noted in FLAIR images.     Assessment and plan:  Acute L MCA territory infarct as above.  No TNK candidate.  Favorable ASPECTS, thus recommended transfer to [facility] for possible MT.      Lytics recommendation: Thrombolytic therapy not recommended due to Out of window based on MRI Protocol (Wakeup/Unknown Onset)    Thrombectomy recommendation: Yes  Placement recommendation: transfer to [facility] by  ground  stat

## 2025-04-23 NOTE — H&P
[facility] - Neuro Critical Care  Neurocritical Care  History & Physical    Admit Date: 4/23/2025  Service Date: 04/23/2025  Length of Stay: 0    Subjective:     Chief Complaint: Embolic stroke involving left middle cerebral artery    History of Present Illness: Pt is an 85 y.o. femamle with PMHx of DM, HTN, HLD, and glaucoma who presents for acute LMCA stroke. She initially presented to OSH for RSW, facial droop and aphasia. Her last known normal was 1700 yesterday. Telestroke was performed and initial NIH 12. Patient was out of the window for TNK but CTA revealed L M1 occlusion so patient was transferred to C for possible intervention. She was taken to IR where thrombectomy was completed with TICI2C reperfusion. She will be admitted to [facility] for higher level care and neuro monitoring.       Past Medical History:   Diagnosis Date    Diabetes mellitus, type 2     Glaucoma     Hyperlipidemia     Hypertension      Past Surgical History:   Procedure Laterality Date    CATARACT EXTRACTION      ROTATOR CUFF REPAIR        Medications Ordered Prior to Encounter[1]   Allergies: Patient has no known allergies.  Family History   Problem Relation Name Age of Onset    Breast cancer Paternal Aunt       Social History[2]  Review of Systems   Reason unable to perform ROS: aphasia, dysarthria.     Objective:     Vitals:    Pulse: 64  BP: (!) 167/67  Resp: 16  SpO2: 99 %    Pulse  Min: 62  Max: 73  BP  Min: 150/54  Max: 188/76  MAP (mmHg)  Min: 109  Max: 109  Resp  Min: 16  Max: 18  SpO2  Min: 98 %  Max: 100 %    No intake/output data recorded.            Physical Exam  Vitals and nursing note reviewed.   HENT:      Head: Normocephalic and atraumatic.      Mouth/Throat:      Mouth: Mucous membranes are moist.   Eyes:      Extraocular Movements: Extraocular movements intact.      Conjunctiva/sclera: Conjunctivae normal.      Pupils: Pupils are equal, round, and reactive to light.   Cardiovascular:      Rate and Rhythm: Normal rate and  regular rhythm.   Pulmonary:      Effort: Pulmonary effort is normal.   Abdominal:      General: Abdomen is flat. There is no distension.   Skin:     General: Skin is warm.      Findings: Bruising present.   Neurological:      Mental Status: She is alert.      Comments:   --GCS:  15  -- severe dysarthria, difficult to assess speech but able to say yes/no and name some objects  --PERRL   --Motor: RSW but antigravity   --sensory: SILT              Current NIH Stroke Score Values      Flowsheet Row Most Recent Value   Interval --  [NCC arrival]   1a. Level of Consciousness 0-->Alert, keenly responsive   1b. LOC Questions 1-->Answers one question correctly   1c. LOC Commands 0-->Performs both tasks correctly   2. Best Gaze 0-->Normal   3. Visual 1-->Partial hemianopia   4. Facial Palsy 2-->Partial paralysis (total or near-total paralysis of lower face)   5a. Motor Arm, Left 0-->No drift, limb holds 90 (or 45) degrees for full 10 secs   5b. Motor Arm, Right 1-->Drift, limb holds 90 (or 45) degrees, but drifts down before full 10 secs, does not hit bed or other support   6a. Motor Leg, Left 0-->No drift, leg holds 30 degree position for full 5 secs   6b. Motor Leg, Right 2-->Some effort against gravity, leg falls to bed by 5 secs, but has some effort against gravity   7. Limb Ataxia 0-->Absent   8. Sensory 0-->Normal, no sensory loss   9. Best Language 1-->Mild-to-moderate aphasia, some obvious loss of fluency or facility of comprehension, without significant limitation on ideas expressed or form of expression. Reduction of speech and/or comprehension, however, makes conversation. . . (see row details)   10. Dysarthria 2-->Severe dysarthria, patients speech is so slurred as to be unintelligible in the absence of or out of proportion to any dysphasia, or is mute/anarthric   11. Extinction and Inattention (formerly Neglect) 0-->No abnormality   Total (NIH Stroke Scale) 10            Unable to test language, memory,  judgment, insight, fund of knowledge, hearing, shoulder shrug, tongue protrusion, coordination, gait due to level of consciousness.       Today I personally reviewed pertinent medications, lines/drains/airways, imaging, cardiology results, laboratory results,        Assessment/Plan:     Neuro  * Embolic stroke involving left middle cerebral artery  L M1 occlusion, no TNK 2/2 to OOW, thrombectomy TICI 2C  -admit to [facility]  -vascular neurology following   -Q 1 hour neuro checks and vitals   Antithrombotics for secondary stroke prevention: Antiplatelets: None: pending post procedure imaging and size of infarct    Statins for secondary stroke prevention and hyperlipidemia, if present:   Statins: Atorvastatin- 40 mg daily    Aggressive risk factor modification: HTN, DM, HLD, Diet, Exercise     Rehab efforts: The patient has been evaluated by a stroke team provider and the therapy needs have been fully considered based off the presenting complaints and exam findings. The following therapy evaluations are needed: PT evaluate and treat, OT evaluate and treat, SLP evaluate and treat, PM&R evaluate for appropriate placement    Diagnostics ordered/pending: HgbA1C to assess blood glucose levels, Lipid Profile to assess cholesterol levels, MRI head without contrast to assess brain parenchyma, TTE to assess cardiac function/status , TSH to assess thyroid function    VTE prophylaxis: Mechanical prophylaxis: Place SCDs    BP parameters: Infarct: Post sucessful thrombectomy, SBP <160        Cytotoxic brain edema  See stroke    Aphasia  SLP eval and treat    Acute right-sided weakness  PT/OT eval and treat     Cardiac/Vascular  Hyperlipidemia  Daily statin  Lipid panel    HTN (hypertension)  SBP goal <160,  Prn hydralazine, labetalol     Endocrine  Type 2 diabetes mellitus  SSI protocol   A1c          The patient is being Prophylaxed for:  Venous Thromboembolism with: Mechanical  Stress Ulcer with: Not Applicable   Ventilator Pneumonia  with: not applicable    Activity Orders            Progressive Mobility Protocol (mobilize patient to their highest level of functioning at least twice daily) starting at 04/23 2000    Turn patient starting at 04/23 1400    Elevate HOB starting at 04/23 1211    Diet NPO: NPO starting at 04/23 1211          Full Code    45 minutes of Critical care time was spent personally by me on the following activities: development of treatment plan with patient or surrogate and bedside caregivers, discussions with consultants, evaluation of patient's response to treatment, examination of patient, ordering and performing treatments and interventions, ordering and review of laboratory studies, ordering and review of radiographic studies, pulse oximetry, antibiotic titration if applicable, vasopressor titration if applicable, re-evaluation of patient's condition. This critical care time did not overlap with that of any other provider or involve time for any procedures. There is high probability for acute neurological change leading to clinical and possibly life-threatening deterioration requiring highest level of provider preparedness for urgent intervention.           Celeste Bryant PA-C  Neurocritical Care  [facility] - Neuro Critical Care       [1]   Current Facility-Administered Medications on File Prior to Encounter   Medication Dose Route Frequency Provider Last Rate Last Admin    [COMPLETED] iohexoL (OMNIPAQUE 350) injection 100 mL  100 mL Intravenous ONCE PRN Max Abad MD   100 mL at 04/23/25 0927     Current Outpatient Medications on File Prior to Encounter   Medication Sig Dispense Refill    atorvastatin (LIPITOR) 40 MG tablet Take 40 mg by mouth once daily.      potassium chloride SA (K-DUR,KLOR-CON M) 10 MEQ tablet Take 10 mEq by mouth once daily.      acac/inulin/c-lose/mv-mn/folic (FIBER PLUS MULITVITAMIN ORAL) Take by mouth.      amLODIPine (NORVASC) 5 MG tablet Take 5 mg by mouth once daily.       ezetimibe (ZETIA) 10 mg tablet Take 1 tablet (10 mg total) by mouth once daily. 90 tablet 3    furosemide (LASIX) 20 MG tablet Take 20 mg by mouth once daily.      glimepiride (AMARYL) 1 MG tablet Take 1 tablet (1 mg total) by mouth before breakfast. 90 tablet 3    latanoprost 0.005 % ophthalmic solution INSTILL 1 DROP INTO BOTH EYES AT BEDTIME AS DIRECTED INSTILL 1 DROP INTO BOTH EYES AT BEDTIME      metoprolol succinate (TOPROL-XL) 50 MG 24 hr tablet Take 50 mg by mouth every evening.      telmisartan (MICARDIS) 80 MG Tab Take 80 mg by mouth once daily.      [DISCONTINUED] atorvastatin (LIPITOR) 20 MG tablet Take 20 mg by mouth once daily.      [DISCONTINUED] bempedoic acid 180 mg Tab Take 1 tablet (180 mg total) by mouth once daily. 90 tablet 0    [DISCONTINUED] cefUROXime (CEFTIN) 250 MG tablet Take 250 mg by mouth every 12 (twelve) hours.      [DISCONTINUED] fluconazole (DIFLUCAN) 150 MG Tab Take one tablet once. Repeat in seven days if symptoms continue. 1 tablet 1    [DISCONTINUED] gabapentin (NEURONTIN) 300 MG capsule Take one capsule nightly for nerve pain. 90 capsule 3    [DISCONTINUED] metFORMIN (GLUCOPHAGE) 1000 MG tablet Take 1 tablet (1,000 mg total) by mouth daily with breakfast. 90 tablet 3    [DISCONTINUED] potassium chloride (KLOR-CON) 10 MEQ TbSR Take 10 mEq by mouth once.      [DISCONTINUED] vibegron (GEMTESA) 75 mg Tab Take 1 tablet (75 mg total) by mouth once daily. 30 tablet 11   [2]   Social History  Tobacco Use    Smoking status: Never    Smokeless tobacco: Never   Substance Use Topics    Alcohol use: Not Currently    Drug use: Never

## 2025-04-23 NOTE — ANESTHESIA PROCEDURE NOTES
Intubation    Date/Time: 4/23/2025 11:28 AM    Performed by: Radha Vale CRNA  Authorized by: Tyson Delgado MD    Intubation:     Induction:  Rapid sequence induction    Intubated:  Postinduction    Mask Ventilation:  Not attempted    Attempts:  1    Attempted By:  CRNA    Method of Intubation:  Video laryngoscopy    Blade:  Valencia 3    Laryngeal View Grade: Grade I - full view of cords      Difficult Airway Encountered?: No      Complications:  None    Airway Device:  Oral endotracheal tube    Airway Device Size:  7.5    Style/Cuff Inflation:  Cuffed (inflated to minimal occlusive pressure)    Inflation Amount (mL):  5    Tube secured:  21    Secured at:  The lips    Placement Verified By:  Capnometry    Complicating Factors:  None    Findings Post-Intubation:  BS equal bilateral and atraumatic/condition of teeth unchanged

## 2025-04-23 NOTE — PT/OT/SLP PROGRESS
Speech Language Pathology      Delilah Williamson  MRN: [id number]    Patient not seen today secondary to RN hold 2/2 HOB restriction this afternoon. Will follow-up next available service date.

## 2025-04-23 NOTE — PLAN OF CARE
"[facility] Care Plan    POC reviewed with Delilah Williamson and family at 1500. Patient verbalized understanding. Questions and concerns addressed. No acute events today. Pt progressing toward goals. Will continue to monitor. See below and flowsheets for full assessment and VS info.     - Post IR monitoring  - PRN Hydralazine given x1      Is this a stroke patient? yes- Stroke booklet reviewed with patient and family, risk factors identified for patient and stroke booklet remains at bedside for ongoing education.     Care individualization: lightweight blankets, television     Neuro:  Margoth Coma Scale  Best Eye Response: 4-->(E4) spontaneous  Best Motor Response: 6-->(M6) obeys commands  Best Verbal Response: 4-->(V4) confused  Linden Coma Scale Score: 14  Assessment Qualifiers: patient not sedated/intubated, no eye obstruction present  Pupil PERRLA: yes     24 hr Temp:  [96.9 °F (36.1 °C)-99 °F (37.2 °C)]     CV:   Rhythm: (P) normal sinus rhythm  BP goals:   SBP < 160  MAP > 65    Resp:           Plan: N/A    GI/:     Diet/Nutrition Received: NPO  Last Bowel Movement: 04/22/25  Voiding Characteristics: external catheter    Intake/Output Summary (Last 24 hours) at 4/23/2025 1757  Last data filed at 4/23/2025 1245  Gross per 24 hour   Intake 750 ml   Output --   Net 750 ml          Labs/Accuchecks:  Recent Labs   Lab 04/23/25  0925   WBC 7.91   RBC 4.48   HGB 11.7*   HCT 36.9*         Recent Labs   Lab 04/23/25  1024      K 4.0   CO2 25   BUN 33*   CREATININE 1.1   ALKPHOS 52*   ALT 52*   AST 64*   BILITOT 0.7      Recent Labs   Lab 04/23/25  0925   PROTIME 11.4   INR 1.0    No results for input(s): "CPK", "CPKMB", "TROPONINI", "MB" in the last 168 hours.    Electrolytes: N/A - electrolytes WDL  Accuchecks: Q6H    Gtts:      LDA/Wounds:    Yaw Risk Assessment  Sensory Perception: 3-->slightly limited  Moisture: 3-->occasionally moist  Activity: 1-->bedfast  Mobility: 2-->very limited  Nutrition: " 3-->adequate  Friction and Shear: 2-->potential problem  Yaw Score: 14  Is your yaw score 12 or less? no        WCTM       Problem: Adult Inpatient Plan of Care  Goal: Plan of Care Review  Outcome: Progressing  Goal: Patient-Specific Goal (Individualized)  Outcome: Progressing  Goal: Absence of Hospital-Acquired Illness or Injury  Outcome: Progressing  Goal: Optimal Comfort and Wellbeing  Outcome: Progressing  Goal: Readiness for Transition of Care  Outcome: Progressing     Problem: Stroke, Ischemic (Includes Transient Ischemic Attack)  Goal: Optimal Coping  Outcome: Progressing  Goal: Effective Bowel Elimination  Outcome: Progressing  Goal: Optimal Cerebral Tissue Perfusion  Outcome: Progressing  Goal: Optimal Cognitive Function  Outcome: Progressing  Goal: Improved Communication Skills  Outcome: Progressing  Goal: Optimal Functional Ability  Outcome: Progressing  Goal: Optimal Nutrition Intake  Outcome: Progressing  Goal: Effective Oxygenation and Ventilation  Outcome: Progressing  Goal: Improved Sensorimotor Function  Outcome: Progressing  Goal: Safe and Effective Swallow  Outcome: Progressing  Goal: Effective Urinary Elimination  Outcome: Progressing     Problem: Wound  Goal: Optimal Coping  Outcome: Progressing  Goal: Optimal Functional Ability  Outcome: Progressing  Goal: Absence of Infection Signs and Symptoms  Outcome: Progressing  Goal: Improved Oral Intake  Outcome: Progressing  Goal: Optimal Pain Control and Function  Outcome: Progressing  Goal: Skin Health and Integrity  Outcome: Progressing  Goal: Optimal Wound Healing  Outcome: Progressing

## 2025-04-23 NOTE — ED NOTES
Bed: 02  Expected date: 4/23/25  Expected time: 10:49 AM  Means of arrival:   Comments:  [facility] STROKE

## 2025-04-23 NOTE — NURSING
Patient arrived to [facility] from IR     Report received from: JAKE RN and CRNA    Type of stroke/diagnosis:  L MCA    start and end time (if applicable) na    Thrombectomy start and end time (if applicable) 1222    Current symptoms: R facial droop, expressive aphasia, dysarthria, follows commands, MIX , NIH 10    Skin Assessment done: Y/N yes  Wounds noted:sacrum moisture asso dermatitis with breakdown, rt abd skinfold reddness   *If wounds noted, was Wound Care consulted? Y/N yes  *If wounds noted, LDA placed? Y/N yes  Skin Assessment Verified by:  Shelli Henriquez Completed? Y/N - pending     Patient Belongings on Admit: (be specific) pajamas, upper dentures, hair rollers, socks, glimepiride 1mg 63 tabs, metformin 1000mg 35 tabs, temisartan 80mg 24 tabs, Kcl 10 meq 34 tabs, Metoprolol 50mg 66 tab, Amlodipine 5mg 36 tab, Lasix 20 mg 2 tab    NCC notified: name of person notified JASMIN DAVIS

## 2025-04-23 NOTE — ANESTHESIA PREPROCEDURE EVALUATION
04/23/2025  Delilah Williamson is a 85 y.o., female.      Stroke to IR 4 emergently      Pre-op Assessment          Review of Systems         Anesthesia Plan  Type of Anesthesia, risks & benefits discussed:    Anesthesia Type: Gen ETT  Intra-op Monitoring Plan: Standard ASA Monitors  Induction:  IV  Airway Plan: Video  ASA Score: 4 Emergent    Ready For Surgery From Anesthesia Perspective.     .

## 2025-04-23 NOTE — TRANSFER OF CARE
Anesthesia Transfer of Care Note    Patient: Delilah Williamson    Procedure(s) Performed: * No procedures listed *    Patient location: ICU    Anesthesia Type: general    Transport from OR: Continuous ECG monitoring in transport. Continuous SpO2 monitoring in transport. Transported from OR on 100% O2 by closed face mask with adequate spontaneous ventilation    Post pain: adequate analgesia    Post assessment: no apparent anesthetic complications and tolerated procedure well    Post vital signs: stable    Level of consciousness: awake and alert    Nausea/Vomiting: no nausea/vomiting    Complications: none    Transfer of care protocol was followed    Last vitals: Visit Vitals  BP (!) 167/67   Pulse 64   Resp 16   SpO2 99%

## 2025-04-23 NOTE — H&P
"Interventional Neuroradiology Pre-procedure Note     History of Present Illness:  Per chart" 85 y.o. female with HTN, HLD, DM, brought in with acute onset R sided weakness, R face droop, L gaze preference, and inability to talk. "   "Head CT and CTA Head & Neck, and MRI brain  Study Interpretation: Ill defined low attenuation L insula. L M2 occlusion.  MRI brain with acute ischemic changes L frontal and BG regions with some corresponding changes already noted in FLAIR images.   "          Past Medical History:   Diagnosis Date    Diabetes mellitus, type 2      Glaucoma      Hyperlipidemia      Hypertension              Past Surgical History:   Procedure Laterality Date    CATARACT EXTRACTION        ROTATOR CUFF REPAIR             Review of Systems:   As documented in primary team H&P     Home Meds:           Prior to Admission medications    Medication Sig Start Date End Date Taking? Authorizing Provider   acac/inulin/c-lose/mv-mn/folic (FIBER PLUS MULITVITAMIN ORAL) Take by mouth.       Provider, Historical   amLODIPine (NORVASC) 5 MG tablet Take 5 mg by mouth once daily. 8/24/20     Provider, Historical   atorvastatin (LIPITOR) 20 MG tablet Take 20 mg by mouth once daily. 10/20/22     Provider, Historical   bempedoic acid 180 mg Tab Take 1 tablet (180 mg total) by mouth once daily. 10/7/22     MONTY Corona PA-C   cefUROXime (CEFTIN) 250 MG tablet Take 250 mg by mouth every 12 (twelve) hours. 4/22/24     Provider, Historical   ezetimibe (ZETIA) 10 mg tablet Take 1 tablet (10 mg total) by mouth once daily. 10/3/22 10/3/23   MONTY Corona PA-C   fluconazole (DIFLUCAN) 150 MG Tab Take one tablet once. Repeat in seven days if symptoms continue. 6/24/21     MONTY Corona PA-C   furosemide (LASIX) 20 MG tablet Take 20 mg by mouth once daily.       Provider, Historical   gabapentin (NEURONTIN) 300 MG capsule Take one capsule nightly for nerve pain. 10/3/22     MONTY Corona PA-C   glimepiride (AMARYL) 1 MG " tablet Take 1 tablet (1 mg total) by mouth before breakfast. 12/18/20 6/10/24   MONTY Corona PA-C   latanoprost 0.005 % ophthalmic solution INSTILL 1 DROP INTO BOTH EYES AT BEDTIME AS DIRECTED INSTILL 1 DROP INTO BOTH EYES AT BEDTIME 8/22/20     Provider, Historical   metFORMIN (GLUCOPHAGE) 1000 MG tablet Take 1 tablet (1,000 mg total) by mouth daily with breakfast. 1/25/22     MONTY Corona PA-C   metoprolol succinate (TOPROL-XL) 50 MG 24 hr tablet Take 50 mg by mouth every evening. 8/24/20     Provider, Historical   potassium chloride (KLOR-CON) 10 MEQ TbSR Take 10 mEq by mouth once.       Provider, Historical   telmisartan (MICARDIS) 80 MG Tab Take 80 mg by mouth once daily. 9/5/20     Provider, Historical   vibegron (GEMTESA) 75 mg Tab Take 1 tablet (75 mg total) by mouth once daily. 6/24/24 6/24/25   Kacey Stallings MD      Scheduled Meds:   Continuous Infusions:   PRN Meds:  Anticoagulants/Antiplatelets: no anticoagulation     Allergies: Review of patient's allergies indicates:  No Known Allergies  Sedation Hx: have not been any systemic reactions     Labs:      Recent Labs   Lab 04/23/25  0925   INR 1.0           Recent Labs   Lab 04/23/25  0925   WBC 7.91   HGB 11.7*   HCT 36.9*   MCV 82             Recent Labs   Lab 04/23/25  1024      K 4.0   CO2 25   BUN 33*   CREATININE 1.1   CALCIUM 10.6*   ALT 52*   AST 64*   ALBUMIN 3.6   BILITOT 0.7            Vitals:  Pulse: 64 (04/23/25 1011)  Resp: 18 (04/23/25 0913)  BP: (!) 174/76 (04/23/25 1011)  SpO2: 99 % (04/23/25 1011)      Physical Exam:  ASA: 3  Mallampati: 2  Per initial NIHSS- 1a. Level of Consciousness: 1-->Not alert, but arousable by minor stimulation to obey, answer, or respond  1b. LOC Questions: 1-->Answers one question correctly  1c. LOC Commands: 0-->Performs both tasks correctly  2. Best Gaze: 2-->Forced deviation, or total gaze paresis not overcome by the oculocephalic maneuver  3. Visual: 0-->No visual loss  4. Facial  Palsy: 2-->Partial paralysis (total or near-total paralysis of lower face)  5a. Motor Arm, Left: 0-->No drift, limb holds 90 (or 45) degrees for full 10 secs  5b. Motor Arm, Right: 1-->Drift, limb holds 90 (or 45) degrees, but drifts down before full 10 secs, does not hit bed or other support  6a. Motor Leg, Left: 0-->No drift, leg holds 30 degree position for full 5 secs  6b. Motor Leg, Right: 3-->No effort against gravity, leg falls to bed immediately  7. Limb Ataxia: 0-->Absent  8. Sensory: 0-->Normal, no sensory loss  9. Best Language: 2-->Severe aphasia, all communication is through fragmentary expression, great need for inference, questioning, and guessing by the listener. Range of information that can be exchanged is limited, listener carries burden of. . . (see row details)  10. Dysarthria: 0-->Normal  11. Extinction and Inattention (formerly Neglect): 0-->No abnormality  Total (NIH Stroke Scale): 12      NIHSS-14 on arrival here- 0/5 RUE/RLE        Plan:  Sedation Plan: general anesthesia  Patient will undergo: Patient will undergo cerebral angiogram +/  thrombectomy     Informed consent obtained from patient's sister. Risk vs benefits explained. She agrees to procedure.        Shankar Leyva MD  Fellow, NeuroEndovascular Surgery, [facility] [place] [facility]

## 2025-04-23 NOTE — TELEMEDICINE CONSULT
[facility]  Vascular Neurology  Comprehensive Stroke Center  TeleVascular Neurology Acute Consultation Note        Consult Information  Consults    Consulting Provider: MICHELLE HERNANDEZ   Current Providers  No providers found    Patient Location:  [facility] EMERGENCY DEPARTMENT Emergency Department    Spoke hospital nurse at bedside with patient assisting consultant.  Patient information was obtained from past medical records and primary team.       Stroke Documentation  Acute Stroke Times   Last Known Normal Date: 04/22/25  Last Known Normal Time: 1700  Symptom Onset Date: 04/23/25  Symptom Onset Time: 0700  Stroke Team Called Date: 04/23/25  Stroke Team Called Time: 0915  Stroke Team Arrival Date: 04/23/25  Stroke Team Arrival Time: 0923  CT Interpretation Time: 0923  Thrombolytic Therapy Recommended: No  CTA Interpretation Time: 0945  Thrombectomy Recommended: Yes  MRI Acute Stroke Protocol Interpretation Time: 1000  Decision to Treat Time for IR: 1000    NIH Scale:  Interval: baseline  1a. Level of Consciousness: 1-->Not alert, but arousable by minor stimulation to obey, answer, or respond  1b. LOC Questions: 1-->Answers one question correctly  1c. LOC Commands: 0-->Performs both tasks correctly  2. Best Gaze: 2-->Forced deviation, or total gaze paresis not overcome by the oculocephalic maneuver  3. Visual: 0-->No visual loss  4. Facial Palsy: 2-->Partial paralysis (total or near-total paralysis of lower face)  5a. Motor Arm, Left: 0-->No drift, limb holds 90 (or 45) degrees for full 10 secs  5b. Motor Arm, Right: 1-->Drift, limb holds 90 (or 45) degrees, but drifts down before full 10 secs, does not hit bed or other support  6a. Motor Leg, Left: 0-->No drift, leg holds 30 degree position for full 5 secs  6b. Motor Leg, Right: 3-->No effort against gravity, leg falls to bed immediately  7. Limb Ataxia: 0-->Absent  8. Sensory: 0-->Normal, no sensory loss  9. Best Language: 2-->Severe aphasia,  all communication is through fragmentary expression, great need for inference, questioning, and guessing by the listener. Range of information that can be exchanged is limited, listener carries burden of. . . (see row details)  10. Dysarthria: 0-->Normal  11. Extinction and Inattention (formerly Neglect): 0-->No abnormality  Total (NIH Stroke Scale): 12      Modified [place]: Score: 0  [place] Coma Scale: 11   ABCD2 Score:    [id number] Score:    HAS -BLED Score:    ICH Score:    Hunt & Bazzi Classification:      Blood pressure (!) 150/54, pulse 63, resp. rate 18, weight 102.1 kg (225 lb), SpO2 98%.      In my opinion, this was a: Tier 2; VAN Stroke Assessment: Positive     Medical Decision Making  HPI:  85 y.o. female with HTN, HLD, DM, brought in with acute onset R sided weakness, R face droop, L gaze preference, and inability to talk.  No improving.     Images personally reviewed and interpreted:  Study: Head CT and CTA Head & Neck, and MRI brain  Study Interpretation: Ill defined low attenuation L insula. L M2 occlusion.  MRI brain with acute ischemic changes L frontal and BG regions with some corresponding changes already noted in FLAIR images.     Assessment and plan:  Acute L MCA territory infarct as above.  No TNK candidate.  Favorable ASPECTS, thus recommended transfer to [facility] for possible MT.      Lytics recommendation: Thrombolytic therapy not recommended due to Out of window based on MRI Protocol ([place]/Unknown Onset)    Thrombectomy recommendation: Yes  Placement recommendation: transfer to [facility] by  ground  stat               ROS  Physical Exam  Past Medical History:   Diagnosis Date    Diabetes mellitus, type 2     Glaucoma     Hyperlipidemia     Hypertension      Past Surgical History:   Procedure Laterality Date    CATARACT EXTRACTION      ROTATOR CUFF REPAIR       Family History   Problem Relation Name Age of Onset    Breast cancer Paternal Aunt         Diagnoses  Problem Noted    Acute Right-Sided Weakness 4/23/2025       Ministerio Landaverde MD      Emergent/Acute neurological consultation requested by spoke provider due to critical concerns for possible cerebrovascular event that could result in permanent loss of neurologic/bodily function, severe disability or death of this patient.  Immediate/timely evaluation by a highly prepared expert is paramount for optimal outcomes  High risk for neurological deterioration if not properly diagnosed  High risk for neurological deterioration if not treated promplty/as soon as possible  Complex diagnostic evaluation may be required (advanced imaging)  High risk treatment options (thrombolytics and/or thrombectomy)    Patient care was coordinated with spoke provider, including but not limted to    Discussing likely diagnosis/etiology of symptoms  Making recommendations for further diagnostic studies  Making recommendations for intravenous thrombolytics or other advanced therapies  Making recommendations for disposition (admission/transfer for higher level of care)      Neurology consultation requested by spoke provider. Audiovisual encounter with the patient performed using a secure connection.  Results and impressions from the visit are documented on this note and were communicated to the consulting provider/team via direct communication. The note has been shared for addition to the patients electronic medical record.

## 2025-04-23 NOTE — ASSESSMENT & PLAN NOTE
86 y/o female with HTN, HLD, DM presents to [facility] as an emergent transfer from [facility] due to a L M1-M2 occlusion requiring thrombectomy. The patient initially presented to the OSH today, 4/23 with acute onset R sided weakness, R face droop, L gaze preference, and inability to talk. LKN 1700 yesterday, 4/22. Telestroke consulted, NIH 12. CTH with no acute hemorrhage. CTA with L M1-M2 occlusion. MRI with FLAIR changes. TNK not given due to being out of window (LKN>4.5 hrs) and FLAIR changes. CTA with L M1-M2 occlusion. Decision made to transfer patient to [facility] for a possible thrombectomy.     -Patient arrived to [facility] ED at 1102. On arrival to [facility], NIH 17, favorable ASPECTS.   -Patient taken to IR at 1115 and is s/p TICI 2C with 2 passes per IR.  -Patient will transfer to [facility] post thrombectomy for post IR monitoring and higher level of care    Antithrombotics for secondary stroke prevention: Antiplatelets: Aspirin: 81 mg daily  Clopidogrel: 75 mg daily    Statins for secondary stroke prevention and hyperlipidemia, if present:   Statins: Atorvastatin- 40 mg daily    Aggressive risk factor modification: HTN, DM, HLD     Rehab efforts: The patient has been evaluated by a stroke team provider and the therapy needs have been fully considered based off the presenting complaints and exam findings. The following therapy evaluations are needed: PT evaluate and treat, OT evaluate and treat, SLP evaluate and treat, PM&R evaluate for appropriate placement    Diagnostics ordered/pending: MRI head without contrast to assess brain parenchyma, TTE to assess cardiac function/status     VTE prophylaxis: Heparin 5000 units SQ every 8 hours    BP parameters: Infarct: Post sucessful thrombectomy, SBP <140    Thank you for your consult. Vascular neurology will follow on consulting service. Please do not hesitate to contact us at [phone] with any questions/concerns.

## 2025-04-23 NOTE — EICU
Virtual ICU Admission    Admit Date: 2025  LOS: 0  Code Status: Full Code   : 1939  Bed: 9084/9084 A:     Diagnosis: Embolic stroke involving left middle cerebral artery    Patient  has a past medical history of Diabetes mellitus, type 2, Glaucoma, Hyperlipidemia, and Hypertension.    Last VS: BP (!) 158/66   Pulse 63   Temp 99 °F (37.2 °C) (Axillary)   Resp 17   SpO2 100%       VICU Review

## 2025-04-23 NOTE — CONSULTS
[facility] - Neuro Critical Care  Vascular Neurology  Comprehensive Stroke Center  Consult Note    Consults- Transfer from [place]  Assessment/Plan:     Patient is a 85 y.o. year old female with:    * Embolic stroke involving left middle cerebral artery  84 y/o female with HTN, HLD, DM presents to [facility] as an emergent transfer from [facility] due to a L M1-M2 occlusion requiring thrombectomy. The patient initially presented to the OSH today, 4/23 with acute onset R sided weakness, R face droop, L gaze preference, and inability to talk. LKN 1700 yesterday, 4/22. Telestroke consulted, NIH 12. CTH with no acute hemorrhage. CTA with L M1-M2 occlusion. MRI with FLAIR changes. TNK not given due to being out of window (LKN>4.5 hrs) and FLAIR changes. CTA with L M1-M2 occlusion. Decision made to transfer patient to [facility] for a possible thrombectomy.     -Patient arrived to [facility] ED at 1102. On arrival to [facility], NIH 17, favorable ASPECTS.   -Patient taken to IR at 1115 and is s/p TICI 2C with 2 passes per IR.  -Patient will transfer to [facility] post thrombectomy for post IR monitoring and higher level of care    Antithrombotics for secondary stroke prevention: Antiplatelets: Aspirin: 81 mg daily  Clopidogrel: 75 mg daily    Statins for secondary stroke prevention and hyperlipidemia, if present:   Statins: Atorvastatin- 40 mg daily    Aggressive risk factor modification: HTN, DM, HLD     Rehab efforts: The patient has been evaluated by a stroke team provider and the therapy needs have been fully considered based off the presenting complaints and exam findings. The following therapy evaluations are needed: PT evaluate and treat, OT evaluate and treat, SLP evaluate and treat, PM&R evaluate for appropriate placement    Diagnostics ordered/pending: MRI head without contrast to assess brain parenchyma, TTE to assess cardiac function/status     VTE prophylaxis: Heparin 5000 units SQ every 8 hours    BP parameters: Infarct: Post  sucessful thrombectomy, SBP <140    Thank you for your consult. Vascular neurology will follow on consulting service. Please do not hesitate to contact us at [phone] with any questions/concerns.           Type 2 diabetes mellitus  Stroke risk factor   Pending A1C  -180 while inpatient  SSI      Hyperlipidemia  -Stroke risk factor.    -LDL 72.6; goal <70  -Continue home Atorvastatin 40 mg daily.      HTN (hypertension)  Stroke risk factor.    SBP<140 post successful thrombectomy.  Hold all home antihypertensives  Cardene as needed      Aphasia  Due to stroke   Therapy to evaluate and treat       Acute right-sided weakness  Due to stroke  Therapy to evaluate and treat          STROKE DOCUMENTATION     Acute Stroke Times   Last Known Normal Date: 04/22/25  Last Known Normal Time: 1700  Stroke Team Called Date: 04/23/25  Stroke Team Called Time: 0915 (Telestroke at OSH)  Stroke Team Arrival Date: 04/23/25  Stroke Team Arrival Time: 0923 (OSH)  CT Interpretation Time: 0923  Thrombolytic Therapy Recommended: No  CTA Interpretation Time: 0945 (OSH)  Thrombectomy Recommended: Yes  MRI Acute Stroke Protocol Interpretation Time: 1000 (OSH)  Decision to Treat Time for IR: 1000 (Telestroke)    NIH Scale:  Interval: baseline  1a. Level of Consciousness: 0-->Alert, keenly responsive  1b. LOC Questions: 2-->Answers neither question correctly  1c. LOC Commands: 1-->Performs one task correctly  2. Best Gaze: 2-->Forced deviation, or total gaze paresis not overcome by the oculocephalic maneuver  3. Visual: 0-->No visual loss  4. Facial Palsy: 2-->Partial paralysis (total or near-total paralysis of lower face)  5a. Motor Arm, Left: 0-->No drift, limb holds 90 (or 45) degrees for full 10 secs  5b. Motor Arm, Right: 3-->No effort against gravity, limb falls  6a. Motor Leg, Left: 0-->No drift, leg holds 30 degree position for full 5 secs  6b. Motor Leg, Right: 3-->No effort against gravity, leg falls to bed immediately  7. Limb  Ataxia: 0-->Absent  8. Sensory: 0-->Normal, no sensory loss  9. Best Language: 2-->Severe aphasia, all communication is through fragmentary expression, great need for inference, questioning, and guessing by the listener. Range of information that can be exchanged is limited, listener carries burden of. . . (see row details)  10. Dysarthria: 2-->Severe dysarthria, patients speech is so slurred as to be unintelligible in the absence of or out of proportion to any dysphasia, or is mute/anarthric  11. Extinction and Inattention (formerly Neglect): 0-->No abnormality  Total (NIH Stroke Scale): 17    Modified [place] Score: 0  [place] Coma Scale:12   ABCD2 Score:    [id number] Score:   HAS -BLED Score:   ICH Score:   Hunt & Bazzi Classification:       Thrombolysis Candidate? No, Out of window - Symptom onset > 4.5 hours    Delays to Thrombolysis?  Not Applicable    Interventional Revascularization Candidate?   Is the patient eligible for mechanical endovascular reperfusion (ALBA)?  Yes    Delays to Thrombectomy? No    Hemorrhagic change of an Ischemic Stroke: Does this patient have an ischemic stroke with hemorrhagic changes? No     Subjective:     History of Present Illness:  86 y/o female with HTN, HLD, DM presents to [facility] as an emergent transfer from [facility] due to a L M1-M2 occlusion requiring thrombectomy. The patient initially presented to the OSH today, 4/23 with acute onset R sided weakness, R face droop, L gaze preference, and inability to talk. LKN 1700 yesterday, 4/22.           Past Medical History:   Diagnosis Date    Diabetes mellitus, type 2     Glaucoma     Hyperlipidemia     Hypertension      Past Surgical History:   Procedure Laterality Date    CATARACT EXTRACTION      ROTATOR CUFF REPAIR       Social History[1]  Review of patient's allergies indicates:  No Known Allergies    Medications: I have reviewed the current medication administration record.    Prescriptions Prior to  Admission[2]    Review of Systems   Unable to perform ROS: Acuity of condition (severe aphasia)     Objective:     Vital Signs (Most Recent):  Pulse: 63 (04/23/25 1352)  Resp: 14 (04/23/25 1352)  BP: (!) 193/78 (04/23/25 1352)  SpO2: 100 % (04/23/25 1352)    Vital Signs Range (Last 24H):  Pulse:  [60-73]   Resp:  [14-30]   BP: (150-193)/(54-79)   SpO2:  [98 %-100 %]        Physical Exam  Vitals reviewed.   Constitutional:       General: She is not in acute distress.  HENT:      Head: Normocephalic and atraumatic.      Mouth/Throat:      Mouth: Mucous membranes are moist.   Eyes:      Pupils: Pupils are equal, round, and reactive to light.   Cardiovascular:      Rate and Rhythm: Normal rate.   Pulmonary:      Effort: Pulmonary effort is normal. No respiratory distress.   Abdominal:      Tenderness: There is no guarding.   Skin:     General: Skin is warm and dry.   Neurological:      Mental Status: She is alert.      Cranial Nerves: Cranial nerve deficit present.      Sensory: Sensory deficit present.      Motor: Weakness present.   Psychiatric:         Mood and Affect: Mood normal.         Behavior: Behavior normal.              Neurological Exam:   LOC: alert  Attention Span: poor  Language: Expressive aphasia  Articulation: Untestable due to severe aphasia   Orientation: Untestable due to severe aphasia   Visual Fields: Visual neglect  EOM (CN III, IV, VI): Gaze preference  left  Pupils (CN II, III): PERRL  Facial Movement (CN VII): Unable to test   Motor: Arm left  Normal 5/5  Leg left  Normal 5/5  Arm right  Paresis: 1/5  Leg right Paresis: 1/5  Cerebellum: unable to test  Sensation: Lion-anesthesia right      Laboratory:  CMP:   Recent Labs   Lab 04/23/25  1024   GLUCOSE 74   CALCIUM 10.6*   ALBUMIN 3.6      K 4.0   CO2 25   BUN 33*   CREATININE 1.1   ALKPHOS 52*   ALT 52*   AST 64*   BILITOT 0.7     CBC:   Recent Labs   Lab 04/23/25  0925   WBC 7.91   RBC 4.48   HGB 11.7*   HCT 36.9*      MCV 82  "  MCH 26.1*   MCHC 31.7*     Lipid Panel:   Recent Labs   Lab 04/23/25  1024   CHOL 129   HDL 39*   TRIG 87     Coagulation:   Recent Labs   Lab 04/23/25  0925   INR 1.0     Hgb A1C: No results for input(s): "HGBA1C" in the last 168 hours.  TSH:   Recent Labs   Lab 04/23/25  1024   TSH 1.754       Diagnostic Results:      Brain imaging/Vessel Imaging:    MRI Brain WO  @ OSH - 4/23/2025    Impression:     1. Regions of diffusion restriction involving the left basal ganglia and left insular cortex compatible with acute ischemia.  No evidence of associated hemorrhage or mass effect.  2. Involutional changes as above.    CTA Head and Neck @ OSH - 4/23/2025    Impression:     1.  Focal high-grade stenosis/narrowing, with suspected thrombus/soft plaque in the left MCA     2.  Suspicious/pathologic mediastinal lymphadenopathy, while this may be related to granulomatous disease, lymphoma, metastasis/malignancy is a consideration and correlation with the laboratory values, history and CT chest follow-up would be warranted.     3.  No clinically significant stenosis or large vessel occlusion in the neck.    CTH WO @ OSH - 4/23/2025    Impression:     1.  No acute intracranial hemorrhage, no hydrocephalus, herniation or midline shift.     2.  Ill-defined low-attenuation in the left insular cortex, and adjacent basal ganglia, possibly from small vessel ischemic disease and or edema from a subacute infarct, consider correlation with MRI and history.    Cardiac Evaluation:     TTE - Pending         Anh Loya [facility]  Department of Vascular Neurology   [facility] - Neuro Critical Care        [1]   Social History  Tobacco Use    Smoking status: Never    Smokeless tobacco: Never   Substance Use Topics    Alcohol use: Not Currently    Drug use: Never   [2]   Medications Prior to Admission   Medication Sig Dispense Refill Last Dose/Taking    atorvastatin (LIPITOR) 40 MG tablet Take 40 mg by mouth " once daily.   Taking    potassium chloride SA (K-DUR,KLOR-CON M) 10 MEQ tablet Take 10 mEq by mouth once daily.   Taking    acac/inulin/c-lose/mv-mn/folic (FIBER PLUS MULITVITAMIN ORAL) Take by mouth.       amLODIPine (NORVASC) 5 MG tablet Take 5 mg by mouth once daily.       ezetimibe (ZETIA) 10 mg tablet Take 1 tablet (10 mg total) by mouth once daily. 90 tablet 3     furosemide (LASIX) 20 MG tablet Take 20 mg by mouth once daily.       glimepiride (AMARYL) 1 MG tablet Take 1 tablet (1 mg total) by mouth before breakfast. 90 tablet 3     latanoprost 0.005 % ophthalmic solution INSTILL 1 DROP INTO BOTH EYES AT BEDTIME AS DIRECTED INSTILL 1 DROP INTO BOTH EYES AT BEDTIME       metoprolol succinate (TOPROL-XL) 50 MG 24 hr tablet Take 50 mg by mouth every evening.       telmisartan (MICARDIS) 80 MG Tab Take 80 mg by mouth once daily.

## 2025-04-23 NOTE — SUBJECTIVE & OBJECTIVE
Past Medical History:   Diagnosis Date    Diabetes mellitus, type 2     Glaucoma     Hyperlipidemia     Hypertension      Past Surgical History:   Procedure Laterality Date    CATARACT EXTRACTION      ROTATOR CUFF REPAIR       Social History[1]  Review of patient's allergies indicates:  No Known Allergies    Medications: I have reviewed the current medication administration record.    Prescriptions Prior to Admission[2]    Review of Systems   Unable to perform ROS: Acuity of condition (severe aphasia)     Objective:     Vital Signs (Most Recent):  Pulse: 63 (04/23/25 1352)  Resp: 14 (04/23/25 1352)  BP: (!) 193/78 (04/23/25 1352)  SpO2: 100 % (04/23/25 1352)    Vital Signs Range (Last 24H):  Pulse:  [60-73]   Resp:  [14-30]   BP: (150-193)/(54-79)   SpO2:  [98 %-100 %]        Physical Exam  Vitals reviewed.   Constitutional:       General: She is not in acute distress.  HENT:      Head: Normocephalic and atraumatic.      Mouth/Throat:      Mouth: Mucous membranes are moist.   Eyes:      Pupils: Pupils are equal, round, and reactive to light.   Cardiovascular:      Rate and Rhythm: Normal rate.   Pulmonary:      Effort: Pulmonary effort is normal. No respiratory distress.   Abdominal:      Tenderness: There is no guarding.   Skin:     General: Skin is warm and dry.   Neurological:      Mental Status: She is alert.      Cranial Nerves: Cranial nerve deficit present.      Sensory: Sensory deficit present.      Motor: Weakness present.   Psychiatric:         Mood and Affect: Mood normal.         Behavior: Behavior normal.              Neurological Exam:   LOC: alert  Attention Span: poor  Language: Expressive aphasia  Articulation: Untestable due to severe aphasia   Orientation: Untestable due to severe aphasia   Visual Fields: Visual neglect  EOM (CN III, IV, VI): Gaze preference  left  Pupils (CN II, III): PERRL  Facial Movement (CN VII): Unable to test   Motor: Arm left  Normal 5/5  Leg left  Normal 5/5  Arm  "right  Paresis: 1/5  Leg right Paresis: 1/5  Cerebellum: unable to test  Sensation: Lion-anesthesia right      Laboratory:  CMP:   Recent Labs   Lab 04/23/25  1024   GLUCOSE 74   CALCIUM 10.6*   ALBUMIN 3.6      K 4.0   CO2 25   BUN 33*   CREATININE 1.1   ALKPHOS 52*   ALT 52*   AST 64*   BILITOT 0.7     CBC:   Recent Labs   Lab 04/23/25  0925   WBC 7.91   RBC 4.48   HGB 11.7*   HCT 36.9*      MCV 82   MCH 26.1*   MCHC 31.7*     Lipid Panel:   Recent Labs   Lab 04/23/25  1024   CHOL 129   HDL 39*   TRIG 87     Coagulation:   Recent Labs   Lab 04/23/25  0925   INR 1.0     Hgb A1C: No results for input(s): "HGBA1C" in the last 168 hours.  TSH:   Recent Labs   Lab 04/23/25  1024   TSH 1.754       Diagnostic Results:      Brain imaging/Vessel Imaging:    MRI Brain WO  @ OS - 4/23/2025    Impression:     1. Regions of diffusion restriction involving the left basal ganglia and left insular cortex compatible with acute ischemia.  No evidence of associated hemorrhage or mass effect.  2. Involutional changes as above.    CTA Head and Neck @ OSH - 4/23/2025    Impression:     1.  Focal high-grade stenosis/narrowing, with suspected thrombus/soft plaque in the left MCA     2.  Suspicious/pathologic mediastinal lymphadenopathy, while this may be related to granulomatous disease, lymphoma, metastasis/malignancy is a consideration and correlation with the laboratory values, history and CT chest follow-up would be warranted.     3.  No clinically significant stenosis or large vessel occlusion in the neck.    CTH WO @ OSH - 4/23/2025    Impression:     1.  No acute intracranial hemorrhage, no hydrocephalus, herniation or midline shift.     2.  Ill-defined low-attenuation in the left insular cortex, and adjacent basal ganglia, possibly from small vessel ischemic disease and or edema from a subacute infarct, consider correlation with MRI and history.    Cardiac Evaluation:     TTE - Pending            [1]   Social " History  Tobacco Use    Smoking status: Never    Smokeless tobacco: Never   Substance Use Topics    Alcohol use: Not Currently    Drug use: Never   [2]   Medications Prior to Admission   Medication Sig Dispense Refill Last Dose/Taking    atorvastatin (LIPITOR) 40 MG tablet Take 40 mg by mouth once daily.   Taking    potassium chloride SA (K-DUR,KLOR-CON M) 10 MEQ tablet Take 10 mEq by mouth once daily.   Taking    acac/inulin/c-lose/mv-mn/folic (FIBER PLUS MULITVITAMIN ORAL) Take by mouth.       amLODIPine (NORVASC) 5 MG tablet Take 5 mg by mouth once daily.       ezetimibe (ZETIA) 10 mg tablet Take 1 tablet (10 mg total) by mouth once daily. 90 tablet 3     furosemide (LASIX) 20 MG tablet Take 20 mg by mouth once daily.       glimepiride (AMARYL) 1 MG tablet Take 1 tablet (1 mg total) by mouth before breakfast. 90 tablet 3     latanoprost 0.005 % ophthalmic solution INSTILL 1 DROP INTO BOTH EYES AT BEDTIME AS DIRECTED INSTILL 1 DROP INTO BOTH EYES AT BEDTIME       metoprolol succinate (TOPROL-XL) 50 MG 24 hr tablet Take 50 mg by mouth every evening.       telmisartan (MICARDIS) 80 MG Tab Take 80 mg by mouth once daily.

## 2025-04-23 NOTE — PLAN OF CARE
Per Dr. Skip Walker, upon arrival to IR 4, patient had left gaze, right facial droop, no movement from right extremity, some movement from right foot, normal movement left upper and lower extremity, pupils 2 brisk and equal, reactive to light. Pt had global aphasia-no speech.

## 2025-04-23 NOTE — ED NOTES
Unable to obtain neuro/head to toe assessment on pt. Due to pt. Immediately being transport on EMS stretcher to IR. Pt. Transported with RN and stroke team.

## 2025-04-23 NOTE — ASSESSMENT & PLAN NOTE
Stroke risk factor.    SBP<140 post successful thrombectomy.  Hold all home antihypertensives  Cardene as needed

## 2025-04-23 NOTE — PLAN OF CARE
Cerebral angiogram with thrombectomy procedure completed. Angioseal closure device deployed in right  femoral artery; hemostasis achieved at 1222. Patient to lay flat for 2 hours until 1422 time on 04/23/25  per MD. Right groin site clean, dry, and intact; no bleeding or hematoma noted. Patient to be transferred to [facility] for post-procedural recovery per MD.  Report to be given at bedside to RN.

## 2025-04-23 NOTE — ASSESSMENT & PLAN NOTE
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 The following therapy evaluations are needed: PT evaluate and treat, OT evaluate and treat, SLP evaluate and treat, PM&R evaluate for appropriate placement    Diagnostics ordered/pending: HgbA1C to assess blood glucose levels, Lipid Profile to assess cholesterol levels, MRI head without contrast to assess brain parenchyma, TTE to assess cardiac function/status , TSH to assess thyroid function    VTE prophylaxis: Mechanical prophylaxis: Place SCDs    BP parameters: Infarct: Post sucessful thrombectomy, SBP <160

## 2025-04-23 NOTE — ED PROVIDER NOTES
Encounter Date: 4/23/2025       History     Chief Complaint   Patient presents with    Extremity Weakness     HPI patient is a 85-year-old woman who presents emergency department for evaluation of right-sided weakness and aphasia found it 8:00 a.m. this morning.  Last seen normal was 5:00 p.m. last night.  Patient lives alone and girlfriend of a family member came to give her a ride to a doctor's appointment and found her lying beside the couch where she sleeps on the floor.  Blood glucose in the 80s per EMS.  Review of patient's allergies indicates:  No Known Allergies  Past Medical History:   Diagnosis Date    Diabetes mellitus, type 2     Glaucoma     Hyperlipidemia     Hypertension      Past Surgical History:   Procedure Laterality Date    CATARACT EXTRACTION      ROTATOR CUFF REPAIR       Family History   Problem Relation Name Age of Onset    Breast cancer Paternal Aunt       Social History[1]  Review of Systems   Unable to perform ROS: Mental status change       Physical Exam     Initial Vitals [04/23/25 0913]   BP Pulse Resp Temp SpO2   (!) 150/54 63 18 -- 98 %      MAP       --         Physical Exam    Nursing note and vitals reviewed.  Constitutional: She appears well-developed and well-nourished. No distress.   HENT:   Head: Normocephalic and atraumatic.   Eyes: Pupils are equal, round, and reactive to light.   Left-sided gaze preference, not crossing eyes past midline to the right   Neck: Neck supple.   Cardiovascular:  Normal rate and regular rhythm.           Pulmonary/Chest: Breath sounds normal. No respiratory distress.   Abdominal: Abdomen is soft. She exhibits distension. There is no abdominal tenderness.   Musculoskeletal:         General: Normal range of motion.      Cervical back: Neck supple.     Neurological: She is alert. A cranial nerve deficit is present. GCS score is 15. GCS eye subscore is 4. GCS verbal subscore is 5. GCS motor subscore is 6.   Aphasia, incomprehensible speech.  Unable to  test orientation.  Right-sided neglect  3/5 right lower extremity strength, 0/5 right upper extremity strength.  VAN positive     Skin: Skin is warm and dry.         ED Course   Procedures  Labs Reviewed   COMPREHENSIVE METABOLIC PANEL - Abnormal       Result Value    Sodium 137      Potassium 4.0      Chloride 104      CO2 25      Glucose 74      BUN 33 (*)     Creatinine 1.1      Calcium 10.6 (*)     Protein Total 6.7      Albumin 3.6      Bilirubin Total 0.7      ALP 52 (*)     AST 64 (*)     ALT 52 (*)     Anion Gap 8      eGFR 49 (*)    LIPID PANEL - Abnormal    Cholesterol Total 129      Triglyceride 87      HDL Cholesterol 39 (*)     LDL Cholesterol 72.6      HDL/Cholesterol Ratio 30.2      Cholesterol/HDL Ratio 3.3      Non HDL Cholesterol 90     CBC WITH DIFFERENTIAL - Abnormal    WBC 7.91      RBC 4.48      Hgb 11.7 (*)     Hct 36.9 (*)     MCV 82      MCH 26.1 (*)     MCHC 31.7 (*)     RDW 14.5      Platelet Count 281      MPV 10.4      Nucleated RBC 0      Neut % 77.4 (*)     Lymph % 14.5 (*)     Mono % 6.6      Eos % 0.6      Basophil % 0.3      Imm Grans % 0.6 (*)     Neut # 6.1      Lymph # 1.15      Mono # 0.52      Eos # 0.05      Baso # 0.02      Imm Grans # 0.05 (*)    PROTIME-INR - Normal    PT 11.4      INR 1.0     TSH - Normal    TSH 1.754     CBC W/ AUTO DIFFERENTIAL    Narrative:     The following orders were created for panel order CBC W/ AUTO DIFFERENTIAL.  Procedure                               Abnormality         Status                     ---------                               -----------         ------                     CBC with Differential[[id number]]       Abnormal            Final result                 Please view results for these tests on the individual orders.   EXTRA TUBES    Narrative:     The following orders were created for panel order EXTRA TUBES.  Procedure                               Abnormality         Status                     ---------                                -----------         ------                     Lavender Top Hold[[id number]]                               In process                   Please view results for these tests on the individual orders.   LAVENDER TOP HOLD   EXTRA TUBES    Narrative:     The following orders were created for panel order EXTRA TUBES.  Procedure                               Abnormality         Status                     ---------                               -----------         ------                     Light Blue Top Hold[[id number]]                             In process                 Light Green Top Hold[[id number]]                            In process                 Lavender Top Hold[[id number]]                               In process                 Lavender Top Hold[[id number]]                               In process                 Gold Top Hold[[id number]]                                   In process                   Please view results for these tests on the individual orders.   LIGHT BLUE TOP HOLD   LIGHT GREEN TOP HOLD   LAVENDER TOP HOLD   LAVENDER TOP HOLD   GOLD TOP HOLD   POCT GLUCOSE    POCT Glucose 81     ISTAT CREATININE    POC Creatinine 1.2      Sample VENOUS          ECG Results              ECG 12 lead (In process)        Collection Time Result Time QRS Duration OHS QTC Calculation    04/23/25 10:04:05 04/23/25 10:16:53 98 414                     In process by Interface, Lab In [facility] (04/23/25 10:16:59)                   Narrative:    Test Reason : R29.818,    Vent. Rate :  60 BPM     Atrial Rate :  60 BPM     P-R Int : 134 ms          QRS Dur :  98 ms      QT Int : 414 ms       P-R-T Axes :  86  59  42 degrees    QTcB Int : 414 ms    Sinus rhythm with marked sinus arrythmia  Otherwise normal ECG  No previous ECGs available    Referred By: AAAREFERRAL SELF           Confirmed By:                                   Imaging Results              MRI Brain Without Contrast (Final result)  Result time  04/23/25 10:17:34      Final result by Felipe Hess MD (04/23/25 10:17:34)                   Impression:      1. Regions of diffusion restriction involving the left basal ganglia and left insular cortex compatible with acute ischemia.  No evidence of associated hemorrhage or mass effect.  2. Involutional changes as above.      Electronically signed by: Felipe Hess  Date:    04/23/2025  Time:    10:17               Narrative:    EXAMINATION:  MRI BRAIN WITHOUT CONTRAST    CLINICAL HISTORY:  Neuro deficit, acute, stroke suspected;.    TECHNIQUE:  Multiplanar noninfusion imaging was performed.    COMPARISON:  CT brain, April 23, 2025    FINDINGS:  Diffusion-weighted images demonstrate regions of diffusion restriction involving the left basal ganglia and left insular cortex compatible with acute ischemic involvement.  There is no evidence of associated hemorrhage or mass effect.    There is no midline shift.  Ventricles and sulci are mildly prominent.  There are no pathologic extra-axial fluid collections.    FLAIR images demonstrate findings of mild chronic microvascular white matter disease.  The cerebellum and brainstem are unremarkable.    The orbits, paranasal sinuses, skull base, and sella turcica are within normal limits.                                       CTA Head and Neck (xpd) (Final result)  Result time 04/23/25 09:53:39      Final result by Tye Jenkins MD (04/23/25 09:53:39)                   Impression:      1.  Focal high-grade stenosis/narrowing, with suspected thrombus/soft plaque in the left MCA    2.  Suspicious/pathologic mediastinal lymphadenopathy, while this may be related to granulomatous disease, lymphoma, metastasis/malignancy is a consideration and correlation with the laboratory values, history and CT chest follow-up would be warranted.    3.  No clinically significant stenosis or large vessel occlusion in the neck.    RESULT NOTIFICATION: These observations were  discussed by the dictating physician, by phone with, and acknowledged by Max Abad at 09:48 on 4/23/2025.      Electronically signed by: Tye Jenkins  Date:    04/23/2025  Time:    09:53               Narrative:    CLINICAL HISTORY:  ([id number])84 y/o  (1940) F    Neuro deficit, acute, stroke suspected;    TECHNIQUE:  (A#[id number], exam time 4/23/2025 9:37)    CTA HEAD AND NECK (XPD) [id number]    CT angiography of the head and neck was performed using thin axial slices respectively and reviewed in multiple planes. Two and three dimensional multiplanar reformatted images were generated and submitted to PACS.    POST PROCESSING:    (Vitrea) 3D advanced post-processing was performed by the interpreting physician using an independent workstation utilizing a combination of MIP and MPR techniques to better evaluate anatomy and disease process. 3D rendering was performed with physician participation and supervision.    CONTRAST:    100  mL Omni 350 was injected intravenously.    CMS MANDATED QUALITY DATA - CT RADIATION - 436    All CT scans at this facility utilize dose modulation, iterative reconstruction, and/or weight based dosing when appropriate to reduce radiation dose to as low as reasonably achievable.    CMS MANDATED QUALITY DATA - CAROTID - 195    All measurements and percent stenosis described below were determined using NASCET criteria or criteria similar to NASCET, as defined by the Society of Radiologists in Ultrasound Consensus Conference, Radiology, 2003    COMPARISON:  None available.    FINDINGS:  Limited essentially nondiagnostic study for evaluation of the vascular structures; the technologist reported that the IV tubing came undone, contrast leak, and an indeterminate suboptimal bolus of IV contrast was administered during the initial scan, subsequently a repeat CTA was performed, with adequate contrast bolus and timing.    CTA of the [place]: There are calcified mural plaques in  the intracranial internal carotid arteries, causing less than 20% stenosis in any given area.  There is patency of the intracranial circulation including the bilateral internal carotid arteries, the carotid terminus, the A1 and M1 segments, the bilateral intracranial vertebral arteries, the basilar artery and its distal branches.  There is a likely focal high-grade stenosis/narrowing/thrombus in the proximal M2 segment of the left MCA (axial image 267 series 12), this is best seen on the reconstructed coronal image 177 series 12).  No aneurysm is identified within the limits of the technique. Conventional angiography is more sensitive for the detection of small aneurysms.    CTA of the Neck:    There is a three-vessel aortic arch. CTA of the neck demonstrates that the origins of the great vessels are widely patent. No aneurysm is seen.    RIGHT:    Common carotid artery origin: Patent.    Cervical segment: Patent.    External carotid origin characteristics: Patent.    Carotid bifurcation: Patent.    Internal carotid origin characteristics: No focal stenosis.  Calcified plaque is seen in the intracranial internal carotid artery with no measurable stenosis.    Vertebral artery: within normal limits.    LEFT    Common carotid artery origin: Patent.    Cervical segment: Patent.    External carotid origin characteristics: Patent.    Carotid bifurcation: Patent.  Calcified plaque is seen in the left carotid bulb extending into the proximal left internal carotid artery with no measurable stenosis.    Internal carotid origin characteristics: No focal stenosis.    Vertebral artery: within normal limits.    Additional:    -mediastinal lymphadenopathy is present, for example:    -22 x 16 mm pretracheal node (image 42)    -27 x 18 mm precarinal node (image 13)    -17 x 12 mm prevascular node (image 16)    -15 x 10 mm left hilar node (image 1)    -degenerative change throughout the spine.                                       CT  HEAD FOR CODE STROKE (Final result)  Result time 04/23/25 09:21:56      Final result by Tye Jenkins MD (04/23/25 09:21:56)                   Impression:      1.  No acute intracranial hemorrhage, no hydrocephalus, herniation or midline shift.    2.  Ill-defined low-attenuation in the left insular cortex, and adjacent basal ganglia, possibly from small vessel ischemic disease and or edema from a subacute infarct, consider correlation with MRI and history.    RESULT NOTIFICATION: These observations were discussed by the dictating physician, by phone with, and acknowledged by Max Abad at 09:19 on 4/23/2025.      Electronically signed by: Tye Jenkins  Date:    04/23/2025  Time:    09:21               Narrative:    CLINICAL HISTORY:  ([id number])84 y/o  (1940) F    Neuro deficit, acute, stroke suspected;    TECHNIQUE:  (A#[id number], exam time 4/23/2025 9:18)    CT HEAD FOR CODE STROKE [id number]    Axial CT of the brain without contrast using soft tissue and bone algorithm. Please note in the acute setting if there is a clinical concern for an acute stroke MRI would be more sensitive/specific for evaluation of ischemia.    CMS MANDATED QUALITY DATA - CT RADIATION - 436    All CT scans at this facility utilize dose modulation, iterative reconstruction, and/or weight based dosing when appropriate to reduce radiation dose to as low as reasonably achievable.    COMPARISON:  None available.    FINDINGS:  No acute intracranial hemorrhage, hydrocephalus, herniation or midline shift in the basal/suprasellar cisterns are patent.    There is an ill-defined area of loss of the normal gray-white differentiation in the left mid insular cortex (image 28), with mild low-attenuation in the adjacent centrum semiovale.    Mild diffuse cerebral and cerebellar atrophy for age with periventricular deep cerebral white matter low attenuation  nonspecific findings which can be seen in any diffuse white matter process but  most commonly associated with chronic microvascular ischemic disease. There are scattered atheromatous calcifications in the intracranial internal carotid arteries.    The orbits appear within normal limits noting likely bilateral lens replacement. External auditory canals are unremarkable. The visualized paranasal sinuses and mastoid air cells are essentially clear.                                       Medications   iohexoL (OMNIPAQUE 350) injection 100 mL (100 mLs Intravenous Given 4/23/25 0927)     Medical Decision Making  85-year-old woman who presents emergency department for evaluation of right-sided weakness and aphasia found it 8:00 a.m. this morning.  Last seen normal was 5:00 p.m. last night.  Patient lives alone and girlfriend of a family member came to give her a ride to a doctor's appointment and found her lying beside the couch where she sleeps on the floor.  Blood glucose in the 80s per EMS.        Thrombolysis Candidate? No, Out of window based on MRI STAT Protocol (Wake-up/Unknown Onset)    Delays to Thrombolysis?  Not Applicable  CT head interpreted myself showed no evidence of head bleed.  Glucose is 74 creatinine 1.1 hemoglobin 11.7.    Patient VANpositive with M2 occlusion with changes in FLAIR already.  Vascular Neurology recommends no TNK but is a direct thrombectomy candidate.  Patient will be emergently transferred to [place] for thrombectomy.      Amount and/or Complexity of Data Reviewed  Labs: ordered.  Radiology: ordered.    Risk  Prescription drug management.      Additional MDM:     NIH Stroke Scale:   Interval = initial 15 minute assessment from arrival  Level of consciousness = 0 - alert  LOC questions = 2 - answers none correctly  LOC commands = 2 - performs neither correctly  Best gaze = 0 - normal  Visual = 0 - no visual loss  Facial palsy = 3 - complete  Motor left arm =  0 - no drift  Motor right arm =  1 - drift  Motor left leg = 0 - no drift  Motor right leg =  2 - can't  resist gravity  Limb ataxia = 2 - present in two limbs  Sensory = 0 - normal  Best language = 2 - severe aphasia  Dysarthria = 2 - near to unintelligible  Extinction and inattention = 1 - partial neglect  NIH Stroke Scale Total = 17             Attending Attestation:             Attending ED Notes:   Critical Care Time MDM    The high probability of sudden, clinically significant deterioration in the patient's condition required the highest level of my preparedness to intervene urgently.    The services I provided to this patient were to treat and/or prevent clinically significant deterioration that could result in permanent disability, chronic pain and/or death.     Services included the following: chart data review, reviewing nursing notes and/or old charts, documentation time, consultant collaboration regarding findings and treatment options, medication orders and management, direct patient care, vital sign assessments and ordering, interpreting and reviewing diagnostic studies/lab tests.    Aggregate critical care time was 50 minutes, which includes only time during which I was engaged in work directly related to the patient's care, as described above, whether at the bedside or elsewhere in the Emergency Department.     Max Abad M.D.                                      Clinical Impression:  Final diagnoses:  [R29.818] Acute focal neurological deficit          ED Disposition Condition    Transfer to Another Facility                     [1]   Social History  Tobacco Use    Smoking status: Never    Smokeless tobacco: Never   Substance Use Topics    Alcohol use: Not Currently    Drug use: Never        Max Abad MD  04/23/25 8323

## 2025-04-23 NOTE — HPI
Pt is an 85 y.o. femamle with PMHx of DM, HTN, HLD, and glaucoma who presents for acute LMCA stroke. She initially presented to OSH for RSW, facial droop and aphasia. Her last known normal was 1700 yesterday. Telestroke was performed and initial NIH 12. Patient was out of the window for TNK but CTA revealed L M1 occlusion so patient was transferred to C for possible intervention. She was taken to IR where thrombectomy was completed with TICI2C reperfusion. She will be admitted to [facility] for higher level care and neuro monitoring.

## 2025-04-24 PROBLEM — R59.1 LYMPHADENOPATHY: Status: ACTIVE | Noted: 2025-04-24

## 2025-04-24 LAB
ABSOLUTE EOSINOPHIL (OHS): 0.03 K/UL
ABSOLUTE MONOCYTE (OHS): 0.53 K/UL (ref 0.3–1)
ABSOLUTE NEUTROPHIL COUNT (OHS): 5.39 K/UL (ref 1.8–7.7)
ALBUMIN SERPL BCP-MCNC: 2.6 G/DL (ref 3.5–5.2)
ALP SERPL-CCNC: 52 UNIT/L (ref 40–150)
ALT SERPL W/O P-5'-P-CCNC: 45 UNIT/L (ref 10–44)
ANION GAP (OHS): 13 MMOL/L (ref 8–16)
AST SERPL-CCNC: 47 UNIT/L (ref 11–45)
BASOPHILS # BLD AUTO: 0.02 K/UL
BASOPHILS NFR BLD AUTO: 0.3 %
BILIRUB SERPL-MCNC: 0.5 MG/DL (ref 0.1–1)
BUN SERPL-MCNC: 28 MG/DL (ref 8–23)
CALCIUM SERPL-MCNC: 9.4 MG/DL (ref 8.7–10.5)
CHLORIDE SERPL-SCNC: 109 MMOL/L (ref 95–110)
CO2 SERPL-SCNC: 19 MMOL/L (ref 23–29)
CREAT SERPL-MCNC: 0.9 MG/DL (ref 0.5–1.4)
ERYTHROCYTE [DISTWIDTH] IN BLOOD BY AUTOMATED COUNT: 14.4 % (ref 11.5–14.5)
GFR SERPLBLD CREATININE-BSD FMLA CKD-EPI: >60 ML/MIN/1.73/M2
GLUCOSE SERPL-MCNC: 93 MG/DL (ref 70–110)
HCT VFR BLD AUTO: 31.4 % (ref 37–48.5)
HGB BLD-MCNC: 10.1 GM/DL (ref 12–16)
IMM GRANULOCYTES # BLD AUTO: 0.03 K/UL (ref 0–0.04)
IMM GRANULOCYTES NFR BLD AUTO: 0.4 % (ref 0–0.5)
LYMPHOCYTES # BLD AUTO: 1.31 K/UL (ref 1–4.8)
MAGNESIUM SERPL-MCNC: 1.2 MG/DL (ref 1.6–2.6)
MCH RBC QN AUTO: 26 PG (ref 27–31)
MCHC RBC AUTO-ENTMCNC: 32.2 G/DL (ref 32–36)
MCV RBC AUTO: 81 FL (ref 82–98)
NUCLEATED RBC (/100WBC) (OHS): 0 /100 WBC
OHS QRS DURATION: 114 MS
OHS QTC CALCULATION: 456 MS
PHOSPHATE SERPL-MCNC: 3.1 MG/DL (ref 2.7–4.5)
PLATELET # BLD AUTO: 257 K/UL (ref 150–450)
PMV BLD AUTO: 10.6 FL (ref 9.2–12.9)
POCT GLUCOSE: 100 MG/DL (ref 70–110)
POCT GLUCOSE: 130 MG/DL (ref 70–110)
POCT GLUCOSE: 180 MG/DL (ref 70–110)
POTASSIUM SERPL-SCNC: 4.1 MMOL/L (ref 3.5–5.1)
PROT SERPL-MCNC: 5.6 GM/DL (ref 6–8.4)
RBC # BLD AUTO: 3.89 M/UL (ref 4–5.4)
RELATIVE EOSINOPHIL (OHS): 0.4 %
RELATIVE LYMPHOCYTE (OHS): 17.9 % (ref 18–48)
RELATIVE MONOCYTE (OHS): 7.3 % (ref 4–15)
RELATIVE NEUTROPHIL (OHS): 73.7 % (ref 38–73)
SODIUM SERPL-SCNC: 141 MMOL/L (ref 136–145)
WBC # BLD AUTO: 7.31 K/UL (ref 3.9–12.7)

## 2025-04-24 PROCEDURE — 63600175 PHARM REV CODE 636 W HCPCS

## 2025-04-24 PROCEDURE — 83735 ASSAY OF MAGNESIUM: CPT | Performed by: PHYSICIAN ASSISTANT

## 2025-04-24 PROCEDURE — 25500020 PHARM REV CODE 255: Performed by: PSYCHIATRY & NEUROLOGY

## 2025-04-24 PROCEDURE — 84100 ASSAY OF PHOSPHORUS: CPT | Performed by: PHYSICIAN ASSISTANT

## 2025-04-24 PROCEDURE — 92610 EVALUATE SWALLOWING FUNCTION: CPT

## 2025-04-24 PROCEDURE — 25000003 PHARM REV CODE 250: Performed by: PHYSICIAN ASSISTANT

## 2025-04-24 PROCEDURE — 80053 COMPREHEN METABOLIC PANEL: CPT | Performed by: PHYSICIAN ASSISTANT

## 2025-04-24 PROCEDURE — 99233 SBSQ HOSP IP/OBS HIGH 50: CPT | Mod: ,,, | Performed by: PSYCHIATRY & NEUROLOGY

## 2025-04-24 PROCEDURE — 97162 PT EVAL MOD COMPLEX 30 MIN: CPT

## 2025-04-24 PROCEDURE — 20000000 HC ICU ROOM

## 2025-04-24 PROCEDURE — 25000003 PHARM REV CODE 250: Performed by: PSYCHIATRY & NEUROLOGY

## 2025-04-24 PROCEDURE — 85025 COMPLETE CBC W/AUTO DIFF WBC: CPT | Performed by: PHYSICIAN ASSISTANT

## 2025-04-24 PROCEDURE — 97165 OT EVAL LOW COMPLEX 30 MIN: CPT

## 2025-04-24 PROCEDURE — 99232 SBSQ HOSP IP/OBS MODERATE 35: CPT | Mod: ,,, | Performed by: PSYCHIATRY & NEUROLOGY

## 2025-04-24 PROCEDURE — 63600175 PHARM REV CODE 636 W HCPCS: Performed by: PSYCHIATRY & NEUROLOGY

## 2025-04-24 PROCEDURE — 94761 N-INVAS EAR/PLS OXIMETRY MLT: CPT

## 2025-04-24 PROCEDURE — 97112 NEUROMUSCULAR REEDUCATION: CPT

## 2025-04-24 PROCEDURE — 97535 SELF CARE MNGMENT TRAINING: CPT

## 2025-04-24 PROCEDURE — 97530 THERAPEUTIC ACTIVITIES: CPT

## 2025-04-24 PROCEDURE — 92523 SPEECH SOUND LANG COMPREHEN: CPT

## 2025-04-24 RX ORDER — NAPROXEN SODIUM 220 MG/1
81 TABLET, FILM COATED ORAL DAILY
Status: DISCONTINUED | OUTPATIENT
Start: 2025-04-24 | End: 2025-04-30 | Stop reason: HOSPADM

## 2025-04-24 RX ORDER — ENOXAPARIN SODIUM 100 MG/ML
40 INJECTION SUBCUTANEOUS EVERY 24 HOURS
Status: DISCONTINUED | OUTPATIENT
Start: 2025-04-24 | End: 2025-04-27

## 2025-04-24 RX ORDER — CLOPIDOGREL BISULFATE 75 MG/1
75 TABLET ORAL DAILY
Status: DISCONTINUED | OUTPATIENT
Start: 2025-04-24 | End: 2025-04-30 | Stop reason: HOSPADM

## 2025-04-24 RX ORDER — MAGNESIUM SULFATE HEPTAHYDRATE 40 MG/ML
2 INJECTION, SOLUTION INTRAVENOUS
Status: DISCONTINUED | OUTPATIENT
Start: 2025-04-24 | End: 2025-04-25

## 2025-04-24 RX ADMIN — IOHEXOL 75 ML: 350 INJECTION, SOLUTION INTRAVENOUS at 09:04

## 2025-04-24 RX ADMIN — ASPIRIN 81 MG CHEWABLE TABLET 81 MG: 81 TABLET CHEWABLE at 09:04

## 2025-04-24 RX ADMIN — ATORVASTATIN CALCIUM 40 MG: 40 TABLET, FILM COATED ORAL at 09:04

## 2025-04-24 RX ADMIN — SENNOSIDES AND DOCUSATE SODIUM 1 TABLET: 50; 8.6 TABLET ORAL at 09:04

## 2025-04-24 RX ADMIN — SENNOSIDES AND DOCUSATE SODIUM 1 TABLET: 50; 8.6 TABLET ORAL at 08:04

## 2025-04-24 RX ADMIN — ENOXAPARIN SODIUM 40 MG: 40 INJECTION SUBCUTANEOUS at 04:04

## 2025-04-24 RX ADMIN — WHITE PETROLATUM: 1.75 OINTMENT TOPICAL at 01:04

## 2025-04-24 RX ADMIN — MAGNESIUM SULFATE HEPTAHYDRATE 2 G: 40 INJECTION, SOLUTION INTRAVENOUS at 06:04

## 2025-04-24 RX ADMIN — MAGNESIUM SULFATE HEPTAHYDRATE 2 G: 40 INJECTION, SOLUTION INTRAVENOUS at 09:04

## 2025-04-24 RX ADMIN — CLOPIDOGREL 75 MG: 75 TABLET ORAL at 09:04

## 2025-04-24 RX ADMIN — LATANOPROST 1 DROP: 50 SOLUTION OPHTHALMIC at 09:04

## 2025-04-24 NOTE — EICU
Virtual ICU Quality Rounds    Admit Date: 4/23/2025  Hospital Day: 1    ICU Day: 20h    24H Vital Sign Range:  Temp:  [96.9 °F (36.1 °C)-99 °F (37.2 °C)]   Pulse:  [47-73]   Resp:  [7-30]   BP: (100-193)/(50-98)   SpO2:  [99 %-100 %]     VICU Surveillance Screening    LDA reconciliation : Yes

## 2025-04-24 NOTE — CONSULTS
Inpatient consult to Physical Medicine Rehab  Consult performed by: Jaycee Negro NP  Consult ordered by: Celeste Bryant PA-C  Reason for consult: Rehab      Consult received.     DEBRA Jones, FNP-C  Physical Medicine & Rehabilitation   04/24/2025

## 2025-04-24 NOTE — ASSESSMENT & PLAN NOTE
86 y/o female with HTN, HLD, DM presents to [facility] as an emergent transfer from [facility] due to a L M1-M2 occlusion requiring thrombectomy. The patient initially presented to the OSH today, 4/23 with acute onset R sided weakness, R face droop, L gaze preference, and inability to talk. LKN 1700 yesterday, 4/22. Telestroke consulted, NIH 12. CTH with no acute hemorrhage. CTA with L M1-M2 occlusion. MRI with FLAIR changes. TNK not given due to being out of window (LKN>4.5 hrs) and FLAIR changes. CTA with L M1-M2 occlusion. Decision made to transfer patient to [facility] for a possible thrombectomy, s/p TICI 2C and admitted to [facility] post IR for post thrombectomy care.     Repeat MRI Brain without contrast overnight with stable infarct, no hemorrhage. Ok to continue DAPT for secondary stroke prevention. TTE with EF 55-60%, +LAE, normal wall motion. Etiology of stroke likely cardioembolic. Patient will need cardiac event monitor at discharge. Exam overall improved post IR with TICI 2C. Ok to stepdown to NPU.     Antithrombotics for secondary stroke prevention: Antiplatelets: Aspirin: 81 mg daily  Clopidogrel: 75 mg daily    Statins for secondary stroke prevention and hyperlipidemia, if present:   Statins: Atorvastatin- 40 mg daily    Aggressive risk factor modification: HTN, DM, HLD     Rehab efforts: The patient has been evaluated by a stroke team provider and the therapy needs have been fully considered based off the presenting complaints and exam findings. The following therapy evaluations are needed: PT evaluate and treat, OT evaluate and treat, SLP evaluate and treat, PM&R evaluate for appropriate placement    Diagnostics ordered/pending: Other: Stat CTH with any neuro change    VTE prophylaxis: Heparin 5000 units SQ every 8 hours    BP parameters: Infarct: Post sucessful thrombectomy, SBP <140

## 2025-04-24 NOTE — PLAN OF CARE
Problem: Occupational Therapy  Goal: Occupational Therapy Goal  Description: Goals to be met by: 5/24/25     Patient will increase functional independence with ADLs by performing:    UE Dressing with Stand-by Assistance.  LE Dressing with Minimal Assistance.  Grooming while standing at sink with Minimal Assistance.  Toileting from toilet with Contact Guard Assistance for hygiene and clothing management.   Sitting at edge of bed x10 minutes with Supervision.  Supine to sit with Minimal Assistance.  Step transfer with Minimal Assistance and RW prn  Toilet transfer to toilet with Minimal Assistance.  Increased functional strength to 4/5 for RUE.  Upper extremity exercise program x10 reps per handout, with assistance as needed.    Outcome: Progressing

## 2025-04-24 NOTE — PROGRESS NOTES
Andre Carver - Neuro Critical Care  Vascular Neurology  Comprehensive Stroke Center  Progress Note    Assessment/Plan:     * Embolic stroke involving left middle cerebral artery  86 y/o female with HTN, HLD, DM presents to [facility] as an emergent transfer from [facility] due to a L M1-M2 occlusion requiring thrombectomy. The patient initially presented to the OSH today, 4/23 with acute onset R sided weakness, R face droop, L gaze preference, and inability to talk. LKN 1700 yesterday, 4/22. Telestroke consulted, NIH 12. CTH with no acute hemorrhage. CTA with L M1-M2 occlusion. MRI with FLAIR changes. TNK not given due to being out of window (LKN>4.5 hrs) and FLAIR changes. CTA with L M1-M2 occlusion. Decision made to transfer patient to [facility] for a possible thrombectomy, s/p TICI 2C and admitted to [facility] post IR for post thrombectomy care.     Repeat MRI Brain without contrast overnight with stable infarct, no hemorrhage. Ok to continue DAPT for secondary stroke prevention. TTE with EF 55-60%, +LAE, normal wall motion. Etiology of stroke likely cardioembolic. Patient will need cardiac event monitor at discharge. Exam overall improved post IR with TICI 2C. Ok to stepdown to NPU.     Antithrombotics for secondary stroke prevention: Antiplatelets: Aspirin: 81 mg daily  Clopidogrel: 75 mg daily    Statins for secondary stroke prevention and hyperlipidemia, if present:   Statins: Atorvastatin- 40 mg daily    Aggressive risk factor modification: HTN, DM, HLD     Rehab efforts: The patient has been evaluated by a stroke team provider and the therapy needs have been fully considered based off the presenting complaints and exam findings. The following therapy evaluations are needed: PT evaluate and treat, OT evaluate and treat, SLP evaluate and treat, PM&R evaluate for appropriate placement    Diagnostics ordered/pending: Other: Stat CTH with any neuro change    VTE prophylaxis: Heparin 5000 units SQ every 8 hours    BP  parameters: Infarct: Post sucessful thrombectomy, SBP <140          Cytotoxic brain edema  -As seen on imaging  -Admitted to [facility] for close neuro monitoring and observation due to high risk for worsening edema, on hemicrani watch  -Continue frequent q1hr neuro checks as any acute changes or worsening neuro status may signify expansion of the insult and/or area of the edema, which may require acute interventions to prevent loss of function and/or death.  -Obtain stat CTH for any new or worsening neuro changes and notify primary team immediately      Type 2 diabetes mellitus  Stroke risk factor   A1C 4.9%  -180 while inpatient  SSI      Hyperlipidemia  -Stroke risk factor.    -LDL 72.6; goal <70  -Continue home Atorvastatin 40 mg daily.      HTN (hypertension)  Stroke risk factor.    SBP<140 post successful thrombectomy.  Hold all home antihypertensives  Prn antihypertensives      Aphasia  Due to stroke   Ongoing therapy       Acute right-sided weakness  Due to stroke  Improved post thrombectomy  Ongoing therapy           04/24/2025 Repeat MRI Brain without contrast overnight with stable infarct, no hemorrhage. Ok to continue DAPT for secondary stroke prevention. TTE with EF 55-60%, +LAE, normal wall motion. Etiology of stroke likely cardioembolic. Patient will need cardiac event monitor at discharge. Exam overall improved post IR with TICI 2C. Ok to stepdown to NPU.     STROKE DOCUMENTATION   Acute Stroke Times   Last Known Normal Date: 04/22/25  Last Known Normal Time: 1700  Stroke Team Called Date: 04/23/25  Stroke Team Called Time: 0915 (Telestroke at OSH)  Stroke Team Arrival Date: 04/23/25  Stroke Team Arrival Time: 0923 (OSH)  CT Interpretation Time: 0923  Thrombolytic Therapy Recommended: No  CTA Interpretation Time: 0945 (OSH)  Thrombectomy Recommended: Yes  MRI Acute Stroke Protocol Interpretation Time: 1000 (OSH)  Decision to Treat Time for IR: 1000 (Telestroke)    NIH Scale:  1a. Level of  Consciousness: 0-->Alert, keenly responsive  1b. LOC Questions: 1-->Answers one question correctly  1c. LOC Commands: 0-->Performs both tasks correctly  2. Best Gaze: 1-->Partial gaze palsy, gaze is abnormal in one or both eyes, but forced deviation or total gaze paresis is not present  3. Visual: 0-->No visual loss  4. Facial Palsy: 2-->Partial paralysis (total or near-total paralysis of lower face)  5a. Motor Arm, Left: 0-->No drift, limb holds 90 (or 45) degrees for full 10 secs  5b. Motor Arm, Right: 1-->Drift, limb holds 90 (or 45) degrees, but drifts down before full 10 secs, does not hit bed or other support  6a. Motor Leg, Left: 0-->No drift, leg holds 30 degree position for full 5 secs  6b. Motor Leg, Right: 1-->Drift, leg falls by the end of the 5-sec period but does not hit bed  7. Limb Ataxia: 0-->Absent  8. Sensory: 0-->Normal, no sensory loss  9. Best Language: 1-->Mild-to-moderate aphasia, some obvious loss of fluency or facility of comprehension, without significant limitation on ideas expressed or form of expression. Reduction of speech and/or comprehension, however, makes conversation. . . (see row details)  10. Dysarthria: 2-->Severe dysarthria, patients speech is so slurred as to be unintelligible in the absence of or out of proportion to any dysphasia, or is mute/anarthric  11. Extinction and Inattention (formerly Neglect): 0-->No abnormality  Total (NIH Stroke Scale): 9       Modified Cumberland Score: 0  Margoth Coma Scale:    ABCD2 Score:    [id number] Score:   HAS -BLED Score:   ICH Score:   Hunt & Bazzi Classification:      Hemorrhagic change of an Ischemic Stroke: Does this patient have an ischemic stroke with hemorrhagic changes? No     Neurologic Chief Complaint: Distal L M1 Occlusion    Subjective:     Interval History: Patient is seen for follow-up neurological assessment and treatment recommendations: See hospital course.    HPI, Past Medical, Family, and Social History remains the same  as documented in the initial encounter.     Review of Systems   Unable to perform ROS: Other (Severe dysarthria)     Scheduled Meds:   aspirin  81 mg Oral Daily    atorvastatin  40 mg Oral Daily    clopidogreL  75 mg Oral Daily    enoxparin  40 mg Subcutaneous Q24H (prophylaxis, 1700)    latanoprost  1 drop Both Eyes QHS    senna-docusate  1 tablet Oral BID    white petrolatum   Topical (Top) Daily     Continuous Infusions:  PRN Meds:  Current Facility-Administered Medications:     calcium gluconate IVPB, 1 g, Intravenous, PRN    calcium gluconate IVPB, 1 g, Intravenous, PRN    calcium gluconate IVPB, 1 g, Intravenous, PRN    dextrose 50%, 12.5 g, Intravenous, PRN    glucagon (human recombinant), 1 mg, Intramuscular, PRN    hydrALAZINE, 10 mg, Intravenous, Q4H PRN    insulin aspart U-100, 0-10 Units, Subcutaneous, Q6H PRN    labetalol, 10 mg, Intravenous, Q4H PRN    magnesium sulfate 2 g IVPB, 2 g, Intravenous, PRN    magnesium sulfate 2 g IVPB, 2 g, Intravenous, PRN    ondansetron, 4 mg, Intravenous, Q8H PRN    sodium chloride 0.9%, 10 mL, Intravenous, PRN    sodium chloride 0.9%, 10 mL, Intravenous, PRN    Objective:     Vital Signs (Most Recent):  Temp: 97.9 °F (36.6 °C) (04/24/25 1502)  Pulse: (!) 56 (04/24/25 1602)  Resp: 18 (04/24/25 1602)  BP: (!) 152/67 (04/24/25 1602)  SpO2: 100 % (04/24/25 1602)  BP Location: Left arm    Vital Signs Range (Last 24H):  Temp:  [97.6 °F (36.4 °C)-98.2 °F (36.8 °C)]   Pulse:  [47-67]   Resp:  [7-29]   BP: (100-171)/(50-98)   SpO2:  [98 %-100 %]   BP Location: Left arm       Physical Exam  Vitals and nursing note reviewed.   Constitutional:       General: She is not in acute distress.  HENT:      Head: Normocephalic and atraumatic.      Mouth/Throat:      Mouth: Mucous membranes are moist.   Eyes:      Extraocular Movements: Extraocular movements intact.      Pupils: Pupils are equal, round, and reactive to light.   Cardiovascular:      Rate and Rhythm: Bradycardia present.  "  Pulmonary:      Effort: Pulmonary effort is normal. No respiratory distress.   Abdominal:      Tenderness: There is no guarding.   Skin:     General: Skin is warm and dry.   Neurological:      Mental Status: She is alert.      Cranial Nerves: Cranial nerve deficit present.      Motor: Weakness present.   Psychiatric:         Mood and Affect: Mood normal.         Behavior: Behavior normal.              Neurological Exam:   LOC: alert  Attention Span: Good   Language: Expressive aphasia  Articulation: Dysarthria  Orientation: Untestable due to severe aphasia   Visual Fields: Full  EOM (CN III, IV, VI): Gaze preference  left  Pupils (CN II, III): PERRL  Facial Movement (CN VII): Upper & lower facial weakness on the Right  Motor: Arm left  Normal 5/5  Leg left  Normal 5/5  Arm right  Paresis: 4/5  Leg right Paresis: 4/5  Sensation: Intact to light touch, temperature and vibration    Laboratory:  CMP:   Recent Labs   Lab 04/24/25  0146   GLUCOSE 93   CALCIUM 9.4   ALBUMIN 2.6*      K 4.1   CO2 19*      BUN 28*   CREATININE 0.9   ALKPHOS 52   ALT 45*   AST 47*   BILITOT 0.5     BMP:   Recent Labs   Lab 04/24/25  0146   GLUCOSE 93      K 4.1      CO2 19*   BUN 28*   CREATININE 0.9   CALCIUM 9.4     CBC:   Recent Labs   Lab 04/24/25  0146   WBC 7.31   RBC 3.89*   HGB 10.1*   HCT 31.4*      MCV 81*   MCH 26.0*   MCHC 32.2     Lipid Panel:   Recent Labs   Lab 04/23/25  1340   CHOL 130   HDL 34*   TRIG 92     Coagulation:   Recent Labs   Lab 04/23/25  0925   INR 1.0     Platelet Aggregation Study: No results for input(s): "PLTAGG", "PLTAGINTERP", "PLTAGREGLACO", "ADPPLTAGGREG" in the last 168 hours.  Hgb A1C:   Recent Labs   Lab 04/23/25  1340   HGBA1C 4.9     TSH:   Recent Labs   Lab 04/23/25  1340   TSH 1.747       Diagnostic Results     MRI Brain WO  @ OS - 4/24/2025  Impression:     Stable distribution of acute infarction in the MCA territory centered within the left subinsular cortex and " the left basal ganglia.  Hemosiderin deposition within the left basal ganglia.  The findings may reflect microscopic blood products.  Attention on follow-up suggested.    MRI Brain WO  @ OSH - 4/23/2025  Impression:     1. Regions of diffusion restriction involving the left basal ganglia and left insular cortex compatible with acute ischemia.  No evidence of associated hemorrhage or mass effect.  2. Involutional changes as above.     CTA Head and Neck @ OSH - 4/23/2025  Impression:     1.  Focal high-grade stenosis/narrowing, with suspected thrombus/soft plaque in the left MCA     2.  Suspicious/pathologic mediastinal lymphadenopathy, while this may be related to granulomatous disease, lymphoma, metastasis/malignancy is a consideration and correlation with the laboratory values, history and CT chest follow-up would be warranted.     3.  No clinically significant stenosis or large vessel occlusion in the neck.     CTH WO @ OSH - 4/23/2025  Impression:     1.  No acute intracranial hemorrhage, no hydrocephalus, herniation or midline shift.     2.  Ill-defined low-attenuation in the left insular cortex, and adjacent basal ganglia, possibly from small vessel ischemic disease and or edema from a subacute infarct, consider correlation with MRI and history.     Cardiac Evaluation:      TTE -4/24/2025  Summary      Left Ventricle: The left ventricle is normal in size. Normal wall thickness. Normal wall motion. There is normal systolic function with a visually estimated ejection fraction of 55 - 60%. There is normal diastolic function.    Right Ventricle: Right ventricular enlargement. Wall thickness is normal. Systolic function is normal.    Left Atrium: Severely dilated    Mitral Valve: There is mild regurgitation.    Tricuspid Valve: There is mild regurgitation.    Pulmonic Valve: There is mild regurgitation.    Pulmonary Artery: The estimated pulmonary artery systolic pressure is 37 mmHg.    IVC/SVC: Normal venous  pressure at 3 mmHg.        Anh Loya DNP  [facility]  Department of Vascular Neurology   Andre Carver - Neuro Critical Care

## 2025-04-24 NOTE — HOSPITAL COURSE
04/24/2025 Repeat MRI Brain without contrast overnight with stable infarct, no hemorrhage. Ok to continue DAPT for secondary stroke prevention. TTE with EF 55-60%, +LAE, normal wall motion. Etiology of stroke likely ESUS. Patient will need cardiac event monitor at discharge. Exam overall improved post IR with TICI 2C. Ok to stepdown to NPU.   4/25/2025 NAEON, neuro exam stable and continues to have RSW and speech difficulty. Started on rocephin for abx for UTI, urine cx with GNR and susceptibilities pending. Stepped down to NPU this morning. CT chest yesterday shows mediastinal and bilateral hilar lymphadenopathy, will obtain CT C/A/P for malignancy eval. Dispo planning ongoing for IPR, pending auth/acceptance for [facility] rehab.  4/26/2025 NAEON, neuro exam stable. Continuing antibiotics for UTI. CT C/A/P for malignancy eval complete, continued to show right lung nodule and lymphadenopathy. Also showed evidence of right obstructing renal stone. Dispo pending IPR placement.   4/27/2025 NAEON. Patient will complete antibiotics for UTI today. Urology consulted for obstructing right stone, plan for stent placement today. Dispo pending IPR placement.   4/28/2025 NAEON. Ureteral stent placed yesterday. Antibiotics extended to 12d course following stent placement. Escalating bowel regimen. Stable medical and neurological status. Dispo pending IPR placement.  04/29/2025 NAEON. Day 5/12 of antibiotics. Continue to escalate bowel regimen. Had hypertension today up to ; starting home amlodipine.  04/30/2025 NAEON. Day 6/12. Bowel movement yesterday with suppository. Blood pressure better controlled after starting amlodipine; will restart half-dose home telmisartan on discharge for further monitoring while at rehab. Patient to follow up with PCP concerning restarting full dose antiHTN, lasix, and potassium supplementation. Patient will continue on DAPT for at least 30 days and will continue ASA indefinitely. Patient scheduled  to receive cardiac event monitor post-discharge from [facility]. Follow up with Vascular Neurology.

## 2025-04-24 NOTE — ASSESSMENT & PLAN NOTE
-As seen on imaging  -Admitted to [facility] for close neuro monitoring and observation due to high risk for worsening edema, on hemicrani watch  -Continue frequent q1hr neuro checks as any acute changes or worsening neuro status may signify expansion of the insult and/or area of the edema, which may require acute interventions to prevent loss of function and/or death.  -Obtain stat CTH for any new or worsening neuro changes and notify primary team immediately

## 2025-04-24 NOTE — ASSESSMENT & PLAN NOTE
Stroke risk factor.    SBP<140 post successful thrombectomy.  Hold all home antihypertensives  Prn antihypertensives

## 2025-04-24 NOTE — PT/OT/SLP EVAL
Occupational Therapy  Co Evaluation/ Treatment    Name: Delilah Williamson  MRN: [id number]  Admitting Diagnosis: Embolic stroke involving left middle cerebral artery  Recent Surgery: * No surgery found *      Recommendations:     Discharge Recommendations: High Intensity Therapy  Discharge Equipment Recommendations:  wheelchair  Barriers to discharge:  Other (Comment) (increased skilled A needed)    Assessment:     Delilah Williamson is a 85 y.o. female with a medical diagnosis of Embolic stroke involving left middle cerebral artery.  She presents with decreased self-care and functional mobility independence 2/2 AMS. Performance deficits affecting function: weakness, gait instability, decreased upper extremity function, decreased ROM, impaired endurance, impaired balance, decreased lower extremity function, impaired coordination, decreased safety awareness, impaired fine motor, impaired self care skills, impaired functional mobility, decreased coordination.  This date pt A&O x2 (person and place) with pt responding to yes/no questions as pt with significant dysarthria. Pt following 75% of one step commands, benefiting from visual and tactile cues 2/2 communication difficulties. Pt with RUE weakness observed with limited shoulder flexion AROM, though with formal strength assessment limited due to command following difficulty. Pt benefiting from significant assistance with mobility and therefore would continue to benefit from skilled OT services to increase independence. Patient presents with good participation and motivation to return to prior level of function with high intensity therapy.  The patient demonstrates appropriate endurance to participate in up to 3 hours or 15hrs of combined therapy post acute.     Co-treatment with PT performed for patient safety, education, and facilitation of treatment to maximize activity tolerance and progression towards goals from two skilled therapy disciplines.     Rehab Prognosis: Good;  "patient would benefit from acute skilled OT services to address these deficits and reach maximum level of function.       Plan:     Patient to be seen 4 x/week to address the above listed problems via self-care/home management, therapeutic activities, therapeutic exercises, neuromuscular re-education  Plan of Care Expires: 05/24/25  Plan of Care Reviewed with: patient    Subjective     Chief Complaint: "two" (pt verbalizing number of fingers held by therapist)  Patient/Family Comments/goals: unknown    Occupational Profile: PLOF gathered through pt head nods to yes/no questions with writing therapist unsure of pt's historian accuracy with PLOF  Living Environment: Pt lives alone in mobile home with 0 JAYSHREE, t/s   Previous level of function: modified independent with cane  Roles and Routines: unknown   Equipment Used at Home: cane, straight  Assistance upon Discharge: unknown    Pain/Comfort:  Pain Rating 1: 0/10    Patients cultural, spiritual, Anabaptism conflicts given the current situation: no    Objective:     Communicated with: nursing prior to session.  Patient found HOB elevated with telemetry, pulse ox (continuous), blood pressure cuff, SCD, pressure relief boots, PureWick upon OT entry to room.    General Precautions: Standard, aspiration, aphasia, fall  Orthopedic Precautions: N/A  Braces: N/A  Respiratory Status: Room air    Occupational Performance:    Bed Mobility:    Patient completed Scooting/Bridging with maximal assistance  Patient completed Supine to Sit with maximal assistance and 2 persons  Patient completed Sit to Supine with maximal assistance and 2 persons  Pt seated EOB with minimal assistance to CGA     Functional Mobility/Transfers:  Patient completed Sit <> Stand Transfer with minimum assistance and of 2 persons  with  hand-held assist   Functional Mobility: Pt engaged in functional mobility to simulate household/community distances of ~3 R lateral steps and 1 forward/backward step with " moderate assistance of 2 persons with maximal assistance at RLE for step progression in order to maximize functional endurance and standing balance required for engagement in occupations of choice - pt with significant assistance needed for RLE progression with increased assistance needed to maintain midline/upright posture    Activities of Daily Living:  Lower Body Dressing: maximal assistance to hemalatha footwear while pt supine with HOB elevated    Cognitive/Visual Perceptual:  Cognitive/Psychosocial Skills:  -       Oriented to: Person, Place, and pt responding to yes/no dual option questions   -       Follows Commands/attention:Follows one-step commands and 75% of the time with pt benefiting from tactile and v/cs  -       Communication: expressive aphasia and dysarthria  -       Memory: Impaired LTM and difficult to formally assess  -       Safety awareness/insight to disability: mildly impaired   -       Mood/Affect/Coping skills/emotional control: Pleasant  Visual/Perceptual:      -Intact - pt tracking across midline, acuity intact (pt identifying number of fingers held by therapist)    Physical Exam:  Balance:    -       fair sitting balance, poor dynamic standing balance  Sensation:    -       Intact  Upper Extremity Range of Motion:     -       Right Upper Extremity: AROM shoulder flexion ~90*, elbow flexion AROM assessment limited 2/2 command following; PROM WFL  -       Left Upper Extremity: WNL  Upper Extremity Strength:    -       Right Upper Extremity: Deficits: 3-/5 shoulder flexion, biceps/triceps MMT limited  -       Left Upper Extremity: WFL   Strength:    -       Right Upper Extremity: Deficits: 3/5  -       Left Upper Extremity: WFL  Fine Motor Coordination:    -       Impaired  Right hand, finger to nose - significantly impaired, Right hand thumb/finger opposition skills    Gross motor coordination:   impaired RUE    AMPAC 6 Click ADL:  AMPAC Total Score: 12    Treatment & Education:  Session  this date targeted initial OT evaluation, therapeutic activities, and self-care activities to increase pt's independence  Pt educated on OT roles, POC, call button for assistance  Pt educated on importance of RUE AROM t/o day    Patient left HOB elevated with all lines intact, call button in reach, and nursing notified    GOALS:   Multidisciplinary Problems       Occupational Therapy Goals          Problem: Occupational Therapy    Goal Priority Disciplines Outcome Interventions   Occupational Therapy Goal     OT, PT/OT Progressing    Description: Goals to be met by: 5/24/25     Patient will increase functional independence with ADLs by performing:    UE Dressing with Stand-by Assistance.  LE Dressing with Minimal Assistance.  Grooming while standing at sink with Minimal Assistance.  Toileting from toilet with Contact Guard Assistance for hygiene and clothing management.   Sitting at edge of bed x10 minutes with Supervision.  Supine to sit with Minimal Assistance.  Step transfer with Minimal Assistance and RW prn  Toilet transfer to toilet with Minimal Assistance.  Increased functional strength to 4/5 for RUE.  Upper extremity exercise program x10 reps per handout, with assistance as needed.                         DME Justifications:  Delilah Williamson has a mobility limitation that significantly impairs her ability to participate in one or more mobility related activities of daily living (MRADLs) such as toileting, feeding, dressing, grooming, and bathing in customary locations in the home.  The mobility limitation cannot be sufficiently resolved by the use of a cane or walker.   The use of a manual wheelchair will significantly improve the patients ability to participate in MRADLS and the patient will use it on regular basis in the home.  Delilah Williamson has expressed her willingness to use a manual wheelchair in the home. Patients upper body strength is sufficient for propulsion.  She also has a caregiver who is  available, willing, and able to provide assistance with the wheelchair when needed.      History:     Past Medical History:   Diagnosis Date    Diabetes mellitus, type 2     Glaucoma     Hyperlipidemia     Hypertension          Past Surgical History:   Procedure Laterality Date    CATARACT EXTRACTION      ROTATOR CUFF REPAIR         Time Tracking:     OT Date of Treatment: 04/24/25  OT Start Time: 1153  OT Stop Time: 1218  OT Total Time (min): 25 min    Billable Minutes:Evaluation 8  Therapeutic Activity 17    4/24/2025

## 2025-04-24 NOTE — PLAN OF CARE
[facility] - Neuro Critical Care  Initial Discharge Assessment       Primary Care Provider: Juan Abarca MD    Admission Diagnosis: Stroke [I63.9]  Acute CVA (cerebrovascular accident) [I63.9]    Admission Date: 4/23/2025  Expected Discharge Date:          Payor: [employer] MGD [facility] / Plan: [employer] CHOICES / Product Type: Medicare Advantage /     Extended Emergency Contact Information  Primary Emergency Contact: Samreen Delgado  Address: [address] [place] [address]           [place] [address] [facility]  Home Phone: [phone]  Mobile Phone: [phone]  Relation: Sister  Preferred language: English   needed? No    Discharge Plan A: Rehab  Discharge Plan B: Skilled Nursing Facility, Home Health      [id number] [place] - [address]  [address] [place] [address]  Phone: [phone] Fax: [phone]    [facility] - [address] [address]  Phone: [phone] Fax: [phone]      Initial Assessment (most recent)       Adult Discharge Assessment - 04/24/25 1332          Discharge Assessment    Assessment Type Discharge Planning Assessment     Confirmed/corrected address, phone number and insurance Yes     Confirmed Demographics Correct on Facesheet     Source of Information family     If unable to respond/provide information was family/caregiver contacted? Yes     Contact Name/Number brother at bed side     Does patient/caregiver understand observation status Yes     Communicated ALINA with patient/caregiver Yes;Date not available/Unable to determine     Reason For Admission Embolic stroke involving left middle cerebral artery     People in Home alone     Facility Arrived From: [facility]     Do you expect to return to your current living situation? Yes     Do you have help at home or someone to help you manage your care at home? No     Walking or  Climbing Stairs Difficulty yes     Walking or Climbing Stairs ambulation difficulty, requires equipment     Dressing/Bathing Difficulty no     Equipment Currently Used at Home cane, straight     Readmission within 30 days? No     Patient currently being followed by outpatient case management? No     Do you currently have service(s) that help you manage your care at home? No     Do you take prescription medications? Yes     Do you have prescription coverage? Yes     Coverage Payor: [employer] MGD [facility] -     Do you have any problems affording any of your prescribed medications? No     Is the patient taking medications as prescribed? yes     How do you get to doctors appointments? car, drives self;family or friend will provide     Are you on dialysis? No     Do you take coumadin? No     Discharge Plan A Rehab     Discharge Plan B Skilled Nursing Facility;Home Health     DME Needed Upon Discharge  other (see comments)   TBD    Discharge Plan discussed with: Sibling     Name(s) and Number(s) Partha Delgado Brother [phone]                   Spoke to patients brother at bed side. Patient lives at home alone. Post hospital stay,  patients family will be patients support person. Patient has transportation at d/c with her family. There have been no hospitalizations within the last 30 days. Verified patients PCP and preferred pharmacy. Patient not on Coumadin and is not receiving dialysis. All questions answered regarding case management/ discharge planning , patient verbalized understanding. Discharge booklet with SW contact information given to patient.    Discharge Plan A and Plan B have been determined by review of patient's clinical status, future medical and therapeutic needs, and coverage/benefits for post-acute care in coordination with multidisciplinary team members.      REVA Marte, MSW  Case Management  [facility]

## 2025-04-24 NOTE — PLAN OF CARE
[facility] Care Plan    POC reviewed with Delilah Williamson at 0300. Patient verbalized understanding. Questions and concerns addressed.  See below and flowsheets for full assessment and VS info.     - Post thrombectomy monitoring. R groin site soft and dressing clean and dry. +1 pedal pulses.  - MRI completed - see PA note regarding holding ASA and sub q heparin overnight  - EKG performed As pt ran sinus matheus overnight. BP stable. PA states that 40 is WDL.Will call back if pt becomes symptomatic or hemodynamically unstable.  - Gown and complete linen change performed.  - Mag 1.2 this am. PA notified. IV replacement started. Bag 1 of 2 infusing now.  - o2 titrated off to room air.  - Turned q2 hours      Is this a stroke patient? yes- Stroke booklet reviewed with patient, risk factors identified for patient and stroke booklet remains at bedside for ongoing education.     Care individualization:  Pt likes multiple blankets.    Neuro:  [place] Coma Scale  Best Eye Response: 4-->(E4) spontaneous  Best Motor Response: 6-->(M6) obeys commands  Best Verbal Response: 4-->(V4) confused  [place] Coma Scale Score: 14  Assessment Qualifiers: patient not sedated/intubated, no eye obstruction present  Pupil PERRLA: yes     24 hr Temp:  [96.9 °F (36.1 °C)-99 °F (37.2 °C)]     CV:   Rhythm: sinus bradycardia  BP goals:   SBP < 160  MAP > 65    Resp:           Plan: N/A    GI/:     Diet/Nutrition Received: NPO  Last Bowel Movement: 04/22/25  Voiding Characteristics: external catheter, incontinence    Intake/Output Summary (Last 24 hours) at 4/24/2025 0643  Last data filed at 4/24/2025 0635  Gross per 24 hour   Intake 750 ml   Output 725 ml   Net 25 ml          Labs/Accuchecks:  Recent Labs   Lab 04/24/25  0146   WBC 7.31   RBC 3.89*   HGB 10.1*   HCT 31.4*         Recent Labs   Lab 04/24/25  0146      K 4.1   CO2 19*      BUN 28*   CREATININE 0.9   ALKPHOS 52   ALT 45*   AST 47*   BILITOT 0.5      Recent Labs   Lab  "04/23/25  0925   PROTIME 11.4   INR 1.0    No results for input(s): "CPK", "CPKMB", "TROPONINI", "MB" in the last 168 hours.    Electrolytes: Electrolytes replaced  Accuchecks: Q6H    Gtts:      LDA/Wounds:    Yaw Risk Assessment  Sensory Perception: 3-->slightly limited  Moisture: 3-->occasionally moist  Activity: 1-->bedfast  Mobility: 2-->very limited  Nutrition: 2-->probably inadequate  Friction and Shear: 2-->potential problem  Yaw Score: 13  Is your yaw score 12 or less? no          Restraints:        WCTM   "

## 2025-04-24 NOTE — PLAN OF CARE
Patient unable to participate in assessment. SW called patients contact which is her sister Samreen Delgado [phone]. No answer.     SW went to patient room and saw an additional family member number on the white board. Eula [phone]. Also no answer.     SW will re-attempt at a later time.

## 2025-04-24 NOTE — CONSULTS
[facility] - Neuro Critical Care  Wound Care    Patient Name:  Delliah Williamson   MRN:  [id number]  Date: 4/24/2025  Diagnosis: Embolic stroke involving left middle cerebral artery    History:     Past Medical History:   Diagnosis Date    Diabetes mellitus, type 2     Glaucoma     Hyperlipidemia     Hypertension        Social History[1]    Precautions:     Allergies as of 04/23/2025    (No Known Allergies)       WO Assessment Details/Treatment     Wound care consult received for sacrum, head to toe assessment completed for Yaw score of 13.  Chart reviewed for this encounter.  See flowsheets for findings.    Pt found lying in bed, agreeable to care at this time.  Pt turned into lateral position, scar tissue appreciated to R buttocks, pt w/ well defined, pink linear wound to the center of her gluteal cleft, likely r/t moisture associated dermatitis. Pt also w/ pink linear wound to R lower abdominal fold, will order Triad for moisture barrier protection for gluteal cleft and skin fold.  Pt w/ dry, crusted abrasions to BL elbows, R cheek - will order Aquaphor for healing properties.    RECOMMENDATIONS:  BID & PRN - gluteal cleft, R lower ABD fold - cleanse gently w/ no rinse bath wipes, pat dry and apply Triad to affected area.    Daily - BL elbows, R cheek - cleanse gently w/ NS, pat dry and apply Aquaphor to crusted abrasions.      04/24/25 1034   WOCN Assessment   WOCN Total Time (mins) 30   Visit Date 04/24/25   Visit Time 1034   Consult Type New   WOCN Speciality Wound   Intervention assessed;changed;applied;chart review;coordination of care;orders   Teaching on-going        Wound 04/23/25 1813 Moisture associated dermatitis Gluteal cleft   Date First Assessed/Time First Assessed: 04/23/25 1813   Present on Original Admission: Yes  Primary Wound Type: Moisture associated dermatitis  Location: Gluteal cleft   Wound Image    Dressing Appearance Dry;Intact;Clean   Drainage Amount None   Drainage Characteristics/Odor No  odor   Appearance Pink;Moist   Tissue loss description Partial thickness   Periwound Area Intact;Moist;Scar tissue   Wound Edges Open;Defined   Care Applied:;Skin Barrier        Wound 04/23/25 1815 Moisture associated dermatitis Right lower Abdomen   Date First Assessed/Time First Assessed: 04/23/25 1815   Present on Original Admission: Yes  Primary Wound Type: Moisture associated dermatitis  Side: Right  Orientation: lower  Location: Abdomen   Wound Image    Dressing Appearance Open to air   Drainage Amount None   Drainage Characteristics/Odor No odor   Appearance Moist;Pink   Tissue loss description Partial thickness   Periwound Area Intact;Moist   Wound Edges Defined;Open        Wound 04/24/25 1034 Abrasion(s) Left Elbow   Date First Assessed/Time First Assessed: 04/24/25 1034   Primary Wound Type: Abrasion(s)  Side: Left  Location: Elbow   Wound Image    Dressing Appearance Open to air   Drainage Amount None   Drainage Characteristics/Odor No odor   Appearance Dry;Red   Periwound Area Intact;Dry        Wound 04/24/25 1034 Abrasion(s) Right Elbow   Date First Assessed/Time First Assessed: 04/24/25 1034   Primary Wound Type: Abrasion(s)  Side: Right  Location: Elbow   Wound Image    Dressing Appearance Open to air   Drainage Amount None   Drainage Characteristics/Odor No odor   Appearance Intact;Maroon;Dry   Periwound Area Intact;Dry          [1]   Social History  Socioeconomic History    Marital status: Single   Tobacco Use    Smoking status: Never    Smokeless tobacco: Never   Substance and Sexual Activity    Alcohol use: Not Currently    Drug use: Never    Sexual activity: Not Currently

## 2025-04-24 NOTE — PROGRESS NOTES
[facility] - Neuro Critical Care  Neurocritical Care  Progress Note    Admit Date: 4/23/2025  Service Date: 04/24/2025  Length of Stay: 1    Subjective:     Chief Complaint: Embolic stroke involving left middle cerebral artery    History of Present Illness: Pt is an 85 y.o. femamle with PMHx of DM, HTN, HLD, and glaucoma who presents for acute LMCA stroke. She initially presented to OSH for RSW, facial droop and aphasia. Her last known normal was 1700 yesterday. Telestroke was performed and initial NIH 12. Patient was out of the window for TNK but CTA revealed L M1 occlusion so patient was transferred to [facility] for possible intervention. She was taken to IR where thrombectomy was completed with TICI2C reperfusion. She will be admitted to [facility] for higher level care and neuro monitoring.     Hospital Course: 04/24/2025 NIH 7 this morning. MRI with stable distribution of acute infarction in the MCA territory centered within the left subinsular cortex and the left basal ganglia. Hemosiderin deposition in L basal ganglia, though very small. Started DAPT and dvt ppx. CTA neck noted mediastinal LAD. Designated chest CT ordered for additional eval. UA c/f possible UTI, follow urine culture. SD to stroke.      Objective:     Vitals:  Temp: 98.2 °F (36.8 °C)  Pulse: (!) 59  Rhythm: sinus bradycardia  BP: 127/60  MAP (mmHg): 87  Resp: 17  SpO2: 100 %    Temp  Min: 96.9 °F (36.1 °C)  Max: 99 °F (37.2 °C)  Pulse  Min: 47  Max: 69  BP  Min: 100/56  Max: 193/78  MAP (mmHg)  Min: 70  Max: 114  Resp  Min: 7  Max: 30  SpO2  Min: 99 %  Max: 100 %    04/23 0701 - 04/24 0700  In: 750   Out: 725 [Urine:725]   Unmeasured Output  Urine Occurrence: 1  Pad Count: 2        Physical Exam  Vitals and nursing note reviewed.   HENT:      Head: Normocephalic and atraumatic.      Mouth/Throat:      Mouth: Mucous membranes are moist.   Eyes:      Extraocular Movements: Extraocular movements intact.      Conjunctiva/sclera: Conjunctivae normal.       Pupils: Pupils are equal, round, and reactive to light.   Cardiovascular:      Rate and Rhythm: Normal rate and regular rhythm.   Pulmonary:      Effort: Pulmonary effort is normal.   Abdominal:      General: Abdomen is flat. There is no distension.   Skin:     General: Skin is warm.      Findings: Bruising present.   Neurological:      Mental Status: She is alert.      Comments: -- dysarthria, expressive aphasia  -- intermittently following commands  --PERRL   --Motor: RSW but antigravity                   Medications:  Continuous Scheduledaspirin, 81 mg, Daily  atorvastatin, 40 mg, Daily  clopidogreL, 75 mg, Daily  enoxparin, 40 mg, Q24H (prophylaxis, 1700)  latanoprost, 1 drop, QHS  senna-docusate, 1 tablet, BID    PRNcalcium gluconate IVPB, 1 g, PRN  calcium gluconate IVPB, 1 g, PRN  calcium gluconate IVPB, 1 g, PRN  dextrose 50%, 12.5 g, PRN  glucagon (human recombinant), 1 mg, PRN  hydrALAZINE, 10 mg, Q4H PRN  insulin aspart U-100, 0-10 Units, Q6H PRN  labetalol, 10 mg, Q4H PRN  magnesium sulfate 2 g IVPB, 2 g, PRN  magnesium sulfate 2 g IVPB, 2 g, PRN  ondansetron, 4 mg, Q8H PRN  sodium chloride 0.9%, 10 mL, PRN  sodium chloride 0.9%, 10 mL, PRN      Today I personally reviewed pertinent medications, lines/drains/airways, imaging, cardiology results, laboratory results, microbiology results, notably:    Diet  Diet NPO  Diet NPO      Assessment/Plan:     Neuro  * Embolic stroke involving left middle cerebral artery  L M1 occlusion, no TNK 2/2 to OOW, thrombectomy TICI 2C  -admit to [facility]  -vascular neurology following   -Q 1 hour neuro checks and vitals   - MRI with  stable distribution of acute infarction in the MCA territory centered within the left subinsular cortex and the left basal ganglia. Hemosiderin deposition in L basal ganglia, though very small.     Antithrombotics for secondary stroke prevention: Antiplatelets: Aspirin: 81 mg daily  Clopidogrel: 75 mg daily    Statins for secondary stroke prevention  and hyperlipidemia, if present:   Statins: Atorvastatin- 40 mg daily    Aggressive risk factor modification: HTN, DM, HLD, Diet, Exercise     Rehab efforts: The patient has been evaluated by a stroke team provider and the therapy needs have been fully considered based off the presenting complaints and exam findings. The following therapy evaluations are needed: PT evaluate and treat, OT evaluate and treat, SLP evaluate and treat, PM&R evaluate for appropriate placement    Diagnostics ordered/pending: HgbA1C to assess blood glucose levels, Lipid Profile to assess cholesterol levels, MRI head without contrast to assess brain parenchyma, TTE to assess cardiac function/status , TSH to assess thyroid function    VTE prophylaxis: Enoxaparin 40 mg SQ every 24 hours    BP parameters: Infarct: Post sucessful thrombectomy, SBP <160        Cytotoxic brain edema  See stroke    Aphasia  SLP eval and treat    Acute right-sided weakness  PT/OT eval and treat     Cardiac/Vascular  Hyperlipidemia  Daily statin  Lipid panel reviewed    HTN (hypertension)  SBP goal <160,  Prn hydralazine, labetalol     Endocrine  Type 2 diabetes mellitus  SSI protocol   A1c 4.9    Other  Lymphadenopathy  CTA head and neck noted to have mediastinal lymphadenopathy. Designated chest CT obtained and revealed mediastinal and bilateral hilar lymphadenopathy, mild bilateral smooth interlobular septal thickening and trace bilateral pleural effusions.  While nonspecific, this can be seen with pulmonary edema. 3. 2 mm right lower lobe nodule.  No prior imaging available to compare. Per chart review, no significant smoking history or known h/o cancer. Unclear whether malignant vs autoimmune/inflammatory process. No apparent signs of infection at this time.    - Consider pulmonology consult vs outpatient follow referral for further eval            The patient is being Prophylaxed for:  Venous Thromboembolism with: Chemical  Stress Ulcer with: None  Ventilator  Pneumonia with: none    Activity Orders            Progressive Mobility Protocol (mobilize patient to their highest level of functioning at least twice daily) starting at 04/23 2000    Turn patient starting at 04/23 1400    Elevate HOB starting at 04/23 1211    Diet NPO: NPO starting at 04/23 1211          Full Code    Miley Small MD  Neurocritical Care  [facility] - Neuro Critical Care

## 2025-04-24 NOTE — NURSING
Some differences and in documentation noted from day time nurse's assessment for example grasp reported to right hand during day and none noted now. Pt not able to dorsi chao and plantarflex right foot. Left side drift present upper and lower. During bedside handoff RN states these are not neuro changes. ELIZABETH Zendejas notified of changes of documentation in neuro assessment. MRI scheduled for 21:00. No new orders at this time.

## 2025-04-24 NOTE — HOSPITAL COURSE
04/24/2025 NIH 7 this morning. MRI with stable distribution of acute infarction in the MCA territory centered within the left subinsular cortex and the left basal ganglia. Hemosiderin deposition in L basal ganglia, though very small. Started DAPT and dvt ppx. CTA neck noted mediastinal LAD. Designated chest CT ordered for additional eval. UA c/f possible UTI, follow urine culture. SD to stroke.  04/25/2025- exam improved this morning. Ucx with GNR. Started 3 day course of CTX for UTI. SD to stroke.

## 2025-04-24 NOTE — NURSING
Spoke with MRI tech Qi about stat MRI. Qi states they are unable to perform MRI without rad clearance as pt is not oriented and family is not at bedside. Lucian NP notified of delay in stat scan. Telephone order placed for stat KUB and chest xray portable. Order read back.

## 2025-04-24 NOTE — PLAN OF CARE
Problem: Physical Therapy  Goal: Physical Therapy Goal  Description: Goals to be met by: 25     Patient will increase functional independence with mobility by performin. Supine to sit with [place]  2. Sit to supine with [place]  3. Sit to stand transfer with [place]  4. Bed to chair transfer with [place] using LRAD  5. Gait  x 150 feet with Modified [place] using LRAD  6. Follow 100% of 1-step verbal commands.     Outcome: Progressing   PT eval completed and goals established. Pt will begin PT POC, progressing as tolerated.

## 2025-04-24 NOTE — SIGNIFICANT EVENT
MRI w/o complete. Notified by radiology of stable infarct though with hemosiderin deposition within the left basal ganglia, concerning for microscopic blood products. Will hold ASA and SQH overnight.

## 2025-04-24 NOTE — PLAN OF CARE
04/24/25 1337   Post-Acute Status   Post-Acute Authorization Placement   Post-Acute Placement Status Referrals Sent   Coverage Payor: [facility] [employer] [facility] CHOICES -   Discharge Plan   Discharge Plan A Rehab   Discharge Plan B Skilled Nursing Facility;Home Health     Met with patients brother at bed side to review discharge recommendation of rehab and is agreeable to plan    Patient/family provided list of facilities in-network with patient's payor plan. Providers that are owned, operated, or affiliated with [facility] are included on the list.     Notified that referral sent to below listed facilities from in-network list based on proximity to home/family support:   [facility]     Patient/family instructed to identify preference.    Preferred Facility: (if more than 1, listed in order of descending preference)  [facility]    Patient has declined to select a preferred provider and elects placement with the first accepting in network provider that is available to provide services as ordered by the physician       If an additional preferred facility not listed above is identified, additional referral to be sent. If above facilities unable to accept, will send additional referrals to in-network providers.

## 2025-04-24 NOTE — NURSING
MRI notified stat kub and chest xray completed. MRI stated they will call me when she is cleared to come down.

## 2025-04-24 NOTE — SUBJECTIVE & OBJECTIVE
Objective:     Vitals:  Temp: 98.2 °F (36.8 °C)  Pulse: (!) 59  Rhythm: sinus bradycardia  BP: 127/60  MAP (mmHg): 87  Resp: 17  SpO2: 100 %    Temp  Min: 96.9 °F (36.1 °C)  Max: 99 °F (37.2 °C)  Pulse  Min: 47  Max: 69  BP  Min: 100/56  Max: 193/78  MAP (mmHg)  Min: 70  Max: 114  Resp  Min: 7  Max: 30  SpO2  Min: 99 %  Max: 100 %    04/23 0701 - 04/24 0700  In: 750   Out: 725 [Urine:725]   Unmeasured Output  Urine Occurrence: 1  Pad Count: 2        Physical Exam  Vitals and nursing note reviewed.   HENT:      Head: Normocephalic and atraumatic.      Mouth/Throat:      Mouth: Mucous membranes are moist.   Eyes:      Extraocular Movements: Extraocular movements intact.      Conjunctiva/sclera: Conjunctivae normal.      Pupils: Pupils are equal, round, and reactive to light.   Cardiovascular:      Rate and Rhythm: Normal rate and regular rhythm.   Pulmonary:      Effort: Pulmonary effort is normal.   Abdominal:      General: Abdomen is flat. There is no distension.   Skin:     General: Skin is warm.      Findings: Bruising present.   Neurological:      Mental Status: She is alert.      Comments: -- dysarthria, expressive aphasia  -- intermittently following commands  --PERRL   --Motor: RSW but antigravity                   Medications:  Continuous Scheduledaspirin, 81 mg, Daily  atorvastatin, 40 mg, Daily  clopidogreL, 75 mg, Daily  enoxparin, 40 mg, Q24H (prophylaxis, 1700)  latanoprost, 1 drop, QHS  senna-docusate, 1 tablet, BID    PRNcalcium gluconate IVPB, 1 g, PRN  calcium gluconate IVPB, 1 g, PRN  calcium gluconate IVPB, 1 g, PRN  dextrose 50%, 12.5 g, PRN  glucagon (human recombinant), 1 mg, PRN  hydrALAZINE, 10 mg, Q4H PRN  insulin aspart U-100, 0-10 Units, Q6H PRN  labetalol, 10 mg, Q4H PRN  magnesium sulfate 2 g IVPB, 2 g, PRN  magnesium sulfate 2 g IVPB, 2 g, PRN  ondansetron, 4 mg, Q8H PRN  sodium chloride 0.9%, 10 mL, PRN  sodium chloride 0.9%, 10 mL, PRN      Today I personally reviewed pertinent  medications, lines/drains/airways, imaging, cardiology results, laboratory results, microbiology results, notably:    Diet  Diet NPO  Diet NPO

## 2025-04-24 NOTE — EICU
Intervention Initiated From:  COR / RITUU    Julian intervened regarding:  Rounding (Video assessment)    VICU Night Rounds Checklist  24H Vital Sign Range:  Temp:  [96.9 °F (36.1 °C)-99 °F (37.2 °C)]   Pulse:  [57-73]   Resp:  [10-30]   BP: (121-193)/(54-79)   SpO2:  [98 %-100 %]     Video rounds and LDA reconciliation

## 2025-04-24 NOTE — ASSESSMENT & PLAN NOTE
CTA head and neck noted to have mediastinal lymphadenopathy. Designated chest CT obtained and revealed mediastinal and bilateral hilar lymphadenopathy, mild bilateral smooth interlobular septal thickening and trace bilateral pleural effusions.  While nonspecific, this can be seen with pulmonary edema. 3. 2 mm right lower lobe nodule.  No prior imaging available to compare. Per chart review, no significant smoking history or known h/o cancer. Unclear whether malignant vs autoimmune/inflammatory process. No apparent signs of infection at this time.    - Consider pulmonology consult vs outpatient follow referral for further eval

## 2025-04-24 NOTE — PLAN OF CARE
"[facility] Care Plan    POC reviewed with Delilah Williamson and family at 1500. Patient and Family verbalized understanding. Questions and concerns addressed. No acute events today. Pt progressing toward goals. Will continue to monitor. See below and flowsheets for full assessment and VS info.     - CT Chest done  - TTF  - Mg given      Is this a stroke patient? yes- Stroke booklet reviewed with patient and family, risk factors identified for patient and stroke booklet remains at bedside for ongoing education.     Care individualization: lightweight blankets, television    Neuro:  Margoth Coma Scale  Best Eye Response: 4-->(E4) spontaneous  Best Motor Response: 6-->(M6) obeys commands  Best Verbal Response: 4-->(V4) confused  Morton Coma Scale Score: 14  Assessment Qualifiers: patient not sedated/intubated, no eye obstruction present  Pupil PERRLA: yes     24 hr Temp:  [97.6 °F (36.4 °C)-98.2 °F (36.8 °C)]     CV:   Rhythm: sinus bradycardia  BP goals:   SBP < 160  MAP > 65    Resp:           Plan: N/A    GI/:     Diet/Nutrition Received: NPO  Last Bowel Movement: 04/22/25  Voiding Characteristics: external catheter    Intake/Output Summary (Last 24 hours) at 4/24/2025 1559  Last data filed at 4/24/2025 1102  Gross per 24 hour   Intake 79.63 ml   Output 725 ml   Net -645.37 ml     Unmeasured Output  Urine Occurrence: 1  Pad Count: 2    Labs/Accuchecks:  Recent Labs   Lab 04/24/25  0146   WBC 7.31   RBC 3.89*   HGB 10.1*   HCT 31.4*         Recent Labs   Lab 04/24/25  0146      K 4.1   CO2 19*      BUN 28*   CREATININE 0.9   ALKPHOS 52   ALT 45*   AST 47*   BILITOT 0.5      Recent Labs   Lab 04/23/25  0925   PROTIME 11.4   INR 1.0    No results for input(s): "CPK", "CPKMB", "TROPONINI", "MB" in the last 168 hours.    Electrolytes: Electrolytes replaced  Accuchecks: Q6H    Gtts:      LDA/Wounds:    Yaw Risk Assessment  Sensory Perception: 3-->slightly limited  Moisture: 3-->occasionally " moist  Activity: 1-->bedfast  Mobility: 2-->very limited  Nutrition: 2-->probably inadequate  Friction and Shear: 2-->potential problem  Yaw Score: 13  Is your yaw score 12 or less? no         WCTM       Problem: Adult Inpatient Plan of Care  Goal: Plan of Care Review  Outcome: Progressing  Goal: Patient-Specific Goal (Individualized)  Outcome: Progressing  Goal: Absence of Hospital-Acquired Illness or Injury  Outcome: Progressing  Goal: Optimal Comfort and Wellbeing  Outcome: Progressing  Goal: Readiness for Transition of Care  Outcome: Progressing     Problem: Stroke, Ischemic (Includes Transient Ischemic Attack)  Goal: Optimal Coping  Outcome: Progressing  Goal: Effective Bowel Elimination  Outcome: Progressing  Goal: Optimal Cerebral Tissue Perfusion  Outcome: Progressing  Goal: Optimal Cognitive Function  Outcome: Progressing  Goal: Improved Communication Skills  Outcome: Progressing  Goal: Optimal Functional Ability  Outcome: Progressing  Goal: Optimal Nutrition Intake  Outcome: Progressing  Goal: Effective Oxygenation and Ventilation  Outcome: Progressing  Goal: Improved Sensorimotor Function  Outcome: Progressing  Goal: Safe and Effective Swallow  Outcome: Progressing  Goal: Effective Urinary Elimination  Outcome: Progressing     Problem: Diabetes Comorbidity  Goal: Blood Glucose Level Within Targeted Range  Outcome: Progressing     Problem: Wound  Goal: Optimal Coping  Outcome: Progressing  Goal: Optimal Functional Ability  Outcome: Progressing  Goal: Absence of Infection Signs and Symptoms  Outcome: Progressing  Goal: Improved Oral Intake  Outcome: Progressing  Goal: Optimal Pain Control and Function  Outcome: Progressing  Goal: Skin Health and Integrity  Outcome: Progressing  Goal: Optimal Wound Healing  Outcome: Progressing

## 2025-04-24 NOTE — ASSESSMENT & PLAN NOTE
L M1 occlusion, no TNK 2/2 to OOW, thrombectomy TICI 2C  -admit to [facility]  -vascular neurology following   -Q 1 hour neuro checks and vitals   - MRI with  stable distribution of acute infarction in the MCA territory centered within the left subinsular cortex and the left basal ganglia. Hemosiderin deposition in L basal ganglia, though very small.     Antithrombotics for secondary stroke prevention: Antiplatelets: Aspirin: 81 mg daily  Clopidogrel: 75 mg daily    Statins for secondary stroke prevention and hyperlipidemia, if present:   Statins: Atorvastatin- 40 mg daily    Aggressive risk factor modification: HTN, DM, HLD, Diet, Exercise     Rehab efforts: The patient has been evaluated by a stroke team provider and the therapy needs have been fully considered based off the presenting complaints and exam findings. The following therapy evaluations are needed: PT evaluate and treat, OT evaluate and treat, SLP evaluate and treat, PM&R evaluate for appropriate placement    Diagnostics ordered/pending: HgbA1C to assess blood glucose levels, Lipid Profile to assess cholesterol levels, MRI head without contrast to assess brain parenchyma, TTE to assess cardiac function/status , TSH to assess thyroid function    VTE prophylaxis: Enoxaparin 40 mg SQ every 24 hours    BP parameters: Infarct: Post sucessful thrombectomy, SBP <160

## 2025-04-24 NOTE — PT/OT/SLP EVAL
Speech Language Pathology Evaluation  Cognitive-Linguistic/Bedside Swallow    Patient Name:  Delilah Williamson   MRN:  [id number]  Admitting Diagnosis: Embolic stroke involving left middle cerebral artery    Recommendations:                  General Recommendations:  Dysphagia therapy and Speech/language therapy   Diet recommendations:  Puree Diet - IDDSI Level 4, Thin liquids - IDDSI Level 0   Aspiration Precautions: 1 bite/sip at a time, Alternating bites/sips, Assistance with meals, Check for pocketing/oral residue, Eliminate distractions, Feed only when awake/alert, HOB to 90 degrees, Meds crushed in puree, and Standard aspiration precautions   General Precautions: Standard, fall, aspiration, aphasia  Communication strategies:  provide increased time to answer and go to room if call light pushed    Assessment:     Delilah Williamson is a 85 y.o. female with an SLP diagnosis of Aphasia, Dysphagia, and Dysarthria.  She presents with dysphagia c/b difficulty with low level trials with patient demonstrating multiple swallows and slow AP bolus transit across all low level trials, however functional for PO intake of a puree diet with thin liquids. Dysarthria c/b slurred speech with impaired intelligibility, right sided facial droop and generally weak articulators. Aphasia c/b difficulty following simple directions, and difficulty with automatic speech tasks. Patient does well answering questions related to self with yes/no answers. SLP to continue to follow.    History:     Past Medical History:   Diagnosis Date    Diabetes mellitus, type 2     Glaucoma     Hyperlipidemia     Hypertension        Past Surgical History:   Procedure Laterality Date    CATARACT EXTRACTION      ROTATOR CUFF REPAIR         Prior Intubation HX:  4/23    Modified Barium Swallow: none on file    Chest X-Rays: 4/23/25  FINDINGS:  Cardiac wires overlie the chest.  Cardiomediastinal silhouette is stable.  Atherosclerotic calcifications overlie the aortic  arch.  No confluent area of consolidation.  No large pleural effusion.  No pneumothorax.  Possible mild pulmonary emphysema.  Operative changes in the left shoulder.  No unexpected metallic foreign body identified in the field of view.    Prior diet: reg/thin with dentures.    Subjective     Patient found laying in bed.  Patient goals: None stated     Pain/Comfort:  Pain Rating 1: 0/10    Respiratory Status: Room air    Objective:   Cognition:  Sustained good attn without persistent cues  Patient maintained good wakefulness throughout session without need for cues    Receptive Language:   Patient able to point to preferred food item in field of 2 in trial x1. Patient requiring maximum cues in order to correctly identify food items in field of 2 in 2/2 trials.  Patient answered simple yes no questions related to self with 100% accuracy in trials x5.   Patient answered yes/no questions related to surroundings independently in 2/4 trials, increasing to 4/4 trials given maximum verbal cues.  Patient followed simple one step commands independently in 3/5 commands, increasing to 5/5 with min verbal cues.   Patient with some difficulty following oral mechanism directions. Pt followed 2/5 directions independently, and required visual model to increase to 4/5 correct trials, unable to puff lips on this day despite modeling and verbal/tactile cueing.  Pragmatics:    Patient attempting to appropriately greet and say by to clinician  Difficulty maintaining visual attention at the beginning of session, likely due to lethargy which resolved by middle of session.    Expressive Language:  Patient verbally expressed orientation to place and name.  Patient performed automatic speech task of 1-5 independently x1, though one instance of repetition of the number 3  Performed automatic speech task of counting 1-10 with 50% accuracy independently with need for moderate visual and verbal cues in order to increase accuracy to 80%. Repeated  numbers 3 and 8 one time each during trials.      Motor Speech:   Slurred speech noted during intermittent phonation attempts. Patiently largely unintelligible, difficult to discern at this time if use of jargon or dysarthria is more significantly effecting intelligibility. Patient intelligible to unknown listener in quiet room in unknown contexts ~15% of the time. In structured tasks patient is about 60% intelligible to unknown listener in a quiet room.  No evidence of groping though patient benefiting from models during oral motor tasks likely 2/2 receptive language deficits. Will continue to assess for apraxic features in future sessions.    Voice:   Voice with adequate intensity in a quiet room, with good phonation during attempts, however speech remains primary barrier to intelligibility     Visual-Spatial:  TBA - Patient with clear left side gaze preference throughout session, though able to cross midline and find clinician on right side with moderate verbal cues.    Reading:   TBA    Written Expression:   TBA    Oral Musculature Evaluation  Oral Musculature: general weakness  Dentition: scattered dentition, teeth in poor condition  Secretion Management: adequate  Mucosal Quality: dry  Mandibular Strength and Mobility: flaccid  Oral Labial Strength and Mobility: impaired retraction, impaired pursing, impaired seal, impaired coordination  Lingual Strength and Mobility: impaired strength, impaired protrusion, impaired right lateral movement, impaired left lateral movement  Buccal Strength and Mobility: flaccid  Oral Musculature Comments: right sided facial droop    Bedside Swallow Eval:   Consistencies Assessed:  Ice chips: x3  Thin liquids by 1/2 tsp x2, 1 tsp x3, cup sip x2 and straw sip x2  Nectar thick liquids by cup sip x3, straw sip x5  Puree by 1/2 tsp x2, 1 tsp x3  Soft solids x1    Oral Phase:   Slightly slowed AP bolus transit across puree and semisolid trials  Trace oral residue during puree trials  cleared with liquid rinse  Patient without rotary chew on semisolid trial, instead electing to swallow semisolids whole with success, though warranting discontinuation of semi solid and solid trials on this day.     Pharyngeal Phase:   no overt clinical signs/symptoms of aspiration  no overt clinical signs/symptoms of pharyngeal dysphagia    Compensatory Strategies  None    Treatment:   SLP cleared for session prior to entry. Pt seen for bedside swallow eval and cognitive linguistic evaluation on this day. Patient initially lethargic, but easily roused with turning on of lights and elevation of head of bed. MD present for part of session to complete physical exam. Educated patient on SLP POC, SLP role, current diet, and aphasia. Pt would benefit from continued education. Whiteboard updated, Discussed impressions and recommendations with nursing.    Goals:   Multidisciplinary Problems       SLP Goals          Problem: SLP    Goal Priority Disciplines Outcome   SLP Goal     SLP Progressing   Description: 1. Patient will follow simple one step directions with min verbal cues with 50% accuracy.  2. Pt will participate in ongoing diagnostic dysphagia therapy   3. Patient will complete automatic speech tasks with 80% accuracy given verbal model from clinician.  4. Patient will asnwer simple yes/no questions with 80% accuracy given min verbal cues from clinician.                         Plan:     Patient to be seen:  4 x/week   Plan of Care expires:     Plan of Care reviewed with:  patient   SLP Follow-Up:  Yes       Discharge recommendations:  Therapy Intensity Recommendations at Discharge: High Intensity Therapy   Barriers to Discharge:  None    Time Tracking:     SLP Treatment Date:   04/24/25  Speech Start Time:  0846  Speech Stop Time:  0922     Speech Total Time (min):  36 min    Billable Minutes: Eval 20 , Eval Swallow and Oral Function 8, and Self Care/Home Management Training 8    04/24/2025

## 2025-04-24 NOTE — SUBJECTIVE & OBJECTIVE
Neurologic Chief Complaint: Distal L M1 Occlusion    Subjective:     Interval History: Patient is seen for follow-up neurological assessment and treatment recommendations: See hospital course.    HPI, Past Medical, Family, and Social History remains the same as documented in the initial encounter.     Review of Systems   Unable to perform ROS: Other (Severe dysarthria)     Scheduled Meds:   aspirin  81 mg Oral Daily    atorvastatin  40 mg Oral Daily    clopidogreL  75 mg Oral Daily    enoxparin  40 mg Subcutaneous Q24H (prophylaxis, 1700)    latanoprost  1 drop Both Eyes QHS    senna-docusate  1 tablet Oral BID    white petrolatum   Topical (Top) Daily     Continuous Infusions:  PRN Meds:  Current Facility-Administered Medications:     calcium gluconate IVPB, 1 g, Intravenous, PRN    calcium gluconate IVPB, 1 g, Intravenous, PRN    calcium gluconate IVPB, 1 g, Intravenous, PRN    dextrose 50%, 12.5 g, Intravenous, PRN    glucagon (human recombinant), 1 mg, Intramuscular, PRN    hydrALAZINE, 10 mg, Intravenous, Q4H PRN    insulin aspart U-100, 0-10 Units, Subcutaneous, Q6H PRN    labetalol, 10 mg, Intravenous, Q4H PRN    magnesium sulfate 2 g IVPB, 2 g, Intravenous, PRN    magnesium sulfate 2 g IVPB, 2 g, Intravenous, PRN    ondansetron, 4 mg, Intravenous, Q8H PRN    sodium chloride 0.9%, 10 mL, Intravenous, PRN    sodium chloride 0.9%, 10 mL, Intravenous, PRN    Objective:     Vital Signs (Most Recent):  Temp: 97.9 °F (36.6 °C) (04/24/25 1502)  Pulse: (!) 56 (04/24/25 1602)  Resp: 18 (04/24/25 1602)  BP: (!) 152/67 (04/24/25 1602)  SpO2: 100 % (04/24/25 1602)  BP Location: Left arm    Vital Signs Range (Last 24H):  Temp:  [97.6 °F (36.4 °C)-98.2 °F (36.8 °C)]   Pulse:  [47-67]   Resp:  [7-29]   BP: (100-171)/(50-98)   SpO2:  [98 %-100 %]   BP Location: Left arm       Physical Exam  Vitals and nursing note reviewed.   Constitutional:       General: She is not in acute distress.  HENT:      Head: Normocephalic and  "atraumatic.      Mouth/Throat:      Mouth: Mucous membranes are moist.   Eyes:      Extraocular Movements: Extraocular movements intact.      Pupils: Pupils are equal, round, and reactive to light.   Cardiovascular:      Rate and Rhythm: Bradycardia present.   Pulmonary:      Effort: Pulmonary effort is normal. No respiratory distress.   Abdominal:      Tenderness: There is no guarding.   Skin:     General: Skin is warm and dry.   Neurological:      Mental Status: She is alert.      Cranial Nerves: Cranial nerve deficit present.      Motor: Weakness present.   Psychiatric:         Mood and Affect: Mood normal.         Behavior: Behavior normal.              Neurological Exam:   LOC: alert  Attention Span: Good   Language: Expressive aphasia  Articulation: Dysarthria  Orientation: Untestable due to severe aphasia   Visual Fields: Full  EOM (CN III, IV, VI): Gaze preference  left  Pupils (CN II, III): PERRL  Facial Movement (CN VII): Upper & lower facial weakness on the Right  Motor: Arm left  Normal 5/5  Leg left  Normal 5/5  Arm right  Paresis: 4/5  Leg right Paresis: 4/5  Sensation: Intact to light touch, temperature and vibration    Laboratory:  CMP:   Recent Labs   Lab 04/24/25  0146   GLUCOSE 93   CALCIUM 9.4   ALBUMIN 2.6*      K 4.1   CO2 19*      BUN 28*   CREATININE 0.9   ALKPHOS 52   ALT 45*   AST 47*   BILITOT 0.5     BMP:   Recent Labs   Lab 04/24/25  0146   GLUCOSE 93      K 4.1      CO2 19*   BUN 28*   CREATININE 0.9   CALCIUM 9.4     CBC:   Recent Labs   Lab 04/24/25  0146   WBC 7.31   RBC 3.89*   HGB 10.1*   HCT 31.4*      MCV 81*   MCH 26.0*   MCHC 32.2     Lipid Panel:   Recent Labs   Lab 04/23/25  1340   CHOL 130   HDL 34*   TRIG 92     Coagulation:   Recent Labs   Lab 04/23/25  0925   INR 1.0     Platelet Aggregation Study: No results for input(s): "PLTAGG", "PLTAGINTERP", "PLTAGREGLACO", "ADPPLTAGGREG" in the last 168 hours.  Hgb A1C:   Recent Labs   Lab " 04/23/25  1340   HGBA1C 4.9     TSH:   Recent Labs   Lab 04/23/25  1340   TSH 1.747       Diagnostic Results     MRI Brain WO  @ OSH - 4/24/2025  Impression:     Stable distribution of acute infarction in the MCA territory centered within the left subinsular cortex and the left basal ganglia.  Hemosiderin deposition within the left basal ganglia.  The findings may reflect microscopic blood products.  Attention on follow-up suggested.    MRI Brain WO  @ [facility]SH - 4/23/2025  Impression:     1. Regions of diffusion restriction involving the left basal ganglia and left insular cortex compatible with acute ischemia.  No evidence of associated hemorrhage or mass effect.  2. Involutional changes as above.     CTA Head and Neck @ OSH - 4/23/2025  Impression:     1.  Focal high-grade stenosis/narrowing, with suspected thrombus/soft plaque in the left MCA     2.  Suspicious/pathologic mediastinal lymphadenopathy, while this may be related to granulomatous disease, lymphoma, metastasis/malignancy is a consideration and correlation with the laboratory values, history and CT chest follow-up would be warranted.     3.  No clinically significant stenosis or large vessel occlusion in the neck.     CTH WO @ [facility]SH - 4/23/2025  Impression:     1.  No acute intracranial hemorrhage, no hydrocephalus, herniation or midline shift.     2.  Ill-defined low-attenuation in the left insular cortex, and adjacent basal ganglia, possibly from small vessel ischemic disease and or edema from a subacute infarct, consider correlation with MRI and history.     Cardiac Evaluation:      TTE -4/24/2025  Summary      Left Ventricle: The left ventricle is normal in size. Normal wall thickness. Normal wall motion. There is normal systolic function with a visually estimated ejection fraction of 55 - 60%. There is normal diastolic function.    Right Ventricle: Right ventricular enlargement. Wall thickness is normal. Systolic function is normal.    Left Atrium:  Severely dilated    Mitral Valve: There is mild regurgitation.    Tricuspid Valve: There is mild regurgitation.    Pulmonic Valve: There is mild regurgitation.    Pulmonary Artery: The estimated pulmonary artery systolic pressure is 37 mmHg.    IVC/SVC: Normal venous pressure at 3 mmHg.

## 2025-04-24 NOTE — PT/OT/SLP EVAL
Physical Therapy Co-Evaluation with OT     Patient Name:  Delilah Williamson   MRN:  [id number]    Co-evaluation with OT performed due to patient complexity and deficits, requiring two skilled therapists to appropriately and safely assess patient's strength and endurance while facilitating functional tasks in addition to accommodating for patient's activity tolerance.    Recommendations:     Discharge Recommendations: High Intensity Therapy   Discharge Equipment Recommendations: wheelchair   Barriers to discharge: Inaccessible home and Decreased caregiver support    Assessment:     Delilah Williamson is a 85 y.o. female admitted with a medical diagnosis of Embolic stroke involving left middle cerebral artery.  She presents with the following impairments/functional limitations: weakness, impaired self care skills, impaired functional mobility, gait instability, impaired balance, impaired cognition, decreased upper extremity function, decreased lower extremity function. Patient participated well in session, following ~75% of commands with increased success with tactile cueing/demonstration. She was able to take several steps with moderate assistance x2, displaying difficulty with RLE sequencing and impaired balance. Patient nodding for communication with attempts at verbalization however dysarthria/aphasia noted. Recommending high intensity post-acute therapy after discharge to maximize benefits; patient could tolerate 3xhours/day of combined therapies.       Rehab Prognosis: Good; patient would benefit from acute skilled PT services to address these deficits and reach maximum level of function.    Recent Surgery: * No surgery found *      Plan:     During this hospitalization, patient to be seen 4 x/week to address the identified rehab impairments via gait training, therapeutic activities, therapeutic exercises, neuromuscular re-education and progress toward the following goals:    Plan of Care Expires:  05/22/25    Subjective      Chief Complaint: not stated  Patient/Family Comments/goals: Patient communicating with nodding and attempts at verbal communication.   Pain/Comfort:  Pain Rating 1: 0/10  Pain Rating Post-Intervention 1: 0/10    Patients cultural, spiritual, Anabaptist conflicts given the current situation: no    Living Environment: *needs to be confirmed. Patient lives alone in mobile home, 0STE, tub shower.   Prior Level of Function: modified independent with cane/walker (?)  DME used: none (needs to be confirmed)  DME owned (not currently used): none  Assistance upon Discharge: not specified      Objective:     Communicated with RN prior to session.  Patient found HOB elevated with telemetry, pulse ox (continuous), blood pressure cuff, SCD, pressure relief boots, PureWick  upon PT entry to room.    General Precautions: Standard, fall, aspiration, aphasia  Orthopedic Precautions:N/A   Braces: N/A  Respiratory Status: Room air    Cognition:   Orientation: person and place (with options)   Affect: pleasant and cooperative  Alertness: eyes open 100 % of session  Insight: decreased safety awareness  Attention: attends to task  Command Following: patient follows 75 % of 1-step commands  Increased success with tactile cues and demonstration vs verbal only   Communication: nodding appropriately, dysarthric     Exams:  Cognitive Exam:  Patient is oriented to Person and Place  Sensation:    -       Intact  RLE ROM: WNL  RLE Strength: 3/5 Knee extension, ankle DF/PF    LLE ROM: WNL  LLE Strength: 4+/5 Knee extension, ankle DF/PF      Functional Mobility:  Bed Mobility:     Scooting: maximal assistance  Supine to Sit: maximal assistance and of 2 persons  Sit to Supine: maximal assistance  Transfers:     Sit to Stand:  minimum assistance and of 2 persons with hand-held assist  Gait: patient ambulated 4 steps with modA x2 and HHA   Deviations Noted: decreased gait speed, decreased step height R,L, and decreased step length R, L, difficulty  sequencing LLE  Balance:   Sitting static: CGA-Georgette   R lean  Sitting dynamic: Georgette  Standing static: minAx2      AM-PAC 6 CLICK MOBILITY  Total Score:12       Treatment & Education:  Education: patient educated on POC, need for therapy, safety with mobility, discharge recommendations and equipment recommendations.   EOB balance with cues to engage core mm to decrease assistance needed and increase safety in sitting.  Verbal and tactile cues with assist during STS transfer for optimal REJI prior to transfer, forward weight shift to initiate, to engage LE mm, extend hips forward and bring head and chest up to achieve full upright stance.    Active reorientation provided throughout session with cues for person/place/situation/date with patient assessed for accuracy and carryover of information.    Cues during ambulation for sequencing, weight shifting, upright posture throughout, appropriate step length and height.      Patient left HOB elevated with all lines intact, call button in reach, and RN notified.    GOALS:   Multidisciplinary Problems       Physical Therapy Goals          Problem: Physical Therapy    Goal Priority Disciplines Outcome Interventions   Physical Therapy Goal     PT, PT/OT Progressing    Description: Goals to be met by: 25     Patient will increase functional independence with mobility by performin. Supine to sit with [place]  2. Sit to supine with [place]  3. Sit to stand transfer with [place]  4. Bed to chair transfer with [place] using LRAD  5. Gait  x 150 feet with Modified [place] using LRAD  6. Follow 100% of 1-step verbal commands.                          History:     Past Medical History:   Diagnosis Date    Diabetes mellitus, type 2     Glaucoma     Hyperlipidemia     Hypertension        Past Surgical History:   Procedure Laterality Date    CATARACT EXTRACTION      ROTATOR CUFF REPAIR         Time Tracking:     PT Received On: 25  PT Start  Time: 1151     PT Stop Time: 1218  PT Total Time (min): 27 min     Billable Minutes: Evaluation 12 minutes and Neuromuscular Re-education 15 minutes      04/24/2025

## 2025-04-25 PROBLEM — E66.01 SEVERE OBESITY (BMI 35.0-35.9 WITH COMORBIDITY): Status: ACTIVE | Noted: 2025-04-25

## 2025-04-25 PROBLEM — N39.0 UTI (URINARY TRACT INFECTION): Status: ACTIVE | Noted: 2025-04-25

## 2025-04-25 PROBLEM — R53.81 DEBILITY: Status: ACTIVE | Noted: 2025-04-25

## 2025-04-25 PROBLEM — G81.90 HEMIPLEGIA: Status: ACTIVE | Noted: 2025-04-23

## 2025-04-25 LAB
ALBUMIN SERPL BCP-MCNC: 3 G/DL (ref 3.5–5.2)
ALP SERPL-CCNC: 61 UNIT/L (ref 40–150)
ALT SERPL W/O P-5'-P-CCNC: 43 UNIT/L (ref 10–44)
ANION GAP (OHS): 8 MMOL/L (ref 8–16)
AST SERPL-CCNC: 38 UNIT/L (ref 11–45)
BACTERIA UR CULT: ABNORMAL
BILIRUB SERPL-MCNC: 0.6 MG/DL (ref 0.1–1)
BUN SERPL-MCNC: 26 MG/DL (ref 8–23)
CALCIUM SERPL-MCNC: 10.3 MG/DL (ref 8.7–10.5)
CHLORIDE SERPL-SCNC: 107 MMOL/L (ref 95–110)
CO2 SERPL-SCNC: 25 MMOL/L (ref 23–29)
CREAT SERPL-MCNC: 1.1 MG/DL (ref 0.5–1.4)
D DIMER PPP IA.FEU-MCNC: 1.22 MG/L FEU
ERYTHROCYTE [DISTWIDTH] IN BLOOD BY AUTOMATED COUNT: 14.7 % (ref 11.5–14.5)
GFR SERPLBLD CREATININE-BSD FMLA CKD-EPI: 49 ML/MIN/1.73/M2
GLUCOSE SERPL-MCNC: 110 MG/DL (ref 70–110)
HCT VFR BLD AUTO: 33.1 % (ref 37–48.5)
HGB BLD-MCNC: 10.7 GM/DL (ref 12–16)
MAGNESIUM SERPL-MCNC: 2 MG/DL (ref 1.6–2.6)
MCH RBC QN AUTO: 26.3 PG (ref 27–31)
MCHC RBC AUTO-ENTMCNC: 32.3 G/DL (ref 32–36)
MCV RBC AUTO: 81 FL (ref 82–98)
NUCLEATED RBC (/100WBC) (OHS): 0 /100 WBC
PHOSPHATE SERPL-MCNC: 2.5 MG/DL (ref 2.7–4.5)
PLATELET # BLD AUTO: 329 K/UL (ref 150–450)
PMV BLD AUTO: 11 FL (ref 9.2–12.9)
POCT GLUCOSE: 123 MG/DL (ref 70–110)
POCT GLUCOSE: 180 MG/DL (ref 70–110)
POTASSIUM SERPL-SCNC: 3.9 MMOL/L (ref 3.5–5.1)
PROT SERPL-MCNC: 6.3 GM/DL (ref 6–8.4)
RBC # BLD AUTO: 4.07 M/UL (ref 4–5.4)
SODIUM SERPL-SCNC: 140 MMOL/L (ref 136–145)
WBC # BLD AUTO: 8.96 K/UL (ref 3.9–12.7)

## 2025-04-25 PROCEDURE — 99900035 HC TECH TIME PER 15 MIN (STAT)

## 2025-04-25 PROCEDURE — 80053 COMPREHEN METABOLIC PANEL: CPT | Performed by: PHYSICIAN ASSISTANT

## 2025-04-25 PROCEDURE — 94799 UNLISTED PULMONARY SVC/PX: CPT

## 2025-04-25 PROCEDURE — 85379 FIBRIN DEGRADATION QUANT: CPT | Performed by: PHYSICIAN ASSISTANT

## 2025-04-25 PROCEDURE — 85025 COMPLETE CBC W/AUTO DIFF WBC: CPT | Performed by: PHYSICIAN ASSISTANT

## 2025-04-25 PROCEDURE — 97112 NEUROMUSCULAR REEDUCATION: CPT

## 2025-04-25 PROCEDURE — 36415 COLL VENOUS BLD VENIPUNCTURE: CPT | Performed by: PHYSICIAN ASSISTANT

## 2025-04-25 PROCEDURE — 97110 THERAPEUTIC EXERCISES: CPT

## 2025-04-25 PROCEDURE — 63600175 PHARM REV CODE 636 W HCPCS

## 2025-04-25 PROCEDURE — 99232 SBSQ HOSP IP/OBS MODERATE 35: CPT | Mod: ,,, | Performed by: PSYCHIATRY & NEUROLOGY

## 2025-04-25 PROCEDURE — 25000003 PHARM REV CODE 250: Performed by: PHYSICIAN ASSISTANT

## 2025-04-25 PROCEDURE — 94761 N-INVAS EAR/PLS OXIMETRY MLT: CPT

## 2025-04-25 PROCEDURE — 11000001 HC ACUTE MED/SURG PRIVATE ROOM

## 2025-04-25 PROCEDURE — 92526 ORAL FUNCTION THERAPY: CPT

## 2025-04-25 PROCEDURE — 92507 TX SP LANG VOICE COMM INDIV: CPT

## 2025-04-25 PROCEDURE — 83735 ASSAY OF MAGNESIUM: CPT | Performed by: PHYSICIAN ASSISTANT

## 2025-04-25 PROCEDURE — 97530 THERAPEUTIC ACTIVITIES: CPT

## 2025-04-25 PROCEDURE — 84100 ASSAY OF PHOSPHORUS: CPT | Performed by: PHYSICIAN ASSISTANT

## 2025-04-25 PROCEDURE — 99233 SBSQ HOSP IP/OBS HIGH 50: CPT | Mod: ,,, | Performed by: PSYCHIATRY & NEUROLOGY

## 2025-04-25 PROCEDURE — 25000003 PHARM REV CODE 250: Performed by: PSYCHIATRY & NEUROLOGY

## 2025-04-25 PROCEDURE — 63600175 PHARM REV CODE 636 W HCPCS: Performed by: PSYCHIATRY & NEUROLOGY

## 2025-04-25 PROCEDURE — 63600175 PHARM REV CODE 636 W HCPCS: Performed by: PHYSICIAN ASSISTANT

## 2025-04-25 RX ORDER — SODIUM CHLORIDE 9 MG/ML
INJECTION, SOLUTION INTRAVENOUS CONTINUOUS
Status: ACTIVE | OUTPATIENT
Start: 2025-04-25 | End: 2025-04-26

## 2025-04-25 RX ORDER — MUPIROCIN 20 MG/G
OINTMENT TOPICAL 2 TIMES DAILY
Status: DISCONTINUED | OUTPATIENT
Start: 2025-04-25 | End: 2025-04-25

## 2025-04-25 RX ORDER — CEFTRIAXONE 1 G/1
1 INJECTION, POWDER, FOR SOLUTION INTRAMUSCULAR; INTRAVENOUS
Status: COMPLETED | OUTPATIENT
Start: 2025-04-25 | End: 2025-04-27

## 2025-04-25 RX ORDER — ACETAMINOPHEN 325 MG/1
650 TABLET ORAL EVERY 6 HOURS PRN
Status: DISCONTINUED | OUTPATIENT
Start: 2025-04-25 | End: 2025-04-30 | Stop reason: HOSPADM

## 2025-04-25 RX ADMIN — LATANOPROST 1 DROP: 50 SOLUTION OPHTHALMIC at 09:04

## 2025-04-25 RX ADMIN — HYDRALAZINE HYDROCHLORIDE 10 MG: 20 INJECTION, SOLUTION INTRAMUSCULAR; INTRAVENOUS at 01:04

## 2025-04-25 RX ADMIN — CEFTRIAXONE 1 G: 1 INJECTION, POWDER, FOR SOLUTION INTRAMUSCULAR; INTRAVENOUS at 09:04

## 2025-04-25 RX ADMIN — CLOPIDOGREL 75 MG: 75 TABLET ORAL at 09:04

## 2025-04-25 RX ADMIN — SENNOSIDES AND DOCUSATE SODIUM 1 TABLET: 50; 8.6 TABLET ORAL at 09:04

## 2025-04-25 RX ADMIN — ASPIRIN 81 MG CHEWABLE TABLET 81 MG: 81 TABLET CHEWABLE at 09:04

## 2025-04-25 RX ADMIN — ENOXAPARIN SODIUM 40 MG: 40 INJECTION SUBCUTANEOUS at 05:04

## 2025-04-25 RX ADMIN — SENNOSIDES AND DOCUSATE SODIUM 1 TABLET: 50; 8.6 TABLET ORAL at 08:04

## 2025-04-25 RX ADMIN — WHITE PETROLATUM: 1.75 OINTMENT TOPICAL at 09:04

## 2025-04-25 RX ADMIN — INSULIN ASPART 2 UNITS: 100 INJECTION, SOLUTION INTRAVENOUS; SUBCUTANEOUS at 09:04

## 2025-04-25 RX ADMIN — SODIUM CHLORIDE: 9 INJECTION, SOLUTION INTRAVENOUS at 12:04

## 2025-04-25 RX ADMIN — ATORVASTATIN CALCIUM 40 MG: 40 TABLET, FILM COATED ORAL at 09:04

## 2025-04-25 NOTE — CARE UPDATE
Unit [facility] Care Support Interaction      I have reviewed the chart of Delilah Williamson who is hospitalized for Embolic stroke involving left middle cerebral artery. The patient is currently located in the following unit: npu             I have seen and examined the patient and provided the following support:     Skin - Integrity assessment and waffle mattress and/or specialty bed ordered       Kim Hernandez NP  Unit Based MACK

## 2025-04-25 NOTE — PROGRESS NOTES
Andre Carver - Neurosurgery ([facility])  Vascular Neurology  Comprehensive Stroke Center  Progress Note    Assessment/Plan:     * Embolic stroke involving left middle cerebral artery  Ms. Delilah Williamson is a 86 y/o female with PMHx of HTN, HLD, DM2 that was transferred from [facility] to [facility] for further eval and management of acute L MCA stroke. She initially presented to OSH with acute onset L MCA syndrome (RSW, RFD, L gaze, aphasia) upon waking up at 8am 4/23, approx 1 hr PTA. LKW 5pm 4/22. Telestroke NIHSS 12. CTH showed subtle early ischemic changes in L insular cortex, CTA revealed L M1/M2 occlusion. MRI for wake up protocol showed acute infarcts in L insular cortex and L BG with corresponding FLAIR changes. No lytics recommended due to FLAIR changes, however given fair ASPECTS patient transferred for possible intervention. On arrival to [facility] neuro exam with NIHSS 17, patient taken to IR for DSA and is now s/p L M1/2 thrombectomy with TICI 2c reperfusion. Repeat MRI brain shows stable acute L MCA territory infarcts involving subinsular and BG regions. TTE shows EF 55-60%, severe LAE, and no WMA. Etiology of stroke ESUS at this time, 30d cardiac event monitor will be ordered at discharge for further stroke workup and eval for possible pAF.    4/25: NAEON, neuro exam stable and continues to have RSW and speech difficulty. Started on rocephin for abx for UTI, urine cx with GNR and susceptibilities pending. Stepped down to NPU this morning. CT chest yesterday shows mediastinal and bilateral hilar lymphadenopathy, will obtain CT C/A/P for malignancy eval. Dispo planning ongoing for IPR, pending auth/acceptance for NS rehab.      Antithrombotics for secondary stroke prevention: Antiplatelets: Aspirin: 81 mg daily  Clopidogrel: 75 mg daily    Statins for secondary stroke prevention and hyperlipidemia, if present: Statins: Atorvastatin- 40 mg daily    Aggressive risk factor modification: HTN, DM, HLD      Rehab efforts: The patient has been evaluated by a stroke team provider and the therapy needs have been fully considered based off the presenting complaints and exam findings. The following therapy evaluations are needed: PT evaluate and treat, OT evaluate and treat, SLP evaluate and treat, PM&R evaluate for appropriate placement    Diagnostics ordered/pending: None     VTE prophylaxis: Heparin 5000 units SQ every 8 hours  Mechanical prophylaxis: Place SCDs    BP parameters: Infarct: SBP <160          UTI (urinary tract infection)  -UA on admission with 1+ blood, + nitrites, 3+ leukocyte esterase, many bacteria  -Ucx with GNR >100K CFU, susceptibility pending  -Started on rocephin x 3 days for abx (EOT 4/27/25)    Lymphadenopathy  -CTA head and neck noted to have mediastinal lymphadenopathy. Designated chest CT obtained and revealed mediastinal and bilateral hilar lymphadenopathy, mild bilateral smooth interlobular septal thickening and trace bilateral pleural effusions, and 2 mm right lower lobe nodule.  No prior imaging available to compare.   -Per chart review, no significant smoking history or known h/o cancer.   -Unclear whether malignant vs autoimmune/inflammatory process, although no apparent signs of infection at this time.  -CT C/A/P ordered for further malignancy eval    Cytotoxic brain edema  -Area of cerebral edema identified when reviewing brain imaging in the Left MCA territory, there is not mass effect associated with it.   -Admitted to [facility] post-IR for close neuro monitoring and observation. Neuro exam and follow up imaging has remained stable and patient stepped down to NPU on 4/25.  -Continue frequent q4h neuro checks as any acute changes or worsening neuro status may signify expansion of the insult and/or area of the edema, which may require acute interventions to prevent loss of function and/or death.  -Obtain stat CTH for any new or worsening neuro changes and notify primary team  immediately    Controlled type 2 diabetes mellitus without complication, without long-term current use of insulin  -Stroke risk factor  -A1c 4.9  -Glucose goal while inpatient 140-180, avoid hypoglycemia  -Hold home anti-hyperglycemics while admitted  -[facility] PRN    Hyperlipidemia  -Stroke risk factor  -LDL 77.6, goal <70  -Atorvastatin 40mg daily    HTN (hypertension)  -Stroke risk factor  -SBP goal <160, maintain MAP >65  -BP range in the last 24 hrs: BP  Min: 123/58  Max: 176/76  -SBPs intermittently above goal, will continue to monitor and consider resuming home meds if BPs more consistently above goal  -Continue PRN labetalol/hydralazine      Aphasia  -Due to stroke  -Continues to have mild expressive aphasia  -Aggressive therapy  -PT/OT/SLP eval and treat    Acute right-sided weakness  -Due to stroke  -Improving although continues to have RSW  -Aggressive therapy  -PT/OT/SLP eval and treat         Hospital Course:   04/24/2025 Repeat MRI Brain without contrast overnight with stable infarct, no hemorrhage. Ok to continue DAPT for secondary stroke prevention. TTE with EF 55-60%, +LAE, normal wall motion. Etiology of stroke likely cardioembolic. Patient will need cardiac event monitor at discharge. Exam overall improved post IR with TICI 2C. Ok to stepdown to NPU.   4/25/2025 NAEON, neuro exam stable and continues to have RSW and speech difficulty. Started on rocephin for abx for UTI, urine cx with GNR and susceptibilities pending. Stepped down to NPU this morning. CT chest yesterday shows mediastinal and bilateral hilar lymphadenopathy, will obtain CT C/A/P for malignancy eval. Dispo planning ongoing for IPR, pending auth/acceptance for NS rehab.    STROKE DOCUMENTATION   Acute Stroke Times   Last Known Normal Date: 04/22/25  Last Known Normal Time: 1700  Stroke Team Called Date: 04/23/25  Stroke Team Called Time: 0915 (Telestroke at OSH)  Stroke Team Arrival Date: 04/23/25  Stroke Team Arrival Time: 0923  (OSH)  CT Interpretation Time: 0923  Thrombolytic Therapy Recommended: No  CTA Interpretation Time: 0945 (OSH)  Thrombectomy Recommended: Yes  MRI Acute Stroke Protocol Interpretation Time: 1000 (OSH)  Decision to Treat Time for IR: 1000 (Telestroke)    NIH Scale:  1a. Level of Consciousness: 0-->Alert, keenly responsive  1b. LOC Questions: 0-->Answers both questions correctly  1c. LOC Commands: 0-->Performs both tasks correctly  2. Best Gaze: 0-->Normal  3. Visual: 0-->No visual loss  4. Facial Palsy: 2-->Partial paralysis (total or near-total paralysis of lower face)  5a. Motor Arm, Left: 0-->No drift, limb holds 90 (or 45) degrees for full 10 secs  5b. Motor Arm, Right: 1-->Drift, limb holds 90 (or 45) degrees, but drifts down before full 10 secs, does not hit bed or other support  6a. Motor Leg, Left: 0-->No drift, leg holds 30 degree position for full 5 secs  6b. Motor Leg, Right: 2-->Some effort against gravity, leg falls to bed by 5 secs, but has some effort against gravity  7. Limb Ataxia: 0-->Absent  8. Sensory: 0-->Normal, no sensory loss  9. Best Language: 1-->Mild-to-moderate aphasia, some obvious loss of fluency or facility of comprehension, without significant limitation on ideas expressed or form of expression. Reduction of speech and/or comprehension, however, makes conversation. . . (see row details)  10. Dysarthria: 1-->Mild-to-moderate dysarthria, patient slurs at least some words and, at worst, can be understood with some difficulty  11. Extinction and Inattention (formerly Neglect): 0-->No abnormality  Total (NIH Stroke Scale): 7       Modified Hugo Score: 0  Margoth Coma Scale:    ABCD2 Score:    [id number] Score:   HAS -BLED Score:   ICH Score:   Hunt & Bazzi Classification:      Hemorrhagic change of an Ischemic Stroke: Does this patient have an ischemic stroke with hemorrhagic changes? No     Neurologic Chief Complaint: L MCA stroke    Subjective:     Interval History: Patient is seen for  follow-up neurological assessment and treatment recommendations: See hospital course.    HPI, Past Medical, Family, and Social History remains the same as documented in the initial encounter.     Review of Systems   Eyes:  Negative for visual disturbance.   Neurological:  Positive for facial asymmetry, speech difficulty and weakness.   All other systems reviewed and are negative.    Scheduled Meds:   aspirin  81 mg Oral Daily    atorvastatin  40 mg Oral Daily    cefTRIAXone (Rocephin) IV (PEDS and ADULTS)  1 g Intravenous Q24H    clopidogreL  75 mg Oral Daily    enoxparin  40 mg Subcutaneous Q24H (prophylaxis, 1700)    latanoprost  1 drop Both Eyes QHS    senna-docusate  1 tablet Oral BID    white petrolatum   Topical (Top) Daily     Continuous Infusions:   0.9% NaCl   Intravenous Continuous         PRN Meds:  Current Facility-Administered Medications:     acetaminophen, 650 mg, Oral, Q6H PRN    dextrose 50%, 12.5 g, Intravenous, PRN    glucagon (human recombinant), 1 mg, Intramuscular, PRN    hydrALAZINE, 10 mg, Intravenous, Q4H PRN    insulin aspart U-100, 0-10 Units, Subcutaneous, Q6H PRN    labetalol, 10 mg, Intravenous, Q4H PRN    sodium chloride 0.9%, 10 mL, Intravenous, PRN    sodium chloride 0.9%, 10 mL, Intravenous, PRN    Objective:     Vital Signs (Most Recent):  Temp: 98 °F (36.7 °C) (04/25/25 0705)  Pulse: 63 (04/25/25 1138)  Resp: 19 (04/25/25 1005)  BP: (!) 159/71 (04/25/25 1005)  SpO2: 100 % (04/25/25 1005)  BP Location: Right arm    Vital Signs Range (Last 24H):  Temp:  [97.9 °F (36.6 °C)-98.4 °F (36.9 °C)]   Pulse:  [51-82]   Resp:  [9-23]   BP: (123-176)/(56-76)   SpO2:  [96 %-100 %]   BP Location: Right arm       Physical Exam  Vitals and nursing note reviewed.   Constitutional:       General: She is not in acute distress.  HENT:      Head: Normocephalic.      Nose: Nose normal.   Eyes:      Extraocular Movements: Extraocular movements intact.      Conjunctiva/sclera: Conjunctivae normal.    Cardiovascular:      Rate and Rhythm: Normal rate.   Pulmonary:      Effort: Pulmonary effort is normal. No respiratory distress.   Skin:     General: Skin is warm.      Findings: Bruising present.   Neurological:      Mental Status: She is alert.      Comments: See below for neuro exam   Psychiatric:         Behavior: Behavior normal. Behavior is cooperative.              Neurological Exam:   LOC: alert  Attention Span: Good   Language: Expressive aphasia  Articulation: Dysarthria  Orientation: Person, Place, Time   Visual Fields: Full  EOM (CN III, IV, VI): Full/intact  Facial Movement (CN VII): Lower facial weakness on the Right  Motor: Arm left  Normal 5/5  Leg left  Normal 5/5  Arm right  Paresis: 4/5  Leg right Paresis: 3/5  Sensation: Intact to light touch, temperature and vibration    Laboratory:  CMP:   Recent Labs   Lab 04/25/25  0207   GLUCOSE 110   CALCIUM 10.3   ALBUMIN 3.0*      K 3.9   CO2 25      BUN 26*   CREATININE 1.1   ALKPHOS 61   ALT 43   AST 38   BILITOT 0.6     CBC:   Recent Labs   Lab 04/25/25  0207   WBC 8.96   RBC 4.07   HGB 10.7*   HCT 33.1*      MCV 81*   MCH 26.3*   MCHC 32.3     Lipid Panel:   Recent Labs   Lab 04/23/25  1340   CHOL 130   LDL 77.6   HDL 34*   TRIG 92      Coagulation:   Recent Labs   Lab 04/23/25  0925   INR 1.0     Hgb A1C:   Recent Labs   Lab 04/23/25  1340   HGBA1C 4.9     TSH:   Recent Labs   Lab 04/23/25  1340   TSH 1.747       Diagnostic Results     Brain Imaging:  MRI brain without contrast 4/24/2025  Impression:  Stable distribution of acute infarction in the MCA territory centered within the left subinsular cortex and the left basal ganglia.  Hemosiderin deposition within the left basal ganglia.  The findings may reflect microscopic blood products.  Attention on follow-up suggested.    MRI brain without contrast @ OSH 4/23/2025  Impression:  1. Regions of diffusion restriction involving the left basal ganglia and left insular cortex  compatible with acute ischemia.  No evidence of associated hemorrhage or mass effect.  2. Involutional changes as above.    CTH without contrast @ OSH 4/23/2025  Impression:  1.  No acute intracranial hemorrhage, no hydrocephalus, herniation or midline shift.  2.  Ill-defined low-attenuation in the left insular cortex, and adjacent basal ganglia, possibly from small vessel ischemic disease and or edema from a subacute infarct, consider correlation with MRI and history.       Vessel Imaging:  IR cerebral angiogram 4/23/2025  Impression:  1. There is a left M1 segment occlusion.  Aspiration thrombectomy was performed (ADAPT) Technique was performed with restoration TICI 2C Flow in the left middle cerebral artery territory.  2.  Follow-up right vertebral angiogram demonstrated some collateral flow to the left posterior MCA branches.  3.  Normal right common carotid angiogram.    CTA Head and Neck @ OSH - 4/23/2025  Impression:  1.  Focal high-grade stenosis/narrowing, with suspected thrombus/soft plaque in the left MCA  2.  Suspicious/pathologic mediastinal lymphadenopathy, while this may be related to granulomatous disease, lymphoma, metastasis/malignancy is a consideration and correlation with the laboratory values, history and CT chest follow-up would be warranted.  3.  No clinically significant stenosis or large vessel occlusion in the neck.       Cardiac Evaluation:   TTE 4/24/2025    Left Ventricle: The left ventricle is normal in size. Normal wall thickness. Normal wall motion. There is normal systolic function with a visually estimated ejection fraction of 55 - 60%. There is normal diastolic function.    Right Ventricle: Right ventricular enlargement. Wall thickness is normal. Systolic function is normal.    Left Atrium: Severely dilated    Mitral Valve: There is mild regurgitation.    Tricuspid Valve: There is mild regurgitation.    Pulmonic Valve: There is mild regurgitation.    Pulmonary Artery: The  estimated pulmonary artery systolic pressure is 37 mmHg.    IVC/SVC: Normal venous pressure at 3 mmHg.      Yesenia Sandy PA-C  [facility]  Department of Vascular Neurology   [facility])

## 2025-04-25 NOTE — NURSING TRANSFER
Nursing Transfer Note      4/25/2025   11:29 AM    Nurse giving handoff: JOSEFINA Justice  Nurse receiving handoff: JOSEFINA García    Transfer From: [facility]    Transfer via bed    Transfer with Tele Box    Transported by Nurse    Transfer Vital Signs:  Blood Pressure:159/71   Heart Rate:72   O2:100  Temperature:98.7   Respirations:19    Medicines sent: Insulin aspart and ointment    Patient belongings transferred with patient: Yes    Chart send with patient: Yes    Notified: Nephew and Sister at bed side    Patient reassessed at: 1005 on 04/25/25  1  Upon arrival to floor: patient oriented to room, call bell in reach, and bed in lowest position

## 2025-04-25 NOTE — EICU
Intervention Initiated From:  [facility] / EICU    Julian intervened regarding:  Rounding (Video assessment)    VICU Night Rounds Checklist  24H Vital Sign Range:  Temp:  [97.6 °F (36.4 °C)-98.3 °F (36.8 °C)]   Pulse:  [47-69]   Resp:  [10-19]   BP: (100-163)/(50-75)   SpO2:  [98 %-100 %]     Video rounds and LDA reconciliation        Nursing orders placed : IP NIGEL Peripheral IV Access

## 2025-04-25 NOTE — ASSESSMENT & PLAN NOTE
-Due to stroke  -Improving although continues to have RSW  -Aggressive therapy  -PT/OT/SLP eval and treat

## 2025-04-25 NOTE — ASSESSMENT & PLAN NOTE
-Due to stroke  -Continues to have mild expressive aphasia  -Aggressive therapy  -PT/OT/SLP eval and treat

## 2025-04-25 NOTE — SUBJECTIVE & OBJECTIVE
Neurologic Chief Complaint: L MCA stroke    Subjective:     Interval History: Patient is seen for follow-up neurological assessment and treatment recommendations: See hospital course.    HPI, Past Medical, Family, and Social History remains the same as documented in the initial encounter.     Review of Systems   Eyes:  Negative for visual disturbance.   Neurological:  Positive for facial asymmetry, speech difficulty and weakness.   All other systems reviewed and are negative.    Scheduled Meds:   aspirin  81 mg Oral Daily    atorvastatin  40 mg Oral Daily    cefTRIAXone (Rocephin) IV (PEDS and ADULTS)  1 g Intravenous Q24H    clopidogreL  75 mg Oral Daily    enoxparin  40 mg Subcutaneous Q24H (prophylaxis, 1700)    latanoprost  1 drop Both Eyes QHS    senna-docusate  1 tablet Oral BID    white petrolatum   Topical (Top) Daily     Continuous Infusions:   0.9% NaCl   Intravenous Continuous         PRN Meds:  Current Facility-Administered Medications:     acetaminophen, 650 mg, Oral, Q6H PRN    dextrose 50%, 12.5 g, Intravenous, PRN    glucagon (human recombinant), 1 mg, Intramuscular, PRN    hydrALAZINE, 10 mg, Intravenous, Q4H PRN    insulin aspart U-100, 0-10 Units, Subcutaneous, Q6H PRN    labetalol, 10 mg, Intravenous, Q4H PRN    sodium chloride 0.9%, 10 mL, Intravenous, PRN    sodium chloride 0.9%, 10 mL, Intravenous, PRN    Objective:     Vital Signs (Most Recent):  Temp: 98 °F (36.7 °C) (04/25/25 0705)  Pulse: 63 (04/25/25 1138)  Resp: 19 (04/25/25 1005)  BP: (!) 159/71 (04/25/25 1005)  SpO2: 100 % (04/25/25 1005)  BP Location: Right arm    Vital Signs Range (Last 24H):  Temp:  [97.9 °F (36.6 °C)-98.4 °F (36.9 °C)]   Pulse:  [51-82]   Resp:  [9-23]   BP: (123-176)/(56-76)   SpO2:  [96 %-100 %]   BP Location: Right arm       Physical Exam  Vitals and nursing note reviewed.   Constitutional:       General: She is not in acute distress.  HENT:      Head: Normocephalic.      Nose: Nose normal.   Eyes:       Extraocular Movements: Extraocular movements intact.      Conjunctiva/sclera: Conjunctivae normal.   Cardiovascular:      Rate and Rhythm: Normal rate.   Pulmonary:      Effort: Pulmonary effort is normal. No respiratory distress.   Skin:     General: Skin is warm.      Findings: Bruising present.   Neurological:      Mental Status: She is alert.      Comments: See below for neuro exam   Psychiatric:         Behavior: Behavior normal. Behavior is cooperative.              Neurological Exam:   LOC: alert  Attention Span: Good   Language: Expressive aphasia  Articulation: Dysarthria  Orientation: Person, Place, Time   Visual Fields: Full  EOM (CN III, IV, VI): Full/intact  Facial Movement (CN VII): Lower facial weakness on the Right  Motor: Arm left  Normal 5/5  Leg left  Normal 5/5  Arm right  Paresis: 4/5  Leg right Paresis: 3/5  Sensation: Intact to light touch, temperature and vibration    Laboratory:  CMP:   Recent Labs   Lab 04/25/25  0207   GLUCOSE 110   CALCIUM 10.3   ALBUMIN 3.0*      K 3.9   CO2 25      BUN 26*   CREATININE 1.1   ALKPHOS 61   ALT 43   AST 38   BILITOT 0.6     CBC:   Recent Labs   Lab 04/25/25  0207   WBC 8.96   RBC 4.07   HGB 10.7*   HCT 33.1*      MCV 81*   MCH 26.3*   MCHC 32.3     Lipid Panel:   Recent Labs   Lab 04/23/25  1340   CHOL 130   LDL 77.6   HDL 34*   TRIG 92      Coagulation:   Recent Labs   Lab 04/23/25  0925   INR 1.0     Hgb A1C:   Recent Labs   Lab 04/23/25  1340   HGBA1C 4.9     TSH:   Recent Labs   Lab 04/23/25  1340   TSH 1.747       Diagnostic Results     Brain Imaging:  MRI brain without contrast 4/24/2025  Impression:  Stable distribution of acute infarction in the MCA territory centered within the left subinsular cortex and the left basal ganglia.  Hemosiderin deposition within the left basal ganglia.  The findings may reflect microscopic blood products.  Attention on follow-up suggested.    MRI brain without contrast @ OSH  4/23/2025  Impression:  1. Regions of diffusion restriction involving the left basal ganglia and left insular cortex compatible with acute ischemia.  No evidence of associated hemorrhage or mass effect.  2. Involutional changes as above.    CTH without contrast @ OSH 4/23/2025  Impression:  1.  No acute intracranial hemorrhage, no hydrocephalus, herniation or midline shift.  2.  Ill-defined low-attenuation in the left insular cortex, and adjacent basal ganglia, possibly from small vessel ischemic disease and or edema from a subacute infarct, consider correlation with MRI and history.       Vessel Imaging:  IR cerebral angiogram 4/23/2025  Impression:  1. There is a left M1 segment occlusion.  Aspiration thrombectomy was performed (ADAPT) Technique was performed with restoration TICI 2C Flow in the left middle cerebral artery territory.  2.  Follow-up right vertebral angiogram demonstrated some collateral flow to the left posterior MCA branches.  3.  Normal right common carotid angiogram.    CTA Head and Neck @ OSH - 4/23/2025  Impression:  1.  Focal high-grade stenosis/narrowing, with suspected thrombus/soft plaque in the left MCA  2.  Suspicious/pathologic mediastinal lymphadenopathy, while this may be related to granulomatous disease, lymphoma, metastasis/malignancy is a consideration and correlation with the laboratory values, history and CT chest follow-up would be warranted.  3.  No clinically significant stenosis or large vessel occlusion in the neck.       Cardiac Evaluation:   TTE 4/24/2025    Left Ventricle: The left ventricle is normal in size. Normal wall thickness. Normal wall motion. There is normal systolic function with a visually estimated ejection fraction of 55 - 60%. There is normal diastolic function.    Right Ventricle: Right ventricular enlargement. Wall thickness is normal. Systolic function is normal.    Left Atrium: Severely dilated    Mitral Valve: There is mild regurgitation.    Tricuspid  Valve: There is mild regurgitation.    Pulmonic Valve: There is mild regurgitation.    Pulmonary Artery: The estimated pulmonary artery systolic pressure is 37 mmHg.    IVC/SVC: Normal venous pressure at 3 mmHg.

## 2025-04-25 NOTE — ASSESSMENT & PLAN NOTE
-Area of cerebral edema identified when reviewing brain imaging in the Left MCA territory, there is not mass effect associated with it.   -Admitted to [facility] post-IR for close neuro monitoring and observation. Neuro exam and follow up imaging has remained stable and patient stepped down to NPU on 4/25.  -Continue frequent q4h neuro checks as any acute changes or worsening neuro status may signify expansion of the insult and/or area of the edema, which may require acute interventions to prevent loss of function and/or death.  -Obtain stat CTH for any new or worsening neuro changes and notify primary team immediately

## 2025-04-25 NOTE — PROGRESS NOTES
[facility]re Carver - Neurosurgery ([facility])  Neurocritical Care  Progress Note    Admit Date: 4/23/2025  Service Date: 04/25/2025  Length of Stay: 2    Subjective:     Chief Complaint: Embolic stroke involving left middle cerebral artery    History of Present Illness: Pt is an 85 y.o. femamle with PMHx of DM, HTN, HLD, and glaucoma who presents for acute LMCA stroke. She initially presented to OSH for RSW, facial droop and aphasia. Her last known normal was 1700 yesterday. Telestroke was performed and initial NIH 12. Patient was out of the window for TNK but CTA revealed L M1 occlusion so patient was transferred to [facility] for possible intervention. She was taken to IR where thrombectomy was completed with TICI2C reperfusion. She will be admitted to [facility] for higher level care and neuro monitoring.     Hospital Course: 04/24/2025 NIH 7 this morning. MRI with stable distribution of acute infarction in the MCA territory centered within the left subinsular cortex and the left basal ganglia. Hemosiderin deposition in L basal ganglia, though very small. Started DAPT and dvt ppx. CTA neck noted mediastinal LAD. Designated chest CT ordered for additional eval. UA c/f possible UTI, follow urine culture. SD to stroke.  04/25/2025- exam improved this morning. Ucx with GNR. Started 3 day course of CTX for UTI. SD to stroke.      Objective:     Vitals:  Temp: 98 °F (36.7 °C)  Pulse: 67  Rhythm: normal sinus rhythm  BP: (!) 159/71  MAP (mmHg): 102  Resp: 19  SpO2: 100 %    Temp  Min: 97.9 °F (36.6 °C)  Max: 98.4 °F (36.9 °C)  Pulse  Min: 51  Max: 82  BP  Min: 118/57  Max: 176/76  MAP (mmHg)  Min: 80  Max: 109  Resp  Min: 9  Max: 23  SpO2  Min: 96 %  Max: 100 %    04/24 0701 - 04/25 0700  In: 109.6 [P.O.:30; I.V.:79.6]  Out: 800 [Urine:800]   Unmeasured Output  Urine Occurrence: 1  Pad Count: 2        Physical Exam  Vitals and nursing note reviewed.   HENT:      Head: Normocephalic and atraumatic.      Mouth/Throat:      Mouth: Mucous membranes  are moist.   Eyes:      Extraocular Movements: Extraocular movements intact.      Conjunctiva/sclera: Conjunctivae normal.      Pupils: Pupils are equal, round, and reactive to light.   Cardiovascular:      Rate and Rhythm: Normal rate and regular rhythm.   Pulmonary:      Effort: Pulmonary effort is normal.   Abdominal:      General: Abdomen is flat. There is no distension.   Skin:     General: Skin is warm.      Findings: Bruising present.   Neurological:      Mental Status: She is alert.      Comments: -- dysarthria improved  -- following commands  --PERRL   --Motor: RSW but antigravity                     Medications:  Continuous0.9% NaCl    Scheduledaspirin, 81 mg, Daily  atorvastatin, 40 mg, Daily  cefTRIAXone (Rocephin) IV (PEDS and ADULTS), 1 g, Q24H  clopidogreL, 75 mg, Daily  enoxparin, 40 mg, Q24H (prophylaxis, 1700)  latanoprost, 1 drop, QHS  senna-docusate, 1 tablet, BID  white petrolatum, , Daily    PRNacetaminophen, 650 mg, Q6H PRN  dextrose 50%, 12.5 g, PRN  glucagon (human recombinant), 1 mg, PRN  hydrALAZINE, 10 mg, Q4H PRN  insulin aspart U-100, 0-10 Units, Q6H PRN  labetalol, 10 mg, Q4H PRN  sodium chloride 0.9%, 10 mL, PRN  sodium chloride 0.9%, 10 mL, PRN      Today I personally reviewed pertinent medications, laboratory results, microbiology results, notably:    Diet  Diet Minced & Moist (IDDSI Level 5)  Diet Minced & Moist (IDDSI Level 5)      Assessment/Plan:     Neuro  * Embolic stroke involving left middle cerebral artery  L M1 occlusion, no TNK 2/2 to OOW, thrombectomy TICI 2C  -admit to NCC  -vascular neurology following   -Q 1 hour neuro checks and vitals   - MRI with  stable distribution of acute infarction in the MCA territory centered within the left subinsular cortex and the left basal ganglia. Hemosiderin deposition in L basal ganglia, though very small.     Antithrombotics for secondary stroke prevention: Antiplatelets: Aspirin: 81 mg daily  Clopidogrel: 75 mg daily    Statins for  secondary stroke prevention and hyperlipidemia, if present:   Statins: Atorvastatin- 40 mg daily    Aggressive risk factor modification: HTN, DM, HLD, Diet, Exercise     Rehab efforts: The patient has been evaluated by a stroke team provider and the therapy needs have been fully considered based off the presenting complaints and exam findings. The following therapy evaluations are needed: PT evaluate and treat, OT evaluate and treat, SLP evaluate and treat, PM&R evaluate for appropriate placement    Diagnostics ordered/pending: HgbA1C to assess blood glucose levels, Lipid Profile to assess cholesterol levels, MRI head without contrast to assess brain parenchyma, TTE to assess cardiac function/status , TSH to assess thyroid function    VTE prophylaxis: Enoxaparin 40 mg SQ every 24 hours    BP parameters: Infarct: Post sucessful thrombectomy, SBP <160        Cytotoxic brain edema  See stroke    Aphasia  SLP eval and treat    Acute right-sided weakness  PT/OT eval and treat     Cardiac/Vascular  Hyperlipidemia  Daily statin  Lipid panel reviewed    HTN (hypertension)  SBP goal <160,  Prn hydralazine, labetalol     Renal/  UTI (urinary tract infection)  Ucx + GNR    - Started on 3 days course of CTX  - F/u susceptibility/speciation    Endocrine  Type 2 diabetes mellitus  SSI protocol   A1c 4.9    Other  Lymphadenopathy  CTA head and neck noted to have mediastinal lymphadenopathy. Designated chest CT obtained and revealed mediastinal and bilateral hilar lymphadenopathy, mild bilateral smooth interlobular septal thickening and trace bilateral pleural effusions.  While nonspecific, this can be seen with pulmonary edema. 3. 2 mm right lower lobe nodule.  No prior imaging available to compare. Per chart review, no significant smoking history or known h/o cancer. Unclear whether malignant vs autoimmune/inflammatory process. No apparent signs of infection at this time.    - Consider pulmonology consult vs outpatient follow  referral for further eval            The patient is being Prophylaxed for:  Venous Thromboembolism with: Chemical  Stress Ulcer with: None  Ventilator Pneumonia with: none    Activity Orders            Diet Minced & Moist (IDDSI Level 5): Minced & Moist starting at 04/25 0856    Progressive Mobility Protocol (mobilize patient to their highest level of functioning at least twice daily) starting at 04/23 2000    Turn patient starting at 04/23 1400    Elevate HOB starting at 04/23 1211          Full Code    Miley Small MD  Neurocritical Care  [facility] - Neurosurgery ([facility])

## 2025-04-25 NOTE — PLAN OF CARE
Problem: Physical Therapy  Goal: Physical Therapy Goal  Description: Goals to be met by: 25     Patient will increase functional independence with mobility by performin. Supine to sit with [place]  2. Sit to supine with [place]  3. Sit to stand transfer with [place]  4. Bed to chair transfer with [place] using LRAD  5. Gait  x 150 feet with Modified [place] using LRAD  6. Follow 100% of 1-step verbal commands.     Outcome: Progressing

## 2025-04-25 NOTE — CARE UPDATE
I have reviewed the chart of Delilah Williamson who is hospitalized for the following:    Active Hospital Problems    Diagnosis    *Embolic stroke involving left middle cerebral artery    UTI (urinary tract infection)    Debility     Patient with Acute debility due to hemiplegia/hemiparesis. Latest AMPAC and GEMS scores have been reviewed. Evaluation for etiology is complete. Plan includes - Progressive mobility protocol initated  - PT/OT consulted  - Fall precautions in place  - Physical Medicine/Rehab consulted.        Severe obesity (BMI 35.0-35.9 with comorbidity)    Lymphadenopathy    Aphasia    Hemiplegia     Therapy to evaluate and treat       HTN (hypertension)    Hyperlipidemia    Controlled type 2 diabetes mellitus without complication, without long-term current use of insulin    Cytotoxic brain edema        Kim Hernandez NP  [facility]

## 2025-04-25 NOTE — ASSESSMENT & PLAN NOTE
-CTA head and neck noted to have mediastinal lymphadenopathy. Designated chest CT obtained and revealed mediastinal and bilateral hilar lymphadenopathy, mild bilateral smooth interlobular septal thickening and trace bilateral pleural effusions, and 2 mm right lower lobe nodule.  No prior imaging available to compare.   -Per chart review, no significant smoking history or known h/o cancer.   -Unclear whether malignant vs autoimmune/inflammatory process, although no apparent signs of infection at this time.  -CT C/A/P ordered for further malignancy eval

## 2025-04-25 NOTE — PT/OT/SLP PROGRESS
"Physical Therapy Treatment    Patient Name:  Delilah Williamson   MRN:  [id number]    Recommendations:     Discharge Recommendations: High Intensity Therapy  Discharge Equipment Recommendations: wheelchair  Barriers to discharge:  Increased skilled assistance required    Assessment:     Delilah Williamson is a 85 y.o. female admitted with a medical diagnosis of Embolic stroke involving left middle cerebral artery. Patient received in bed, with HOB elevated, and supportive family present in room. Improved command following, evidenced by following ~90% 1-step commands. Patient demonstrating good functional progress, evidenced by decreased assistance required, increased activity performed intra-session, and improved activity tolerance cumulatively via split PT/OT treatment session. Of note, patient performed bed<>chair step transfer, with significant assistance of 2-persons required for step sequencing. Patient continuing to demonstrate R>L deficits. Patient has demonstrated sufficient progression to warrant high intensity therapy evidenced by objectives noted below. Performance deficits impacting function include weakness, impaired endurance, impaired functional mobility, impaired balance, gait instability, decreased safety awareness, decreased lower extremity function, decreased upper extremity function, impaired coordination, edema, abnormal tone, impaired fine motor.    Rehab Prognosis: Good; patient would benefit from acute skilled PT services to address these deficits and reach maximum level of function.    Recent Surgery: * No surgery found *      Plan:     During this hospitalization, patient to be seen 4 x/week to address the identified rehab impairments via gait training, therapeutic activities, therapeutic exercises, neuromuscular re-education and progress toward the following goals:    Plan of Care Expires:  05/22/25    Subjective     Chief Complaint: Fatigue, "That was hard"  Patient/Family Comments/goals: Patient " "wanting to get to chair  Pain/Comfort:  Pain Rating 1: 0/10  Pain Addressed 1: Reposition, Distraction  Pain Rating Post-Intervention 1: 0/10      Objective:     Communicated with RN prior to session.  Patient found HOB elevated with telemetry, pressure relief boots, PureWick upon PT entry to room. Rehab tech Laura assisting throughout session.    General Precautions: Standard, aspiration, aphasia, fall   Orthopedic Precautions:N/A   Braces: N/A   Body mass index is 35.29 kg/m².  Oxygen Device: Room Air  Vitals: BP (!) 159/71   Pulse 63   Temp 98 °F (36.7 °C) (Oral)   Resp 19   Ht 5' 7" (1.702 m)   Wt 102.2 kg (225 lb 5 oz)   SpO2 100%   BMI 35.29 kg/m²      Functional Mobility:  Bed Mobility:     EOB Scooting: Anterior: Maximum Assistance  Boosting: Maximum Assistance (x2)  Supine>Sit: Moderate Assistance with HOB Elevated  Sit>Supine: Moderate Assistance with HOB Flat  *VC/TC for task sequencing  *Facilitation of trunk/LE management    Transfers:     Sit<>Stand:   Minimal Assistance (x2) from Edge of Bed with HHA  Minimal Assistance from Edge of Bed with HHA  Moderate Assistance from Bedside Chair with HHA  Bed>Chair: Moderate Assistance (x2) with HHA using step transfer technique  Chair>Bed: Maximum Assistance (x2) with HHA using step transfer technique  *VC/TC for upright posturing, glute engagement, step height, step length, lateral WS for step initiation  *Facilitation of trunk/hip extension, lateral WS for step initiation, increased foot advancement of swing leg    Gait:  x4 lat steps, Moderate Assistance (x2) with HHA  x4ft, Maximum Assistance (x2) with HHA *multidirectional steps  Gait Assessment: decreased step length, decreased step height/impaired foot clearance, decreased bakari, decreased gait speed, unsteady gait, impaired step sequencing, impaired lateral WS for step initiation  Total Distance: >4ft  *VC/TC for upright posturing, glute engagement, step length, step height, lateral WS for " step initiation  *Facilitation of trunk/hip extension, lateral WS for step initiation, increased foot advancement of swing leg    Balance:   Static Sitting: Contact Guard Assistance  Dynamic Sitting: Contact Guard Assistance    Static Standing: Minimal Assistance (x2)  Dynamic Standing: Moderate Assistance (x2) - Maximum Assistance (x2)  *VC/TC for upright posturing, glute engagement, lateral WS, step height  *Facilitation of trunk/hip extension, lateral WS    AM-PAC 6 CLICK MOBILITY  Turning over in bed (including adjusting bedclothes, sheets and blankets)?: 3  Sitting down on and standing up from a chair with arms (e.g., wheelchair, bedside commode, etc.): 3  Moving from lying on back to sitting on the side of the bed?: 2  Moving to and from a bed to a chair (including a wheelchair)?: 2  Need to walk in hospital room?: 1  Climbing 3-5 steps with a railing?: 1  Basic Mobility Total Score: 12     Treatment & Education:  Patient performed the following activities:  In-Bed:  SLR: x3 L/R *minimal lift, impaired TKE   EOB:  Marches: x3 L/R  LAQ: x5 L/R  Ankle DF: x5 L/R  Standing:  Alternating Marches: x3 L/R, Moderate Assistance (x2) *impaired step height    Patient Education Provided on:  The role of physical therapy and how the patient can benefit from skilled services  The negative effects of prolonged bed rest/sedentary behavior, along with the importance of OOB activity & patient participation with PT  The importance of contacting RN, via call light, for mobility throughout the day  Pt white board updated with current therapists name and level of mobility assistance needed.     Patient Verbalized understanding of all topics touched on this date. All questions answered within the PT scope of practice    Patient left HOB elevated with all lines intact, call button in reach, and RN notified.    GOALS:   Multidisciplinary Problems       Physical Therapy Goals          Problem: Physical Therapy    Goal Priority  Disciplines Outcome Interventions   Physical Therapy Goal     PT, PT/OT Progressing    Description: Goals to be met by: 25     Patient will increase functional independence with mobility by performin. Supine to sit with [place]  2. Sit to supine with [place]  3. Sit to stand transfer with [place]  4. Bed to chair transfer with [place] using LRAD  5. Gait  x 150 feet with Modified [place] using LRAD  6. Follow 100% of 1-step verbal commands.                          Time Tracking:     PT Received On: 25  PT Start Time: 1406     PT Stop Time: 1438  PT Total Time (min): 32 min     Billable Minutes: Neuromuscular Re-education 27    Treatment Type: Treatment  PT/PTA: PT     Number of PTA visits since last PT visit: 0     2025

## 2025-04-25 NOTE — ASSESSMENT & PLAN NOTE
-UA on admission with 1+ blood, + nitrites, 3+ leukocyte esterase, many bacteria  -Ucx with GNR >100K CFU, susceptibility pending  -Started on rocephin x 3 days for abx (EOT 4/27/25)

## 2025-04-25 NOTE — ASSESSMENT & PLAN NOTE
-Stroke risk factor  -SBP goal <160, maintain MAP >65  -BP range in the last 24 hrs: BP  Min: 123/58  Max: 176/76  -SBPs intermittently above goal, will continue to monitor and consider resuming home meds if BPs more consistently above goal  -Continue PRN labetalol/hydralazine

## 2025-04-25 NOTE — CONSULTS
Inpatient consult to Physical Medicine Rehab  Consult performed by: Jaycee Negro NP  Consult ordered by: Juan A Cerda MD  Reason for consult: Rehab      Consult received.     DEBRA Jones, FNP-C  Physical Medicine & Rehabilitation   04/25/2025

## 2025-04-25 NOTE — PT/OT/SLP PROGRESS
"Speech Language Pathology Treatment    Patient Name:  Delilah Williamson   MRN:  [id number]  Admitting Diagnosis: Embolic stroke involving left middle cerebral artery    Recommendations:                 General Recommendations:  Dysphagia therapy, Speech/language therapy, and Cognitive-linguistic therapy  Diet recommendations:  Minced & Moist Diet - IDDSI Level 5, Liquid Diet Level: Thin liquids - IDDSI Level 0   Aspiration Precautions: 1 bite/sip at a time, Alternating bites/sips, Check for pocketing/oral residue, Eliminate distractions, Feed only when awake/alert, HOB to 90 degrees, Meds crushed in puree, Small bites/sips, and Strict aspiration precautions   General Precautions: Standard, aspiration, aphasia, fall  Communication strategies:  provide increased time to answer and go to room if call light pushed    Assessment:     Delilah Williamson is a 85 y.o. female with an SLP diagnosis of Aphasia, Dysphagia, and Dysarthria.    Subjective     "She told me not to do cranberry. It makes incontinence worse."      Pain/Comfort:  Pain Rating 1: 0/10  Pain Rating Post-Intervention 1: 0/10    Respiratory Status: Room air    Objective:     Has the patient been evaluated by SLP for swallowing?   Yes  Keep patient NPO? No     Pt seen bedside, alert and cooperative.  Nurse assisting with set up of puree tray when SLP entered.  Vocal quality was mildly wet prior to PO trials, cleared with cued cough and swallow.  Pt observed self feeding with appropriate intake management including slow rate of feeding to allow for oral clearance between bites and small bites/sips.  Mild pocketing in R buccal cavity noted with puree, which cleared with cued lingual sweep and liquid wash.  No overt s/s aspiration observed.  Soft solid and regular solid trials also presented with significant prolonged mastication of regular solids.  Continued mild stasis in R buccal caivty which pt cleared with min cues.  Cough noted x1 during mastication though appeared " unrelated to the swallow given timing.  She was oriented x4 except month IND.  Simple y/n questions answered with 100% accy, complex y/n questions answered with 50% accy.  She followed 2 step commands given min cues though did not follow 3 step commands.  She was able to open simple open ended questions with short phrases though language as somewhat halting with phonemic errors as time.  Speech intelligibility was decreased though pt receptive to education re: speech strategies with some carryover observed.  Confrontation and responsive naming completed with 100% accy IND.  Education provided re: diet recs, swallow precs, s/s aspiration, speech strategies, communication strategies, and POC.  Pt nodding in agreement though would benefit from reinforcement.     Goals:   Multidisciplinary Problems       SLP Goals          Problem: SLP    Goal Priority Disciplines Outcome   SLP Goal     SLP Progressing   Description: 1. Patient will follow simple one step directions with min verbal cues with 50% accuracy.  2. Pt will participate in ongoing diagnostic dysphagia therapy   3. Patient will complete automatic speech tasks with 80% accuracy given verbal model from clinician.  4. Patient will asnwer simple yes/no questions with 80% accuracy given min verbal cues from clinician.                         Plan:     Patient to be seen:  4 x/week   Plan of Care expires:  05/24/25  Plan of Care reviewed with:  patient   SLP Follow-Up:  Yes       Discharge recommendations:  High Intensity Therapy   Barriers to Discharge:  Level of Skilled Assistance Needed      Time Tracking:     SLP Treatment Date:   04/25/25  Speech Start Time:  0727  Speech Stop Time:  0747     Speech Total Time (min):  20 min    Billable Minutes: Speech Therapy Individual 10 and Treatment Swallowing Dysfunction 10    04/25/2025

## 2025-04-25 NOTE — PT/OT/SLP PROGRESS
Occupational Therapy   Treatment    Name: Delilah Williamson  MRN: [id number]  Admitting Diagnosis:  Embolic stroke involving left middle cerebral artery       Recommendations:     Discharge Recommendations: High Intensity Therapy  Discharge Equipment Recommendations:  wheelchair, hospital bed  Barriers to discharge:  Other (Comment) (increased skilled A needed)    Assessment:     Delilah Williamson is a 85 y.o. female with a medical diagnosis of Embolic stroke involving left middle cerebral artery.  She presents with decreased self-care and functional mobility independence 2/2 AMS. Performance deficits affecting function are weakness, gait instability, decreased upper extremity function, decreased ROM, impaired endurance, impaired balance, decreased lower extremity function, impaired coordination, decreased safety awareness, impaired fine motor, impaired self care skills, impaired functional mobility, decreased coordination, abnormal tone. This date pt with improved clarity of verbalizations though with remaining intermittent dysarthria and decreased control of oral secretions 2/2 R facial droop. Pt with improved command following, following 100% of verbal commands. Pt with mildly improved bed mobility as well as R steps though continuing to require significant assistance. Pt with continued RUE weakness leading to increased assistance needed for self-care. Pt remains motivated and pleasant t.o OT session and therefore would continue to benefit from skilled OT services. Patient has demonstrated sufficient progression to warrant high intensity therapy evidenced by objectives noted below.     Additional Staff Present: Therapy tech utilized during session due to patient complexity and required physical assistance to ensure patient safety     Rehab Prognosis:  Good; patient would benefit from acute skilled OT services to address these deficits and reach maximum level of function.       Plan:     Patient to be seen 4 x/week to  address the above listed problems via self-care/home management, therapeutic activities, therapeutic exercises, neuromuscular re-education  Plan of Care Expires: 05/24/25  Plan of Care Reviewed with: patient    Subjective     Chief Complaint: n/a  Patient/Family Comments/goals: increase independence  Pain/Comfort:  Pain Rating 1: 0/10  Pain Rating Post-Intervention 1: 0/10    Objective:     Communicated with: nursing prior to session.  Patient found HOB elevated with telemetry, PureWick, SCD, pressure relief boots upon OT entry to room.    General Precautions: Standard, aphasia, fall, aspiration    Orthopedic Precautions:N/A  Braces: N/A  Respiratory Status: Room air     Occupational Performance:     Bed Mobility:    Patient completed Scooting/Bridging with maximal assistance and for anterior scooting while seated EOB  Patient completed Supine to Sit with maximal assistance and pt assisting with LUE/LE   Patient completed Sit to Supine with maximal assistance   Pt seated EOB with CGA to minimal assistance     Functional Mobility/Transfers:  Patient completed Sit <> Stand Transfer with minimum assistance and of 2 persons  with  hand-held assist   Pt maintaining static standing with minimal assistance of 2 persons  Functional Mobility: Pt engaged in functional mobility to simulate household/community distances of 3 R lateral steps with moderate assistance of 2 persons to maintain upright posture in order to maximize functional endurance and standing balance required for engagement in occupations of choice - pt with small steps to R though with mildly improved floor clearance  While static standing with moderate assistance of 2 persons pt completing RLE anterior target taps ~6 inches from REJI    Activities of Daily Living:  Upper Body Dressing: maximal assistance to hemalatha/doff gown as robe while pt seated EOB, pt educated on yenny-dressing technique    Therapeutic exercise:  - while seated EOB pt completing GERARDO ESPINOZA  Shoulder flexion x5 reps (AROM 30*) with assistance to reach 90*  Gravity eliminated elbow flexion/extension to target with support at elbow x5 reps with increased time and minimal assistance at end range     [facility] 6 Click ADL: 12    Treatment & Education:  Session this date targeted therapeutic exercise and therapeutic activities to increase pt's independence  Pt educated on importance of RUE AROM t/o day as well as positioning to maximize joint protection  Pt educated on OT roles, POC, call button for assistance    Patient left HOB elevated with all lines intact, call button in reach, and bed alarm on    GOALS:   Multidisciplinary Problems       Occupational Therapy Goals          Problem: Occupational Therapy    Goal Priority Disciplines Outcome Interventions   Occupational Therapy Goal     OT, PT/OT Progressing    Description: Goals to be met by: 5/24/25     Patient will increase functional independence with ADLs by performing:    UE Dressing with Stand-by Assistance.  LE Dressing with Minimal Assistance.  Grooming while standing at sink with Minimal Assistance.  Toileting from toilet with Contact Guard Assistance for hygiene and clothing management.   Sitting at edge of bed x10 minutes with Supervision.  Supine to sit with Minimal Assistance.  Step transfer with Minimal Assistance and RW prn  Toilet transfer to toilet with Minimal Assistance.  Increased functional strength to 4/5 for RUE.  Upper extremity exercise program x10 reps per handout, with assistance as needed.                         DME Justifications:  Delilah requires a hospital bed due to her requiring positioning of the body in ways not feasible with an ordinary bed due to limited ability and cannot independently make changes in body position without the use of the bed.The positioning of the body cannot be sufficiently resolved by the use of pillows and wedges.  Delilah Williamson has a mobility limitation that significantly impairs her ability to  participate in one or more mobility related activities of daily living (MRADLs) such as toileting, feeding, dressing, grooming, and bathing in customary locations in the home.  The mobility limitation cannot be sufficiently resolved by the use of a cane or walker.   The use of a manual wheelchair will significantly improve the patients ability to participate in MRADLS and the patient will use it on regular basis in the home.  Delilah Williamson has expressed her willingness to use a manual wheelchair in the home. Patients upper body strength is sufficient for propulsion.  She also has a caregiver who is available, willing, and able to provide assistance with the wheelchair when needed.      Time Tracking:     OT Date of Treatment: 04/25/25  OT Start Time: 1103  OT Stop Time: 1127  OT Total Time (min): 24 min    Billable Minutes:Therapeutic Activity 12  Therapeutic Exercise 12    OT/NILAY: OT          4/25/2025

## 2025-04-25 NOTE — SUBJECTIVE & OBJECTIVE
Objective:     Vitals:  Temp: 98 °F (36.7 °C)  Pulse: 67  Rhythm: normal sinus rhythm  BP: (!) 159/71  MAP (mmHg): 102  Resp: 19  SpO2: 100 %    Temp  Min: 97.9 °F (36.6 °C)  Max: 98.4 °F (36.9 °C)  Pulse  Min: 51  Max: 82  BP  Min: 118/57  Max: 176/76  MAP (mmHg)  Min: 80  Max: 109  Resp  Min: 9  Max: 23  SpO2  Min: 96 %  Max: 100 %    04/24 0701 - 04/25 0700  In: 109.6 [P.O.:30; I.V.:79.6]  Out: 800 [Urine:800]   Unmeasured Output  Urine Occurrence: 1  Pad Count: 2        Physical Exam  Vitals and nursing note reviewed.   HENT:      Head: Normocephalic and atraumatic.      Mouth/Throat:      Mouth: Mucous membranes are moist.   Eyes:      Extraocular Movements: Extraocular movements intact.      Conjunctiva/sclera: Conjunctivae normal.      Pupils: Pupils are equal, round, and reactive to light.   Cardiovascular:      Rate and Rhythm: Normal rate and regular rhythm.   Pulmonary:      Effort: Pulmonary effort is normal.   Abdominal:      General: Abdomen is flat. There is no distension.   Skin:     General: Skin is warm.      Findings: Bruising present.   Neurological:      Mental Status: She is alert.      Comments: -- dysarthria improved  -- following commands  --PERRL   --Motor: RSW but antigravity                     Medications:  Continuous0.9% NaCl    Scheduledaspirin, 81 mg, Daily  atorvastatin, 40 mg, Daily  cefTRIAXone (Rocephin) IV (PEDS and ADULTS), 1 g, Q24H  clopidogreL, 75 mg, Daily  enoxparin, 40 mg, Q24H (prophylaxis, 1700)  latanoprost, 1 drop, QHS  senna-docusate, 1 tablet, BID  white petrolatum, , Daily    PRNacetaminophen, 650 mg, Q6H PRN  dextrose 50%, 12.5 g, PRN  glucagon (human recombinant), 1 mg, PRN  hydrALAZINE, 10 mg, Q4H PRN  insulin aspart U-100, 0-10 Units, Q6H PRN  labetalol, 10 mg, Q4H PRN  sodium chloride 0.9%, 10 mL, PRN  sodium chloride 0.9%, 10 mL, PRN      Today I personally reviewed pertinent medications, laboratory results, microbiology results, notably:    Diet  Diet  Minced & Moist (IDDSI Level 5)  Diet Minced & Moist (IDDSI Level 5)

## 2025-04-25 NOTE — ASSESSMENT & PLAN NOTE
-Stroke risk factor  -A1c 4.9  -Glucose goal while inpatient 140-180, avoid hypoglycemia  -Hold home anti-hyperglycemics while admitted  -[facility] SSI PRN

## 2025-04-25 NOTE — ASSESSMENT & PLAN NOTE
Ms. Delilah Williamson is a 84 y/o female with PMHx of HTN, HLD, DM2 that was transferred from [facility] to [facility] for further eval and management of acute L MCA stroke. She initially presented to OSH with acute onset L MCA syndrome (RSW, RFD, L gaze, aphasia) upon waking up at 8am 4/23, approx 1 hr PTA. LKW 5pm 4/22. Telestroke NIHSS 12. CTH showed subtle early ischemic changes in L insular cortex, CTA revealed L M1/M2 occlusion. MRI for wake up protocol showed acute infarcts in L insular cortex and L BG with corresponding FLAIR changes. No lytics recommended due to FLAIR changes, however given fair ASPECTS patient transferred for possible intervention. On arrival to [facility] neuro exam with NIHSS 17, patient taken to IR for DSA and is now s/p L M1/2 thrombectomy with TICI 2c reperfusion. Repeat MRI brain shows stable acute L MCA territory infarcts involving subinsular and BG regions. TTE shows EF 55-60%, severe LAE, and no WMA. Etiology of stroke ESUS at this time, 30d cardiac event monitor will be ordered at discharge for further stroke workup and eval for possible pAF.    4/25: NAEON, neuro exam stable and continues to have RSW and speech difficulty. Started on rocephin for abx for UTI, urine cx with GNR and susceptibilities pending. Stepped down to NPU this morning. CT chest yesterday shows mediastinal and bilateral hilar lymphadenopathy, will obtain CT C/A/P for malignancy eval. Dispo planning ongoing for IPR, pending auth/acceptance for NS rehab.      Antithrombotics for secondary stroke prevention: Antiplatelets: Aspirin: 81 mg daily  Clopidogrel: 75 mg daily    Statins for secondary stroke prevention and hyperlipidemia, if present: Statins: Atorvastatin- 40 mg daily    Aggressive risk factor modification: HTN, DM, HLD     Rehab efforts: The patient has been evaluated by a stroke team provider and the therapy needs have been fully considered based off the presenting complaints and exam findings. The  following therapy evaluations are needed: PT evaluate and treat, OT evaluate and treat, SLP evaluate and treat, PM&R evaluate for appropriate placement    Diagnostics ordered/pending: None     VTE prophylaxis: Heparin 5000 units SQ every 8 hours  Mechanical prophylaxis: Place SCDs    BP parameters: Infarct: SBP <160

## 2025-04-25 NOTE — PLAN OF CARE
"[facility] Care Plan    POC reviewed with Delilah Williamson and family at 0300. Patient and Family verbalized understanding. Questions and concerns addressed. No acute events today. Pt progressing toward goals. Will continue to monitor. See below and flowsheets for full assessment and VS info.       - Transfer orders  - PRN Hydralazine given x1         Is this a stroke patient? yes- Stroke booklet reviewed with patient and family, risk factors identified for patient and stroke booklet remains at bedside for ongoing education.     Care individualization: extra blankets     Neuro:  [place] Coma Scale  Best Eye Response: 4-->(E4) spontaneous  Best Motor Response: 6-->(M6) obeys commands  Best Verbal Response: 5-->(V5) oriented  [place] Coma Scale Score: 15  Assessment Qualifiers: no eye obstruction present, patient not sedated/intubated  Pupil PERRLA: yes     24 hr Temp:  [97.9 °F (36.6 °C)-98.4 °F (36.9 °C)]     CV:   Rhythm: sinus bradycardia  BP goals:   SBP < 160  MAP > 65    Resp: RA          Plan: N/A    GI/:     Diet/Nutrition Received: mechanical/dental soft  Last Bowel Movement: 04/22/25  Voiding Characteristics: external catheter    Intake/Output Summary (Last 24 hours) at 4/25/2025 0607  Last data filed at 4/25/2025 0601  Gross per 24 hour   Intake 109.63 ml   Output 1125 ml   Net -1015.37 ml     Unmeasured Output  Urine Occurrence: 1  Pad Count: 2    Labs/Accuchecks:  Recent Labs   Lab 04/25/25  0207   WBC 8.96   RBC 4.07   HGB 10.7*   HCT 33.1*         Recent Labs   Lab 04/25/25  0207      K 3.9   CO2 25      BUN 26*   CREATININE 1.1   ALKPHOS 61   ALT 43   AST 38   BILITOT 0.6      Recent Labs   Lab 04/23/25  0925   PROTIME 11.4   INR 1.0    No results for input(s): "CPK", "CPKMB", "TROPONINI", "MB" in the last 168 hours.    Electrolytes: No replacement orders/WDL  Accuchecks: Q6H    Gtts:      LDA/Wounds:    Yaw Risk Assessment  Sensory Perception: 3-->slightly limited  Moisture: " 3-->occasionally moist  Activity: 1-->bedfast  Mobility: 2-->very limited  Nutrition: 2-->probably inadequate  Friction and Shear: 1-->problem  Yaw Score: 12  Is your yaw score 12 or less? yes enrolled in the [employer]ach program          Restraints:        EMMY

## 2025-04-26 LAB
ABSOLUTE EOSINOPHIL (OHS): 0.18 K/UL
ABSOLUTE MONOCYTE (OHS): 0.6 K/UL (ref 0.3–1)
ABSOLUTE NEUTROPHIL COUNT (OHS): 3.94 K/UL (ref 1.8–7.7)
ALBUMIN SERPL BCP-MCNC: 2.4 G/DL (ref 3.5–5.2)
ALP SERPL-CCNC: 52 UNIT/L (ref 40–150)
ALT SERPL W/O P-5'-P-CCNC: 27 UNIT/L (ref 10–44)
ANION GAP (OHS): 7 MMOL/L (ref 8–16)
AST SERPL-CCNC: 22 UNIT/L (ref 11–45)
BASOPHILS # BLD AUTO: 0.02 K/UL
BASOPHILS NFR BLD AUTO: 0.3 %
BILIRUB SERPL-MCNC: 0.4 MG/DL (ref 0.1–1)
BUN SERPL-MCNC: 27 MG/DL (ref 8–23)
CALCIUM SERPL-MCNC: 9.7 MG/DL (ref 8.7–10.5)
CHLORIDE SERPL-SCNC: 108 MMOL/L (ref 95–110)
CO2 SERPL-SCNC: 24 MMOL/L (ref 23–29)
CREAT SERPL-MCNC: 1.1 MG/DL (ref 0.5–1.4)
ERYTHROCYTE [DISTWIDTH] IN BLOOD BY AUTOMATED COUNT: 14.7 % (ref 11.5–14.5)
GFR SERPLBLD CREATININE-BSD FMLA CKD-EPI: 49 ML/MIN/1.73/M2
GLUCOSE SERPL-MCNC: 104 MG/DL (ref 70–110)
HCT VFR BLD AUTO: 30.2 % (ref 37–48.5)
HGB BLD-MCNC: 9.6 GM/DL (ref 12–16)
IMM GRANULOCYTES # BLD AUTO: 0.02 K/UL (ref 0–0.04)
IMM GRANULOCYTES NFR BLD AUTO: 0.3 % (ref 0–0.5)
LYMPHOCYTES # BLD AUTO: 1.41 K/UL (ref 1–4.8)
MAGNESIUM SERPL-MCNC: 1.5 MG/DL (ref 1.6–2.6)
MCH RBC QN AUTO: 25.9 PG (ref 27–31)
MCHC RBC AUTO-ENTMCNC: 31.8 G/DL (ref 32–36)
MCV RBC AUTO: 82 FL (ref 82–98)
NUCLEATED RBC (/100WBC) (OHS): 0 /100 WBC
PHOSPHATE SERPL-MCNC: 2.8 MG/DL (ref 2.7–4.5)
PLATELET # BLD AUTO: 267 K/UL (ref 150–450)
PMV BLD AUTO: 10.4 FL (ref 9.2–12.9)
POCT GLUCOSE: 107 MG/DL (ref 70–110)
POTASSIUM SERPL-SCNC: 3.8 MMOL/L (ref 3.5–5.1)
PROT SERPL-MCNC: 5.2 GM/DL (ref 6–8.4)
RBC # BLD AUTO: 3.7 M/UL (ref 4–5.4)
RELATIVE EOSINOPHIL (OHS): 2.9 %
RELATIVE LYMPHOCYTE (OHS): 22.9 % (ref 18–48)
RELATIVE MONOCYTE (OHS): 9.7 % (ref 4–15)
RELATIVE NEUTROPHIL (OHS): 63.9 % (ref 38–73)
SODIUM SERPL-SCNC: 139 MMOL/L (ref 136–145)
WBC # BLD AUTO: 6.17 K/UL (ref 3.9–12.7)

## 2025-04-26 PROCEDURE — 97535 SELF CARE MNGMENT TRAINING: CPT

## 2025-04-26 PROCEDURE — 36415 COLL VENOUS BLD VENIPUNCTURE: CPT | Performed by: PHYSICIAN ASSISTANT

## 2025-04-26 PROCEDURE — 25000003 PHARM REV CODE 250: Performed by: PSYCHIATRY & NEUROLOGY

## 2025-04-26 PROCEDURE — 99233 SBSQ HOSP IP/OBS HIGH 50: CPT | Mod: GC,,, | Performed by: PSYCHIATRY & NEUROLOGY

## 2025-04-26 PROCEDURE — 84100 ASSAY OF PHOSPHORUS: CPT | Performed by: PHYSICIAN ASSISTANT

## 2025-04-26 PROCEDURE — 85025 COMPLETE CBC W/AUTO DIFF WBC: CPT | Performed by: PHYSICIAN ASSISTANT

## 2025-04-26 PROCEDURE — 25000003 PHARM REV CODE 250: Performed by: PHYSICIAN ASSISTANT

## 2025-04-26 PROCEDURE — 97530 THERAPEUTIC ACTIVITIES: CPT

## 2025-04-26 PROCEDURE — 63600175 PHARM REV CODE 636 W HCPCS: Performed by: PSYCHIATRY & NEUROLOGY

## 2025-04-26 PROCEDURE — 25500020 PHARM REV CODE 255: Performed by: PSYCHIATRY & NEUROLOGY

## 2025-04-26 PROCEDURE — 11000001 HC ACUTE MED/SURG PRIVATE ROOM

## 2025-04-26 PROCEDURE — 80053 COMPREHEN METABOLIC PANEL: CPT | Performed by: PHYSICIAN ASSISTANT

## 2025-04-26 PROCEDURE — 63600175 PHARM REV CODE 636 W HCPCS: Performed by: STUDENT IN AN ORGANIZED HEALTH CARE EDUCATION/TRAINING PROGRAM

## 2025-04-26 PROCEDURE — 63600175 PHARM REV CODE 636 W HCPCS

## 2025-04-26 PROCEDURE — 83735 ASSAY OF MAGNESIUM: CPT | Performed by: PHYSICIAN ASSISTANT

## 2025-04-26 RX ORDER — MAGNESIUM SULFATE HEPTAHYDRATE 40 MG/ML
2 INJECTION, SOLUTION INTRAVENOUS ONCE
Status: COMPLETED | OUTPATIENT
Start: 2025-04-26 | End: 2025-04-26

## 2025-04-26 RX ADMIN — LATANOPROST 1 DROP: 50 SOLUTION OPHTHALMIC at 08:04

## 2025-04-26 RX ADMIN — WHITE PETROLATUM: 1.75 OINTMENT TOPICAL at 08:04

## 2025-04-26 RX ADMIN — ATORVASTATIN CALCIUM 40 MG: 40 TABLET, FILM COATED ORAL at 08:04

## 2025-04-26 RX ADMIN — SODIUM CHLORIDE: 9 INJECTION, SOLUTION INTRAVENOUS at 08:04

## 2025-04-26 RX ADMIN — ASPIRIN 81 MG CHEWABLE TABLET 81 MG: 81 TABLET CHEWABLE at 08:04

## 2025-04-26 RX ADMIN — MAGNESIUM SULFATE HEPTAHYDRATE 2 G: 40 INJECTION, SOLUTION INTRAVENOUS at 08:04

## 2025-04-26 RX ADMIN — CEFTRIAXONE 1 G: 1 INJECTION, POWDER, FOR SOLUTION INTRAMUSCULAR; INTRAVENOUS at 09:04

## 2025-04-26 RX ADMIN — SENNOSIDES AND DOCUSATE SODIUM 1 TABLET: 50; 8.6 TABLET ORAL at 08:04

## 2025-04-26 RX ADMIN — ENOXAPARIN SODIUM 40 MG: 40 INJECTION SUBCUTANEOUS at 04:04

## 2025-04-26 RX ADMIN — IOHEXOL 100 ML: 350 INJECTION, SOLUTION INTRAVENOUS at 05:04

## 2025-04-26 RX ADMIN — CLOPIDOGREL 75 MG: 75 TABLET ORAL at 08:04

## 2025-04-26 RX ADMIN — HYDRALAZINE HYDROCHLORIDE 10 MG: 20 INJECTION, SOLUTION INTRAMUSCULAR; INTRAVENOUS at 08:04

## 2025-04-26 NOTE — ASSESSMENT & PLAN NOTE
-Due to stroke  -Improving although continues to have RSW    Plan:   -Aggressive therapy  -PT/OT/SLP eval and treat. Rec high intensity, likely pursue IPR placement

## 2025-04-26 NOTE — ASSESSMENT & PLAN NOTE
-Stroke risk factor  -A1c 4.9    Plan:   -Glucose goal while inpatient 140-180, avoid hypoglycemia  -Hold home anti-hyperglycemics while admitted  -[facility] SSI PRN

## 2025-04-26 NOTE — ASSESSMENT & PLAN NOTE
-Due to stroke  -Continues to have mild expressive aphasia    Plan:   -Aggressive therapy  -PT/OT/SLP eval and treat

## 2025-04-26 NOTE — ASSESSMENT & PLAN NOTE
-Stroke risk factor    Plan:   -SBP goal <160, maintain MAP >65  -BP range in the last 24 hrs: BP  Min: 143/65  Max: 167/60  -SBPs intermittently above goal, will continue to monitor and consider resuming home meds if BPs more consistently above goal  -Continue PRN labetalol/hydralazine

## 2025-04-26 NOTE — ASSESSMENT & PLAN NOTE
-UA on admission with 1+ blood, + nitrites, 3+ leukocyte esterase, many bacteria  -Ucx with GNR >100K CFU, susceptibility pending    Plan:   -Started on rocephin x 3 days for abx (EOT 4/27/25)

## 2025-04-26 NOTE — PROGRESS NOTES
Andre Carver - Neurosurgery ([facility])  Vascular Neurology  Comprehensive Stroke Center  Progress Note    Assessment/Plan:     * Embolic stroke involving left middle cerebral artery  Ms. Delilah Willaimson is a 86 y/o female with PMHx of HTN, HLD, DM2 that [facility] for further eval and management of acute L MCA stroke. She initially presented to OSH with acute onset L MCA syndrome (RSW, RFD, L gaze, aphasia) upon waking up at 8am 4/23, approx 1 hr PTA. LKW 5pm 4/22. Telestroke NIHSS 12. CTH showed subtle early ischemic changes in L insular cortex, CTA revealed L M1/M2 occlusion. MRI for wake up protocol showed acute infarcts in L insular cortex and L BG with corresponding FLAIR changes. No lytics recommended due to FLAIR changes, however given fair ASPECTS patient transferred for possible intervention. On arrival to [facility] neuro exam with NIHSS 17, patient taken to IR for DSA and is now s/p L M1/2 thrombectomy with TICI 2c reperfusion. Repeat MRI brain shows stable acute L MCA territory infarcts involving subinsular and BG regions. TTE shows EF 55-60%, severe LAE, and no WMA. Etiology of stroke ESUS at this time, 30d cardiac event monitor will be ordered at discharge for further stroke workup and eval for possible pAF.    4/25: NAEON, neuro exam stable and continues to have RSW and speech difficulty. Started on rocephin for abx for UTI, urine cx with GNR and susceptibilities pending. Stepped down to NPU this morning. CT chest yesterday shows mediastinal and bilateral hilar lymphadenopathy, will obtain CT C/A/P for malignancy eval. Dispo planning ongoing for IPR, pending auth/acceptance for NS rehab.      Antithrombotics for secondary stroke prevention: Antiplatelets: Aspirin: 81 mg daily  Clopidogrel: 75 mg daily    Statins for secondary stroke prevention and hyperlipidemia, if present: Statins: Atorvastatin- 40 mg daily    Aggressive risk factor modification: HTN, DM, HLD      Rehab efforts: The patient has been evaluated by a stroke team provider and the therapy needs have been fully considered based off the presenting complaints and exam findings. The following therapy evaluations are needed: PT evaluate and treat, OT evaluate and treat, SLP evaluate and treat, PM&R evaluate for appropriate placement    Diagnostics ordered/pending: None     VTE prophylaxis: Heparin 5000 units SQ every 8 hours  Mechanical prophylaxis: Place SCDs    BP parameters: Infarct: SBP <160    Plan:   - BP parameters as above   - VTE prophylaxis as above   - risk factor modification as above   - statin as above   - aspirin and clopidogrel as above   - PT/OT recommend high intensity therapy, dispo pending placement, likely IPR     UTI (urinary tract infection)  -UA on admission with 1+ blood, + nitrites, 3+ leukocyte esterase, many bacteria  -Ucx with GNR >100K CFU, susceptibility pending    Plan:   -Started on rocephin x 3 days for abx (EOT 4/27/25)    Lymphadenopathy  -CTA head and neck noted to have mediastinal lymphadenopathy. Designated chest CT obtained and revealed mediastinal and bilateral hilar lymphadenopathy, mild bilateral smooth interlobular septal thickening and trace bilateral pleural effusions, and 2 mm right lower lobe nodule.  No prior imaging available to compare.   -Per chart review, no significant smoking history or known h/o cancer.   -Unclear whether malignant vs autoimmune/inflammatory process, although no apparent signs of infection at this time.    Plan:   -CT C/A/P ordered for further malignancy eval, will f/u results     Cytotoxic brain edema  -Area of cerebral edema identified when reviewing brain imaging in the Left MCA territory, there is not mass effect associated with it.   -Admitted to [facility] post-IR for close neuro monitoring and observation. Neuro exam and follow up imaging has remained stable and patient stepped down to NPU on 4/25.    Plan:   -Continue frequent q4h neuro checks  as any acute changes or worsening neuro status may signify expansion of the insult and/or area of the edema, which may require acute interventions to prevent loss of function and/or death.  -Obtain stat CTH for any new or worsening neuro changes and notify primary team immediately    Controlled type 2 diabetes mellitus without complication, without long-term current use of insulin  -Stroke risk factor  -A1c 4.9    Plan:   -Glucose goal while inpatient 140-180, avoid hypoglycemia  -Hold home anti-hyperglycemics while admitted  -[facility] [place]    Hyperlipidemia  -Stroke risk factor  -LDL 77.6, goal <70    Plan:   -Atorvastatin 40mg daily    HTN (hypertension)  -Stroke risk factor    Plan:   -SBP goal <160, maintain MAP >65  -BP range in the last 24 hrs: BP  Min: 143/65  Max: 167/60  -SBPs intermittently above goal, will continue to monitor and consider resuming home meds if BPs more consistently above goal  -Continue PRN labetalol/hydralazine      Aphasia  -Due to stroke  -Continues to have mild expressive aphasia    Plan:   -Aggressive therapy  -PT/OT/SLP eval and treat    Hemiplegia  -Due to stroke  -Improving although continues to have RSW    Plan:   -Aggressive therapy  -PT/OT/SLP eval and treat. Rec high intensity, likely pursue IPR placement          04/24/2025 Repeat MRI Brain without contrast overnight with stable infarct, no hemorrhage. Ok to continue DAPT for secondary stroke prevention. TTE with EF 55-60%, +LAE, normal wall motion. Etiology of stroke likely cardioembolic. Patient will need cardiac event monitor at discharge. Exam overall improved post IR with TICI 2C. Ok to stepdown to NPU.   4/25/2025 NAEON, neuro exam stable and continues to have RSW and speech difficulty. Started on rocephin for abx for UTI, urine cx with GNR and susceptibilities pending. Stepped down to NPU this morning. CT chest yesterday shows mediastinal and bilateral hilar lymphadenopathy, will obtain CT C/A/P for malignancy eval.  Dispo planning ongoing for IPR, pending auth/acceptance for NS rehab.    STROKE DOCUMENTATION   Acute Stroke Times   Last Known Normal Date: 04/22/25  Last Known Normal Time: 1700  Stroke Team Called Date: 04/23/25  Stroke Team Called Time: 0915 (Telestroke at OSH)  Stroke Team Arrival Date: 04/23/25  Stroke Team Arrival Time: 0923 (OSH)  CT Interpretation Time: 0923  Thrombolytic Therapy Recommended: No  CTA Interpretation Time: 0945 (OSH)  Thrombectomy Recommended: Yes  MRI Acute Stroke Protocol Interpretation Time: 1000 (OSH)  Decision to Treat Time for IR: 1000 (Telestroke)    NIH Scale:  1a. Level of Consciousness: 0-->Alert, keenly responsive  1b. LOC Questions: 0-->Answers both questions correctly  1c. LOC Commands: 0-->Performs both tasks correctly  2. Best Gaze: 1-->Partial gaze palsy, gaze is abnormal in one or both eyes, but forced deviation or total gaze paresis is not present  3. Visual: 0-->No visual loss  4. Facial Palsy: 2-->Partial paralysis (total or near-total paralysis of lower face)  5a. Motor Arm, Left: 0-->No drift, limb holds 90 (or 45) degrees for full 10 secs  5b. Motor Arm, Right: 1-->Drift, limb holds 90 (or 45) degrees, but drifts down before full 10 secs, does not hit bed or other support  6a. Motor Leg, Left: 0-->No drift, leg holds 30 degree position for full 5 secs  6b. Motor Leg, Right: 2-->Some effort against gravity, leg falls to bed by 5 secs, but has some effort against gravity  7. Limb Ataxia: 0-->Absent  8. Sensory: 0-->Normal, no sensory loss  9. Best Language: 0-->No aphasia, normal  10. Dysarthria: 1-->Mild-to-moderate dysarthria, patient slurs at least some words and, at worst, can be understood with some difficulty  11. Extinction and Inattention (formerly Neglect): 0-->No abnormality  Total (NIH Stroke Scale): 7       Modified Hugo Score: 0  Margoth Coma Scale:    ABCD2 Score:    [id number] Score:   HAS -BLED Score:   ICH Score:   Hunt & Bazzi Classification:       Hemorrhagic change of an Ischemic Stroke: Does this patient have an ischemic stroke with hemorrhagic changes? No     Neurologic Chief Complaint: L MCA stroke    Subjective:     Interval History: Patient is seen for follow-up neurological assessment and treatment recommendations: See hospital course.    HPI, Past Medical, Family, and Social History remains the same as documented in the initial encounter.     Review of Systems   Constitutional: Negative.    Eyes:  Negative for visual disturbance.   Respiratory: Negative.     Cardiovascular: Negative.    Gastrointestinal: Negative.    Genitourinary: Negative.    Neurological:  Positive for facial asymmetry, speech difficulty and weakness.     Scheduled Meds:   aspirin  81 mg Oral Daily    atorvastatin  40 mg Oral Daily    cefTRIAXone (Rocephin) IV (PEDS and ADULTS)  1 g Intravenous Q24H    clopidogreL  75 mg Oral Daily    enoxparin  40 mg Subcutaneous Q24H (prophylaxis, 1700)    latanoprost  1 drop Both Eyes QHS    senna-docusate  1 tablet Oral BID    white petrolatum   Topical (Top) Daily     Continuous Infusions:   0.9% NaCl   Intravenous Continuous 75 mL/hr at 04/26/25 0809 New Bag at 04/26/25 0809     PRN Meds:  Current Facility-Administered Medications:     acetaminophen, 650 mg, Oral, Q6H PRN    dextrose 50%, 12.5 g, Intravenous, PRN    glucagon (human recombinant), 1 mg, Intramuscular, PRN    hydrALAZINE, 10 mg, Intravenous, Q4H PRN    insulin aspart U-100, 0-10 Units, Subcutaneous, Q6H PRN    labetalol, 10 mg, Intravenous, Q4H PRN    sodium chloride 0.9%, 10 mL, Intravenous, PRN    sodium chloride 0.9%, 10 mL, Intravenous, PRN    Objective:     Vital Signs (Most Recent):  Temp: 97.8 °F (36.6 °C) (04/26/25 0740)  Pulse: (!) 59 (04/26/25 0740)  Resp: 18 (04/26/25 0740)  BP: (!) 158/66 (04/26/25 0740)  SpO2: 98 % (04/26/25 0740)  BP Location: Right arm    Vital Signs Range (Last 24H):  Temp:  [97.8 °F (36.6 °C)-98.7 °F (37.1 °C)]   Pulse:  [59-70]   Resp:   [15-22]   BP: (143-167)/(60-72)   SpO2:  [96 %-98 %]   BP Location: Right arm       Physical Exam  Vitals and nursing note reviewed.   Constitutional:       General: She is not in acute distress.  HENT:      Head: Normocephalic and atraumatic.   Eyes:      Extraocular Movements: Extraocular movements intact.      Conjunctiva/sclera: Conjunctivae normal.      Pupils: Pupils are equal, round, and reactive to light.   Cardiovascular:      Rate and Rhythm: Normal rate and regular rhythm.      Heart sounds: No murmur heard.  Pulmonary:      Effort: Pulmonary effort is normal. No respiratory distress.   Abdominal:      General: Abdomen is flat. There is no distension.      Palpations: Abdomen is soft.   Skin:     General: Skin is warm.      Findings: Bruising present.   Neurological:      Mental Status: She is alert.      Comments: See below for neuro exam   Psychiatric:         Mood and Affect: Mood normal.         Behavior: Behavior normal. Behavior is cooperative.              Neurological Exam:   LOC: alert  Attention Span: Good   Language: Expressive aphasia  Articulation: Dysarthria  Orientation: Person, Place, Time   Visual Fields: Full  EOM (CN III, IV, VI): Full/intact  Facial Movement (CN VII): Lower facial weakness on the Right  Motor: Arm left  Normal 5/5  Leg left  Normal 5/5  Arm right  Paresis: 4/5  Leg right Paresis: 3/5  Sensation: Intact to light touch, temperature and vibration    Laboratory:  CMP:   Recent Labs   Lab 04/26/25  0626   GLUCOSE 104   CALCIUM 9.7   ALBUMIN 2.4*      K 3.8   CO2 24      BUN 27*   CREATININE 1.1   ALKPHOS 52   ALT 27   AST 22   BILITOT 0.4     CBC:   Recent Labs   Lab 04/26/25  0626   WBC 6.17   RBC 3.70*   HGB 9.6*   HCT 30.2*      MCV 82   MCH 25.9*   MCHC 31.8*     Lipid Panel:   Recent Labs   Lab 04/23/25  1340   CHOL 130   LDL 77.6   HDL 34*   TRIG 92      Coagulation:   Recent Labs   Lab 04/23/25  0925   INR 1.0     Hgb A1C:   Recent Labs   Lab  04/23/25  1340   HGBA1C 4.9     TSH:   Recent Labs   Lab 04/23/25  1340   TSH 1.747       Diagnostic Results     Brain Imaging:  MRI brain without contrast 4/24/2025  Impression:  Stable distribution of acute infarction in the MCA territory centered within the left subinsular cortex and the left basal ganglia.  Hemosiderin deposition within the left basal ganglia.  The findings may reflect microscopic blood products.  Attention on follow-up suggested.    MRI brain without contrast @ OSH 4/23/2025  Impression:  1. Regions of diffusion restriction involving the left basal ganglia and left insular cortex compatible with acute ischemia.  No evidence of associated hemorrhage or mass effect.  2. Involutional changes as above.    CTH without contrast @ OSH 4/23/2025  Impression:  1.  No acute intracranial hemorrhage, no hydrocephalus, herniation or midline shift.  2.  Ill-defined low-attenuation in the left insular cortex, and adjacent basal ganglia, possibly from small vessel ischemic disease and or edema from a subacute infarct, consider correlation with MRI and history.       Vessel Imaging:  IR cerebral angiogram 4/23/2025  Impression:  1. There is a left M1 segment occlusion.  Aspiration thrombectomy was performed (ADAPT) Technique was performed with restoration TICI 2C Flow in the left middle cerebral artery territory.  2.  Follow-up right vertebral angiogram demonstrated some collateral flow to the left posterior MCA branches.  3.  Normal right common carotid angiogram.    CTA Head and Neck @ OSH - 4/23/2025  Impression:  1.  Focal high-grade stenosis/narrowing, with suspected thrombus/soft plaque in the left MCA  2.  Suspicious/pathologic mediastinal lymphadenopathy, while this may be related to granulomatous disease, lymphoma, metastasis/malignancy is a consideration and correlation with the laboratory values, history and CT chest follow-up would be warranted.  3.  No clinically significant stenosis or large  vessel occlusion in the neck.       Cardiac Evaluation:   TTE 4/24/2025    Left Ventricle: The left ventricle is normal in size. Normal wall thickness. Normal wall motion. There is normal systolic function with a visually estimated ejection fraction of 55 - 60%. There is normal diastolic function.    Right Ventricle: Right ventricular enlargement. Wall thickness is normal. Systolic function is normal.    Left Atrium: Severely dilated    Mitral Valve: There is mild regurgitation.    Tricuspid Valve: There is mild regurgitation.    Pulmonic Valve: There is mild regurgitation.    Pulmonary Artery: The estimated pulmonary artery systolic pressure is 37 mmHg.    IVC/SVC: Normal venous pressure at 3 mmHg.      Rosendo Kaminski MD  Comprehensive Stroke Center  Department of Vascular Neurology   [facility])

## 2025-04-26 NOTE — ASSESSMENT & PLAN NOTE
-Area of cerebral edema identified when reviewing brain imaging in the Left MCA territory, there is not mass effect associated with it.   -Admitted to [facility] post-IR for close neuro monitoring and observation. Neuro exam and follow up imaging has remained stable and patient stepped down to NPU on 4/25.    Plan:   -Continue frequent q4h neuro checks as any acute changes or worsening neuro status may signify expansion of the insult and/or area of the edema, which may require acute interventions to prevent loss of function and/or death.  -Obtain stat CTH for any new or worsening neuro changes and notify primary team immediately

## 2025-04-26 NOTE — SUBJECTIVE & OBJECTIVE
Neurologic Chief Complaint: L MCA stroke    Subjective:     Interval History: Patient is seen for follow-up neurological assessment and treatment recommendations: See hospital course.    HPI, Past Medical, Family, and Social History remains the same as documented in the initial encounter.     Review of Systems   Constitutional: Negative.    Eyes:  Negative for visual disturbance.   Respiratory: Negative.     Cardiovascular: Negative.    Gastrointestinal: Negative.    Genitourinary: Negative.    Neurological:  Positive for facial asymmetry, speech difficulty and weakness.     Scheduled Meds:   aspirin  81 mg Oral Daily    atorvastatin  40 mg Oral Daily    cefTRIAXone (Rocephin) IV (PEDS and ADULTS)  1 g Intravenous Q24H    clopidogreL  75 mg Oral Daily    enoxparin  40 mg Subcutaneous Q24H (prophylaxis, 1700)    latanoprost  1 drop Both Eyes QHS    senna-docusate  1 tablet Oral BID    white petrolatum   Topical (Top) Daily     Continuous Infusions:   0.9% NaCl   Intravenous Continuous 75 mL/hr at 04/26/25 0809 New Bag at 04/26/25 0809     PRN Meds:  Current Facility-Administered Medications:     acetaminophen, 650 mg, Oral, Q6H PRN    dextrose 50%, 12.5 g, Intravenous, PRN    glucagon (human recombinant), 1 mg, Intramuscular, PRN    hydrALAZINE, 10 mg, Intravenous, Q4H PRN    insulin aspart U-100, 0-10 Units, Subcutaneous, Q6H PRN    labetalol, 10 mg, Intravenous, Q4H PRN    sodium chloride 0.9%, 10 mL, Intravenous, PRN    sodium chloride 0.9%, 10 mL, Intravenous, PRN    Objective:     Vital Signs (Most Recent):  Temp: 97.8 °F (36.6 °C) (04/26/25 0740)  Pulse: (!) 59 (04/26/25 0740)  Resp: 18 (04/26/25 0740)  BP: (!) 158/66 (04/26/25 0740)  SpO2: 98 % (04/26/25 0740)  BP Location: Right arm    Vital Signs Range (Last 24H):  Temp:  [97.8 °F (36.6 °C)-98.7 °F (37.1 °C)]   Pulse:  [59-70]   Resp:  [15-22]   BP: (143-167)/(60-72)   SpO2:  [96 %-98 %]   BP Location: Right arm       Physical Exam  Vitals and nursing note  reviewed.   Constitutional:       General: She is not in acute distress.  HENT:      Head: Normocephalic and atraumatic.   Eyes:      Extraocular Movements: Extraocular movements intact.      Conjunctiva/sclera: Conjunctivae normal.      Pupils: Pupils are equal, round, and reactive to light.   Cardiovascular:      Rate and Rhythm: Normal rate and regular rhythm.      Heart sounds: No murmur heard.  Pulmonary:      Effort: Pulmonary effort is normal. No respiratory distress.   Abdominal:      General: Abdomen is flat. There is no distension.      Palpations: Abdomen is soft.   Skin:     General: Skin is warm.      Findings: Bruising present.   Neurological:      Mental Status: She is alert.      Comments: See below for neuro exam   Psychiatric:         Mood and Affect: Mood normal.         Behavior: Behavior normal. Behavior is cooperative.              Neurological Exam:   LOC: alert  Attention Span: Good   Language: Expressive aphasia  Articulation: Dysarthria  Orientation: Person, Place, Time   Visual Fields: Full  EOM (CN III, IV, VI): Full/intact  Facial Movement (CN VII): Lower facial weakness on the Right  Motor: Arm left  Normal 5/5  Leg left  Normal 5/5  Arm right  Paresis: 4/5  Leg right Paresis: 3/5  Sensation: Intact to light touch, temperature and vibration    Laboratory:  CMP:   Recent Labs   Lab 04/26/25  0626   GLUCOSE 104   CALCIUM 9.7   ALBUMIN 2.4*      K 3.8   CO2 24      BUN 27*   CREATININE 1.1   ALKPHOS 52   ALT 27   AST 22   BILITOT 0.4     CBC:   Recent Labs   Lab 04/26/25  0626   WBC 6.17   RBC 3.70*   HGB 9.6*   HCT 30.2*      MCV 82   MCH 25.9*   MCHC 31.8*     Lipid Panel:   Recent Labs   Lab 04/23/25  1340   CHOL 130   LDL 77.6   HDL 34*   TRIG 92      Coagulation:   Recent Labs   Lab 04/23/25  0925   INR 1.0     Hgb A1C:   Recent Labs   Lab 04/23/25  1340   HGBA1C 4.9     TSH:   Recent Labs   Lab 04/23/25  1340   TSH 1.747       Diagnostic Results     Brain  Imaging:  MRI brain without contrast 4/24/2025  Impression:  Stable distribution of acute infarction in the MCA territory centered within the left subinsular cortex and the left basal ganglia.  Hemosiderin deposition within the left basal ganglia.  The findings may reflect microscopic blood products.  Attention on follow-up suggested.    MRI brain without contrast @ OSH 4/23/2025  Impression:  1. Regions of diffusion restriction involving the left basal ganglia and left insular cortex compatible with acute ischemia.  No evidence of associated hemorrhage or mass effect.  2. Involutional changes as above.    CTH without contrast @ OSH 4/23/2025  Impression:  1.  No acute intracranial hemorrhage, no hydrocephalus, herniation or midline shift.  2.  Ill-defined low-attenuation in the left insular cortex, and adjacent basal ganglia, possibly from small vessel ischemic disease and or edema from a subacute infarct, consider correlation with MRI and history.       Vessel Imaging:  IR cerebral angiogram 4/23/2025  Impression:  1. There is a left M1 segment occlusion.  Aspiration thrombectomy was performed (ADAPT) Technique was performed with restoration TICI 2C Flow in the left middle cerebral artery territory.  2.  Follow-up right vertebral angiogram demonstrated some collateral flow to the left posterior MCA branches.  3.  Normal right common carotid angiogram.    CTA Head and Neck @ OSH - 4/23/2025  Impression:  1.  Focal high-grade stenosis/narrowing, with suspected thrombus/soft plaque in the left MCA  2.  Suspicious/pathologic mediastinal lymphadenopathy, while this may be related to granulomatous disease, lymphoma, metastasis/malignancy is a consideration and correlation with the laboratory values, history and CT chest follow-up would be warranted.  3.  No clinically significant stenosis or large vessel occlusion in the neck.       Cardiac Evaluation:   TTE 4/24/2025    Left Ventricle: The left ventricle is normal in  size. Normal wall thickness. Normal wall motion. There is normal systolic function with a visually estimated ejection fraction of 55 - 60%. There is normal diastolic function.    Right Ventricle: Right ventricular enlargement. Wall thickness is normal. Systolic function is normal.    Left Atrium: Severely dilated    Mitral Valve: There is mild regurgitation.    Tricuspid Valve: There is mild regurgitation.    Pulmonic Valve: There is mild regurgitation.    Pulmonary Artery: The estimated pulmonary artery systolic pressure is 37 mmHg.    IVC/SVC: Normal venous pressure at 3 mmHg.

## 2025-04-26 NOTE — PLAN OF CARE
Problem: Occupational Therapy  Goal: Occupational Therapy Goal  Description: Goals to be met by: 5/24/25     Patient will increase functional independence with ADLs by performing:    UE Dressing with Stand-by Assistance.  LE Dressing with Minimal Assistance.  Grooming while standing at sink with Minimal Assistance.  Toileting from toilet with Contact Guard Assistance for hygiene and clothing management.   Sitting at edge of bed x10 minutes with Supervision.  Supine to sit with Minimal Assistance.  Step transfer with SBA and RW prn  Toilet transfer to toilet with SBA.  Increased functional strength to 4/5 for RUE.  Upper extremity exercise program x10 reps per handout, with assistance as needed.    Outcome: Progressing     Goals remain appropriate.

## 2025-04-26 NOTE — PT/OT/SLP PROGRESS
Occupational Therapy   Treatment    Name: Delilah Williamson  MRN: [id number]  Admitting Diagnosis:  Embolic stroke involving left middle cerebral artery       Recommendations:     Discharge Recommendations: High Intensity Therapy  Discharge Equipment Recommendations:  wheelchair  Barriers to discharge:   (increased A required)    Assessment:     Delilah Williamson is a 85 y.o. female with a medical diagnosis of Embolic stroke involving left middle cerebral artery.  She presents with good participation and motivation. Pt with improvements in transfers and balance during functional mobility. Patient  Performance deficits affecting function are weakness, impaired endurance, impaired functional mobility, impaired balance, gait instability, decreased upper extremity function, decreased lower extremity function, decreased safety awareness, impaired self care skills, impaired fine motor, impaired coordination.     Additional Staff Present: Therapy tech utilized during session due to patient complexity and required physical assistance to ensure patient safety.     Rehab Prognosis:  Good; patient would benefit from acute skilled OT services to address these deficits and reach maximum level of function.       Plan:     Patient to be seen 4 x/week to address the above listed problems via self-care/home management, therapeutic activities, therapeutic exercises, neuromuscular re-education  Plan of Care Expires: 05/24/25  Plan of Care Reviewed with: patient    Subjective     Chief Complaint: pt agreeable to therapy  Patient/Family Comments/goals: increase independence with ADLs  Pain/Comfort:  Pain Rating 1: 0/10  Pain Rating Post-Intervention 1: 0/10    Objective:     Communicated with: RN prior to session.  Patient found HOB elevated with PureWick, peripheral IV, SCD, pressure relief boots, telemetry (no family present) upon OT entry to room.    General Precautions: Standard, aphasia, aspiration, fall    Orthopedic  Precautions:N/A  Braces: N/A  Respiratory Status: Room air     Occupational Performance:     Bed Mobility:    Patient completed Rolling/Turning to Left with  moderate assistance  Patient completed Rolling/Turning to Right with moderate assistance  Patient completed Scooting/Bridging with maximal assistance to scoot anteriorly seated at EOB.  Patient completed Supine to Sit with moderate assistance  Patient completed Sit to Supine with moderate assistance   Pt seated EOB with CGA-SBA for postural control.     Functional Mobility/Transfers:  Patient completed Sit <> Stand Transfer with contact guard assistance and hand held assist for first trial from EOB. During the second sit to stand transfer, pt required min A with hand held assist from EOB.   Functional Mobility: Patient completed functional mobility in room with CGA and L hand using bedrail for support to take ~4 steps laterally to the L and R and forward/backward. Pt required min A to for second trial of side steps to the L with no assistance from bedrails.     Activities of Daily Living:  Grooming: minimum assistance to brush hair and wash face seated at EOB. Pt required min A with completing hair brushing on R side.       [facility] 6 Click ADL: 12    Treatment & Education:  Pt completed BUE therapeutic exercises in three planes x 10 reps each while seated at EOB. Pt required verbal and visual cueing to maintain full AROM throughout exercises.     Pt had no further questions & when asked whether there were any concerns pt reported none.      Patient left HOB elevated with all lines intact, call button in reach, bed alarm on, and RN notified    GOALS:   Multidisciplinary Problems       Occupational Therapy Goals          Problem: Occupational Therapy    Goal Priority Disciplines Outcome Interventions   Occupational Therapy Goal     OT, PT/OT Progressing    Description: Goals to be met by: 5/24/25     Patient will increase functional independence with ADLs by  performing:    UE Dressing with Stand-by Assistance.  LE Dressing with Minimal Assistance.  Grooming while standing at sink with Minimal Assistance.  Toileting from toilet with Contact Guard Assistance for hygiene and clothing management.   Sitting at edge of bed x10 minutes with Supervision.  Supine to sit with Minimal Assistance.  Step transfer with SBA and RW prn  Toilet transfer to toilet with SBA.  Increased functional strength to 4/5 for RUE.  Upper extremity exercise program x10 reps per handout, with assistance as needed.                         DME Justifications:  Delilah requires a hospital bed due to her requiring positioning of the body in ways not feasible with an ordinary bed due to limited ability and cannot independently make changes in body position without the use of the bed.The positioning of the body cannot be sufficiently resolved by the use of pillows and wedges.  Delilah Williamson has a mobility limitation that significantly impairs her ability to participate in one or more mobility related activities of daily living (MRADLs) such as toileting, feeding, dressing, grooming, and bathing in customary locations in the home.  The mobility limitation cannot be sufficiently resolved by the use of a cane or walker.   The use of a manual wheelchair will significantly improve the patients ability to participate in MRADLS and the patient will use it on regular basis in the home.  Delilah Williamson has expressed her willingness to use a manual wheelchair in the home. Patients upper body strength is sufficient for propulsion.  She also has a caregiver who is available, willing, and able to provide assistance with the wheelchair when needed.      Time Tracking:     OT Date of Treatment: 04/26/25  OT Start Time: 1146  OT Stop Time: 1211  OT Total Time (min): 25 min    Billable Minutes:Self Care/Home Management 12  Therapeutic Activity 13    OT/NILAY: OT          4/26/2025

## 2025-04-26 NOTE — PLAN OF CARE
Problem: Adult Inpatient Plan of Care  Goal: Plan of Care Review  Outcome: Progressing  Goal: Patient-Specific Goal (Individualized)  Outcome: Progressing  Goal: Optimal Comfort and Wellbeing  Outcome: Progressing  Goal: Readiness for Transition of Care  Outcome: Progressing     Problem: Stroke, Ischemic (Includes Transient Ischemic Attack)  Goal: Effective Bowel Elimination  Outcome: Progressing  Goal: Optimal Cerebral Tissue Perfusion  Outcome: Progressing  Goal: Optimal Cognitive Function  Outcome: Progressing  Goal: Optimal Functional Ability  Outcome: Progressing  Goal: Optimal Nutrition Intake  Outcome: Progressing  Goal: Safe and Effective Swallow  Outcome: Progressing  Goal: Effective Urinary Elimination  Outcome: Progressing   Patient alert and Oriented; Verbally responsive to staffs; Able to make all her needs known to staffs. POC and stroke booklet reviewed with patient at the bed side. Questions and concerns addressed. Verbalization of understanding voiced. However, reinforcement needed. Stroke booklet remains at the bedside. VS assessed; see flowsheet. All safety measures maintained; Bed alarms; Bilateral boots; and call light in progress. Repositioning performed Q2hrs. POC ongoing.

## 2025-04-26 NOTE — ASSESSMENT & PLAN NOTE
Ms. Delilah Williamson is a 84 y/o female with PMHx of HTN, HLD, DM2 that was transferred from [facility] to [facility] for further eval and management of acute L MCA stroke. She initially presented to OSH with acute onset L MCA syndrome (RSW, RFD, L gaze, aphasia) upon waking up at 8am 4/23, approx 1 hr PTA. LKW 5pm 4/22. Telestroke NIHSS 12. CTH showed subtle early ischemic changes in L insular cortex, CTA revealed L M1/M2 occlusion. MRI for wake up protocol showed acute infarcts in L insular cortex and L BG with corresponding FLAIR changes. No lytics recommended due to FLAIR changes, however given fair ASPECTS patient transferred for possible intervention. On arrival to [facility] neuro exam with NIHSS 17, patient taken to IR for DSA and is now s/p L M1/2 thrombectomy with TICI 2c reperfusion. Repeat MRI brain shows stable acute L MCA territory infarcts involving subinsular and BG regions. TTE shows EF 55-60%, severe LAE, and no WMA. Etiology of stroke ESUS at this time, 30d cardiac event monitor will be ordered at discharge for further stroke workup and eval for possible pAF.    4/25: NAEON, neuro exam stable and continues to have RSW and speech difficulty. Started on rocephin for abx for UTI, urine cx with GNR and susceptibilities pending. Stepped down to NPU this morning. CT chest yesterday shows mediastinal and bilateral hilar lymphadenopathy, will obtain CT C/A/P for malignancy eval. Dispo planning ongoing for IPR, pending auth/acceptance for NS rehab.      Antithrombotics for secondary stroke prevention: Antiplatelets: Aspirin: 81 mg daily  Clopidogrel: 75 mg daily    Statins for secondary stroke prevention and hyperlipidemia, if present: Statins: Atorvastatin- 40 mg daily    Aggressive risk factor modification: HTN, DM, HLD     Rehab efforts: The patient has been evaluated by a stroke team provider and the therapy needs have been fully considered based off the presenting complaints and exam findings. The  following therapy evaluations are needed: PT evaluate and treat, OT evaluate and treat, SLP evaluate and treat, PM&R evaluate for appropriate placement    Diagnostics ordered/pending: None     VTE prophylaxis: Heparin 5000 units SQ every 8 hours  Mechanical prophylaxis: Place SCDs    BP parameters: Infarct: SBP <160    Plan:   - BP parameters as above   - VTE prophylaxis as above   - risk factor modification as above   - statin as above   - aspirin and clopidogrel as above   - PT/OT recommend high intensity therapy, dispo pending placement, likely IPR

## 2025-04-26 NOTE — ASSESSMENT & PLAN NOTE
-CTA head and neck noted to have mediastinal lymphadenopathy. Designated chest CT obtained and revealed mediastinal and bilateral hilar lymphadenopathy, mild bilateral smooth interlobular septal thickening and trace bilateral pleural effusions, and 2 mm right lower lobe nodule.  No prior imaging available to compare.   -Per chart review, no significant smoking history or known h/o cancer.   -Unclear whether malignant vs autoimmune/inflammatory process, although no apparent signs of infection at this time.    Plan:   -CT C/A/P ordered for further malignancy eval, will f/u results

## 2025-04-27 ENCOUNTER — ANESTHESIA EVENT (OUTPATIENT)
Dept: SURGERY | Facility: HOSPITAL | Age: 85
End: 2025-04-27
Payer: MEDICARE

## 2025-04-27 ENCOUNTER — ANESTHESIA (OUTPATIENT)
Dept: SURGERY | Facility: HOSPITAL | Age: 85
End: 2025-04-27
Payer: MEDICARE

## 2025-04-27 PROBLEM — N20.1 URETEROLITHIASIS: Status: ACTIVE | Noted: 2025-04-27

## 2025-04-27 LAB
ABSOLUTE EOSINOPHIL (OHS): 0.21 K/UL
ABSOLUTE MONOCYTE (OHS): 0.68 K/UL (ref 0.3–1)
ABSOLUTE NEUTROPHIL COUNT (OHS): 4.64 K/UL (ref 1.8–7.7)
ALBUMIN SERPL BCP-MCNC: 2.3 G/DL (ref 3.5–5.2)
ALP SERPL-CCNC: 50 UNIT/L (ref 40–150)
ALT SERPL W/O P-5'-P-CCNC: 21 UNIT/L (ref 10–44)
ANION GAP (OHS): 7 MMOL/L (ref 8–16)
AST SERPL-CCNC: 22 UNIT/L (ref 11–45)
BASOPHILS # BLD AUTO: 0.02 K/UL
BASOPHILS NFR BLD AUTO: 0.3 %
BILIRUB SERPL-MCNC: 0.3 MG/DL (ref 0.1–1)
BUN SERPL-MCNC: 22 MG/DL (ref 8–23)
CALCIUM SERPL-MCNC: 9.6 MG/DL (ref 8.7–10.5)
CHLORIDE SERPL-SCNC: 109 MMOL/L (ref 95–110)
CO2 SERPL-SCNC: 20 MMOL/L (ref 23–29)
CREAT SERPL-MCNC: 1.1 MG/DL (ref 0.5–1.4)
ERYTHROCYTE [DISTWIDTH] IN BLOOD BY AUTOMATED COUNT: 14.7 % (ref 11.5–14.5)
GFR SERPLBLD CREATININE-BSD FMLA CKD-EPI: 49 ML/MIN/1.73/M2
GLUCOSE SERPL-MCNC: 90 MG/DL (ref 70–110)
HCT VFR BLD AUTO: 29 % (ref 37–48.5)
HGB BLD-MCNC: 9.1 GM/DL (ref 12–16)
IMM GRANULOCYTES # BLD AUTO: 0.03 K/UL (ref 0–0.04)
IMM GRANULOCYTES NFR BLD AUTO: 0.4 % (ref 0–0.5)
LYMPHOCYTES # BLD AUTO: 1.57 K/UL (ref 1–4.8)
MAGNESIUM SERPL-MCNC: 1.6 MG/DL (ref 1.6–2.6)
MCH RBC QN AUTO: 25.9 PG (ref 27–31)
MCHC RBC AUTO-ENTMCNC: 31.4 G/DL (ref 32–36)
MCV RBC AUTO: 82 FL (ref 82–98)
NUCLEATED RBC (/100WBC) (OHS): 0 /100 WBC
PHOSPHATE SERPL-MCNC: 2.7 MG/DL (ref 2.7–4.5)
PLATELET # BLD AUTO: 250 K/UL (ref 150–450)
PMV BLD AUTO: 10.7 FL (ref 9.2–12.9)
POCT GLUCOSE: 182 MG/DL (ref 70–110)
POCT GLUCOSE: 99 MG/DL (ref 70–110)
POTASSIUM SERPL-SCNC: 4 MMOL/L (ref 3.5–5.1)
PROT SERPL-MCNC: 5.2 GM/DL (ref 6–8.4)
RBC # BLD AUTO: 3.52 M/UL (ref 4–5.4)
RELATIVE EOSINOPHIL (OHS): 2.9 %
RELATIVE LYMPHOCYTE (OHS): 22 % (ref 18–48)
RELATIVE MONOCYTE (OHS): 9.5 % (ref 4–15)
RELATIVE NEUTROPHIL (OHS): 64.9 % (ref 38–73)
SODIUM SERPL-SCNC: 136 MMOL/L (ref 136–145)
WBC # BLD AUTO: 7.15 K/UL (ref 3.9–12.7)

## 2025-04-27 PROCEDURE — 84100 ASSAY OF PHOSPHORUS: CPT | Performed by: PHYSICIAN ASSISTANT

## 2025-04-27 PROCEDURE — 82962 GLUCOSE BLOOD TEST: CPT | Performed by: STUDENT IN AN ORGANIZED HEALTH CARE EDUCATION/TRAINING PROGRAM

## 2025-04-27 PROCEDURE — C1758 CATHETER, URETERAL: HCPCS | Performed by: STUDENT IN AN ORGANIZED HEALTH CARE EDUCATION/TRAINING PROGRAM

## 2025-04-27 PROCEDURE — C1769 GUIDE WIRE: HCPCS | Performed by: STUDENT IN AN ORGANIZED HEALTH CARE EDUCATION/TRAINING PROGRAM

## 2025-04-27 PROCEDURE — 25000003 PHARM REV CODE 250: Performed by: PSYCHIATRY & NEUROLOGY

## 2025-04-27 PROCEDURE — 37000008 HC ANESTHESIA 1ST 15 MINUTES: Performed by: STUDENT IN AN ORGANIZED HEALTH CARE EDUCATION/TRAINING PROGRAM

## 2025-04-27 PROCEDURE — 99233 SBSQ HOSP IP/OBS HIGH 50: CPT | Mod: GC,,, | Performed by: PSYCHIATRY & NEUROLOGY

## 2025-04-27 PROCEDURE — 80053 COMPREHEN METABOLIC PANEL: CPT | Performed by: PHYSICIAN ASSISTANT

## 2025-04-27 PROCEDURE — 71000015 HC POSTOP RECOV 1ST HR: Performed by: STUDENT IN AN ORGANIZED HEALTH CARE EDUCATION/TRAINING PROGRAM

## 2025-04-27 PROCEDURE — 63600175 PHARM REV CODE 636 W HCPCS

## 2025-04-27 PROCEDURE — 36000707: Performed by: STUDENT IN AN ORGANIZED HEALTH CARE EDUCATION/TRAINING PROGRAM

## 2025-04-27 PROCEDURE — D9220A PRA ANESTHESIA: Mod: CRNA,,,

## 2025-04-27 PROCEDURE — 11000001 HC ACUTE MED/SURG PRIVATE ROOM

## 2025-04-27 PROCEDURE — C2617 STENT, NON-COR, TEM W/O DEL: HCPCS | Performed by: STUDENT IN AN ORGANIZED HEALTH CARE EDUCATION/TRAINING PROGRAM

## 2025-04-27 PROCEDURE — 36415 COLL VENOUS BLD VENIPUNCTURE: CPT | Performed by: PHYSICIAN ASSISTANT

## 2025-04-27 PROCEDURE — 94761 N-INVAS EAR/PLS OXIMETRY MLT: CPT

## 2025-04-27 PROCEDURE — 0T768DZ DILATION OF RIGHT URETER WITH INTRALUMINAL DEVICE, VIA NATURAL OR ARTIFICIAL OPENING ENDOSCOPIC: ICD-10-PCS | Performed by: STUDENT IN AN ORGANIZED HEALTH CARE EDUCATION/TRAINING PROGRAM

## 2025-04-27 PROCEDURE — D9220A PRA ANESTHESIA: Mod: ANES,,, | Performed by: ANESTHESIOLOGY

## 2025-04-27 PROCEDURE — 25000003 PHARM REV CODE 250

## 2025-04-27 PROCEDURE — 85025 COMPLETE CBC W/AUTO DIFF WBC: CPT | Performed by: PHYSICIAN ASSISTANT

## 2025-04-27 PROCEDURE — 36000706: Performed by: STUDENT IN AN ORGANIZED HEALTH CARE EDUCATION/TRAINING PROGRAM

## 2025-04-27 PROCEDURE — 25000003 PHARM REV CODE 250: Performed by: PHYSICIAN ASSISTANT

## 2025-04-27 PROCEDURE — 71000033 HC RECOVERY, INTIAL HOUR: Performed by: STUDENT IN AN ORGANIZED HEALTH CARE EDUCATION/TRAINING PROGRAM

## 2025-04-27 PROCEDURE — 37000009 HC ANESTHESIA EA ADD 15 MINS: Performed by: STUDENT IN AN ORGANIZED HEALTH CARE EDUCATION/TRAINING PROGRAM

## 2025-04-27 PROCEDURE — 83735 ASSAY OF MAGNESIUM: CPT | Performed by: PHYSICIAN ASSISTANT

## 2025-04-27 DEVICE — STENT URETERAL UNIV 6FR 24CM: Type: IMPLANTABLE DEVICE | Site: URETER | Status: FUNCTIONAL

## 2025-04-27 RX ORDER — DEXAMETHASONE SODIUM PHOSPHATE 4 MG/ML
INJECTION, SOLUTION INTRA-ARTICULAR; INTRALESIONAL; INTRAMUSCULAR; INTRAVENOUS; SOFT TISSUE
Status: DISCONTINUED | OUTPATIENT
Start: 2025-04-27 | End: 2025-04-27

## 2025-04-27 RX ORDER — ONDANSETRON HYDROCHLORIDE 2 MG/ML
4 INJECTION, SOLUTION INTRAVENOUS ONCE AS NEEDED
Status: DISCONTINUED | OUTPATIENT
Start: 2025-04-27 | End: 2025-04-30 | Stop reason: HOSPADM

## 2025-04-27 RX ORDER — CEFTRIAXONE 1 G/1
1 INJECTION, POWDER, FOR SOLUTION INTRAMUSCULAR; INTRAVENOUS
Status: DISCONTINUED | OUTPATIENT
Start: 2025-04-28 | End: 2025-04-28

## 2025-04-27 RX ORDER — DIPHENHYDRAMINE HYDROCHLORIDE 50 MG/ML
25 INJECTION, SOLUTION INTRAMUSCULAR; INTRAVENOUS EVERY 6 HOURS PRN
Status: DISCONTINUED | OUTPATIENT
Start: 2025-04-27 | End: 2025-04-30 | Stop reason: HOSPADM

## 2025-04-27 RX ORDER — HYDROMORPHONE HYDROCHLORIDE 1 MG/ML
0.2 INJECTION, SOLUTION INTRAMUSCULAR; INTRAVENOUS; SUBCUTANEOUS EVERY 5 MIN PRN
Status: DISCONTINUED | OUTPATIENT
Start: 2025-04-27 | End: 2025-04-30 | Stop reason: HOSPADM

## 2025-04-27 RX ORDER — PROPOFOL 10 MG/ML
VIAL (ML) INTRAVENOUS
Status: DISCONTINUED | OUTPATIENT
Start: 2025-04-27 | End: 2025-04-27

## 2025-04-27 RX ORDER — FENTANYL CITRATE 50 UG/ML
25 INJECTION, SOLUTION INTRAMUSCULAR; INTRAVENOUS EVERY 5 MIN PRN
Status: DISCONTINUED | OUTPATIENT
Start: 2025-04-27 | End: 2025-04-30 | Stop reason: HOSPADM

## 2025-04-27 RX ORDER — FENTANYL CITRATE 50 UG/ML
INJECTION, SOLUTION INTRAMUSCULAR; INTRAVENOUS
Status: DISCONTINUED | OUTPATIENT
Start: 2025-04-27 | End: 2025-04-27

## 2025-04-27 RX ORDER — SODIUM CHLORIDE 0.9 % (FLUSH) 0.9 %
3 SYRINGE (ML) INJECTION
Status: DISCONTINUED | OUTPATIENT
Start: 2025-04-27 | End: 2025-04-30 | Stop reason: HOSPADM

## 2025-04-27 RX ORDER — ONDANSETRON HYDROCHLORIDE 2 MG/ML
INJECTION, SOLUTION INTRAVENOUS
Status: DISCONTINUED | OUTPATIENT
Start: 2025-04-27 | End: 2025-04-27

## 2025-04-27 RX ORDER — PROCHLORPERAZINE EDISYLATE 5 MG/ML
5 INJECTION INTRAMUSCULAR; INTRAVENOUS EVERY 30 MIN PRN
Status: DISCONTINUED | OUTPATIENT
Start: 2025-04-27 | End: 2025-04-30 | Stop reason: HOSPADM

## 2025-04-27 RX ORDER — MAGNESIUM SULFATE HEPTAHYDRATE 40 MG/ML
2 INJECTION, SOLUTION INTRAVENOUS ONCE
Status: COMPLETED | OUTPATIENT
Start: 2025-04-27 | End: 2025-04-27

## 2025-04-27 RX ORDER — LIDOCAINE HYDROCHLORIDE 20 MG/ML
INJECTION INTRAVENOUS
Status: DISCONTINUED | OUTPATIENT
Start: 2025-04-27 | End: 2025-04-27

## 2025-04-27 RX ORDER — ONDANSETRON HYDROCHLORIDE 2 MG/ML
4 INJECTION, SOLUTION INTRAVENOUS DAILY PRN
Status: DISCONTINUED | OUTPATIENT
Start: 2025-04-27 | End: 2025-04-30 | Stop reason: HOSPADM

## 2025-04-27 RX ORDER — GLUCAGON 1 MG
1 KIT INJECTION
Status: DISCONTINUED | OUTPATIENT
Start: 2025-04-27 | End: 2025-04-30 | Stop reason: HOSPADM

## 2025-04-27 RX ORDER — SODIUM CHLORIDE 9 MG/ML
INJECTION, SOLUTION INTRAVENOUS CONTINUOUS
Status: ACTIVE | OUTPATIENT
Start: 2025-04-27 | End: 2025-04-28

## 2025-04-27 RX ORDER — HALOPERIDOL LACTATE 5 MG/ML
0.5 INJECTION, SOLUTION INTRAMUSCULAR EVERY 10 MIN PRN
Status: DISCONTINUED | OUTPATIENT
Start: 2025-04-27 | End: 2025-04-28

## 2025-04-27 RX ADMIN — FENTANYL CITRATE 25 MCG: 50 INJECTION, SOLUTION INTRAMUSCULAR; INTRAVENOUS at 02:04

## 2025-04-27 RX ADMIN — PROPOFOL 70 MG: 10 INJECTION, EMULSION INTRAVENOUS at 02:04

## 2025-04-27 RX ADMIN — CLOPIDOGREL 75 MG: 75 TABLET ORAL at 08:04

## 2025-04-27 RX ADMIN — MAGNESIUM SULFATE HEPTAHYDRATE 2 G: 40 INJECTION, SOLUTION INTRAVENOUS at 08:04

## 2025-04-27 RX ADMIN — CEFTRIAXONE 1 G: 1 INJECTION, POWDER, FOR SOLUTION INTRAMUSCULAR; INTRAVENOUS at 09:04

## 2025-04-27 RX ADMIN — ATORVASTATIN CALCIUM 40 MG: 40 TABLET, FILM COATED ORAL at 08:04

## 2025-04-27 RX ADMIN — ASPIRIN 81 MG CHEWABLE TABLET 81 MG: 81 TABLET CHEWABLE at 08:04

## 2025-04-27 RX ADMIN — ONDANSETRON 4 MG: 2 INJECTION INTRAMUSCULAR; INTRAVENOUS at 02:04

## 2025-04-27 RX ADMIN — SENNOSIDES AND DOCUSATE SODIUM 1 TABLET: 50; 8.6 TABLET ORAL at 09:04

## 2025-04-27 RX ADMIN — SENNOSIDES AND DOCUSATE SODIUM 1 TABLET: 50; 8.6 TABLET ORAL at 08:04

## 2025-04-27 RX ADMIN — LIDOCAINE HYDROCHLORIDE 100 MG: 20 INJECTION INTRAVENOUS at 02:04

## 2025-04-27 RX ADMIN — WHITE PETROLATUM: 1.75 OINTMENT TOPICAL at 08:04

## 2025-04-27 RX ADMIN — PROPOFOL 125 MCG/KG/MIN: 10 INJECTION, EMULSION INTRAVENOUS at 02:04

## 2025-04-27 RX ADMIN — SODIUM CHLORIDE: 0.9 INJECTION, SOLUTION INTRAVENOUS at 02:04

## 2025-04-27 RX ADMIN — DEXAMETHASONE SODIUM PHOSPHATE 4 MG: 4 INJECTION, SOLUTION INTRAMUSCULAR; INTRAVENOUS at 02:04

## 2025-04-27 RX ADMIN — LATANOPROST 1 DROP: 50 SOLUTION OPHTHALMIC at 09:04

## 2025-04-27 NOTE — ASSESSMENT & PLAN NOTE
Right sided obstructing stone noted on imaging.     Plan:   - urology consulted, plan for stent placement today   - appreciate assistance

## 2025-04-27 NOTE — ASSESSMENT & PLAN NOTE
Ms. Delilah Williamson is a 84 y/o female with PMHx of HTN, HLD, DM2 that was transferred from [facility] to [facility] for further eval and management of acute L MCA stroke. She initially presented to OSH with acute onset L MCA syndrome (RSW, RFD, L gaze, aphasia) upon waking up at 8am 4/23, approx 1 hr PTA. LKW 5pm 4/22. Telestroke NIHSS 12. CTH showed subtle early ischemic changes in L insular cortex, CTA revealed L M1/M2 occlusion. MRI for wake up protocol showed acute infarcts in L insular cortex and L BG with corresponding FLAIR changes. No lytics recommended due to FLAIR changes, however given fair ASPECTS patient transferred for possible intervention. On arrival to [facility] neuro exam with NIHSS 17, patient taken to IR for DSA and is now s/p L M1/2 thrombectomy with TICI 2c reperfusion. Repeat MRI brain shows stable acute L MCA territory infarcts involving subinsular and BG regions. TTE shows EF 55-60%, severe LAE, and no WMA. Etiology of stroke ESUS at this time, 30d cardiac event monitor will be ordered at discharge for further stroke workup and eval for possible pAF.    4/25: NAEON, neuro exam stable and continues to have RSW and speech difficulty. Started on rocephin for abx for UTI, urine cx with GNR and susceptibilities pending. Stepped down to NPU this morning. CT chest yesterday shows mediastinal and bilateral hilar lymphadenopathy, will obtain CT C/A/P for malignancy eval. Dispo planning ongoing for IPR, pending auth/acceptance for NS rehab.      Antithrombotics for secondary stroke prevention: Antiplatelets: Aspirin: 81 mg daily  Clopidogrel: 75 mg daily    Statins for secondary stroke prevention and hyperlipidemia, if present: Statins: Atorvastatin- 40 mg daily    Aggressive risk factor modification: HTN, DM, HLD     Rehab efforts: The patient has been evaluated by a stroke team provider and the therapy needs have been fully considered based off the presenting complaints and exam findings. The  following therapy evaluations are needed: PT evaluate and treat, OT evaluate and treat, SLP evaluate and treat, PM&R evaluate for appropriate placement    Diagnostics ordered/pending: None     VTE prophylaxis: Heparin 5000 units SQ every 8 hours  Mechanical prophylaxis: Place SCDs    BP parameters: Infarct: SBP <160    Plan:   - BP parameters as above   - chemical VTE prophylaxis held secondary to urology procedure, add back as indicated   - risk factor modification as above   - statin as above   - aspirin and clopidogrel as above   - PT/OT recommend high intensity therapy, dispo pending placement, likely IPR

## 2025-04-27 NOTE — HPI
Delilah Williamson is a 85 year old women with a PMH of HTN, DM2 and OAB that was transferred from [facility] for management of an acute L MCA stroke. She ultimately underwent a thrombectomy with IR. A CT chest showed mediastinal and bilateral hilar lymphadenopathy which prompted a CT C/A/P to rule out malignancy. At that time the patient was noted to have a 6 mm right proximal ureteral stone. Urine culture (4/23) positive for Klebsiella, she has been on culture appropriate antibiotics.    On assessment the patient was hemodynamically stable, afebrile and responding appropriately. No flank pain on physical exam. Creatinine near baseline. No leukocytosis.

## 2025-04-27 NOTE — PROGRESS NOTES
Andre Carver - Neurosurgery ([facility])  Vascular Neurology  Comprehensive Stroke Center  Progress Note    Assessment/Plan:     * Embolic stroke involving left middle cerebral artery  Ms. Delilah Williamson is a 84 y/o female with PMHx of HTN, HLD, DM2 that was transferred from [facility] to [facility] for further eval and management of acute L MCA stroke. She initially presented to OSH with acute onset L MCA syndrome (RSW, RFD, L gaze, aphasia) upon waking up at 8am 4/23, approx 1 hr PTA. LKW 5pm 4/22. Telestroke NIHSS 12. CTH showed subtle early ischemic changes in L insular cortex, CTA revealed L M1/M2 occlusion. MRI for wake up protocol showed acute infarcts in L insular cortex and L BG with corresponding FLAIR changes. No lytics recommended due to FLAIR changes, however given fair ASPECTS patient transferred for possible intervention. [place] [facility] neuro exam with NIHSS 17, patient taken to IR for DSA and is now s/p L M1/2 thrombectomy with TICI 2c reperfusion. Repeat MRI brain shows stable acute L MCA territory infarcts involving subinsular and BG regions. TTE shows EF 55-60%, severe LAE, and no WMA. Etiology of stroke ESUS at this time, 30d cardiac event monitor will be ordered at discharge for further stroke workup and eval for possible pAF.    4/25: NAEON, neuro exam stable and continues to have RSW and speech difficulty. Started on rocephin for abx for UTI, urine cx with GNR and susceptibilities pending. Stepped down to NPU this morning. CT chest yesterday shows mediastinal and bilateral hilar lymphadenopathy, will obtain CT C/A/P for malignancy eval. Dispo planning ongoing for IPR, pending auth/acceptance for NS rehab.      Antithrombotics for secondary stroke prevention: Antiplatelets: Aspirin: 81 mg daily  Clopidogrel: 75 mg daily    Statins for secondary stroke prevention and hyperlipidemia, if present: Statins: Atorvastatin- 40 mg daily    Aggressive risk factor modification: HTN, DM, HLD      Rehab efforts: The patient has been evaluated by a stroke team provider and the therapy needs have been fully considered based off the presenting complaints and exam findings. The following therapy evaluations are needed: PT evaluate and treat, OT evaluate and treat, SLP evaluate and treat, PM&R evaluate for appropriate placement    Diagnostics ordered/pending: None     VTE prophylaxis: Heparin 5000 units SQ every 8 hours  Mechanical prophylaxis: Place SCDs    BP parameters: Infarct: SBP <160    Plan:   - BP parameters as above   - chemical VTE prophylaxis held secondary to urology procedure, add back as indicated   - risk factor modification as above   - statin as above   - aspirin and clopidogrel as above   - PT/OT recommend high intensity therapy, dispo pending placement, likely IPR     Ureterolithiasis  Right sided obstructing stone noted on imaging.     Plan:   - urology consulted, plan for stent placement today   - appreciate assistance     UTI (urinary tract infection)  -UA on admission with 1+ blood, + nitrites, 3+ leukocyte esterase, many bacteria  -Ucx with GNR >100K CFU, susceptibility pending    Plan:   -Started on rocephin x 3 days for abx (EOT 4/27/25)    Lymphadenopathy  -CTA head and neck noted to have mediastinal lymphadenopathy. Designated chest CT obtained and revealed mediastinal and bilateral hilar lymphadenopathy, mild bilateral smooth interlobular septal thickening and trace bilateral pleural effusions, and 2 mm right lower lobe nodule.  No prior imaging available to compare.   -Per chart review, no significant smoking history or known h/o cancer.   -Unclear whether malignant vs autoimmune/inflammatory process, although no apparent signs of infection at this time.    Plan:   -CT C/A/P showed right lung nodule and lymphadenopathy previously noted     Cytotoxic brain edema  -Area of cerebral edema identified when reviewing brain imaging in the Left MCA territory, there is not mass effect  associated with it.   -Admitted to [facility] post-IR for close neuro monitoring and observation. Neuro exam and follow up imaging has remained stable and patient stepped down to NPU on 4/25.    Plan:   -Continue frequent q4h neuro checks as any acute changes or worsening neuro status may signify expansion of the insult and/or area of the edema, which may require acute interventions to prevent loss of function and/or death.  -Obtain stat CTH for any new or worsening neuro changes and notify primary team immediately    Controlled type 2 diabetes mellitus without complication, without long-term current use of insulin  -Stroke risk factor  -A1c 4.9    Plan:   -Glucose goal while inpatient 140-180, avoid hypoglycemia  -Hold home anti-hyperglycemics while admitted  -[facility] PRN    Hyperlipidemia  -Stroke risk factor  -LDL 77.6, goal <70    Plan:   -Atorvastatin 40mg daily    HTN (hypertension)  -Stroke risk factor    Plan:   -SBP goal <160, maintain MAP >65  -BP range in the last 24 hrs: BP  Min: 144/70  Max: 172/65  -SBPs intermittently above goal, will continue to monitor and consider resuming home meds if BPs more consistently above goal  -Continue PRN labetalol/hydralazine      Aphasia  -Due to stroke  -Continues to have mild expressive aphasia    Plan:   -Aggressive therapy  -PT/OT/SLP eval and treat    Hemiplegia  -Due to stroke  -Improving although continues to have RSW    Plan:   -Aggressive therapy  -PT/OT/SLP eval and treat. Rec high intensity, likely pursue IPR placement          04/24/2025 Repeat MRI Brain without contrast overnight with stable infarct, no hemorrhage. Ok to continue DAPT for secondary stroke prevention. TTE with EF 55-60%, +LAE, normal wall motion. Etiology of stroke likely cardioembolic. Patient will need cardiac event monitor at discharge. Exam overall improved post IR with TICI 2C. Ok to stepdown to NPU.   4/25/2025 NAEON, neuro exam stable and continues to have RSW and speech difficulty. Started  on rocephin for abx for UTI, urine cx with GNR and susceptibilities pending. Stepped down to NPU this morning. CT chest yesterday shows mediastinal and bilateral hilar lymphadenopathy, will obtain CT C/A/P for malignancy eval. Dispo planning ongoing for IPR, pending auth/acceptance for NS rehab.  4/26/2025 NAEON, neuro exam stable. Continuing antibiotics for UTI. CT C/A/P for malignancy eval complete, continued to show right lung nodule and lymphadenopathy. Also showed evidence of right obstructing renal stone. Dispo pending IPR placement.   4/27/2025 NAEON. Patient will complete antibiotics for UTI today. Urology consulted for obstructing right stone, plan for stent placement today. Dispo pending IPR placement.     STROKE DOCUMENTATION   Acute Stroke Times   Last Known Normal Date: 04/22/25  Last Known Normal Time: 1700  Stroke Team Called Date: 04/23/25  Stroke Team Called Time: 0915 (Telestroke at OSH)  Stroke Team Arrival Date: 04/23/25  Stroke Team Arrival Time: 0923 (OSH)  CT Interpretation Time: 0923  Thrombolytic Therapy Recommended: No  CTA Interpretation Time: 0945 (OSH)  Thrombectomy Recommended: Yes  MRI Acute Stroke Protocol Interpretation Time: 1000 (OSH)  Decision to Treat Time for IR: 1000 (Telestroke)    NIH Scale:  1a. Level of Consciousness: 0-->Alert, keenly responsive  1b. LOC Questions: 0-->Answers both questions correctly  1c. LOC Commands: 0-->Performs both tasks correctly  2. Best Gaze: 1-->Partial gaze palsy, gaze is abnormal in one or both eyes, but forced deviation or total gaze paresis is not present  3. Visual: 0-->No visual loss  4. Facial Palsy: 2-->Partial paralysis (total or near-total paralysis of lower face)  5a. Motor Arm, Left: 0-->No drift, limb holds 90 (or 45) degrees for full 10 secs  5b. Motor Arm, Right: 1-->Drift, limb holds 90 (or 45) degrees, but drifts down before full 10 secs, does not hit bed or other support  6a. Motor Leg, Left: 0-->No drift, leg holds 30 degree  position for full 5 secs  6b. Motor Leg, Right: 1-->Drift, leg falls by the end of the 5-sec period but does not hit bed  7. Limb Ataxia: 0-->Absent  8. Sensory: 0-->Normal, no sensory loss  9. Best Language: 0-->No aphasia, normal  10. Dysarthria: 1-->Mild-to-moderate dysarthria, patient slurs at least some words and, at worst, can be understood with some difficulty  11. Extinction and Inattention (formerly Neglect): 0-->No abnormality  Total (NIH Stroke Scale): 6       Modified Providence Score: 0  [place] Coma Scale:    ABCD2 Score:    [id number] Score:   HAS -BLED Score:   ICH Score:   Hunt & Bazzi Classification:      Hemorrhagic change of an Ischemic Stroke: Does this patient have an ischemic stroke with hemorrhagic changes? No     Neurologic Chief Complaint: L MCA stroke    Subjective:     Interval History: Patient is seen for follow-up neurological assessment and treatment recommendations: See hospital course.    HPI, Past Medical, Family, and Social History remains the same as documented in the initial encounter.     Review of Systems   Constitutional: Negative.    Eyes:  Negative for visual disturbance.   Respiratory: Negative.     Cardiovascular: Negative.    Gastrointestinal: Negative.    Genitourinary: Negative.    Neurological:  Positive for facial asymmetry, speech difficulty and weakness.     Scheduled Meds:   aspirin  81 mg Oral Daily    atorvastatin  40 mg Oral Daily    clopidogreL  75 mg Oral Daily    enoxparin  40 mg Subcutaneous Q24H (prophylaxis, 1700)    latanoprost  1 drop Both Eyes QHS    senna-docusate  1 tablet Oral BID    white petrolatum   Topical (Top) Daily     Continuous Infusions:   0.9% NaCl   Intravenous Continuous 100 mL/hr at 04/27/25 1145 Rate Change at 04/27/25 1145     PRN Meds:  Current Facility-Administered Medications:     acetaminophen, 650 mg, Oral, Q6H PRN    dextrose 50%, 12.5 g, Intravenous, PRN    glucagon (human recombinant), 1 mg, Intramuscular, PRN    hydrALAZINE, 10  mg, Intravenous, Q4H PRN    insulin aspart U-100, 0-10 Units, Subcutaneous, Q6H PRN    labetalol, 10 mg, Intravenous, Q4H PRN    sodium chloride 0.9%, 10 mL, Intravenous, PRN    sodium chloride 0.9%, 10 mL, Intravenous, PRN    Objective:     Vital Signs (Most Recent):  Temp: 98.9 °F (37.2 °C) (04/27/25 1144)  Pulse: (!) 57 (04/27/25 1148)  Resp: 18 (04/27/25 1144)  BP: (!) 148/73 (04/27/25 1144)  SpO2: 97 % (04/27/25 1144)  BP Location: Right forearm    Vital Signs Range (Last 24H):  Temp:  [98.5 °F (36.9 °C)-99 °F (37.2 °C)]   Pulse:  [57-66]   Resp:  [18-19]   BP: (144-172)/(55-73)   SpO2:  [95 %-99 %]   BP Location: Right forearm       Physical Exam  Vitals and nursing note reviewed.   Constitutional:       General: She is not in acute distress.  HENT:      Head: Normocephalic and atraumatic.   Eyes:      Extraocular Movements: Extraocular movements intact.      Conjunctiva/sclera: Conjunctivae normal.      Pupils: Pupils are equal, round, and reactive to light.   Cardiovascular:      Rate and Rhythm: Normal rate and regular rhythm.      Heart sounds: No murmur heard.  Pulmonary:      Effort: Pulmonary effort is normal. No respiratory distress.   Abdominal:      General: Abdomen is flat. There is no distension.      Palpations: Abdomen is soft.   Skin:     General: Skin is warm.      Findings: Bruising present.   Neurological:      Mental Status: She is alert.      Comments: See below for neuro exam   Psychiatric:         Mood and Affect: Mood normal.         Behavior: Behavior normal. Behavior is cooperative.              Neurological Exam:   LOC: alert  Attention Span: Good   Language: Expressive aphasia  Articulation: Dysarthria  Orientation: Person, Place, Time   Visual Fields: Full  EOM (CN III, IV, VI): Full/intact  Facial Movement (CN VII): Lower facial weakness on the Right  Motor: Arm left  Normal 5/5  Leg left  Normal 5/5  Arm right  Paresis: 4/5  Leg right Paresis: 3/5  Sensation: Intact to light  touch, temperature and vibration    Laboratory:  CMP:   Recent Labs   Lab 04/27/25  0301   GLUCOSE 90   CALCIUM 9.6   ALBUMIN 2.3*      K 4.0   CO2 20*      BUN 22   CREATININE 1.1   ALKPHOS 50   ALT 21   AST 22   BILITOT 0.3     CBC:   Recent Labs   Lab 04/27/25  0301   WBC 7.15   RBC 3.52*   HGB 9.1*   HCT 29.0*      MCV 82   MCH 25.9*   MCHC 31.4*     Lipid Panel:   Recent Labs   Lab 04/23/25  1340   CHOL 130   LDL 77.6   HDL 34*   TRIG 92      Coagulation:   Recent Labs   Lab 04/23/25  0925   INR 1.0     Hgb A1C:   Recent Labs   Lab 04/23/25  1340   HGBA1C 4.9     TSH:   Recent Labs   Lab 04/23/25  1340   TSH 1.747       Diagnostic Results     Brain Imaging:  MRI brain without contrast 4/24/2025  Impression:  Stable distribution of acute infarction in the MCA territory centered within the left subinsular cortex and the left basal ganglia.  Hemosiderin deposition within the left basal ganglia.  The findings may reflect microscopic blood products.  Attention on follow-up suggested.    MRI brain without contrast @ OSH 4/23/2025  Impression:  1. Regions of diffusion restriction involving the left basal ganglia and left insular cortex compatible with acute ischemia.  No evidence of associated hemorrhage or mass effect.  2. Involutional changes as above.    CTH without contrast @ OSH 4/23/2025  Impression:  1.  No acute intracranial hemorrhage, no hydrocephalus, herniation or midline shift.  2.  Ill-defined low-attenuation in the left insular cortex, and adjacent basal ganglia, possibly from small vessel ischemic disease and or edema from a subacute infarct, consider correlation with MRI and history.       Vessel Imaging:  IR cerebral angiogram 4/23/2025  Impression:  1. There is a left M1 segment occlusion.  Aspiration thrombectomy was performed (ADAPT) Technique was performed with restoration TICI 2C Flow in the left middle cerebral artery territory.  2.  Follow-up right vertebral angiogram  demonstrated some collateral flow to the left posterior MCA branches.  3.  Normal right common carotid angiogram.    CTA Head and Neck @ OSH - 4/23/2025  Impression:  1.  Focal high-grade stenosis/narrowing, with suspected thrombus/soft plaque in the left MCA  2.  Suspicious/pathologic mediastinal lymphadenopathy, while this may be related to granulomatous disease, lymphoma, metastasis/malignancy is a consideration and correlation with the laboratory values, history and CT chest follow-up would be warranted.  3.  No clinically significant stenosis or large vessel occlusion in the neck.       Cardiac Evaluation:   TTE 4/24/2025    Left Ventricle: The left ventricle is normal in size. Normal wall thickness. Normal wall motion. There is normal systolic function with a visually estimated ejection fraction of 55 - 60%. There is normal diastolic function.    Right Ventricle: Right ventricular enlargement. Wall thickness is normal. Systolic function is normal.    Left Atrium: Severely dilated    Mitral Valve: There is mild regurgitation.    Tricuspid Valve: There is mild regurgitation.    Pulmonic Valve: There is mild regurgitation.    Pulmonary Artery: The estimated pulmonary artery systolic pressure is 37 mmHg.    IVC/SVC: Normal venous pressure at 3 mmHg.      Rosendo Kaminski MD  Comprehensive Stroke Center  Department of Vascular Neurology   [facility])

## 2025-04-27 NOTE — NURSING TRANSFER
Nursing Transfer Note      4/27/2025   3:34 PM    Reason patient is being transferred: POST procedure    Transfer To: 928    Transfer via bed    Transported by RN and PCT    Order for Tele Monitor? Yes    Any special needs or follow-up needed: ROUTINE    Patient belongings transferred with patient: No    Chart send with patient: Yes    Notified:no one on text    Patient reassessed at: 1445, 04/27/2025

## 2025-04-27 NOTE — TRANSFER OF CARE
"Anesthesia Transfer of Care Note    Patient: Delilah Williamson    Procedure(s) Performed: Procedure(s) (LRB):  CYSTOSCOPY, WITH URETERAL STENT INSERTION (Right)    Patient location: PACU    Anesthesia Type: general    Transport from OR: Transported from OR on 6-10 L/min O2 by face mask with adequate spontaneous ventilation. Continuous SpO2 monitoring in transport    Post pain: adequate analgesia    Post assessment: no apparent anesthetic complications    Post vital signs: stable    Level of consciousness: awake    Nausea/Vomiting: no nausea/vomiting    Complications: none    Transfer of care protocol was followed    Last vitals: Visit Vitals  BP (!) 148/73   Pulse (!) 57   Temp 37.2 °C (98.9 °F)   Resp 18   Ht 5' 7" (1.702 m)   Wt 87.7 kg (193 lb 5.5 oz)   SpO2 97%   BMI 30.28 kg/m²     "

## 2025-04-27 NOTE — ASSESSMENT & PLAN NOTE
Delilah Williamson is a 85 year old women with a 6 mm ureteral stone found incidentally on abdominal imaging. The patient has a positive urine culture and has been on culture appropriate abx.     - NPO for right ureteral stent placement today   - No leukocytosis   - Creatinine at baseline  - Urine culture growing Klebsiella. The patient is on culture appopriate abx.    - Recommend mIVF until procedure  - Rest of care per primary team

## 2025-04-27 NOTE — ASSESSMENT & PLAN NOTE
-Area of cerebral edema identified when reviewing brain imaging in the Left MCA territory, there is not mass effect associated with it.   -Admitted to [facility] post-IR for close neuro monitoring and observation. Neuro exam and follow up imaging has remained stable and patient stepped down to NPU on 4/25.    Plan:   -Continue frequent q4h neuro checks as any acute changes or worsening neuro status may signify expansion of the insult and/or area of the edema, which may require acute interventions to prevent loss of function and/or death.  -Obtain stat CTH for any new or worsening neuro changes and notify primary team immediately

## 2025-04-27 NOTE — ASSESSMENT & PLAN NOTE
-CTA head and neck noted to have mediastinal lymphadenopathy. Designated chest CT obtained and revealed mediastinal and bilateral hilar lymphadenopathy, mild bilateral smooth interlobular septal thickening and trace bilateral pleural effusions, and 2 mm right lower lobe nodule.  No prior imaging available to compare.   -Per chart review, no significant smoking history or known h/o cancer.   -Unclear whether malignant vs autoimmune/inflammatory process, although no apparent signs of infection at this time.    Plan:   -CT C/A/P showed right lung nodule and lymphadenopathy previously noted

## 2025-04-27 NOTE — SUBJECTIVE & OBJECTIVE
Neurologic Chief Complaint: L MCA stroke    Subjective:     Interval History: Patient is seen for follow-up neurological assessment and treatment recommendations: See hospital course.    HPI, Past Medical, Family, and Social History remains the same as documented in the initial encounter.     Review of Systems   Constitutional: Negative.    Eyes:  Negative for visual disturbance.   Respiratory: Negative.     Cardiovascular: Negative.    Gastrointestinal: Negative.    Genitourinary: Negative.    Neurological:  Positive for facial asymmetry, speech difficulty and weakness.     Scheduled Meds:   aspirin  81 mg Oral Daily    atorvastatin  40 mg Oral Daily    clopidogreL  75 mg Oral Daily    enoxparin  40 mg Subcutaneous Q24H (prophylaxis, 1700)    latanoprost  1 drop Both Eyes QHS    senna-docusate  1 tablet Oral BID    white petrolatum   Topical (Top) Daily     Continuous Infusions:   0.9% NaCl   Intravenous Continuous 100 mL/hr at 04/27/25 1145 Rate Change at 04/27/25 1145     PRN Meds:  Current Facility-Administered Medications:     acetaminophen, 650 mg, Oral, Q6H PRN    dextrose 50%, 12.5 g, Intravenous, PRN    glucagon (human recombinant), 1 mg, Intramuscular, PRN    hydrALAZINE, 10 mg, Intravenous, Q4H PRN    insulin aspart U-100, 0-10 Units, Subcutaneous, Q6H PRN    labetalol, 10 mg, Intravenous, Q4H PRN    sodium chloride 0.9%, 10 mL, Intravenous, PRN    sodium chloride 0.9%, 10 mL, Intravenous, PRN    Objective:     Vital Signs (Most Recent):  Temp: 98.9 °F (37.2 °C) (04/27/25 1144)  Pulse: (!) 57 (04/27/25 1148)  Resp: 18 (04/27/25 1144)  BP: (!) 148/73 (04/27/25 1144)  SpO2: 97 % (04/27/25 1144)  BP Location: Right forearm    Vital Signs Range (Last 24H):  Temp:  [98.5 °F (36.9 °C)-99 °F (37.2 °C)]   Pulse:  [57-66]   Resp:  [18-19]   BP: (144-172)/(55-73)   SpO2:  [95 %-99 %]   BP Location: Right forearm       Physical Exam  Vitals and nursing note reviewed.   Constitutional:       General: She is not in  acute distress.  HENT:      Head: Normocephalic and atraumatic.   Eyes:      Extraocular Movements: Extraocular movements intact.      Conjunctiva/sclera: Conjunctivae normal.      Pupils: Pupils are equal, round, and reactive to light.   Cardiovascular:      Rate and Rhythm: Normal rate and regular rhythm.      Heart sounds: No murmur heard.  Pulmonary:      Effort: Pulmonary effort is normal. No respiratory distress.   Abdominal:      General: Abdomen is flat. There is no distension.      Palpations: Abdomen is soft.   Skin:     General: Skin is warm.      Findings: Bruising present.   Neurological:      Mental Status: She is alert.      Comments: See below for neuro exam   Psychiatric:         Mood and Affect: Mood normal.         Behavior: Behavior normal. Behavior is cooperative.              Neurological Exam:   LOC: alert  Attention Span: Good   Language: Expressive aphasia  Articulation: Dysarthria  Orientation: Person, Place, Time   Visual Fields: Full  EOM (CN III, IV, VI): Full/intact  Facial Movement (CN VII): Lower facial weakness on the Right  Motor: Arm left  Normal 5/5  Leg left  Normal 5/5  Arm right  Paresis: 4/5  Leg right Paresis: 3/5  Sensation: Intact to light touch, temperature and vibration    Laboratory:  CMP:   Recent Labs   Lab 04/27/25  0301   GLUCOSE 90   CALCIUM 9.6   ALBUMIN 2.3*      K 4.0   CO2 20*      BUN 22   CREATININE 1.1   ALKPHOS 50   ALT 21   AST 22   BILITOT 0.3     CBC:   Recent Labs   Lab 04/27/25  0301   WBC 7.15   RBC 3.52*   HGB 9.1*   HCT 29.0*      MCV 82   MCH 25.9*   MCHC 31.4*     Lipid Panel:   Recent Labs   Lab 04/23/25  1340   CHOL 130   LDL 77.6   HDL 34*   TRIG 92      Coagulation:   Recent Labs   Lab 04/23/25  0925   INR 1.0     Hgb A1C:   Recent Labs   Lab 04/23/25  1340   HGBA1C 4.9     TSH:   Recent Labs   Lab 04/23/25  1340   TSH 1.747       Diagnostic Results     Brain Imaging:  MRI brain without contrast 4/24/2025  Impression:  Stable  distribution of acute infarction in the MCA territory centered within the left subinsular cortex and the left basal ganglia.  Hemosiderin deposition within the left basal ganglia.  The findings may reflect microscopic blood products.  Attention on follow-up suggested.    MRI brain without contrast @ OSH 4/23/2025  Impression:  1. Regions of diffusion restriction involving the left basal ganglia and left insular cortex compatible with acute ischemia.  No evidence of associated hemorrhage or mass effect.  2. Involutional changes as above.    CTH without contrast @ OSH 4/23/2025  Impression:  1.  No acute intracranial hemorrhage, no hydrocephalus, herniation or midline shift.  2.  Ill-defined low-attenuation in the left insular cortex, and adjacent basal ganglia, possibly from small vessel ischemic disease and or edema from a subacute infarct, consider correlation with MRI and history.       Vessel Imaging:  IR cerebral angiogram 4/23/2025  Impression:  1. There is a left M1 segment occlusion.  Aspiration thrombectomy was performed (ADAPT) Technique was performed with restoration TICI 2C Flow in the left middle cerebral artery territory.  2.  Follow-up right vertebral angiogram demonstrated some collateral flow to the left posterior MCA branches.  3.  Normal right common carotid angiogram.    CTA Head and Neck @ OSH - 4/23/2025  Impression:  1.  Focal high-grade stenosis/narrowing, with suspected thrombus/soft plaque in the left MCA  2.  Suspicious/pathologic mediastinal lymphadenopathy, while this may be related to granulomatous disease, lymphoma, metastasis/malignancy is a consideration and correlation with the laboratory values, history and CT chest follow-up would be warranted.  3.  No clinically significant stenosis or large vessel occlusion in the neck.       Cardiac Evaluation:   TTE 4/24/2025    Left Ventricle: The left ventricle is normal in size. Normal wall thickness. Normal wall motion. There is normal  systolic function with a visually estimated ejection fraction of 55 - 60%. There is normal diastolic function.    Right Ventricle: Right ventricular enlargement. Wall thickness is normal. Systolic function is normal.    Left Atrium: Severely dilated    Mitral Valve: There is mild regurgitation.    Tricuspid Valve: There is mild regurgitation.    Pulmonic Valve: There is mild regurgitation.    Pulmonary Artery: The estimated pulmonary artery systolic pressure is 37 mmHg.    IVC/SVC: Normal venous pressure at 3 mmHg.

## 2025-04-27 NOTE — PLAN OF CARE
Problem: Adult Inpatient Plan of Care  Goal: Plan of Care Review  Outcome: Progressing  Goal: Patient-Specific Goal (Individualized)  Outcome: Progressing  Goal: Optimal Comfort and Wellbeing  Outcome: Progressing  Goal: Readiness for Transition of Care  Outcome: Progressing     Problem: Stroke, Ischemic (Includes Transient Ischemic Attack)  Goal: Effective Bowel Elimination  Outcome: Progressing  Goal: Optimal Cerebral Tissue Perfusion  Outcome: Progressing  Goal: Optimal Cognitive Function  Outcome: Progressing  Goal: Optimal Functional Ability  Outcome: Progressing  Goal: Improved Sensorimotor Function  Outcome: Progressing  Goal: Safe and Effective Swallow  Outcome: Progressing  Goal: Effective Urinary Elimination  Outcome: Progressing     Problem: Diabetes Comorbidity  Goal: Blood Glucose Level Within Targeted Range  Outcome: Progressing     Problem: Skin Injury Risk Increased  Goal: Skin Health and Integrity  Outcome: Progressing     Patient alert and Oriented; Verbally responsive to staffs; Able to make all her needs known to staffs. POC and stroke booklet reviewed with patient at the bed side. Questions and concerns addressed. Verbalization of understanding voiced. However, reinforcement needed. Stroke booklet remains at the bedside. VS assessed; see flowsheet. All safety measures maintained; Bed alarms; Bilateral boots; and call light in progress. Repositioning performed Q2hrs. POC ongoing.

## 2025-04-27 NOTE — ANESTHESIA PREPROCEDURE EVALUATION
[facility]  Anesthesia Pre-Operative Evaluation         Patient Name: Delilah Williamson  YOB: 1940  MRN: [id number]    SUBJECTIVE:     Pre-operative evaluation for Procedure(s) (LRB):  CYSTOSCOPY, WITH URETERAL STENT INSERTION (Right)     04/27/2025    **Pt accidentally submitted consent form prior to signing on Ipad. Paper consent in chart**    Delilah Williamson is a 85 y.o. female w/ a significant PMHx of HTN, DM2, LMCA stroke s/p IR thrombectomy on 04/23, R hemiplegia, UTI, and Ureterolithiasis.    Patient now presents for the above procedure(s).    Results for orders placed during the hospital encounter of 04/23/25    Echo    Interpretation Summary    Left Ventricle: The left ventricle is normal in size. Normal wall thickness. Normal wall motion. There is normal systolic function with a visually estimated ejection fraction of 55 - 60%. There is normal diastolic function.    Right Ventricle: Right ventricular enlargement. Wall thickness is normal. Systolic function is normal.    Left Atrium: Severely dilated    Mitral Valve: There is mild regurgitation.    Tricuspid Valve: There is mild regurgitation.    Pulmonic Valve: There is mild regurgitation.    Pulmonary Artery: The estimated pulmonary artery systolic pressure is 37 mmHg.    IVC/SVC: Normal venous pressure at 3 mmHg.      LDA:        Peripheral IV - Single Lumen 04/23/25 1618 20 G Anterior;Left;Proximal Forearm (Active)   Site Assessment Clean;Dry;Intact 04/27/25 0800   Line Securement Device Secured with sutureless device 04/27/25 0800   Extremity Assessment Distal to IV No abnormal discoloration;No redness;No swelling 04/27/25 0800   Line Status Saline locked 04/27/25 0800   Dressing Status Clean;Intact;Dry 04/27/25 0800   Dressing Intervention Integrity maintained 04/27/25 0800   Dressing Change Due 04/27/25 04/26/25 2000   Site Change Due 04/27/25 04/26/25 2000   Reason Not Rotated Not due 04/26/25 2000   Number of days: 3             Peripheral IV - Single Lumen 04/25/25 20 G Anterior;Left Upper Arm (Active)   Site Assessment Clean;Dry;Intact 04/27/25 0800   Line Securement Device Secured with sutureless device 04/27/25 0800   Extremity Assessment Distal to IV No abnormal discoloration 04/27/25 0800   Line Status Infusing 04/27/25 0800   Dressing Status Clean;Dry;Intact 04/27/25 0800   Dressing Intervention Integrity maintained 04/27/25 0800   Dressing Change Due 04/29/25 04/26/25 2000   Site Change Due 04/29/25 04/26/25 2000   Reason Not Rotated Not due 04/26/25 2000   Number of days: 2       Female External Urinary Catheter w/ Suction 04/23/25 1830 (Active)   Skin no redness;no breakdown 04/27/25 0800   Tolerance no signs/symptoms of discomfort 04/27/25 0800   Suction Continuous suction at 40 mmHg 04/26/25 2000   Date of last wick change 04/26/25 04/26/25 2000   Time of last wick change 2000 04/26/25 2000   Output (mL) 1000 mL 04/27/25 0300   Number of days: 3       Prev airway:     Intubation     Date/Time: 4/23/2025 11:28 AM     Performed by: Radha Vale CRNA  Authorized by: Tyson Delgado MD    Intubation:     Induction:  Rapid sequence induction    Intubated:  Postinduction    Mask Ventilation:  Not attempted    Attempts:  1    Attempted By:  CRNA    Method of Intubation:  Video laryngoscopy    Blade:  [place] 3    Laryngeal View Grade: Grade I - full view of cords      Difficult Airway Encountered?: No      Complications:  None    Airway Device:  Oral endotracheal tube    Airway Device Size:  7.5    Style/Cuff Inflation:  Cuffed (inflated to minimal occlusive pressure)    Inflation Amount (mL):  5    Tube secured:  21    Secured at:  The lips    Placement Verified By:  Capnometry    Complicating Factors:  None    Findings Post-Intubation:  BS equal bilateral and atraumatic/condition of teeth unchanged       Drips:    0.9% NaCl   Intravenous Continuous 100 mL/hr at 04/27/25 1145 Rate Change at 04/27/25 1145        Problem List[1]    Review of patient's allergies indicates:  No Known Allergies    Current Inpatient Medications:   aspirin  81 mg Oral Daily    atorvastatin  40 mg Oral Daily    clopidogreL  75 mg Oral Daily    enoxparin  40 mg Subcutaneous Q24H (prophylaxis, 1700)    latanoprost  1 drop Both Eyes QHS    senna-docusate  1 tablet Oral BID    white petrolatum   Topical (Top) Daily       Medications Ordered Prior to Encounter[2]    Past Surgical History:   Procedure Laterality Date    CATARACT EXTRACTION      ROTATOR CUFF REPAIR         Social History[3]    OBJECTIVE:     Vital Signs Range (Last 24H):  Temp:  [36.5 °C (97.7 °F)-37.2 °C (99 °F)]   Pulse:  [59-66]   Resp:  [18-19]   BP: (144-172)/(55-73)   SpO2:  [95 %-99 %]       Significant Labs:  Lab Results   Component Value Date    WBC 7.15 04/27/2025    HGB 9.1 (L) 04/27/2025    HCT 29.0 (L) 04/27/2025     04/27/2025    CHOL 130 04/23/2025    TRIG 92 04/23/2025    HDL 34 (L) 04/23/2025    ALT 21 04/27/2025    AST 22 04/27/2025     04/27/2025    K 4.0 04/27/2025     04/27/2025    CREATININE 1.1 04/27/2025    BUN 22 04/27/2025    CO2 20 (L) 04/27/2025    TSH 1.747 04/23/2025    INR 1.0 04/23/2025    HGBA1C 4.9 04/23/2025       Diagnostic Studies: No relevant studies.    EKG:   Results for orders placed or performed in visit on 04/24/25   EKG 12-lead    Collection Time: 04/24/25 12:57 AM   Result Value Ref Range    QRS Duration 114 ms    [facility] QTC Calculation 456 ms    Narrative    Test Reason :    Vent. Rate :  53 BPM     Atrial Rate :  53 BPM     P-R Int : 148 ms          QRS Dur : 114 ms      QT Int : 486 ms       P-R-T Axes :  81  66  78 degrees    QTcB Int : 456 ms    Sinus bradycardia  Low voltage QRS in the limb leads  Borderline Abnormal ECG  When compared with ECG of 23-Apr-2025 10:04,  No significant change was found  Confirmed by Magen Gómez ([id number]) on 4/24/2025 12:15:26 PM    Referred By: MICHELLE HERNANDEZ           Confirmed By:  Magen Gómez       2D ECHO:  TTE:  Results for orders placed or performed during the hospital encounter of 04/23/25   Echo   Result Value Ref Range    LV Diastolic Volume 120 mL    Echo EF Estimated 75 %    LV Systolic Volume 30 mL    IVS 0.9 0.6 - 1.1 cm    LVIDd 5.0 3.5 - 6.0 cm    LVIDs 2.8 2.1 - 4.0 cm    LVOT diameter 1.9 cm    PW 0.7 0.6 - 1.1 cm    AV LVOT peak gradient 6 mmHg    LVOT mn grad 3 mmHg    LVOT peak jagruti 1.2 m/s    LVOT peak VTI 27.6 cm    RV S' 27.41 cm/s    LA size 2.8 cm    Left Atrium Major Axis 6.1 cm    Left Atrium Minor Axis 5.8 cm    LA Vol (MOD) 87 mL    RA Major Axis 4.52 cm    RA Area 14.2 cm2    AV valve area 2.4 cm2    AV area by cont VTI 2.4 cm2    AV peak gradient 9 mmHg    AV mean gradient 5 mmHg    Ao peak jagruti 1.5 m/s    Ao VTI 32.4 cm    MV Peak A Jagruti 0.46 m/s    E wave deceleration time 227 ms    MV Peak E Jagruti 0.54 m/s    E/A ratio 1.17     LV LATERAL E/E' RATIO 6.0     LV SEPTAL E/E' RATIO 7.7     TDI LATERAL 0.09 m/s    TDI SEPTAL 0.07 m/s    TV peak gradient 33 mmHg    TR Max Jagruti 2.9 m/s    Ascending aorta 3.0 cm    STJ 2.5 cm    IVC diameter 1.22 cm    Sinus 3.37 cm    RA Width 3.65 cm    LA WIDTH 5.0 cm    TAPSE 3.2 cm    LVOT stroke volume 78.2 cm3    LVOT area 2.8 cm2    FS 44.0 28 - 44 %    Left Ventricle Relative Wall Thickness 0.28 cm    LV mass 135.8 g    E/E' ratio 7 m/s    LA Vol 71 cm3    AV Velocity Ratio 0.80     AV index (prosthetic) 0.85     KEVIN by Velocity Ratio 2.3 cm²    Mean e' 0.08 m/s    TV resting pulmonary artery pressure 37 mmHg    RV TB RVSP 6 mmHg    Est. RA pres 3 mmHg    Narrative      Left Ventricle: The left ventricle is normal in size. Normal wall   thickness. Normal wall motion. There is normal systolic function with a   visually estimated ejection fraction of 55 - 60%. There is normal   diastolic function.    Right Ventricle: Right ventricular enlargement. Wall thickness is   normal. Systolic function is normal.    Left Atrium: Severely  dilated    Mitral Valve: There is mild regurgitation.    Tricuspid Valve: There is mild regurgitation.    Pulmonic Valve: There is mild regurgitation.    Pulmonary Artery: The estimated pulmonary artery systolic pressure is   37 mmHg.    IVC/SVC: Normal venous pressure at 3 mmHg.         ROMAN:  No results found for this or any previous visit.    ASSESSMENT/PLAN:           Pre-op Assessment    I have reviewed the Patient Summary Reports.     I have reviewed the Nursing Notes. I have reviewed the NPO Status.   I have reviewed the Medications.     Review of Systems  Anesthesia Hx:  No problems with previous Anesthesia   History of prior surgery of interest to airway management or planning:          Denies Family Hx of Anesthesia complications.    Denies Personal Hx of Anesthesia complications.                    Hematology/Oncology:  Hematology Normal                                     EENT/Dental:  EENT/Dental Normal           Cardiovascular:     Hypertension           hyperlipidemia                               Pulmonary:  Pulmonary Normal                       Renal/:  Renal/ Normal                 Hepatic/GI:  Hepatic/GI Normal                    Musculoskeletal:  Musculoskeletal Normal                Neurological:   CVA                                    Endocrine:  Diabetes, type 2               Physical Exam  General: Well nourished, Cooperative, Alert and Oriented    Airway:  Mallampati: III   Mouth Opening: Normal  TM Distance: Normal  Tongue: Normal  Neck ROM: Normal ROM    Chest/Lungs:  Normal Respiratory Rate    Heart:  Rate: Normal        Anesthesia Plan  Type of Anesthesia, risks & benefits discussed:    Anesthesia Type: Gen ETT  Intra-op Monitoring Plan: Standard ASA Monitors  Post Op Pain Control Plan: multimodal analgesia and IV/PO Opioids PRN  Induction:  IV  Airway Plan: Direct and Video, Post-Induction  Informed Consent: Informed consent signed with the Patient and all parties understand the  risks and agree with anesthesia plan.  All questions answered.   ASA Score: 4  Day of Surgery Review of History & Physical: H&P Update referred to the surgeon/provider.    Ready For Surgery From Anesthesia Perspective.     .           [1]   Patient Active Problem List  Diagnosis    Hemiplegia    Aphasia    Embolic stroke involving left middle cerebral artery    HTN (hypertension)    Hyperlipidemia    Controlled type 2 diabetes mellitus without complication, without long-term current use of insulin    Cytotoxic brain edema    Lymphadenopathy    UTI (urinary tract infection)    Debility    Severe obesity (BMI 35.0-35.9 with comorbidity)    Ureterolithiasis   [2]   No current facility-administered medications on file prior to encounter.     Current Outpatient Medications on File Prior to Encounter   Medication Sig Dispense Refill    atorvastatin (LIPITOR) 40 MG tablet Take 40 mg by mouth once daily.      potassium chloride SA (K-DUR,KLOR-CON M) 10 MEQ tablet Take 10 mEq by mouth once daily.      acac/inulin/c-lose/mv-mn/folic (FIBER PLUS MULITVITAMIN ORAL) Take by mouth.      amLODIPine (NORVASC) 5 MG tablet Take 5 mg by mouth once daily.      ezetimibe (ZETIA) 10 mg tablet Take 1 tablet (10 mg total) by mouth once daily. 90 tablet 3    furosemide (LASIX) 20 MG tablet Take 20 mg by mouth once daily.      glimepiride (AMARYL) 1 MG tablet Take 1 tablet (1 mg total) by mouth before breakfast. 90 tablet 3    latanoprost 0.005 % ophthalmic solution INSTILL 1 DROP INTO BOTH EYES AT BEDTIME AS DIRECTED INSTILL 1 DROP INTO BOTH EYES AT BEDTIME      metoprolol succinate (TOPROL-XL) 50 MG 24 hr tablet Take 50 mg by mouth every evening.      telmisartan (MICARDIS) 80 MG Tab Take 80 mg by mouth once daily.     [3]   Social History  Socioeconomic History    Marital status: Single   Tobacco Use    Smoking status: Never    Smokeless tobacco: Never   Substance and Sexual Activity    Alcohol use: Not Currently    Drug use: Never     Sexual activity: Not Currently     Social Drivers of Health     Financial Resource Strain: Low Risk  (4/25/2025)    Overall Financial Resource Strain (CARDIA)     Difficulty of Paying Living Expenses: Not hard at all   Food Insecurity: No Food Insecurity (4/25/2025)    Hunger Vital Sign     Worried About Running Out of Food in the Last Year: Never true     Ran Out of Food in the Last Year: Never true   Transportation Needs: No Transportation Needs (4/25/2025)    PRAPARE - Transportation     Lack of Transportation (Medical): No     Lack of Transportation (Non-Medical): No   Stress: No Stress Concern Present (4/25/2025)    [employer] [employer] - Occupational Stress Questionnaire     Feeling of Stress : Not at all   Housing Stability: Low Risk  (4/25/2025)    Housing Stability Vital Sign     Unable to Pay for Housing in the Last Year: No     Number of Times Moved in the Last Year: 0     Homeless in the Last Year: No

## 2025-04-27 NOTE — ANESTHESIA POSTPROCEDURE EVALUATION
Anesthesia Post Evaluation    Patient: Delilah Williamson    Procedure(s) Performed: Procedure(s) (LRB):  CYSTOSCOPY, WITH URETERAL STENT INSERTION (Right)    Final Anesthesia Type: general      Patient location during evaluation: PACU  Patient participation: Yes- Able to Participate  Level of consciousness: awake and alert  Post-procedure vital signs: reviewed and stable  Pain management: adequate  Airway patency: patent    PONV status at discharge: No PONV  Anesthetic complications: no      Cardiovascular status: blood pressure returned to baseline  Respiratory status: unassisted  Follow-up not needed.              Vitals Value Taken Time   /67 04/27/25 15:02   Temp 36.7 °C (98.1 °F) 04/27/25 14:44   Pulse 54 04/27/25 15:12   Resp 18 04/27/25 15:12   SpO2 100 % 04/27/25 15:12   Vitals shown include unfiled device data.      Event Time   Out of Recovery 04/27/2025 15:00:00         Pain/Fernando Score: Pain Rating Prior to Med Admin: 0 (4/27/2025  4:00 AM)  Pain Rating Post Med Admin: 0 (4/27/2025  4:00 AM)  Fernando Score: 9 (4/27/2025  3:00 PM)

## 2025-04-27 NOTE — CONSULTS
Andre Carver - Neurosurgery ([facility])  Urology  Consult Note    Patient Name: Delilah Williamson  MRN: [id number]  Admission Date: 4/23/2025  Hospital Length of Stay: 4   Code Status: Full Code   Attending Provider: Tyson Wallace MD   Consulting Provider: Shalom Rowe MD  Primary Care Physician: Juan Abarca MD  Principal Problem:Embolic stroke involving left middle cerebral artery    Inpatient consult to Urology  Consult performed by: Shalom Rowe MD  Consult ordered by: Shayna Elizondo MD  Reason for consult: Right ureterolithiasis          Subjective:     HPI:  Delilah Williamson is a 85 year old women with a PMH of HTN, DM2 and OAB that was transferred from [facility] for management of an acute L MCA stroke. She ultimately underwent a thrombectomy with IR. A CT chest showed mediastinal and bilateral hilar lymphadenopathy which prompted a CT C/A/P to rule out malignancy. At that time the patient was noted to have a 6 mm right proximal ureteral stone. Urine culture (4/23) positive for Klebsiella, she has been on culture appropriate antibiotics.    On assessment the patient was hemodynamically stable, afebrile and responding appropriately. No flank pain on physical exam. Creatinine near baseline. No leukocytosis.     Past Medical History:   Diagnosis Date    Diabetes mellitus, type 2     Glaucoma     Hyperlipidemia     Hypertension        Past Surgical History:   Procedure Laterality Date    CATARACT EXTRACTION      ROTATOR CUFF REPAIR         Review of patient's allergies indicates:  No Known Allergies    Family History       Problem Relation (Age of Onset)    Breast cancer Paternal Aunt            Tobacco Use    Smoking status: Never    Smokeless tobacco: Never   Substance and Sexual Activity    Alcohol use: Not Currently    Drug use: Never    Sexual activity: Not Currently       Review of Systems   Constitutional:  Negative for chills and fever.   Genitourinary:  Negative for flank pain.  "      Objective:     Temp:  [97.7 °F (36.5 °C)-99 °F (37.2 °C)] 99 °F (37.2 °C)  Pulse:  [59-66] 60  Resp:  [18-19] 18  SpO2:  [95 %-99 %] 95 %  BP: (144-172)/(55-73) 144/70  Weight: 87.7 kg (193 lb 5.5 oz)  Body mass index is 30.28 kg/m².           Drains       Drain  Duration             Female External Urinary Catheter w/ Suction 04/23/25 1830 3 days                     Physical Exam  Vitals reviewed.   Constitutional:       Appearance: Normal appearance. She is obese.   Pulmonary:      Effort: Pulmonary effort is normal.   Abdominal:      General: Abdomen is flat.      Palpations: Abdomen is soft.   Skin:     General: Skin is warm.      Capillary Refill: Capillary refill takes less than 2 seconds.   Neurological:      General: No focal deficit present.      Mental Status: She is alert and oriented to person, place, and time.   Psychiatric:         Mood and Affect: Mood normal.          Significant Labs:    BMP:  Recent Labs   Lab 04/25/25  0207 04/26/25  0626 04/27/25  0301    139 136   K 3.9 3.8 4.0    108 109   CO2 25 24 20*   BUN 26* 27* 22   CREATININE 1.1 1.1 1.1   GLUCOSE 110 104 90   CALCIUM 10.3 9.7 9.6       CBC:  Recent Labs   Lab 04/25/25  0207 04/26/25  0626 04/27/25  0301   WBC 8.96 6.17 7.15   HGB 10.7* 9.6* 9.1*   HCT 33.1* 30.2* 29.0*    267 250       Blood Culture: No results for input(s): "LABBLOO" in the last 168 hours.  Urine Culture:   Recent Labs   Lab 04/23/25  1345   LABURIN >100,000 cfu/ml Klebsiella pneumoniae*     Urine Studies:   Recent Labs   Lab 04/23/25  1345   COLORU Yellow   APPEARANCEUA Clear   PHUR 6.0   SPECGRAV 1.010   PROTEINUA Negative   GLUCUA Negative   BILIRUBINUA Negative   OCCULTUA 1+*   NITRITE Positive*   UROBILINOGEN Negative   LEUKOCYTESUR 3+*   RBCUA 10*   WBCUA 55*   BACTERIA Many*       Significant Imaging:  All pertinent imaging results/findings from the past 24 hours have been reviewed.                    Assessment and Plan: "     Ureterolithiasis  Delilah Williamson is a 85 year old women with a 6 mm ureteral stone found incidentally on abdominal imaging. The patient has a positive urine culture and has been on culture appropriate abx.     - NPO for right ureteral stent placement today   - No leukocytosis   - Creatinine at baseline  - Urine culture growing Klebsiella. The patient is on culture appopriate abx.    - Recommend mIVF until procedure  - Rest of care per primary team         VTE Risk Mitigation (From admission, onward)           Ordered     enoxaparin injection 40 mg  Every 24 hours         04/24/25 0857     IP VTE HIGH RISK PATIENT  Once         04/23/25 1212     Place sequential compression device  Until discontinued         04/23/25 1212     Reason for No Pharmacological VTE Prophylaxis  Once        Question:  Reasons:  Answer:  Risk of Bleeding    04/23/25 1212                    Thank you for your consult. I will follow-up with patient. Please contact us if you have any additional questions.    Shalom Rowe MD  Urology  Andre phyllis - Neurosurgery ([facility])

## 2025-04-27 NOTE — OP NOTE
[facility]  Operative Note    Date: 04/27/2025    Pre-Op Diagnosis: Right ureterolithiasis     Post-Op Diagnosis: same    Procedure(s) Performed:    1. Cystoscopy with right ureteral JJ stent placement  2. Fluoroscopy < 1 h    Specimen(s): None    Staff Surgeon: Bill Moreno MD    Assistant Surgeon: Shalom Rowe MD    Anesthesia: Monitored Local Anesthesia with Sedation    Indications: Delilah Williamson is a 85 y.o. female with right ureterolithiasis with infected urine.    Findings:  Radiopaque stone  film. No pyonephrosis upon stent placement.     Estimated Blood Loss: min    Drains:  6 Uzbek x 24 cm right JJ ureteral stent without strings    Procedure in Detail:  After risks, benefits and possible complications of the procedure were explained, the patient elected to undergo the procedure and informed consent was obtained. All questions were answered in the own-operative area. The patient was transferred to the cystoscopy suite and placed on the fluoroscopy table in the supine position.  SCDs were applied and working. Time out was performed, won-procedural antibiotics were given. Anesthesia was administered.  After adequate anesthesia the patient was placed in dorsal lithotomy position and prepped and draped in the usual sterile fashion.     A rigid cystoscope in a 22 Fr sheath was introduced into the patients bladder per urethra. This passed easily.  The entire urethra was visualized and revealed no strictures or masses.  Cystoscopy was performed which showed the right and left ureteral orifices in the normal anatomic position.  There were no bladder tumors, no  trabeculations, and no stones.      Our attention was turned to the patient's right ureteral orifice.  A motion wire was advanced up the right ureteral orifice to the level of the expected renal pelvis.  This was confirmed using fluoroscopy.     We then passed a 6 Fr x 24 cm JJ ureteral stent without strings over the wire to the  level of the renal pelvis under direct vision as well as flouroscopy. The guide wire was removed.  A 180 degree coil was observed in the renal pelvis as well as the bladder using fluoroscopy.  A 180 degree coil was also seen using direct visualization in the bladder.     The patient tolerated the procedure well and was transferred to the recovery room in stable condition.      Disposition: The patient will be transferred to the stroke service. The patient will need two weeks of culture appropriate antibiotics. Will arrange outpatient follow up to discuss definitive stone management. Urology to sign off.        Shalom Rowe MD

## 2025-04-27 NOTE — SUBJECTIVE & OBJECTIVE
Past Medical History:   Diagnosis Date    Diabetes mellitus, type 2     Glaucoma     Hyperlipidemia     Hypertension        Past Surgical History:   Procedure Laterality Date    CATARACT EXTRACTION      ROTATOR CUFF REPAIR         Review of patient's allergies indicates:  No Known Allergies    Family History       Problem Relation (Age of Onset)    Breast cancer Paternal Aunt            Tobacco Use    Smoking status: Never    Smokeless tobacco: Never   Substance and Sexual Activity    Alcohol use: Not Currently    Drug use: Never    Sexual activity: Not Currently       Review of Systems   Constitutional:  Negative for chills and fever.   Genitourinary:  Negative for flank pain.       Objective:     Temp:  [97.7 °F (36.5 °C)-99 °F (37.2 °C)] 99 °F (37.2 °C)  Pulse:  [59-66] 60  Resp:  [18-19] 18  SpO2:  [95 %-99 %] 95 %  BP: (144-172)/(55-73) 144/70  Weight: 87.7 kg (193 lb 5.5 oz)  Body mass index is 30.28 kg/m².           Drains       Drain  Duration             Female External Urinary Catheter w/ Suction 04/23/25 1830 3 days                     Physical Exam  Vitals reviewed.   Constitutional:       Appearance: Normal appearance. She is obese.   Pulmonary:      Effort: Pulmonary effort is normal.   Abdominal:      General: Abdomen is flat.      Palpations: Abdomen is soft.   Skin:     General: Skin is warm.      Capillary Refill: Capillary refill takes less than 2 seconds.   Neurological:      General: No focal deficit present.      Mental Status: She is alert and oriented to person, place, and time.   Psychiatric:         Mood and Affect: Mood normal.          Significant Labs:    BMP:  Recent Labs   Lab 04/25/25 0207 04/26/25  0626 04/27/25  0301    139 136   K 3.9 3.8 4.0    108 109   CO2 25 24 20*   BUN 26* 27* 22   CREATININE 1.1 1.1 1.1   GLUCOSE 110 104 90   CALCIUM 10.3 9.7 9.6       CBC:  Recent Labs   Lab 04/25/25  0207 04/26/25  0626 04/27/25  0301   WBC 8.96 6.17 7.15   HGB 10.7* 9.6* 9.1*  "  HCT 33.1* 30.2* 29.0*    267 250       Blood Culture: No results for input(s): "LABBLOO" in the last 168 hours.  Urine Culture:   Recent Labs   Lab 04/23/25  1345   LABURIN >100,000 cfu/ml Klebsiella pneumoniae*     Urine Studies:   Recent Labs   Lab 04/23/25  1345   COLORU Yellow   APPEARANCEUA Clear   PHUR 6.0   SPECGRAV 1.010   PROTEINUA Negative   GLUCUA Negative   BILIRUBINUA Negative   OCCULTUA 1+*   NITRITE Positive*   UROBILINOGEN Negative   LEUKOCYTESUR 3+*   RBCUA 10*   WBCUA 55*   BACTERIA Many*       Significant Imaging:  All pertinent imaging results/findings from the past 24 hours have been reviewed.                  "

## 2025-04-27 NOTE — PLAN OF CARE
Problem: Adult Inpatient Plan of Care  Goal: Plan of Care Review  Outcome: Progressing  Goal: Patient-Specific Goal (Individualized)  Outcome: Progressing  Goal: Absence of Hospital-Acquired Illness or Injury  Outcome: Progressing  Goal: Optimal Comfort and Wellbeing  Outcome: Progressing  Goal: Readiness for Transition of Care  Outcome: Progressing     Problem: Stroke, Ischemic (Includes Transient Ischemic Attack)  Goal: Optimal Coping  Outcome: Progressing  Goal: Effective Bowel Elimination  Outcome: Progressing  Goal: Optimal Cerebral Tissue Perfusion  Outcome: Progressing  Goal: Optimal Cognitive Function  Outcome: Progressing  Goal: Improved Communication Skills  Outcome: Progressing  Goal: Optimal Functional Ability  Outcome: Progressing  Goal: Optimal Nutrition Intake  Outcome: Progressing  Goal: Effective Oxygenation and Ventilation  Outcome: Progressing  Goal: Improved Sensorimotor Function  Outcome: Progressing  Goal: Safe and Effective Swallow  Outcome: Progressing  Goal: Effective Urinary Elimination  Outcome: Progressing     Problem: Wound  Goal: Optimal Coping  Outcome: Progressing  Goal: Optimal Functional Ability  Outcome: Progressing  Goal: Absence of Infection Signs and Symptoms  Outcome: Progressing  Goal: Improved Oral Intake  Outcome: Progressing  Goal: Optimal Pain Control and Function  Outcome: Progressing  Goal: Skin Health and Integrity  Outcome: Progressing  Goal: Optimal Wound Healing  Outcome: Progressing     Problem: Diabetes Comorbidity  Goal: Blood Glucose Level Within Targeted Range  Outcome: Progressing     Problem: Skin Injury Risk Increased  Goal: Skin Health and Integrity  Outcome: Progressing     Problem: Fall Injury Risk  Goal: Absence of Fall and Fall-Related Injury  Outcome: Progressing

## 2025-04-27 NOTE — PT/OT/SLP PROGRESS
Occupational Therapy      Patient Name:  Delilah Williamson   MRN:  [id number]    Patient not seen today secondary to off floor for procedure . Will follow-up as appropriate.    4/27/2025

## 2025-04-27 NOTE — ASSESSMENT & PLAN NOTE
-Stroke risk factor  -A1c 4.9    Plan:   -Glucose goal while inpatient 140-180, avoid hypoglycemia  -Hold home anti-hyperglycemics while admitted  -[facility] SSI PRN

## 2025-04-28 ENCOUNTER — TELEPHONE (OUTPATIENT)
Dept: UROLOGY | Facility: CLINIC | Age: 85
End: 2025-04-28
Payer: MEDICARE

## 2025-04-28 PROBLEM — K59.00 CONSTIPATION: Status: ACTIVE | Noted: 2025-04-28

## 2025-04-28 LAB
ABSOLUTE EOSINOPHIL (OHS): 0.06 K/UL
ABSOLUTE MONOCYTE (OHS): 0.56 K/UL (ref 0.3–1)
ABSOLUTE NEUTROPHIL COUNT (OHS): 4.48 K/UL (ref 1.8–7.7)
ALBUMIN SERPL BCP-MCNC: 2.4 G/DL (ref 3.5–5.2)
ALP SERPL-CCNC: 51 UNIT/L (ref 40–150)
ALT SERPL W/O P-5'-P-CCNC: 20 UNIT/L (ref 10–44)
ANION GAP (OHS): 5 MMOL/L (ref 8–16)
AST SERPL-CCNC: 20 UNIT/L (ref 11–45)
BASOPHILS # BLD AUTO: 0.01 K/UL
BASOPHILS NFR BLD AUTO: 0.2 %
BILIRUB SERPL-MCNC: 0.3 MG/DL (ref 0.1–1)
BUN SERPL-MCNC: 24 MG/DL (ref 8–23)
CALCIUM SERPL-MCNC: 9.5 MG/DL (ref 8.7–10.5)
CHLORIDE SERPL-SCNC: 110 MMOL/L (ref 95–110)
CO2 SERPL-SCNC: 22 MMOL/L (ref 23–29)
CREAT SERPL-MCNC: 1.2 MG/DL (ref 0.5–1.4)
ERYTHROCYTE [DISTWIDTH] IN BLOOD BY AUTOMATED COUNT: 14.6 % (ref 11.5–14.5)
GFR SERPLBLD CREATININE-BSD FMLA CKD-EPI: 44 ML/MIN/1.73/M2
GLUCOSE SERPL-MCNC: 107 MG/DL (ref 70–110)
HCT VFR BLD AUTO: 30.9 % (ref 37–48.5)
HGB BLD-MCNC: 9.4 GM/DL (ref 12–16)
IMM GRANULOCYTES # BLD AUTO: 0.04 K/UL (ref 0–0.04)
IMM GRANULOCYTES NFR BLD AUTO: 0.6 % (ref 0–0.5)
LACTATE SERPL-SCNC: 1.5 MMOL/L (ref 0.5–2.2)
LYMPHOCYTES # BLD AUTO: 1.43 K/UL (ref 1–4.8)
MAGNESIUM SERPL-MCNC: 1.7 MG/DL (ref 1.6–2.6)
MCH RBC QN AUTO: 25.1 PG (ref 27–31)
MCHC RBC AUTO-ENTMCNC: 30.4 G/DL (ref 32–36)
MCV RBC AUTO: 83 FL (ref 82–98)
NUCLEATED RBC (/100WBC) (OHS): 0 /100 WBC
OHS QRS DURATION: 98 MS
OHS QTC CALCULATION: 414 MS
PHOSPHATE SERPL-MCNC: 3.1 MG/DL (ref 2.7–4.5)
PLATELET # BLD AUTO: 272 K/UL (ref 150–450)
PMV BLD AUTO: 10.2 FL (ref 9.2–12.9)
POCT GLUCOSE: 106 MG/DL (ref 70–110)
POCT GLUCOSE: 123 MG/DL (ref 70–110)
POCT GLUCOSE: 125 MG/DL (ref 70–110)
POCT GLUCOSE: 131 MG/DL (ref 70–110)
POTASSIUM SERPL-SCNC: 4 MMOL/L (ref 3.5–5.1)
PROT SERPL-MCNC: 5.2 GM/DL (ref 6–8.4)
RBC # BLD AUTO: 3.74 M/UL (ref 4–5.4)
RELATIVE EOSINOPHIL (OHS): 0.9 %
RELATIVE LYMPHOCYTE (OHS): 21.7 % (ref 18–48)
RELATIVE MONOCYTE (OHS): 8.5 % (ref 4–15)
RELATIVE NEUTROPHIL (OHS): 68.1 % (ref 38–73)
SODIUM SERPL-SCNC: 137 MMOL/L (ref 136–145)
WBC # BLD AUTO: 6.58 K/UL (ref 3.9–12.7)

## 2025-04-28 PROCEDURE — 25000003 PHARM REV CODE 250: Performed by: PHYSICIAN ASSISTANT

## 2025-04-28 PROCEDURE — 11000001 HC ACUTE MED/SURG PRIVATE ROOM

## 2025-04-28 PROCEDURE — 92526 ORAL FUNCTION THERAPY: CPT

## 2025-04-28 PROCEDURE — 85025 COMPLETE CBC W/AUTO DIFF WBC: CPT | Performed by: PHYSICIAN ASSISTANT

## 2025-04-28 PROCEDURE — 83605 ASSAY OF LACTIC ACID: CPT

## 2025-04-28 PROCEDURE — 25000003 PHARM REV CODE 250: Performed by: STUDENT IN AN ORGANIZED HEALTH CARE EDUCATION/TRAINING PROGRAM

## 2025-04-28 PROCEDURE — 80053 COMPREHEN METABOLIC PANEL: CPT | Performed by: PHYSICIAN ASSISTANT

## 2025-04-28 PROCEDURE — 63600175 PHARM REV CODE 636 W HCPCS: Performed by: STUDENT IN AN ORGANIZED HEALTH CARE EDUCATION/TRAINING PROGRAM

## 2025-04-28 PROCEDURE — 36415 COLL VENOUS BLD VENIPUNCTURE: CPT | Performed by: PHYSICIAN ASSISTANT

## 2025-04-28 PROCEDURE — 83735 ASSAY OF MAGNESIUM: CPT | Performed by: PHYSICIAN ASSISTANT

## 2025-04-28 PROCEDURE — 36415 COLL VENOUS BLD VENIPUNCTURE: CPT

## 2025-04-28 PROCEDURE — 97530 THERAPEUTIC ACTIVITIES: CPT

## 2025-04-28 PROCEDURE — 92507 TX SP LANG VOICE COMM INDIV: CPT

## 2025-04-28 PROCEDURE — 25000003 PHARM REV CODE 250: Performed by: PSYCHIATRY & NEUROLOGY

## 2025-04-28 PROCEDURE — 97530 THERAPEUTIC ACTIVITIES: CPT | Mod: CQ

## 2025-04-28 PROCEDURE — A4216 STERILE WATER/SALINE, 10 ML: HCPCS | Performed by: PHYSICIAN ASSISTANT

## 2025-04-28 PROCEDURE — 97535 SELF CARE MNGMENT TRAINING: CPT

## 2025-04-28 PROCEDURE — 97116 GAIT TRAINING THERAPY: CPT | Mod: CQ

## 2025-04-28 PROCEDURE — 84100 ASSAY OF PHOSPHORUS: CPT | Performed by: PHYSICIAN ASSISTANT

## 2025-04-28 PROCEDURE — 99233 SBSQ HOSP IP/OBS HIGH 50: CPT | Mod: GC,,, | Performed by: PSYCHIATRY & NEUROLOGY

## 2025-04-28 PROCEDURE — 63600175 PHARM REV CODE 636 W HCPCS

## 2025-04-28 RX ORDER — BISACODYL 10 MG/1
10 SUPPOSITORY RECTAL DAILY PRN
Status: DISCONTINUED | OUTPATIENT
Start: 2025-04-28 | End: 2025-04-30 | Stop reason: HOSPADM

## 2025-04-28 RX ORDER — POLYETHYLENE GLYCOL 3350 17 G/17G
17 POWDER, FOR SOLUTION ORAL DAILY
Status: DISCONTINUED | OUTPATIENT
Start: 2025-04-28 | End: 2025-04-30 | Stop reason: HOSPADM

## 2025-04-28 RX ORDER — NITROFURANTOIN 25; 75 MG/1; MG/1
100 CAPSULE ORAL EVERY 12 HOURS
Status: DISCONTINUED | OUTPATIENT
Start: 2025-04-28 | End: 2025-04-30 | Stop reason: HOSPADM

## 2025-04-28 RX ADMIN — SENNOSIDES AND DOCUSATE SODIUM 1 TABLET: 50; 8.6 TABLET ORAL at 08:04

## 2025-04-28 RX ADMIN — ATORVASTATIN CALCIUM 40 MG: 40 TABLET, FILM COATED ORAL at 09:04

## 2025-04-28 RX ADMIN — WHITE PETROLATUM: 1.75 OINTMENT TOPICAL at 09:04

## 2025-04-28 RX ADMIN — POLYETHYLENE GLYCOL 3350 17 G: 17 POWDER, FOR SOLUTION ORAL at 10:04

## 2025-04-28 RX ADMIN — CEFTRIAXONE 1 G: 1 INJECTION, POWDER, FOR SOLUTION INTRAMUSCULAR; INTRAVENOUS at 09:04

## 2025-04-28 RX ADMIN — CLOPIDOGREL 75 MG: 75 TABLET ORAL at 09:04

## 2025-04-28 RX ADMIN — NITROFURANTOIN MONOHYDRATE/MACROCRYSTALS 100 MG: 25; 75 CAPSULE ORAL at 10:04

## 2025-04-28 RX ADMIN — ASPIRIN 81 MG CHEWABLE TABLET 81 MG: 81 TABLET CHEWABLE at 09:04

## 2025-04-28 RX ADMIN — NITROFURANTOIN MONOHYDRATE/MACROCRYSTALS 100 MG: 25; 75 CAPSULE ORAL at 08:04

## 2025-04-28 RX ADMIN — SODIUM CHLORIDE, POTASSIUM CHLORIDE, SODIUM LACTATE AND CALCIUM CHLORIDE 1000 ML: 600; 310; 30; 20 INJECTION, SOLUTION INTRAVENOUS at 11:04

## 2025-04-28 RX ADMIN — LATANOPROST 1 DROP: 50 SOLUTION OPHTHALMIC at 08:04

## 2025-04-28 RX ADMIN — SENNOSIDES AND DOCUSATE SODIUM 1 TABLET: 50; 8.6 TABLET ORAL at 09:04

## 2025-04-28 RX ADMIN — Medication 10 ML: at 08:04

## 2025-04-28 NOTE — PLAN OF CARE
POC updated and reviewed with the patient at the bedside. Questions regarding POC were encouraged and addressed. VSS, see flowsheets. Tele maintained per provider's order. Patient is AOX 4 at this time. No acute events noted during shift. Fall, and safety precautions maintained, no signs of injury noted during shift. Patient repositioned independently and with assistance in bed for comfort, pt OOB to chair w/ meals. Upon exiting room, patient's bed locked in low position, side rails up x 2, bed alarm on, with call light within reach. Instructed patient to call staff for mobility, verbalized understanding. Stroke book and stroke education reviewed with the patient at the bedside, see education flowsheets for details. No acute signs of distress noted at this time.      -Priscilla Interiano    Problem: Adult Inpatient Plan of Care  Goal: Plan of Care Review  Outcome: Progressing  Goal: Optimal Comfort and Wellbeing  Outcome: Progressing  Goal: Readiness for Transition of Care  Outcome: Progressing     Problem: Stroke, Ischemic (Includes Transient Ischemic Attack)  Goal: Optimal Coping  Outcome: Progressing  Goal: Effective Bowel Elimination  Outcome: Progressing  Goal: Improved Communication Skills  Outcome: Progressing  Goal: Optimal Functional Ability  Outcome: Progressing  Goal: Optimal Nutrition Intake  Outcome: Progressing  Goal: Safe and Effective Swallow  Outcome: Progressing  Goal: Effective Urinary Elimination  Outcome: Progressing     Problem: Wound  Goal: Optimal Coping  Outcome: Progressing  Goal: Skin Health and Integrity  Outcome: Progressing  Goal: Optimal Wound Healing  Outcome: Progressing     Problem: Diabetes Comorbidity  Goal: Blood Glucose Level Within Targeted Range  Outcome: Progressing     Problem: Skin Injury Risk Increased  Goal: Skin Health and Integrity  Outcome: Progressing     Problem: Fall Injury Risk  Goal: Absence of Fall and Fall-Related Injury  Outcome: Progressing

## 2025-04-28 NOTE — ASSESSMENT & PLAN NOTE
-UA on admission with 1+ blood, + nitrites, 3+ leukocyte esterase, many bacteria  -Ucx with GNR >100K CFU, susceptibility pending    Plan:   -Completed rocephin x 3 days for abx (EOT 4/27/25)

## 2025-04-28 NOTE — SUBJECTIVE & OBJECTIVE
Neurologic Chief Complaint: L MCA stroke    Subjective:     Interval History: Patient is seen for follow-up neurological assessment and treatment recommendations: See hospital course.    HPI, Past Medical, Family, and Social History remains the same as documented in the initial encounter.     Review of Systems   Constitutional: Negative.    Eyes:  Negative for visual disturbance.   Respiratory: Negative.     Cardiovascular: Negative.    Gastrointestinal: Negative.    Genitourinary: Negative.    Neurological:  Positive for facial asymmetry, speech difficulty and weakness.     Scheduled Meds:   aspirin  81 mg Oral Daily    atorvastatin  40 mg Oral Daily    clopidogreL  75 mg Oral Daily    lactated ringers  1,000 mL Intravenous Once    latanoprost  1 drop Both Eyes QHS    nitrofurantoin (macrocrystal-monohydrate)  100 mg Oral Q12H    polyethylene glycol  17 g Oral Daily    senna-docusate  1 tablet Oral BID    white petrolatum   Topical (Top) Daily     Continuous Infusions:      PRN Meds:  Current Facility-Administered Medications:     acetaminophen, 650 mg, Oral, Q6H PRN    bisacodyL, 10 mg, Rectal, Daily PRN    dextrose 50%, 12.5 g, Intravenous, PRN    dextrose 50%, 12.5 g, Intravenous, PRN    diphenhydrAMINE, 25 mg, Intravenous, Q6H PRN    fentaNYL, 25 mcg, Intravenous, Q5 Min PRN    glucagon (human recombinant), 1 mg, Intramuscular, PRN    glucagon (human recombinant), 1 mg, Intramuscular, PRN    hydrALAZINE, 10 mg, Intravenous, Q4H PRN    HYDROmorphone, 0.2 mg, Intravenous, Q5 Min PRN    HYDROmorphone, 0.2 mg, Intravenous, Q5 Min PRN    insulin aspart U-100, 0-10 Units, Subcutaneous, Q6H PRN    labetalol, 10 mg, Intravenous, Q4H PRN    ondansetron, 4 mg, Intravenous, Once PRN    ondansetron, 4 mg, Intravenous, Daily PRN    prochlorperazine, 5 mg, Intravenous, Q30 Min PRN    sodium chloride 0.9%, 10 mL, Intravenous, PRN    sodium chloride 0.9%, 10 mL, Intravenous, PRN    sodium chloride 0.9%, 3 mL, Intravenous,  PRN    Objective:     Vital Signs (Most Recent):  Temp: 98.1 °F (36.7 °C) (04/28/25 1126)  Pulse: (!) 59 (04/28/25 1126)  Resp: 17 (04/28/25 1126)  BP: (!) 150/68 (04/28/25 1126)  SpO2: 98 % (04/28/25 1126)  BP Location: Left arm    Vital Signs Range (Last 24H):  Temp:  [98.1 °F (36.7 °C)-98.9 °F (37.2 °C)]   Pulse:  [53-78]   Resp:  [13-19]   BP: (119-163)/(52-85)   SpO2:  [95 %-100 %]   BP Location: Left arm       Physical Exam  Vitals and nursing note reviewed.   Constitutional:       General: She is not in acute distress.  HENT:      Head: Normocephalic and atraumatic.   Eyes:      Extraocular Movements: Extraocular movements intact.      Conjunctiva/sclera: Conjunctivae normal.      Pupils: Pupils are equal, round, and reactive to light.   Cardiovascular:      Rate and Rhythm: Normal rate and regular rhythm.      Heart sounds: No murmur heard.  Pulmonary:      Effort: Pulmonary effort is normal. No respiratory distress.   Abdominal:      General: Abdomen is flat. There is no distension.      Palpations: Abdomen is soft.   Skin:     General: Skin is warm.      Findings: Bruising present.   Neurological:      Mental Status: She is alert.      Comments: See below for neuro exam   Psychiatric:         Mood and Affect: Mood normal.         Behavior: Behavior normal. Behavior is cooperative.              Neurological Exam:   LOC: alert  Attention Span: Good   Language: Expressive aphasia  Articulation: Dysarthria  Orientation: Person, Place, Time   Visual Fields: Full  EOM (CN III, IV, VI): Full/intact  Facial Movement (CN VII): Lower facial weakness on the Right  Motor: Arm left  Normal 5/5  Leg left  Normal 5/5  Arm right  Paresis: 4/5  Leg right Paresis: 3/5  Sensation: Intact to light touch, temperature and vibration    Laboratory:  CMP:   Recent Labs   Lab 04/28/25  0550   GLUCOSE 107   CALCIUM 9.5   ALBUMIN 2.4*      K 4.0   CO2 22*      BUN 24*   CREATININE 1.2   ALKPHOS 51   ALT 20   AST 20    BILITOT 0.3     CBC:   Recent Labs   Lab 04/28/25  0550   WBC 6.58   RBC 3.74*   HGB 9.4*   HCT 30.9*      MCV 83   MCH 25.1*   MCHC 30.4*     Lipid Panel:   Recent Labs   Lab 04/23/25  1340   CHOL 130   LDL 77.6   HDL 34*   TRIG 92      Coagulation:   Recent Labs   Lab 04/23/25  0925   INR 1.0     Hgb A1C:   Recent Labs   Lab 04/23/25  1340   HGBA1C 4.9     TSH:   Recent Labs   Lab 04/23/25  1340   TSH 1.747       Diagnostic Results     Brain Imaging:  MRI brain without contrast 4/24/2025  Impression:  Stable distribution of acute infarction in the MCA territory centered within the left subinsular cortex and the left basal ganglia.  Hemosiderin deposition within the left basal ganglia.  The findings may reflect microscopic blood products.  Attention on follow-up suggested.    MRI brain without contrast @ OSH 4/23/2025  Impression:  1. Regions of diffusion restriction involving the left basal ganglia and left insular cortex compatible with acute ischemia.  No evidence of associated hemorrhage or mass effect.  2. Involutional changes as above.    CTH without contrast @ OSH 4/23/2025  Impression:  1.  No acute intracranial hemorrhage, no hydrocephalus, herniation or midline shift.  2.  Ill-defined low-attenuation in the left insular cortex, and adjacent basal ganglia, possibly from small vessel ischemic disease and or edema from a subacute infarct, consider correlation with MRI and history.       Vessel Imaging:  IR cerebral angiogram 4/23/2025  Impression:  1. There is a left M1 segment occlusion.  Aspiration thrombectomy was performed (ADAPT) Technique was performed with restoration TICI 2C Flow in the left middle cerebral artery territory.  2.  Follow-up right vertebral angiogram demonstrated some collateral flow to the left posterior MCA branches.  3.  Normal right common carotid angiogram.    CTA Head and Neck @ OSH - 4/23/2025  Impression:  1.  Focal high-grade stenosis/narrowing, with suspected  thrombus/soft plaque in the left MCA  2.  Suspicious/pathologic mediastinal lymphadenopathy, while this may be related to granulomatous disease, lymphoma, metastasis/malignancy is a consideration and correlation with the laboratory values, history and CT chest follow-up would be warranted.  3.  No clinically significant stenosis or large vessel occlusion in the neck.       Cardiac Evaluation:   TTE 4/24/2025    Left Ventricle: The left ventricle is normal in size. Normal wall thickness. Normal wall motion. There is normal systolic function with a visually estimated ejection fraction of 55 - 60%. There is normal diastolic function.    Right Ventricle: Right ventricular enlargement. Wall thickness is normal. Systolic function is normal.    Left Atrium: Severely dilated    Mitral Valve: There is mild regurgitation.    Tricuspid Valve: There is mild regurgitation.    Pulmonic Valve: There is mild regurgitation.    Pulmonary Artery: The estimated pulmonary artery systolic pressure is 37 mmHg.    IVC/SVC: Normal venous pressure at 3 mmHg.

## 2025-04-28 NOTE — ASSESSMENT & PLAN NOTE
Last documented bowel movement 4/22  Recent CT abdomen without obstruction  Will escalate bowel regimen    -continue senna BID  -Starting miralax QD  -Added bisacodyl suppository PRN

## 2025-04-28 NOTE — ASSESSMENT & PLAN NOTE
-Stroke risk factor  -A1c 4.9    Plan:   -Glucose goal while inpatient 140-180, avoid hypoglycemia  -Hold home anti-hyperglycemics while admitted  -[facility] SSI PRN

## 2025-04-28 NOTE — ASSESSMENT & PLAN NOTE
Ms. Delilah Williamson is a 84 y/o female with PMHx of HTN, HLD, DM2 that was transferred from [facility] to [facility] for further eval and management of acute L MCA stroke. She initially presented to OSH with acute onset L MCA syndrome (RSW, RFD, L gaze, aphasia) upon waking up at 8am 4/23, approx 1 hr PTA. LKW 5pm 4/22. Telestroke NIHSS 12. CTH showed subtle early ischemic changes in L insular cortex, CTA revealed L M1/M2 occlusion. MRI for wake up protocol showed acute infarcts in L insular cortex and L BG with corresponding FLAIR changes. No lytics recommended due to FLAIR changes, however given fair ASPECTS patient transferred for possible intervention. On arrival to [facility] neuro exam with NIHSS 17, patient taken to IR for DSA and is now s/p L M1/2 thrombectomy with TICI 2c reperfusion. Repeat MRI brain shows stable acute L MCA territory infarcts involving subinsular and BG regions. TTE shows EF 55-60%, severe LAE, and no WMA. Etiology of stroke ESUS at this time, 30d cardiac event monitor will be ordered at discharge for further stroke workup and eval for possible pAF.    4/28/2025 NAEON. Ureteral stent placed yesterday. Antibiotics extended to 12d course following stent placement. Escalating bowel regimen. Stable medical and neurological status. Dispo pending IPR placement.       Antithrombotics for secondary stroke prevention: Antiplatelets: Aspirin: 81 mg daily  Clopidogrel: 75 mg daily    Statins for secondary stroke prevention and hyperlipidemia, if present: Statins: Atorvastatin- 40 mg daily    Aggressive risk factor modification: HTN, DM, HLD     Rehab efforts: The patient has been evaluated by a stroke team provider and the therapy needs have been fully considered based off the presenting complaints and exam findings. The following therapy evaluations are needed: PT evaluate and treat, OT evaluate and treat, SLP evaluate and treat, PM&R evaluate for appropriate placement    Diagnostics  ordered/pending: None     VTE prophylaxis: Heparin 5000 units SQ every 8 hours  Mechanical prophylaxis: Place SCDs    BP parameters: Infarct: SBP <160    Plan:   - chemical VTE prophylaxis held secondary to urology procedure, add back as indicated   - risk factor modification as above   - statin as above   - aspirin and clopidogrel as above   - PT/OT recommend high intensity therapy, dispo pending placement, likely IPR

## 2025-04-28 NOTE — TELEPHONE ENCOUNTER
Shalom Rowe MD [facility], can we schedule this patient to follow up in two weeks with Dr. Lott to discuss stone management?

## 2025-04-28 NOTE — PLAN OF CARE
Problem: Adult Inpatient Plan of Care  Goal: Plan of Care Review  Outcome: Progressing  Goal: Patient-Specific Goal (Individualized)  Outcome: Progressing  Goal: Optimal Comfort and Wellbeing  Outcome: Progressing  Goal: Readiness for Transition of Care  Outcome: Progressing     Problem: Stroke, Ischemic (Includes Transient Ischemic Attack)  Goal: Effective Bowel Elimination  Outcome: Progressing  Goal: Optimal Cerebral Tissue Perfusion  Outcome: Progressing  Goal: Optimal Functional Ability  Outcome: Progressing  Goal: Improved Sensorimotor Function  Outcome: Progressing     Problem: Wound  Goal: Optimal Pain Control and Function  Outcome: Progressing  Goal: Skin Health and Integrity  Outcome: Progressing   Patient alert and Oriented; Verbally responsive to staffs; Able to make all her needs known to staffs. POC and stroke booklet reviewed with patient at the bed side. Questions and concerns addressed. Verbalization of understanding voiced. However, reinforcement needed. Stroke booklet remains at the bedside. All safety measures maintained; Bed alarms; Bilateral boots; and call light in progress. Repositioning performed Q2hrs. VS assessed; see flowsheet. POC ongoing.

## 2025-04-28 NOTE — PT/OT/SLP PROGRESS
"Speech Language Pathology Treatment    Patient Name:  Delilah Williamson   MRN:  [id number]  Admitting Diagnosis: Embolic stroke involving left middle cerebral artery    Recommendations:                 General Recommendations:  Dysphagia therapy, Speech/language therapy, and Cognitive-linguistic therapy  Diet recommendations:  Minced & Moist Diet - IDDSI Level 5, Liquid Diet Level: Thin liquids - IDDSI Level 0   Aspiration Precautions: 1 bite/sip at a time, Alternating bites/sips, Check for pocketing/oral residue, Eliminate distractions, Feed only when awake/alert, HOB to 90 degrees, Meds crushed in puree, Small bites/sips, and Strict aspiration precautions   General Precautions: Standard, aspiration, aphasia, fall  Communication strategies:  provide increased time to answer and go to room if call light pushed    Assessment:     Delilah Williamson is a 85 y.o. female with an SLP diagnosis of Aphasia, Dysphagia, and Dysarthria.      Subjective     Spoke with RN before session.     Pt awake/alert and cooperative during session.     "Young and the restless"    Pain/Comfort:  Pain Rating 1: 0/10  Pain Rating Post-Intervention 1: 0/10    Respiratory Status: Room air    Objective:     Has the patient been evaluated by SLP for swallowing?   Yes  Keep patient NPO? No       Pt seen for ongoing language and dysphagia therapy. Pt seated upright in bedside chair upon SLP entry. Pt followed 2-step directions in 4/5 trials increasing to 5/5 trials given repetition of instruction. Pt followed 3-step commands in 5/5 trials IND. Pt oriented to person, place, situation, and time (mildly increased time to recall month). Pt expressed her basic wants/needs during the SLP session (i.e.,asked SLP to find a TV channel for her soap opera). Given a 1 minute time interval, pt named 4 items in a concrete categories increasing to 6-8 items given min verbal cues. During divergent naming task, SLP cued pt to look at her tray table to assist with recall of " additional items in the category. Pt required additional cues to locate the Sprite on the left side of the tray table. Following this, pt able to named x5 objects on the left side of her table IND. Pt reports use of reading glasses for small print though not present at bedside. Pt read phrases and sentences aloud in medium and large sized print from her rehab pamphlet with 100% accy. Pt self-presented small bites of minced/moist solids and small sips of water from the straw. Pt with mildly increased time for bolus prep though functional. Mild residue observed in the right buccal cavity though cleared with cued lingual sweep and liquid wash. No overt signs of airway compromise observed across consistencies. SLP recommends continuation of current minced/moist diet with thin liquids. Education provided re: role of SLP, diet recs, swallow precs, alternate bites/sips, check for oral residue, lingual sweeps, s/s aspiration and POC.  Pt verbalized understanding and agreement.       Goals:   Multidisciplinary Problems       SLP Goals          Problem: SLP    Goal Priority Disciplines Outcome   SLP Goal     SLP Progressing   Description: 1. Patient will follow simple one step directions with min verbal cues with 50% accuracy.  2. Pt will participate in ongoing diagnostic dysphagia therapy   3. Patient will complete automatic speech tasks with 80% accuracy given verbal model from clinician.  4. Patient will asnwer simple yes/no questions with 80% accuracy given min verbal cues from clinician.                         Plan:     Patient to be seen:  4 x/week   Plan of Care expires:  05/08/25  Plan of Care reviewed with:  patient   SLP Follow-Up:  Yes       Discharge recommendations:  High Intensity Therapy   Barriers to Discharge:  Level of Skilled Assistance Needed      Time Tracking:     SLP Treatment Date:   04/28/25  Speech Start Time:  1135  Speech Stop Time:  1154     Speech Total Time (min):  19 min    Billable Minutes:  Speech Therapy Individual 10 and Treatment Swallowing Dysfunction 9    04/28/2025

## 2025-04-28 NOTE — ASSESSMENT & PLAN NOTE
-Stroke risk factor    Plan:   -SBP goal <160, maintain MAP >65  -BP range in the last 24 hrs: BP  Min: 119/58  Max: 163/70  -SBPs intermittently above goal, will continue to monitor and consider resuming home meds if BPs more consistently above goal  -Continue PRN labetalol/hydralazine

## 2025-04-28 NOTE — PLAN OF CARE
Richard from [facility] informed this CM that they have accepted Ms. Fox and will submit for insurance authorization. Will continue to follow.    Dyana Kenyon RN  Ext [phone]

## 2025-04-28 NOTE — PROGRESS NOTES
Andre Carver - Neurosurgery ([facility])  Vascular Neurology  Comprehensive Stroke Center  Progress Note    Assessment/Plan:     * Embolic stroke involving left middle cerebral artery  Ms. Delilah Williamson is a 84 y/o female with PMHx of HTN, HLD, DM2 that was transferred from [facility] to [facility] for further eval and management of acute L MCA stroke. She initially presented to OSH with acute onset L MCA syndrome (RSW, RFD, L gaze, aphasia) upon waking up at 8am 4/23, approx 1 hr PTA. LKW 5pm 4/22. Telestroke NIHSS 12. CTH showed subtle early ischemic changes in L insular cortex, CTA revealed L M1/M2 occlusion. MRI for wake up protocol showed acute infarcts in L insular cortex and L BG with corresponding FLAIR changes. No lytics recommended due to FLAIR changes, however given fair ASPECTS patient transferred for possible intervention. On arrival to [facility] [place] neuro exam with NIHSS 17, patient taken to IR for DSA and is now s/p L M1/2 thrombectomy with TICI 2c reperfusion. Repeat MRI brain shows stable acute L MCA territory infarcts involving subinsular and BG regions. TTE shows EF 55-60%, severe LAE, and no WMA. Etiology of stroke ESUS at this time, 30d cardiac event monitor will be ordered at discharge for further stroke workup and eval for possible pAF.    4/28/2025 NAEON. Ureteral stent placed yesterday. Antibiotics extended to 12d course following stent placement. Escalating bowel regimen. Stable medical and neurological status. Dispo pending IPR placement.       Antithrombotics for secondary stroke prevention: Antiplatelets: Aspirin: 81 mg daily  Clopidogrel: 75 mg daily    Statins for secondary stroke prevention and hyperlipidemia, if present: Statins: Atorvastatin- 40 mg daily    Aggressive risk factor modification: HTN, DM, HLD     Rehab efforts: The patient has been evaluated by a stroke team provider and the therapy needs have been fully considered based off the presenting complaints and exam findings. The  following therapy evaluations are needed: PT evaluate and treat, OT evaluate and treat, SLP evaluate and treat, PM&R evaluate for appropriate placement    Diagnostics ordered/pending: None     VTE prophylaxis: Heparin 5000 units SQ every 8 hours  Mechanical prophylaxis: Place SCDs    BP parameters: Infarct: SBP <160    Plan:   - chemical VTE prophylaxis held secondary to urology procedure, add back as indicated   - risk factor modification as above   - statin as above   - aspirin and clopidogrel as above   - PT/OT recommend high intensity therapy, dispo pending placement, likely IPR     Constipation  Last documented bowel movement 4/22  Recent CT abdomen without obstruction  Will escalate bowel regimen    -continue senna BID  -Starting miralax QD  -Added bisacodyl suppository PRN    Ureterolithiasis  Right sided obstructing stone noted on imaging.     Plan:   - s/p R ureteral stent placement 4/27  - urology consulted  - appreciate assistance     UTI (urinary tract infection)  -UA on admission with 1+ blood, + nitrites, 3+ leukocyte esterase, many bacteria  -Ucx with GNR >100K CFU, susceptibility pending    Plan:   -Completed rocephin x 3 days for abx (EOT 4/27/25)    Lymphadenopathy  -CTA head and neck noted to have mediastinal lymphadenopathy. Designated chest CT obtained and revealed mediastinal and bilateral hilar lymphadenopathy, mild bilateral smooth interlobular septal thickening and trace bilateral pleural effusions, and 2 mm right lower lobe nodule.  No prior imaging available to compare.   -Per chart review, no significant smoking history or known h/o cancer.   -Unclear whether malignant vs autoimmune/inflammatory process, although no apparent signs of infection at this time.    Plan:   -CT C/A/P showed right lung nodule and lymphadenopathy previously noted     Cytotoxic brain edema  -Area of cerebral edema identified when reviewing brain imaging in the Left MCA territory, there is not mass effect  associated with it.   -Admitted to [facility] post-IR for close neuro monitoring and observation. Neuro exam and follow up imaging has remained stable and patient stepped down to NPU on 4/25.    Plan:   -Continue frequent q4h neuro checks as any acute changes or worsening neuro status may signify expansion of the insult and/or area of the edema, which may require acute interventions to prevent loss of function and/or death.  -Obtain stat CTH for any new or worsening neuro changes and notify primary team immediately    Controlled type 2 diabetes mellitus without complication, without long-term current use of insulin  -Stroke risk factor  -A1c 4.9    Plan:   -Glucose goal while inpatient 140-180, avoid hypoglycemia  -Hold home anti-hyperglycemics while admitted  -[facility] [place] [place] SSI PRN    Hyperlipidemia  -Stroke risk factor  -LDL 77.6, goal <70    Plan:   -Atorvastatin 40mg daily    HTN (hypertension)  -Stroke risk factor    Plan:   -SBP goal <160, maintain MAP >65  -BP range in the last 24 hrs: BP  Min: 119/58  Max: 163/70  -SBPs intermittently above goal, will continue to monitor and consider resuming home meds if BPs more consistently above goal  -Continue PRN labetalol/hydralazine      Aphasia  -Due to stroke  -Continues to have mild expressive aphasia    Plan:   -Aggressive therapy  -PT/OT/SLP eval and treat    Hemiplegia  -Due to stroke  -Improving although continues to have RSW    Plan:   -Aggressive therapy  -PT/OT/SLP eval and treat. Rec high intensity, likely pursue IPR placement          04/24/2025 Repeat MRI Brain without contrast overnight with stable infarct, no hemorrhage. Ok to continue DAPT for secondary stroke prevention. TTE with EF 55-60%, +LAE, normal wall motion. Etiology of stroke likely cardioembolic. Patient will need cardiac event monitor at discharge. Exam overall improved post IR with TICI 2C. Ok to stepdown to NPU.   4/25/2025 NAEON, neuro exam stable and continues to have RSW and speech difficulty. Started  on rocephin for abx for UTI, urine cx with GNR and susceptibilities pending. Stepped down to NPU this morning. CT chest yesterday shows mediastinal and bilateral hilar lymphadenopathy, will obtain CT C/A/P for malignancy eval. Dispo planning ongoing for IPR, pending auth/acceptance for NS rehab.  4/26/2025 NAEON, neuro exam stable. Continuing antibiotics for UTI. CT C/A/P for malignancy eval complete, continued to show right lung nodule and lymphadenopathy. Also showed evidence of right obstructing renal stone. Dispo pending IPR placement.   4/27/2025 NAEON. Patient will complete antibiotics for UTI today. Urology consulted for obstructing right stone, plan for stent placement today. Dispo pending IPR placement.   4/28/2025 NAEON. Ureteral stent placed yesterday. Antibiotics extended to 12d course following stent placement. Escalating bowel regimen. Stable medical and neurological status. Dispo pending IPR placement.    STROKE DOCUMENTATION   Acute Stroke Times   Last Known Normal Date: 04/22/25  Last Known Normal Time: 1700  Stroke Team Called Date: 04/23/25  Stroke Team Called Time: 0915 (Telestroke at OSH)  Stroke Team Arrival Date: 04/23/25  Stroke Team Arrival Time: 0923 (OSH)  CT Interpretation Time: 0923  Thrombolytic Therapy Recommended: No  CTA Interpretation Time: 0945 (OSH)  Thrombectomy Recommended: Yes  MRI Acute Stroke Protocol Interpretation Time: 1000 (OSH)  Decision to Treat Time for IR: 1000 (Telestroke)    NIH Scale:  1a. Level of Consciousness: 0-->Alert, keenly responsive  1b. LOC Questions: 0-->Answers both questions correctly  1c. LOC Commands: 0-->Performs both tasks correctly  2. Best Gaze: 0-->Normal  3. Visual: 0-->No visual loss  4. Facial Palsy: 0-->Normal symmetrical movements  5a. Motor Arm, Left: 0-->No drift, limb holds 90 (or 45) degrees for full 10 secs  5b. Motor Arm, Right: 0-->No drift, limb holds 90 (or 45) degrees for full 10 secs  6a. Motor Leg, Left: 0-->No drift, leg holds  30 degree position for full 5 secs  6b. Motor Leg, Right: 1-->Drift, leg falls by the end of the 5-sec period but does not hit bed  7. Limb Ataxia: 0-->Absent  8. Sensory: 0-->Normal, no sensory loss  9. Best Language: 0-->No aphasia, normal  10. Dysarthria: 0-->Normal  11. Extinction and Inattention (formerly Neglect): 0-->No abnormality  Total (NIH Stroke Scale): 1       Modified Cleveland Score: 0  [place] Coma Scale:    ABCD2 Score:    [id number] Score:   HAS -BLED Score:   ICH Score:   Hunt & Bazzi Classification:      Hemorrhagic change of an Ischemic Stroke: Does this patient have an ischemic stroke with hemorrhagic changes? No     Neurologic Chief Complaint: L MCA stroke    Subjective:     Interval History: Patient is seen for follow-up neurological assessment and treatment recommendations: See hospital course.    HPI, Past Medical, Family, and Social History remains the same as documented in the initial encounter.     Review of Systems   Constitutional: Negative.    Eyes:  Negative for visual disturbance.   Respiratory: Negative.     Cardiovascular: Negative.    Gastrointestinal: Negative.    Genitourinary: Negative.    Neurological:  Positive for facial asymmetry, speech difficulty and weakness.     Scheduled Meds:   aspirin  81 mg Oral Daily    atorvastatin  40 mg Oral Daily    clopidogreL  75 mg Oral Daily    lactated ringers  1,000 mL Intravenous Once    latanoprost  1 drop Both Eyes QHS    nitrofurantoin (macrocrystal-monohydrate)  100 mg Oral Q12H    polyethylene glycol  17 g Oral Daily    senna-docusate  1 tablet Oral BID    white petrolatum   Topical (Top) Daily     Continuous Infusions:      PRN Meds:  Current Facility-Administered Medications:     acetaminophen, 650 mg, Oral, Q6H PRN    bisacodyL, 10 mg, Rectal, Daily PRN    dextrose 50%, 12.5 g, Intravenous, PRN    dextrose 50%, 12.5 g, Intravenous, PRN    diphenhydrAMINE, 25 mg, Intravenous, Q6H PRN    fentaNYL, 25 mcg, Intravenous, Q5 Min PRN     glucagon (human recombinant), 1 mg, Intramuscular, PRN    glucagon (human recombinant), 1 mg, Intramuscular, PRN    hydrALAZINE, 10 mg, Intravenous, Q4H PRN    HYDROmorphone, 0.2 mg, Intravenous, Q5 Min PRN    HYDROmorphone, 0.2 mg, Intravenous, Q5 Min PRN    insulin aspart U-100, 0-10 Units, Subcutaneous, Q6H PRN    labetalol, 10 mg, Intravenous, Q4H PRN    ondansetron, 4 mg, Intravenous, Once PRN    ondansetron, 4 mg, Intravenous, Daily PRN    prochlorperazine, 5 mg, Intravenous, Q30 Min PRN    sodium chloride 0.9%, 10 mL, Intravenous, PRN    sodium chloride 0.9%, 10 mL, Intravenous, PRN    sodium chloride 0.9%, 3 mL, Intravenous, PRN    Objective:     Vital Signs (Most Recent):  Temp: 98.1 °F (36.7 °C) (04/28/25 1126)  Pulse: (!) 59 (04/28/25 1126)  Resp: 17 (04/28/25 1126)  BP: (!) 150/68 (04/28/25 1126)  SpO2: 98 % (04/28/25 1126)  BP Location: Left arm    Vital Signs Range (Last 24H):  Temp:  [98.1 °F (36.7 °C)-98.9 °F (37.2 °C)]   Pulse:  [53-78]   Resp:  [13-19]   BP: (119-163)/(52-85)   SpO2:  [95 %-100 %]   BP Location: Left arm       Physical Exam  Vitals and nursing note reviewed.   Constitutional:       General: She is not in acute distress.  HENT:      Head: Normocephalic and atraumatic.   Eyes:      Extraocular Movements: Extraocular movements intact.      Conjunctiva/sclera: Conjunctivae normal.      Pupils: Pupils are equal, round, and reactive to light.   Cardiovascular:      Rate and Rhythm: Normal rate and regular rhythm.      Heart sounds: No murmur heard.  Pulmonary:      Effort: Pulmonary effort is normal. No respiratory distress.   Abdominal:      General: Abdomen is flat. There is no distension.      Palpations: Abdomen is soft.   Skin:     General: Skin is warm.      Findings: Bruising present.   Neurological:      Mental Status: She is alert.      Comments: See below for neuro exam   Psychiatric:         Mood and Affect: Mood normal.         Behavior: Behavior normal. Behavior is  cooperative.              Neurological Exam:   LOC: alert  Attention Span: Good   Language: Expressive aphasia  Articulation: Dysarthria  Orientation: Person, Place, Time   Visual Fields: Full  EOM (CN III, IV, VI): Full/intact  Facial Movement (CN VII): Lower facial weakness on the Right  Motor: Arm left  Normal 5/5  Leg left  Normal 5/5  Arm right  Paresis: 4/5  Leg right Paresis: 3/5  Sensation: Intact to light touch, temperature and vibration    Laboratory:  CMP:   Recent Labs   Lab 04/28/25  0550   GLUCOSE 107   CALCIUM 9.5   ALBUMIN 2.4*      K 4.0   CO2 22*      BUN 24*   CREATININE 1.2   ALKPHOS 51   ALT 20   AST 20   BILITOT 0.3     CBC:   Recent Labs   Lab 04/28/25  0550   WBC 6.58   RBC 3.74*   HGB 9.4*   HCT 30.9*      MCV 83   MCH 25.1*   MCHC 30.4*     Lipid Panel:   Recent Labs   Lab 04/23/25  1340   CHOL 130   LDL 77.6   HDL 34*   TRIG 92      Coagulation:   Recent Labs   Lab 04/23/25  0925   INR 1.0     Hgb A1C:   Recent Labs   Lab 04/23/25  1340   HGBA1C 4.9     TSH:   Recent Labs   Lab 04/23/25  1340   TSH 1.747       Diagnostic Results     Brain Imaging:  MRI brain without contrast 4/24/2025  Impression:  Stable distribution of acute infarction in the MCA territory centered within the left subinsular cortex and the left basal ganglia.  Hemosiderin deposition within the left basal ganglia.  The findings may reflect microscopic blood products.  Attention on follow-up suggested.    MRI brain without contrast @ OSH 4/23/2025  Impression:  1. Regions of diffusion restriction involving the left basal ganglia and left insular cortex compatible with acute ischemia.  No evidence of associated hemorrhage or mass effect.  2. Involutional changes as above.    CTH without contrast @ OSH 4/23/2025  Impression:  1.  No acute intracranial hemorrhage, no hydrocephalus, herniation or midline shift.  2.  Ill-defined low-attenuation in the left insular cortex, and adjacent basal ganglia, possibly  from small vessel ischemic disease and or edema from a subacute infarct, consider correlation with MRI and history.       Vessel Imaging:  IR cerebral angiogram 4/23/2025  Impression:  1. There is a left M1 segment occlusion.  Aspiration thrombectomy was performed (ADAPT) Technique was performed with restoration TICI 2C Flow in the left middle cerebral artery territory.  2.  Follow-up right vertebral angiogram demonstrated some collateral flow to the left posterior MCA branches.  3.  Normal right common carotid angiogram.    CTA Head and Neck @ OSH - 4/23/2025  Impression:  1.  Focal high-grade stenosis/narrowing, with suspected thrombus/soft plaque in the left MCA  2.  Suspicious/pathologic mediastinal lymphadenopathy, while this may be related to granulomatous disease, lymphoma, metastasis/malignancy is a consideration and correlation with the laboratory values, history and CT chest follow-up would be warranted.  3.  No clinically significant stenosis or large vessel occlusion in the neck.       Cardiac Evaluation:   TTE 4/24/2025    Left Ventricle: The left ventricle is normal in size. Normal wall thickness. Normal wall motion. There is normal systolic function with a visually estimated ejection fraction of 55 - 60%. There is normal diastolic function.    Right Ventricle: Right ventricular enlargement. Wall thickness is normal. Systolic function is normal.    Left Atrium: Severely dilated    Mitral Valve: There is mild regurgitation.    Tricuspid Valve: There is mild regurgitation.    Pulmonic Valve: There is mild regurgitation.    Pulmonary Artery: The estimated pulmonary artery systolic pressure is 37 mmHg.    IVC/SVC: Normal venous pressure at 3 mmHg.      Galindo Melton MD  Comprehensive Stroke Center  Department of Vascular Neurology   [facility])

## 2025-04-28 NOTE — TELEPHONE ENCOUNTER
Pt is still currently admitted. Attempted to contact, phone rang several times-no answer, no machine. Pili't made to see dr. Lott on 5/8 at 140 pm

## 2025-04-28 NOTE — ASSESSMENT & PLAN NOTE
Right sided obstructing stone noted on imaging.     Plan:   - s/p R ureteral stent placement 4/27  - urology consulted  - appreciate assistance

## 2025-04-28 NOTE — ASSESSMENT & PLAN NOTE
-Area of cerebral edema identified when reviewing brain imaging in the Left MCA territory, there is not mass effect associated with it.   -Admitted to [facility] post-IR for close neuro monitoring and observation. Neuro exam and follow up imaging has remained stable and patient stepped down to NPU on 4/25.    Plan:   -Continue frequent q4h neuro checks as any acute changes or worsening neuro status may signify expansion of the insult and/or area of the edema, which may require acute interventions to prevent loss of function and/or death.  -Obtain stat CTH for any new or worsening neuro changes and notify primary team immediately

## 2025-04-28 NOTE — PT/OT/SLP PROGRESS
Physical Therapy Treatment    Patient Name:  Delilah Williamson   MRN:  [id number]    Recommendations:     Discharge Recommendations: High Intensity Therapy  Discharge Equipment Recommendations: bedside commode, walker, rolling  Barriers to discharge: Inaccessible home and Decreased caregiver support    Assessment:     Delilah Williamson is a 85 y.o. female admitted with a medical diagnosis of Embolic stroke involving left middle cerebral artery.  She presents with the following impairments/functional limitations: weakness, impaired endurance, impaired self care skills, impaired functional mobility, gait instability, impaired balance, decreased safety awareness, decreased coordination, impaired cardiopulmonary response to activity, decreased lower extremity function . Patient has achieved significant progress, as noted by bed mobility, transfer ability and walking range. Patient initially appeared a little confused, but ability to follow motor commands improved as she sat at the edge of the bed for a few minutes.    Rehab Prognosis: Good; patient would benefit from acute skilled PT services to address these deficits and reach maximum level of function.    Recent Surgery: Procedure(s) (LRB):  CYSTOSCOPY, WITH URETERAL STENT INSERTION (Right) 1 Day Post-Op    Plan:     During this hospitalization, patient to be seen 4 x/week to address the identified rehab impairments via gait training, therapeutic activities, therapeutic exercises, neuromuscular re-education and progress toward the following goals:    Plan of Care Expires:  05/22/25    Subjective     Chief Complaint: None stated  Patient/Family Comments/goals: to go to Rehab before she goes back home by herself.  Pain/Comfort:  Pain Rating 1: 0/10  Pain Rating Post-Intervention 1: 0/10      Objective:     Communicated with NSG prior to session.  Patient found HOB elevated with bed alarm, telemetry, PureWick, SCD, pressure relief boots upon PT entry to room.     General  Precautions: Standard, aspiration, fall  Orthopedic Precautions: N/A  Braces: N/A  Respiratory Status: Room air     Functional Mobility:  Bed Mobility:     Rolling Right: minimum assistance  Scooting: minimum assistance and moderate assistance  Supine to Sit: minimum assistance  Sit to Supine: contact guard assistance  Transfers:     Sit to Stand:  minimum assistance with rolling walker  Gait: ~140 ft x 2 trials  with RW and min to CGA, with cues to correct downward gaze and chair follow by P.T. tech.      AM-PAC 6 CLICK MOBILITY  Turning over in bed (including adjusting bedclothes, sheets and blankets)?: 3  Sitting down on and standing up from a chair with arms (e.g., wheelchair, bedside commode, etc.): 3  Moving from lying on back to sitting on the side of the bed?: 3  Moving to and from a bed to a chair (including a wheelchair)?: 3  Need to walk in hospital room?: 3  Climbing 3-5 steps with a railing?: 2  Basic Mobility Total Score: 17       Treatment & Education:  Doffed/Donned SCDs and pressure relief boots. Patient sat at EOB for a few minutes to prepare for treatment and to manage medical lines. Educated on safety and not getting up by herself to prevent any falls.    Patient left HOB elevated with all lines intact, call button in reach, bed alarm on, and Grand-niece present.    GOALS:   Multidisciplinary Problems       Physical Therapy Goals          Problem: Physical Therapy    Goal Priority Disciplines Outcome Interventions   Physical Therapy Goal     PT, PT/OT Progressing    Description: Goals to be met by: 25     Patient will increase functional independence with mobility by performin. Supine to sit with [place]  2. Sit to supine with [place]  3. Sit to stand transfer with [place]  4. Bed to chair transfer with [place] using LRAD  5. Gait  x 150 feet with Modified [place] using LRAD  6. Follow 100% of 1-step verbal commands.                          DME  Justifications:  Delilah Williamson has a mobility limitation that significantly impairs her ability to participate in one or more mobility related activities of daily living (MRADLs) such as toileting, feeding, dressing, grooming, and bathing in customary locations in the home.  The mobility limitation cannot be sufficiently resolved by the use of a cane or walker.   The use of a manual wheelchair will significantly improve the patients ability to participate in MRADLS and the patient will use it on regular basis in the home.  Delilah Williamson has expressed her willingness to use a manual wheelchair in the home. Patients upper body strength is sufficient for propulsion.  She also has a caregiver who is available, willing, and able to provide assistance with the wheelchair when needed.      Time Tracking:     PT Received On: 04/28/25  PT Start Time: 1443     PT Stop Time: 1517  PT Total Time (min): 34 min     Billable Minutes: Gait Training 11 and Therapeutic Activity 23    Treatment Type: Treatment  PT/PTA: PTA     Number of PTA visits since last PT visit: 1 04/28/2025

## 2025-04-29 LAB
ABSOLUTE EOSINOPHIL (OHS): 0.23 K/UL
ABSOLUTE MONOCYTE (OHS): 0.6 K/UL (ref 0.3–1)
ABSOLUTE NEUTROPHIL COUNT (OHS): 4.34 K/UL (ref 1.8–7.7)
ALBUMIN SERPL BCP-MCNC: 2.5 G/DL (ref 3.5–5.2)
ALP SERPL-CCNC: 57 UNIT/L (ref 40–150)
ALT SERPL W/O P-5'-P-CCNC: 21 UNIT/L (ref 10–44)
ANION GAP (OHS): 7 MMOL/L (ref 8–16)
AST SERPL-CCNC: 21 UNIT/L (ref 11–45)
BASOPHILS # BLD AUTO: 0.02 K/UL
BASOPHILS NFR BLD AUTO: 0.3 %
BILIRUB SERPL-MCNC: 0.3 MG/DL (ref 0.1–1)
BUN SERPL-MCNC: 27 MG/DL (ref 8–23)
CALCIUM SERPL-MCNC: 9.7 MG/DL (ref 8.7–10.5)
CHLORIDE SERPL-SCNC: 112 MMOL/L (ref 95–110)
CO2 SERPL-SCNC: 21 MMOL/L (ref 23–29)
CREAT SERPL-MCNC: 1 MG/DL (ref 0.5–1.4)
ERYTHROCYTE [DISTWIDTH] IN BLOOD BY AUTOMATED COUNT: 14.6 % (ref 11.5–14.5)
GFR SERPLBLD CREATININE-BSD FMLA CKD-EPI: 55 ML/MIN/1.73/M2
GLUCOSE SERPL-MCNC: 95 MG/DL (ref 70–110)
HCT VFR BLD AUTO: 31.9 % (ref 37–48.5)
HGB BLD-MCNC: 9.7 GM/DL (ref 12–16)
IMM GRANULOCYTES # BLD AUTO: 0.03 K/UL (ref 0–0.04)
IMM GRANULOCYTES NFR BLD AUTO: 0.4 % (ref 0–0.5)
LYMPHOCYTES # BLD AUTO: 1.69 K/UL (ref 1–4.8)
MAGNESIUM SERPL-MCNC: 1.5 MG/DL (ref 1.6–2.6)
MCH RBC QN AUTO: 25.4 PG (ref 27–31)
MCHC RBC AUTO-ENTMCNC: 30.4 G/DL (ref 32–36)
MCV RBC AUTO: 84 FL (ref 82–98)
NUCLEATED RBC (/100WBC) (OHS): 0 /100 WBC
PHOSPHATE SERPL-MCNC: 3 MG/DL (ref 2.7–4.5)
PLATELET # BLD AUTO: 258 K/UL (ref 150–450)
PMV BLD AUTO: 10.6 FL (ref 9.2–12.9)
POCT GLUCOSE: 134 MG/DL (ref 70–110)
POCT GLUCOSE: 137 MG/DL (ref 70–110)
POCT GLUCOSE: 95 MG/DL (ref 70–110)
POTASSIUM SERPL-SCNC: 4.1 MMOL/L (ref 3.5–5.1)
PROT SERPL-MCNC: 5.3 GM/DL (ref 6–8.4)
RBC # BLD AUTO: 3.82 M/UL (ref 4–5.4)
RELATIVE EOSINOPHIL (OHS): 3.3 %
RELATIVE LYMPHOCYTE (OHS): 24.5 % (ref 18–48)
RELATIVE MONOCYTE (OHS): 8.7 % (ref 4–15)
RELATIVE NEUTROPHIL (OHS): 62.8 % (ref 38–73)
SODIUM SERPL-SCNC: 140 MMOL/L (ref 136–145)
WBC # BLD AUTO: 6.91 K/UL (ref 3.9–12.7)

## 2025-04-29 PROCEDURE — 25000003 PHARM REV CODE 250: Performed by: PSYCHIATRY & NEUROLOGY

## 2025-04-29 PROCEDURE — 97530 THERAPEUTIC ACTIVITIES: CPT

## 2025-04-29 PROCEDURE — 85025 COMPLETE CBC W/AUTO DIFF WBC: CPT | Performed by: PHYSICIAN ASSISTANT

## 2025-04-29 PROCEDURE — 84100 ASSAY OF PHOSPHORUS: CPT | Performed by: PHYSICIAN ASSISTANT

## 2025-04-29 PROCEDURE — 63600175 PHARM REV CODE 636 W HCPCS

## 2025-04-29 PROCEDURE — 92507 TX SP LANG VOICE COMM INDIV: CPT

## 2025-04-29 PROCEDURE — 99233 SBSQ HOSP IP/OBS HIGH 50: CPT | Mod: GC,,, | Performed by: PSYCHIATRY & NEUROLOGY

## 2025-04-29 PROCEDURE — 97530 THERAPEUTIC ACTIVITIES: CPT | Mod: CQ

## 2025-04-29 PROCEDURE — 11000001 HC ACUTE MED/SURG PRIVATE ROOM

## 2025-04-29 PROCEDURE — 25000003 PHARM REV CODE 250

## 2025-04-29 PROCEDURE — 80053 COMPREHEN METABOLIC PANEL: CPT | Performed by: PHYSICIAN ASSISTANT

## 2025-04-29 PROCEDURE — 25000003 PHARM REV CODE 250: Performed by: PHYSICIAN ASSISTANT

## 2025-04-29 PROCEDURE — 97116 GAIT TRAINING THERAPY: CPT | Mod: CQ

## 2025-04-29 PROCEDURE — 92526 ORAL FUNCTION THERAPY: CPT

## 2025-04-29 PROCEDURE — 97110 THERAPEUTIC EXERCISES: CPT

## 2025-04-29 PROCEDURE — A4216 STERILE WATER/SALINE, 10 ML: HCPCS | Performed by: PHYSICIAN ASSISTANT

## 2025-04-29 PROCEDURE — 36415 COLL VENOUS BLD VENIPUNCTURE: CPT | Performed by: PHYSICIAN ASSISTANT

## 2025-04-29 PROCEDURE — 83735 ASSAY OF MAGNESIUM: CPT | Performed by: PHYSICIAN ASSISTANT

## 2025-04-29 PROCEDURE — 25000003 PHARM REV CODE 250: Performed by: STUDENT IN AN ORGANIZED HEALTH CARE EDUCATION/TRAINING PROGRAM

## 2025-04-29 RX ORDER — MAGNESIUM SULFATE 1 G/100ML
1 INJECTION INTRAVENOUS ONCE
Status: COMPLETED | OUTPATIENT
Start: 2025-04-29 | End: 2025-04-29

## 2025-04-29 RX ORDER — AMLODIPINE BESYLATE 5 MG/1
5 TABLET ORAL DAILY
Status: DISCONTINUED | OUTPATIENT
Start: 2025-04-29 | End: 2025-04-30 | Stop reason: HOSPADM

## 2025-04-29 RX ADMIN — Medication 10 ML: at 08:04

## 2025-04-29 RX ADMIN — POLYETHYLENE GLYCOL 3350 17 G: 17 POWDER, FOR SOLUTION ORAL at 09:04

## 2025-04-29 RX ADMIN — BISACODYL 10 MG: 10 SUPPOSITORY RECTAL at 03:04

## 2025-04-29 RX ADMIN — ASPIRIN 81 MG CHEWABLE TABLET 81 MG: 81 TABLET CHEWABLE at 09:04

## 2025-04-29 RX ADMIN — WHITE PETROLATUM: 1.75 OINTMENT TOPICAL at 09:04

## 2025-04-29 RX ADMIN — AMLODIPINE BESYLATE 5 MG: 5 TABLET ORAL at 03:04

## 2025-04-29 RX ADMIN — CLOPIDOGREL 75 MG: 75 TABLET ORAL at 09:04

## 2025-04-29 RX ADMIN — NITROFURANTOIN MONOHYDRATE/MACROCRYSTALS 100 MG: 25; 75 CAPSULE ORAL at 08:04

## 2025-04-29 RX ADMIN — SENNOSIDES AND DOCUSATE SODIUM 1 TABLET: 50; 8.6 TABLET ORAL at 09:04

## 2025-04-29 RX ADMIN — SENNOSIDES AND DOCUSATE SODIUM 1 TABLET: 50; 8.6 TABLET ORAL at 08:04

## 2025-04-29 RX ADMIN — MAGNESIUM SULFATE 1 G: 500 INJECTION, SOLUTION INTRAMUSCULAR; INTRAVENOUS at 09:04

## 2025-04-29 RX ADMIN — ATORVASTATIN CALCIUM 40 MG: 40 TABLET, FILM COATED ORAL at 09:04

## 2025-04-29 RX ADMIN — NITROFURANTOIN MONOHYDRATE/MACROCRYSTALS 100 MG: 25; 75 CAPSULE ORAL at 09:04

## 2025-04-29 RX ADMIN — LATANOPROST 1 DROP: 50 SOLUTION OPHTHALMIC at 08:04

## 2025-04-29 NOTE — ASSESSMENT & PLAN NOTE
Ms. Delilah Williamson is a 84 y/o female with PMHx of HTN, HLD, DM2 that was transferred from [facility] to [facility] for further eval and management of acute L MCA stroke. She initially presented to OSH with acute onset L MCA syndrome (RSW, RFD, L gaze, aphasia) upon waking up at 8am 4/23, approx 1 hr PTA. LKW 5pm 4/22. Telestroke NIHSS 12. CTH showed subtle early ischemic changes in L insular cortex, CTA revealed L M1/M2 occlusion. MRI for wake up protocol showed acute infarcts in L insular cortex and L BG with corresponding FLAIR changes. No lytics recommended due to FLAIR changes, however given fair ASPECTS patient transferred for possible intervention. On arrival to [facility] neuro exam with NIHSS 17, patient taken to IR for DSA and is now s/p L M1/2 thrombectomy with TICI 2c reperfusion. Repeat MRI brain shows stable acute L MCA territory infarcts involving subinsular and BG regions. TTE shows EF 55-60%, severe LAE, and no WMA. Etiology of stroke ESUS at this time, 30d cardiac event monitor will be ordered at discharge for further stroke workup and eval for possible pAF.    04/29/2025 NAEON. Day 5/12 of antibiotics. Continue to escalate bowel regimen. Had hypertension today up to ; starting home amlodipine.       Antithrombotics for secondary stroke prevention: Antiplatelets: Aspirin: 81 mg daily  Clopidogrel: 75 mg daily    Statins for secondary stroke prevention and hyperlipidemia, if present: Statins: Atorvastatin- 40 mg daily    Aggressive risk factor modification: HTN, DM, HLD     Rehab efforts: The patient has been evaluated by a stroke team provider and the therapy needs have been fully considered based off the presenting complaints and exam findings. The following therapy evaluations are needed: PT evaluate and treat, OT evaluate and treat, SLP evaluate and treat, PM&R evaluate for appropriate placement    Diagnostics ordered/pending: None     VTE prophylaxis: Heparin 5000 units SQ every 8  hours  Mechanical prophylaxis: Place SCDs    BP parameters: Infarct: SBP <160    Plan:   - chemical VTE prophylaxis held secondary to urology procedure, add back as indicated   - risk factor modification as above   - statin as above   - aspirin and clopidogrel as above   - PT/OT recommend high intensity therapy, dispo pending placement, likely IPR

## 2025-04-29 NOTE — PLAN OF CARE
Problem: Adult Inpatient Plan of Care  Goal: Plan of Care Review  Outcome: Progressing  Goal: Patient-Specific Goal (Individualized)  Outcome: Progressing  Goal: Absence of Hospital-Acquired Illness or Injury  Outcome: Progressing  Goal: Optimal Comfort and Wellbeing  Outcome: Progressing  Goal: Readiness for Transition of Care  Outcome: Progressing     Problem: Stroke, Ischemic (Includes Transient Ischemic Attack)  Goal: Optimal Coping  Outcome: Progressing  Goal: Effective Bowel Elimination  Outcome: Progressing  Goal: Optimal Cerebral Tissue Perfusion  Outcome: Progressing  Goal: Optimal Cognitive Function  Outcome: Progressing  Goal: Improved Communication Skills  Outcome: Progressing  Goal: Optimal Functional Ability  Outcome: Progressing  Goal: Optimal Nutrition Intake  Outcome: Progressing  Goal: Effective Oxygenation and Ventilation  Outcome: Progressing  Goal: Improved Sensorimotor Function  Outcome: Progressing  Goal: Safe and Effective Swallow  Outcome: Progressing  Goal: Effective Urinary Elimination  Outcome: Progressing     Problem: Wound  Goal: Optimal Coping  Outcome: Progressing  Goal: Optimal Functional Ability  Outcome: Progressing  Goal: Improved Oral Intake  Outcome: Progressing  Goal: Optimal Pain Control and Function  Outcome: Progressing  Goal: Skin Health and Integrity  Outcome: Progressing  Goal: Optimal Wound Healing  Outcome: Progressing     Problem: Diabetes Comorbidity  Goal: Blood Glucose Level Within Targeted Range  Outcome: Progressing     Problem: Skin Injury Risk Increased  Goal: Skin Health and Integrity  Outcome: Progressing     Problem: Fall Injury Risk  Goal: Absence of Fall and Fall-Related Injury  Outcome: Progressing   POC updated and reviewed with the patient at bedside. Questions regarding POC were encouraged and addressed. VSS, see flowsheets. Tele maintained per provider's order. Patient is AO x 4 at this time. No acute events noted during shift. Pain/Nausea management  using PRN medications. See MAR for medication administration details. Seizure, fall, and safety precautions maintained. No signs of injury noted over night. Upon exiting room, patient's bed locked in low position, side rails up x 2, bed alarm on, with call light within reach. Instructed patient to call staff for mobility, verbalized understanding.  No acute signs of distress noted at this time.

## 2025-04-29 NOTE — PT/OT/SLP PROGRESS
Physical Therapy Treatment    Patient Name:  Delilah Williamson   MRN:  [id number]    Recommendations:     Discharge Recommendations: High Intensity Therapy  Discharge Equipment Recommendations: bedside commode, walker, rolling  Barriers to discharge: Inaccessible home and Decreased caregiver support    Assessment:     Delilah Williamson is a 85 y.o. female admitted with a medical diagnosis of Embolic stroke involving left middle cerebral artery.  She presents with the following impairments/functional limitations: weakness, impaired endurance, impaired self care skills, impaired functional mobility, gait instability, impaired balance, decreased safety awareness, decreased coordination, impaired cardiopulmonary response to activity, decreased lower extremity function. Pt found in bed and agreeable to therapy. With cuing, she was able to utilize her UE to lift her trunk off the bed, however, needed assist to complete. Level of assist for bed mobility initially higher, modA, as compared to end of session, Georgette. Purewick cath detached for gait training. Pt continues to display gait deviations (listed below) but tolerance to activity was good with no reports of fatigue or loss of balance. Purewick reattached once patient was returned to supine 2/2 patient expressing desire to urinate. Pt would benefit from continued PT to address ongoing deficits.    Rehab Prognosis: Good; patient would benefit from acute skilled PT services to address these deficits and reach maximum level of function.    Recent Surgery: Procedure(s) (LRB):  CYSTOSCOPY, WITH URETERAL STENT INSERTION (Right) 2 Days Post-Op    Plan:     During this hospitalization, patient to be seen 4 x/week to address the identified rehab impairments via gait training, therapeutic activities, therapeutic exercises, neuromuscular re-education and progress toward the following goals:    Plan of Care Expires:  05/22/25    Subjective     Chief Complaint: none stated  Patient/Family  Comments/goals: to have a BM for pending DC  Pain/Comfort:         Objective:     Communicated with RN prior to session.  Patient found HOB elevated with bed alarm, telemetry, SCD, pressure relief boots, PureWick, Other (comments) (SCD worn but not turned on) upon PT entry to room.     General Precautions: Standard, aspiration, fall  Orthopedic Precautions: N/A  Braces: N/A  Respiratory Status: Room air     Functional Mobility:  Bed Mobility:     Rolling L/R: Georgette Can maneuver into roll. Min A to hip for completion  Scooting: Georgette. Difficulty shifting L hip forward as compared to the R  Supine to Sit: modA Pt maneuver to EOB, needed assist to complete to sit at legs and trunk.  Sit to Supine: CGA    Transfers:     Sit to Stand:  Georgette with RW.   Last stand: without RW. Pt utilized B hand to push self from bed. Georgette to help rise, CGA at full stand    Gait: 2 trails x ~115ft with CGA with RW, ~1 min seated rest break between trials  Chair follow for initial trial, without for second trial due to patient maintained good balance  Gait deviations: decreased step height R>L, decreased step length, slight forward trunk flexion, quick bakari with poor step-to/RW sequencing leading to increased distance of RW placement, wide turns to R/L, veering to L  Verbal cues given to correct deviations and demonstration of step to pattern using RW, with minimal, short lived improvement      AM-PAC 6 CLICK MOBILITY  Turning over in bed (including adjusting bedclothes, sheets and blankets)?: 3  Sitting down on and standing up from a chair with arms (e.g., wheelchair, bedside commode, etc.): 3  Moving from lying on back to sitting on the side of the bed?: 3  Moving to and from a bed to a chair (including a wheelchair)?: 3  Need to walk in hospital room?: 3  Climbing 3-5 steps with a railing?: 2  Basic Mobility Total Score: 17       Treatment & Education:  Patient educated on step-to sequencing with use of RW.    Patient left HOB elevated  with all lines intact, call button in reach, and bed alarm on..    GOALS:   Multidisciplinary Problems       Physical Therapy Goals          Problem: Physical Therapy    Goal Priority Disciplines Outcome Interventions   Physical Therapy Goal     PT, PT/OT Progressing    Description: Goals to be met by: 25     Patient will increase functional independence with mobility by performin. Supine to sit with [place]  2. Sit to supine with [place]  3. Sit to stand transfer with [place]  4. Bed to chair transfer with [place] using LRAD  5. Gait  x 150 feet with Modified [place] using LRAD  6. Follow 100% of 1-step verbal commands.                          Time Tracking:     PT Received On: 25  PT Start Time: 1110     PT Stop Time: 1143  PT Total Time (min): 33 min     Billable Minutes: Gait Training 21 and Therapeutic Activity 12    Treatment Type: Treatment  PT/PTA: PTA     Number of PTA visits since last PT visit: 2     2025

## 2025-04-29 NOTE — ASSESSMENT & PLAN NOTE
-UA on admission with 1+ blood, + nitrites, 3+ leukocyte esterase, many bacteria  -Ucx with GNR >100K CFU, susceptibility pending  -CT C/A/P demonstrates obstructive R ureteral stone  -s/p R ureteral stent placement 4/27  -Urology recommending extended course of antibiotics  -Pansensitive culture    Plan:   -Extended course of antibiotics to 12 days  -s/p CTX 4/25-4/27  -Continue macrobid 100mg BID (EOT 5/6/24)

## 2025-04-29 NOTE — PLAN OF CARE
Message received from [facility] informing this CM that they are still waiting on insurance authorization. Will continue to follow.    Dyana Kenyon RN  Ext [phone]

## 2025-04-29 NOTE — ASSESSMENT & PLAN NOTE
-Stroke risk factor    Plan:   -SBP goal <160, maintain MAP >65  -BP range in the last 24 hrs: BP  Min: 127/56  Max: 181/70  -Adding home amlodipine 5mg QD  -Continue PRN labetalol/hydralazine

## 2025-04-29 NOTE — PLAN OF CARE
[facility] - Neurosurgery (Hospital)  Discharge Reassessment    Primary Care Provider: Juan Abarca MD    Expected Discharge Date: 4/29/2025    Reassessment (most recent)       Discharge Reassessment - 04/29/25 0840          Discharge Reassessment    Assessment Type Discharge Planning Reassessment     Did the patient's condition or plan change since previous assessment? No     Discharge Plan discussed with: Patient     Communicated ALINA with patient/caregiver Yes     Discharge Plan A Rehab     Discharge Plan B Skilled Nursing Facility     DME Needed Upon Discharge  other (see comments)   TBD    Transition of Care Barriers None        Post-Acute Status    Post-Acute Authorization Placement     Post-Acute Placement Status Pending payor review/awaiting authorization (if required)                   Discharge Plan A and Plan B have been determined by review of patient's clinical status, future medical and therapeutic needs, and coverage/benefits for post-acute care in coordination with multidisciplinary team members.     Dyana Kenyon RN  Ext [phone]

## 2025-04-29 NOTE — ASSESSMENT & PLAN NOTE
-Stroke risk factor  -A1c 4.9    Plan:   -Glucose goal while inpatient 140-180, avoid hypoglycemia  -Hold home anti-hyperglycemics while admitted  -[facility] PRN

## 2025-04-29 NOTE — PROGRESS NOTES
Andre Carver - Neurosurgery ([facility])  Vascular Neurology  Comprehensive Stroke Center  Progress Note    Assessment/Plan:     * Embolic stroke involving left middle cerebral artery  Ms. Delilah Williamson is a 84 y/o female with PMHx of HTN, HLD, DM2 that was transferred from [facility] to [facility] for further eval and management of acute L MCA stroke. She initially presented to OSH with acute onset L MCA syndrome (RSW, RFD, L gaze, aphasia) upon waking up at 8am 4/23, approx 1 hr PTA. LKW 5pm 4/22. Telestroke NIHSS 12. CTH showed subtle early ischemic changes in L insular cortex, CTA revealed L M1/M2 occlusion. MRI for wake up protocol showed acute infarcts in L insular cortex and L BG with corresponding FLAIR changes. No lytics recommended due to FLAIR changes, however given fair ASPECTS patient transferred for possible intervention. On arrival to [facility] neuro exam with NIHSS 17, patient taken to IR for DSA and is now s/p L M1/2 thrombectomy with TICI 2c reperfusion. Repeat MRI brain shows stable acute L MCA territory infarcts involving subinsular and BG regions. TTE shows EF 55-60%, severe LAE, and no WMA. Etiology of stroke ESUS at this time, 30d cardiac event monitor will be ordered at discharge for further stroke workup and eval for possible pAF.    04/29/2025 NAEON. Day 5/12 of antibiotics. Continue to escalate bowel regimen. Had hypertension today up to ; starting home amlodipine.       Antithrombotics for secondary stroke prevention: Antiplatelets: Aspirin: 81 mg daily  Clopidogrel: 75 mg daily    Statins for secondary stroke prevention and hyperlipidemia, if present: Statins: Atorvastatin- 40 mg daily    Aggressive risk factor modification: HTN, DM, HLD     Rehab efforts: The patient has been evaluated by a stroke team provider and the therapy needs have been fully considered based off the presenting complaints and exam findings. The following therapy evaluations are needed: PT evaluate and treat,  OT evaluate and treat, SLP evaluate and treat, PM&R evaluate for appropriate placement    Diagnostics ordered/pending: None     VTE prophylaxis: Heparin 5000 units SQ every 8 hours  Mechanical prophylaxis: Place SCDs    BP parameters: Infarct: SBP <160    Plan:   - chemical VTE prophylaxis held secondary to urology procedure, add back as indicated   - risk factor modification as above   - statin as above   - aspirin and clopidogrel as above   - PT/OT recommend high intensity therapy, dispo pending placement, likely IPR     Constipation  Last documented bowel movement 4/22  Recent CT abdomen without obstruction  Will escalate bowel regimen    -continue senna BID  -Continue miralax QD  -Giving bisacodyl suppository PRN    Ureterolithiasis  Right sided obstructing stone noted on imaging.     Plan:   - s/p R ureteral stent placement 4/27  - urology consulted  - appreciate assistance     UTI (urinary tract infection)  -UA on admission with 1+ blood, + nitrites, 3+ leukocyte esterase, many bacteria  -Ucx with GNR >100K CFU, susceptibility pending  -CT C/A/P demonstrates obstructive R ureteral stone  -s/p R ureteral stent placement 4/27  -Urology recommending extended course of antibiotics  -Pansensitive culture    Plan:   -Extended course of antibiotics to 12 days  -s/p CTX 4/25-4/27  -Continue macrobid 100mg BID (EOT 5/6/24)    Lymphadenopathy  -CTA head and neck noted to have mediastinal lymphadenopathy. Designated chest CT obtained and revealed mediastinal and bilateral hilar lymphadenopathy, mild bilateral smooth interlobular septal thickening and trace bilateral pleural effusions, and 2 mm right lower lobe nodule.  No prior imaging available to compare.   -Per chart review, no significant smoking history or known h/o cancer.   -Unclear whether malignant vs autoimmune/inflammatory process, although no apparent signs of infection at this time.    Plan:   -CT C/A/P showed right lung nodule and lymphadenopathy previously  noted     Cytotoxic brain edema  -Area of cerebral edema identified when reviewing brain imaging in the Left MCA territory, there is not mass effect associated with it.   -Admitted to [facility] post-IR for close neuro monitoring and observation. Neuro exam and follow up imaging has remained stable and patient stepped down to NPU on 4/25.    Plan:   -Continue frequent q4h neuro checks as any acute changes or worsening neuro status may signify expansion of the insult and/or area of the edema, which may require acute interventions to prevent loss of function and/or death.  -Obtain stat CTH for any new or worsening neuro changes and notify primary team immediately    Controlled type 2 diabetes mellitus without complication, without long-term current use of insulin  -Stroke risk factor  -A1c 4.9    Plan:   -Glucose goal while inpatient 140-180, avoid hypoglycemia  -Hold home anti-hyperglycemics while admitted  -[facility] SSI PRN    Hyperlipidemia  -Stroke risk factor  -LDL 77.6, goal <70    Plan:   -Atorvastatin 40mg daily    HTN (hypertension)  -Stroke risk factor    Plan:   -SBP goal <160, maintain MAP >65  -BP range in the last 24 hrs: BP  Min: 127/56  Max: 181/70  -Adding home amlodipine 5mg QD  -Continue PRN labetalol/hydralazine      Aphasia  -Due to stroke  -Continues to have mild expressive aphasia    Plan:   -Aggressive therapy  -PT/OT/SLP eval and treat    Hemiplegia  -Due to stroke  -Improving although continues to have RSW    Plan:   -Aggressive therapy  -PT/OT/SLP eval and treat. Rec high intensity, likely pursue IPR placement          04/24/2025 Repeat MRI Brain without contrast overnight with stable infarct, no hemorrhage. Ok to continue DAPT for secondary stroke prevention. TTE with EF 55-60%, +LAE, normal wall motion. Etiology of stroke likely cardioembolic. Patient will need cardiac event monitor at discharge. Exam overall improved post IR with TICI 2C. Ok to stepdown to NPU.   4/25/2025 NAEON, neuro exam stable  and continues to have RSW and speech difficulty. Started on rocephin for abx for UTI, urine cx with GNR and susceptibilities pending. Stepped down to NPU this morning. CT chest yesterday shows mediastinal and bilateral hilar lymphadenopathy, will obtain CT C/A/P for malignancy eval. Dispo planning ongoing for IPR, pending auth/acceptance for NS rehab.  4/26/2025 NAEON, neuro exam stable. Continuing antibiotics for UTI. CT C/A/P for malignancy eval complete, continued to show right lung nodule and lymphadenopathy. Also showed evidence of right obstructing renal stone. Dispo pending IPR placement.   4/27/2025 NAEON. Patient will complete antibiotics for UTI today. Urology consulted for obstructing right stone, plan for stent placement today. Dispo pending IPR placement.   4/28/2025 NAEON. Ureteral stent placed yesterday. Antibiotics extended to 12d course following stent placement. Escalating bowel regimen. Stable medical and neurological status. Dispo pending IPR placement.  04/29/2025 NAEON. Day 5/12 of antibiotics. Continue to escalate bowel regimen. Had hypertension today up to ; starting home amlodipine.    STROKE DOCUMENTATION   Acute Stroke Times   Last Known Normal Date: 04/22/25  Last Known Normal Time: 1700  Stroke Team Called Date: 04/23/25  Stroke Team Called Time: 0915 (Telestroke at OSH)  Stroke Team Arrival Date: 04/23/25  Stroke Team Arrival Time: 0923 (OSH)  CT Interpretation Time: 0923  Thrombolytic Therapy Recommended: No  CTA Interpretation Time: 0945 (OSH)  Thrombectomy Recommended: Yes  MRI Acute Stroke Protocol Interpretation Time: 1000 (OSH)  Decision to Treat Time for IR: 1000 (Telestroke)    NIH Scale:  1a. Level of Consciousness: 0-->Alert, keenly responsive  1b. LOC Questions: 0-->Answers both questions correctly  1c. LOC Commands: 0-->Performs both tasks correctly  2. Best Gaze: 0-->Normal  3. Visual: 0-->No visual loss  4. Facial Palsy: 0-->Normal symmetrical movements  5a. Motor  Arm, Left: 1-->Drift, limb holds 90 (or 45) degrees, but drifts down before full 10 seconds, does not hit bed or other support  5b. Motor Arm, Right: 0-->No drift, limb holds 90 (or 45) degrees for full 10 secs  6a. Motor Leg, Left: 1-->Drift, leg falls by the end of the 5-sec period but does not hit bed  6b. Motor Leg, Right: 0-->No drift, leg holds 30 degree position for full 5 secs  7. Limb Ataxia: 0-->Absent  8. Sensory: 0-->Normal, no sensory loss  9. Best Language: 0-->No aphasia, normal  10. Dysarthria: 0-->Normal  11. Extinction and Inattention (formerly Neglect): 0-->No abnormality  Total (NIH Stroke Scale): 2       Modified Sterling Score: 0  [place] Coma Scale:    ABCD2 Score:    [id number] Score:   HAS -BLED Score:   ICH Score:   Hunt & Bazzi Classification:      Hemorrhagic change of an Ischemic Stroke: Does this patient have an ischemic stroke with hemorrhagic changes? No     Neurologic Chief Complaint: L MCA stroke    Subjective:     Interval History: Patient is seen for follow-up neurological assessment and treatment recommendations: See hospital course.    HPI, Past Medical, Family, and Social History remains the same as documented in the initial encounter.     Review of Systems   Constitutional: Negative.    Eyes:  Negative for visual disturbance.   Respiratory: Negative.     Cardiovascular: Negative.    Gastrointestinal: Negative.    Genitourinary: Negative.    Neurological:  Positive for speech difficulty and weakness.     Scheduled Meds:   aspirin  81 mg Oral Daily    atorvastatin  40 mg Oral Daily    clopidogreL  75 mg Oral Daily    latanoprost  1 drop Both Eyes QHS    nitrofurantoin (macrocrystal-monohydrate)  100 mg Oral Q12H    polyethylene glycol  17 g Oral Daily    senna-docusate  1 tablet Oral BID    white petrolatum   Topical (Top) Daily     Continuous Infusions:      PRN Meds:  Current Facility-Administered Medications:     acetaminophen, 650 mg, Oral, Q6H PRN    bisacodyL, 10 mg,  Rectal, Daily PRN    dextrose 50%, 12.5 g, Intravenous, PRN    dextrose 50%, 12.5 g, Intravenous, PRN    diphenhydrAMINE, 25 mg, Intravenous, Q6H PRN    fentaNYL, 25 mcg, Intravenous, Q5 Min PRN    glucagon (human recombinant), 1 mg, Intramuscular, PRN    glucagon (human recombinant), 1 mg, Intramuscular, PRN    hydrALAZINE, 10 mg, Intravenous, Q4H PRN    HYDROmorphone, 0.2 mg, Intravenous, Q5 Min PRN    HYDROmorphone, 0.2 mg, Intravenous, Q5 Min PRN    insulin aspart U-100, 0-10 Units, Subcutaneous, Q6H PRN    labetalol, 10 mg, Intravenous, Q4H PRN    ondansetron, 4 mg, Intravenous, Once PRN    ondansetron, 4 mg, Intravenous, Daily PRN    prochlorperazine, 5 mg, Intravenous, Q30 Min PRN    sodium chloride 0.9%, 10 mL, Intravenous, PRN    sodium chloride 0.9%, 10 mL, Intravenous, PRN    sodium chloride 0.9%, 3 mL, Intravenous, PRN    Objective:     Vital Signs (Most Recent):  Temp: 98.2 °F (36.8 °C) (04/29/25 1144)  Pulse: (!) 57 (04/29/25 1144)  Resp: 18 (04/29/25 1144)  BP: (!) 181/70 (04/29/25 1144)  SpO2: 98 % (04/29/25 1144)  BP Location: Right arm    Vital Signs Range (Last 24H):  Temp:  [97.5 °F (36.4 °C)-98.2 °F (36.8 °C)]   Pulse:  [50-69]   Resp:  [18-20]   BP: (127-181)/(56-74)   SpO2:  [97 %-100 %]   BP Location: Right arm       Physical Exam  Vitals and nursing note reviewed.   Constitutional:       General: She is not in acute distress.  HENT:      Head: Normocephalic and atraumatic.   Eyes:      Extraocular Movements: Extraocular movements intact.      Conjunctiva/sclera: Conjunctivae normal.      Pupils: Pupils are equal, round, and reactive to light.   Cardiovascular:      Rate and Rhythm: Normal rate and regular rhythm.      Heart sounds: No murmur heard.  Pulmonary:      Effort: Pulmonary effort is normal. No respiratory distress.   Abdominal:      General: Abdomen is flat. There is no distension.      Palpations: Abdomen is soft.   Skin:     General: Skin is warm.   Neurological:      Mental  Status: She is alert.      Comments: See below for neuro exam   Psychiatric:         Mood and Affect: Mood normal.         Behavior: Behavior normal. Behavior is cooperative.              Neurological Exam:   LOC: alert  Attention Span: Good   Language: Expressive aphasia  Articulation: Dysarthria  Orientation: Person, Place, Time   Visual Fields: Full  EOM (CN III, IV, VI): Full/intact  Facial Movement (CN VII): Lower facial weakness on the Right  Motor: Arm left  Normal 5/5; 3/5  strength  Leg left  Normal 5/5  Arm right  Paresis: 4/5  Leg right Paresis: 3/5  Sensation: Intact to light touch, temperature and vibration    Laboratory:  CMP:   Recent Labs   Lab 04/29/25  0350   CALCIUM 9.7   ALBUMIN 2.5*   PROT 5.3*      K 4.1   CO2 21*   *   BUN 27*   CREATININE 1.0   ALKPHOS 57   ALT 21   AST 21   BILITOT 0.3     CBC:   Recent Labs   Lab 04/29/25  0350   WBC 6.91   RBC 3.82*   HGB 9.7*   HCT 31.9*      MCV 84   MCH 25.4*   MCHC 30.4*     Lipid Panel:   Recent Labs   Lab 04/23/25  1340   CHOL 130   HDL 34*   TRIG 92      Coagulation:   Recent Labs   Lab 04/23/25  0925   INR 1.0     Hgb A1C:   Recent Labs   Lab 04/23/25  1340   HGBA1C 4.9     TSH:   Recent Labs   Lab 04/23/25  1340   TSH 1.747       Diagnostic Results     Brain Imaging:  MRI brain without contrast 4/24/2025  Impression:  Stable distribution of acute infarction in the MCA territory centered within the left subinsular cortex and the left basal ganglia.  Hemosiderin deposition within the left basal ganglia.  The findings may reflect microscopic blood products.  Attention on follow-up suggested.    MRI brain without contrast @ OSH 4/23/2025  Impression:  1. Regions of diffusion restriction involving the left basal ganglia and left insular cortex compatible with acute ischemia.  No evidence of associated hemorrhage or mass effect.  2. Involutional changes as above.    CTH without contrast @ OSH 4/23/2025  Impression:  1.  No acute  intracranial hemorrhage, no hydrocephalus, herniation or midline shift.  2.  Ill-defined low-attenuation in the left insular cortex, and adjacent basal ganglia, possibly from small vessel ischemic disease and or edema from a subacute infarct, consider correlation with MRI and history.       Vessel Imaging:  IR cerebral angiogram 4/23/2025  Impression:  1. There is a left M1 segment occlusion.  Aspiration thrombectomy was performed (ADAPT) Technique was performed with restoration TICI 2C Flow in the left middle cerebral artery territory.  2.  Follow-up right vertebral angiogram demonstrated some collateral flow to the left posterior MCA branches.  3.  Normal right common carotid angiogram.    CTA Head and Neck @ OSH - 4/23/2025  Impression:  1.  Focal high-grade stenosis/narrowing, with suspected thrombus/soft plaque in the left MCA  2.  Suspicious/pathologic mediastinal lymphadenopathy, while this may be related to granulomatous disease, lymphoma, metastasis/malignancy is a consideration and correlation with the laboratory values, history and CT chest follow-up would be warranted.  3.  No clinically significant stenosis or large vessel occlusion in the neck.       Cardiac Evaluation:   TTE 4/24/2025    Left Ventricle: The left ventricle is normal in size. Normal wall thickness. Normal wall motion. There is normal systolic function with a visually estimated ejection fraction of 55 - 60%. There is normal diastolic function.    Right Ventricle: Right ventricular enlargement. Wall thickness is normal. Systolic function is normal.    Left Atrium: Severely dilated    Mitral Valve: There is mild regurgitation.    Tricuspid Valve: There is mild regurgitation.    Pulmonic Valve: There is mild regurgitation.    Pulmonary Artery: The estimated pulmonary artery systolic pressure is 37 mmHg.    IVC/SVC: Normal venous pressure at 3 mmHg.      Galindo Melton MD  Comprehensive Stroke Center  Department of Vascular Neurology   Andre Carver  - Neurosurgery ([facility])

## 2025-04-29 NOTE — SUBJECTIVE & OBJECTIVE
Neurologic Chief Complaint: L MCA stroke    Subjective:     Interval History: Patient is seen for follow-up neurological assessment and treatment recommendations: See hospital course.    HPI, Past Medical, Family, and Social History remains the same as documented in the initial encounter.     Review of Systems   Constitutional: Negative.    Eyes:  Negative for visual disturbance.   Respiratory: Negative.     Cardiovascular: Negative.    Gastrointestinal: Negative.    Genitourinary: Negative.    Neurological:  Positive for speech difficulty and weakness.     Scheduled Meds:   aspirin  81 mg Oral Daily    atorvastatin  40 mg Oral Daily    clopidogreL  75 mg Oral Daily    latanoprost  1 drop Both Eyes QHS    nitrofurantoin (macrocrystal-monohydrate)  100 mg Oral Q12H    polyethylene glycol  17 g Oral Daily    senna-docusate  1 tablet Oral BID    white petrolatum   Topical (Top) Daily     Continuous Infusions:      PRN Meds:  Current Facility-Administered Medications:     acetaminophen, 650 mg, Oral, Q6H PRN    bisacodyL, 10 mg, Rectal, Daily PRN    dextrose 50%, 12.5 g, Intravenous, PRN    dextrose 50%, 12.5 g, Intravenous, PRN    diphenhydrAMINE, 25 mg, Intravenous, Q6H PRN    fentaNYL, 25 mcg, Intravenous, Q5 Min PRN    glucagon (human recombinant), 1 mg, Intramuscular, PRN    glucagon (human recombinant), 1 mg, Intramuscular, PRN    hydrALAZINE, 10 mg, Intravenous, Q4H PRN    HYDROmorphone, 0.2 mg, Intravenous, Q5 Min PRN    HYDROmorphone, 0.2 mg, Intravenous, Q5 Min PRN    insulin aspart U-100, 0-10 Units, Subcutaneous, Q6H PRN    labetalol, 10 mg, Intravenous, Q4H PRN    ondansetron, 4 mg, Intravenous, Once PRN    ondansetron, 4 mg, Intravenous, Daily PRN    prochlorperazine, 5 mg, Intravenous, Q30 Min PRN    sodium chloride 0.9%, 10 mL, Intravenous, PRN    sodium chloride 0.9%, 10 mL, Intravenous, PRN    sodium chloride 0.9%, 3 mL, Intravenous, PRN    Objective:     Vital Signs (Most Recent):  Temp: 98.2 °F (36.8  °C) (04/29/25 1144)  Pulse: (!) 57 (04/29/25 1144)  Resp: 18 (04/29/25 1144)  BP: (!) 181/70 (04/29/25 1144)  SpO2: 98 % (04/29/25 1144)  BP Location: Right arm    Vital Signs Range (Last 24H):  Temp:  [97.5 °F (36.4 °C)-98.2 °F (36.8 °C)]   Pulse:  [50-69]   Resp:  [18-20]   BP: (127-181)/(56-74)   SpO2:  [97 %-100 %]   BP Location: Right arm       Physical Exam  Vitals and nursing note reviewed.   Constitutional:       General: She is not in acute distress.  HENT:      Head: Normocephalic and atraumatic.   Eyes:      Extraocular Movements: Extraocular movements intact.      Conjunctiva/sclera: Conjunctivae normal.      Pupils: Pupils are equal, round, and reactive to light.   Cardiovascular:      Rate and Rhythm: Normal rate and regular rhythm.      Heart sounds: No murmur heard.  Pulmonary:      Effort: Pulmonary effort is normal. No respiratory distress.   Abdominal:      General: Abdomen is flat. There is no distension.      Palpations: Abdomen is soft.   Skin:     General: Skin is warm.   Neurological:      Mental Status: She is alert.      Comments: See below for neuro exam   Psychiatric:         Mood and Affect: Mood normal.         Behavior: Behavior normal. Behavior is cooperative.              Neurological Exam:   LOC: alert  Attention Span: Good   Language: Expressive aphasia  Articulation: Dysarthria  Orientation: Person, Place, Time   Visual Fields: Full  EOM (CN III, IV, VI): Full/intact  Facial Movement (CN VII): Lower facial weakness on the Right  Motor: Arm left  Normal 5/5; 3/5  strength  Leg left  Normal 5/5  Arm right  Paresis: 4/5  Leg right Paresis: 3/5  Sensation: Intact to light touch, temperature and vibration    Laboratory:  CMP:   Recent Labs   Lab 04/29/25  0350   CALCIUM 9.7   ALBUMIN 2.5*   PROT 5.3*      K 4.1   CO2 21*   *   BUN 27*   CREATININE 1.0   ALKPHOS 57   ALT 21   AST 21   BILITOT 0.3     CBC:   Recent Labs   Lab 04/29/25  0350   WBC 6.91   RBC 3.82*   HGB  9.7*   HCT 31.9*      MCV 84   MCH 25.4*   MCHC 30.4*     Lipid Panel:   Recent Labs   Lab 04/23/25  1340   CHOL 130   HDL 34*   TRIG 92      Coagulation:   Recent Labs   Lab 04/23/25  0925   INR 1.0     Hgb A1C:   Recent Labs   Lab 04/23/25  1340   HGBA1C 4.9     TSH:   Recent Labs   Lab 04/23/25  1340   TSH 1.747       Diagnostic Results     Brain Imaging:  MRI brain without contrast 4/24/2025  Impression:  Stable distribution of acute infarction in the MCA territory centered within the left subinsular cortex and the left basal ganglia.  Hemosiderin deposition within the left basal ganglia.  The findings may reflect microscopic blood products.  Attention on follow-up suggested.    MRI brain without contrast @ OSH 4/23/2025  Impression:  1. Regions of diffusion restriction involving the left basal ganglia and left insular cortex compatible with acute ischemia.  No evidence of associated hemorrhage or mass effect.  2. Involutional changes as above.    CTH without contrast @ OSH 4/23/2025  Impression:  1.  No acute intracranial hemorrhage, no hydrocephalus, herniation or midline shift.  2.  Ill-defined low-attenuation in the left insular cortex, and adjacent basal ganglia, possibly from small vessel ischemic disease and or edema from a subacute infarct, consider correlation with MRI and history.       Vessel Imaging:  IR cerebral angiogram 4/23/2025  Impression:  1. There is a left M1 segment occlusion.  Aspiration thrombectomy was performed (ADAPT) Technique was performed with restoration TICI 2C Flow in the left middle cerebral artery territory.  2.  Follow-up right vertebral angiogram demonstrated some collateral flow to the left posterior MCA branches.  3.  Normal right common carotid angiogram.    CTA Head and Neck @ OSH - 4/23/2025  Impression:  1.  Focal high-grade stenosis/narrowing, with suspected thrombus/soft plaque in the left MCA  2.  Suspicious/pathologic mediastinal lymphadenopathy, while this  may be related to granulomatous disease, lymphoma, metastasis/malignancy is a consideration and correlation with the laboratory values, history and CT chest follow-up would be warranted.  3.  No clinically significant stenosis or large vessel occlusion in the neck.       Cardiac Evaluation:   TTE 4/24/2025    Left Ventricle: The left ventricle is normal in size. Normal wall thickness. Normal wall motion. There is normal systolic function with a visually estimated ejection fraction of 55 - 60%. There is normal diastolic function.    Right Ventricle: Right ventricular enlargement. Wall thickness is normal. Systolic function is normal.    Left Atrium: Severely dilated    Mitral Valve: There is mild regurgitation.    Tricuspid Valve: There is mild regurgitation.    Pulmonic Valve: There is mild regurgitation.    Pulmonary Artery: The estimated pulmonary artery systolic pressure is 37 mmHg.    IVC/SVC: Normal venous pressure at 3 mmHg.

## 2025-04-29 NOTE — PT/OT/SLP PROGRESS
"Occupational Therapy   Treatment    Name: Delilah Williamson  MRN: [id number]  Admitting Diagnosis:  Embolic stroke involving left middle cerebral artery  2 Days Post-Op    Recommendations:     Discharge Recommendations: High Intensity Therapy  Discharge Equipment Recommendations:  bedside commode, walker, rolling  Barriers to discharge:  None    Assessment:     Delilah Williamson is a 85 y.o. female with a medical diagnosis of Embolic stroke involving left middle cerebral artery.  She presents with continued assistance needed for self-care and mobility activities. Performance deficits affecting function are gait instability, weakness, decreased upper extremity function, impaired endurance, impaired balance, decreased lower extremity function, decreased safety awareness, impaired self care skills, impaired functional mobility, decreased coordination. Pt remaining motivated to increase independence, session this date targeted BUE strengthening as pt completed PT session prior to OT session. Pt with continued RUE weakness leading to mild difficulty with therapeutic exercises with red theraband. Pt with improved functional mobility independence with RW use though continuing to benefit from assistance. Patient has demonstrated sufficient progression to warrant high intensity therapy evidenced by objectives noted below.     Rehab Prognosis:  Good; patient would benefit from acute skilled OT services to address these deficits and reach maximum level of function.       Plan:     Patient to be seen 4 x/week to address the above listed problems via self-care/home management, therapeutic activities, therapeutic exercises, neuromuscular re-education  Plan of Care Expires: 05/24/25  Plan of Care Reviewed with: patient    Subjective     Chief Complaint: "I sat up in the chair for a long time yesterday"  Patient/Family Comments/goals: increase independence  Pain/Comfort:  Pain Rating 1: 0/10  Pain Rating Post-Intervention 1: " 0/10    Objective:     Communicated with: nursing prior to session.  Patient found HOB elevated with bed alarm, telemetry, pressure relief boots, PureWick, SCD upon OT entry to room.    General Precautions: Standard, aspiration, fall    Orthopedic Precautions:N/A  Braces: N/A  Respiratory Status: Room air     Occupational Performance:     Bed Mobility:    Patient completed Scooting/Bridging with minimum assistance and assistance at L hip for anterior scooting  Patient completed Supine to Sit with minimum assistance and assistance at trunk  Patient completed Sit to Supine with minimum assistance and assistance at RLE    Pt seated EOB with SBA    Functional Mobility/Transfers:  Patient completed Sit <> Stand Transfer with minimum assistance  with  rolling walker and min v/cs for hand placement   Functional Mobility: Pt engaged in functional mobility to simulate household/community distances x4 R lateral steps with minimal assistance and RW in order to maximize functional endurance and standing balance required for engagement in occupations of choice - pt with mild difficulty with RW management benefiting from assistance     Therapeutic exercises:   - while seated EOB pt completing the following  exercises with red theraband x10 reps ea  RUE Shoulder flexion, row with scapula retraction, elbow flexion, elbow extension  Pt with mild difficulty completing full ROM with v/cs for body mechanics  LUE shoulder flexion, row with scapula retraction       AMPAC 6 Click ADL: 15    Treatment & Education:  Session this date targeted therapeutic exercises and therapeutic activities to increase pt's independence  Pt educated on OT roles, POC, call button for assistance, and importance of OOB activity    Patient left HOB elevated with all lines intact, call button in reach, bed alarm on, and nursing notified    GOALS:   Multidisciplinary Problems       Occupational Therapy Goals          Problem: Occupational Therapy    Goal Priority  Disciplines Outcome Interventions   Occupational Therapy Goal     OT, PT/OT Progressing    Description: Goals to be met by: 5/24/25     Patient will increase functional independence with ADLs by performing:    UE Dressing with Stand-by Assistance.  LE Dressing with Minimal Assistance.  Grooming while standing at sink with Minimal Assistance.  Toileting from toilet with Contact Guard Assistance for hygiene and clothing management.   Sitting at edge of bed x10 minutes with Supervision.  Supine to sit with Minimal Assistance.  Step transfer with SBA and RW prn  Toilet transfer to toilet with SBA.  Increased functional strength to 4/5 for RUE.  Upper extremity exercise program x10 reps per handout, with assistance as needed.                         DME Justifications:  Delilah requires a hospital bed due to her requiring positioning of the body in ways not feasible with an ordinary bed due to limited ability and cannot independently make changes in body position without the use of the bed.The positioning of the body cannot be sufficiently resolved by the use of pillows and wedges.   Delilah's mobility limitation cannot be sufficiently resolved by the use of a cane. Her functional mobility deficit can be sufficiently resolved with the use of a Rolling Walker. Patient's mobility limitation significantly impairs their ability to participate in one of more activities of daily living.  The use of a RW will significantly improve the patient's ability to participate in MRADLS and the patient will use it on regular basis in the home.    Time Tracking:     OT Date of Treatment: 04/29/25  OT Start Time: 1214  OT Stop Time: 1241  OT Total Time (min): 27 min    Billable Minutes:Therapeutic Activity 12  Therapeutic Exercise 15    OT/NILAY: OT          4/29/2025

## 2025-04-29 NOTE — PT/OT/SLP PROGRESS
"Speech Language Pathology Treatment    Patient Name:  Delilah Williamson   MRN:  [id number]  Admitting Diagnosis: Embolic stroke involving left middle cerebral artery    Recommendations:                 General Recommendations:  Dysphagia therapy, Speech/language therapy, and Cognitive-linguistic therapy  Diet recommendations:  Regular Diet - IDDSI Level 7, Liquid Diet Level: Thin liquids - IDDSI Level 0   Aspiration Precautions: 1 bite/sip at a time, Alternating bites/sips, Check for pocketing/oral residue, Eliminate distractions, Feed only when awake/alert, HOB to 90 degrees, Meds crushed in puree, Small bites/sips, and Strict aspiration precautions   General Precautions: Standard, aspiration, aphasia, fall  Communication strategies:  provide increased time to answer and go to room if call light pushed    Assessment:     Delilah Williamson is a 85 y.o. female with an SLP diagnosis of Aphasia, Dysphagia, and Dysarthria.      Subjective     "I would like that." Re: diet upgrade    Pain/Comfort:  Pain Rating 1: 0/10 (Simultaneous filing. User may not have seen previous data.)  Pain Rating Post-Intervention 1: 0/10    Respiratory Status: Room air    Objective:     Has the patient been evaluated by SLP for swallowing?   Yes  Keep patient NPO? No     Pt seen bedside finishing breakfast tray when SLP entered. She denied difficulty with recent meals. R buccal cavity was mostly clear. Slight wet vocal quality noted which cleared with cued cough. Pt observed self feeding 1/2 chris cracker with thin liquids via cup with mildly increased mastication time.  Pt using tongue sweep with min cues to clear any stasis.  No overt s/s aspiration across trials.  Speech intelligibility improved this date with rare, mild imprecision which pt corrected with prompts for repetitions.  She was able to answer simple open ended questions with mildly increased time.  Complex y/n questions answered with 80% accy.  Convergent naming task completed with 80% " accy, increased time required with decreased accy with abstract categories.  Sentence formation task completed given stimulus word with 100% accy given increased time.  Education provided re: role of SLP, diet recs, swallow precs, s/s aspiration, compensatory strategies, progress made, and POC.  Pt verbalized understanding and agreement.       Goals:   Multidisciplinary Problems       SLP Goals          Problem: SLP    Goal Priority Disciplines Outcome   SLP Goal     SLP Progressing   Description: 1. Patient will follow simple one step directions with min verbal cues with 50% accuracy.  2. Pt will participate in ongoing diagnostic dysphagia therapy   3. Patient will complete automatic speech tasks with 80% accuracy given verbal model from clinician.  4. Patient will asnwer simple yes/no questions with 80% accuracy given min verbal cues from clinician.                         Plan:     Patient to be seen:  4 x/week   Plan of Care expires:  05/08/25  Plan of Care reviewed with:  patient (Simultaneous filing. User may not have seen previous data.)   SLP Follow-Up:  Yes       Discharge recommendations:  High Intensity Therapy (Simultaneous filing. User may not have seen previous data.)   Barriers to Discharge:  Level of Skilled Assistance Needed      Time Tracking:     SLP Treatment Date:   04/29/25  Speech Start Time:  0845  Speech Stop Time:  0901     Speech Total Time (min):  16 min    Billable Minutes: Speech Therapy Individual 8 and Treatment Swallowing Dysfunction 8    04/29/2025

## 2025-04-29 NOTE — PLAN OF CARE
Problem: Adult Inpatient Plan of Care  Goal: Plan of Care Review  Outcome: Not Progressing  Goal: Patient-Specific Goal (Individualized)  Outcome: Not Progressing  Goal: Absence of Hospital-Acquired Illness or Injury  Outcome: Not Progressing  Goal: Optimal Comfort and Wellbeing  Outcome: Not Progressing  Goal: Readiness for Transition of Care  Outcome: Not Progressing     Problem: Stroke, Ischemic (Includes Transient Ischemic Attack)  Goal: Optimal Coping  Outcome: Not Progressing  Goal: Effective Bowel Elimination  Outcome: Not Progressing  Goal: Optimal Cerebral Tissue Perfusion  Outcome: Not Progressing  Goal: Optimal Cognitive Function  Outcome: Not Progressing  Goal: Improved Communication Skills  Outcome: Not Progressing  Goal: Optimal Functional Ability  Outcome: Not Progressing  Goal: Optimal Nutrition Intake  Outcome: Not Progressing  Goal: Effective Oxygenation and Ventilation  Outcome: Not Progressing  Goal: Improved Sensorimotor Function  Outcome: Not Progressing  Goal: Safe and Effective Swallow  Outcome: Not Progressing  Goal: Effective Urinary Elimination  Outcome: Not Progressing         POC updated and reviewed with the patient at the bedside. Questions regarding POC were encouraged and addressed. VSS, see flowsheets. Patient is AOX 4 at this time. NAEON. Seizure, fall, and safety precautions maintained, no signs of injury noted during shift. Patient repositioned independently/ with assistance in bed for comfort. Upon exiting room, patient's bed locked in low position, side rails up x 3, bed alarm set, with call light within reach. Instructed patient to call staff for mobility, verbalized understanding. Stroke book and stroke education reviewed with the patient at the bedside, see education flowsheets for details. No acute signs of distress noted at this time.

## 2025-04-29 NOTE — ASSESSMENT & PLAN NOTE
-Area of cerebral edema identified when reviewing brain imaging in the Left MCA territory, there is not mass effect associated with it.   -Admitted to [facility] post-IR for close neuro monitoring and observation. Neuro exam and follow up imaging has remained stable and patient stepped down to NPU on 4/25.    Plan:   -Continue frequent q4h neuro checks as any acute changes or worsening neuro status may signify expansion of the insult and/or area of the edema, which may require acute interventions to prevent loss of function and/or death.  -Obtain stat CTH for any new or worsening neuro changes and notify primary team immediately

## 2025-04-29 NOTE — ASSESSMENT & PLAN NOTE
Last documented bowel movement 4/22  Recent CT abdomen without obstruction  Will escalate bowel regimen    -continue senna BID  -Continue miralax QD  -Giving bisacodyl suppository PRN

## 2025-04-30 VITALS
WEIGHT: 192.69 LBS | HEART RATE: 59 BPM | OXYGEN SATURATION: 99 % | SYSTOLIC BLOOD PRESSURE: 167 MMHG | BODY MASS INDEX: 30.24 KG/M2 | HEIGHT: 67 IN | TEMPERATURE: 98 F | DIASTOLIC BLOOD PRESSURE: 75 MMHG | RESPIRATION RATE: 18 BRPM

## 2025-04-30 LAB
ABSOLUTE EOSINOPHIL (OHS): 0.21 K/UL
ABSOLUTE MONOCYTE (OHS): 0.51 K/UL (ref 0.3–1)
ABSOLUTE NEUTROPHIL COUNT (OHS): 3.89 K/UL (ref 1.8–7.7)
ALBUMIN SERPL BCP-MCNC: 2.5 G/DL (ref 3.5–5.2)
ALP SERPL-CCNC: 55 UNIT/L (ref 40–150)
ALT SERPL W/O P-5'-P-CCNC: 17 UNIT/L (ref 10–44)
ANION GAP (OHS): 7 MMOL/L (ref 8–16)
AST SERPL-CCNC: 20 UNIT/L (ref 11–45)
BASOPHILS # BLD AUTO: 0.04 K/UL
BASOPHILS NFR BLD AUTO: 0.6 %
BILIRUB SERPL-MCNC: 0.3 MG/DL (ref 0.1–1)
BUN SERPL-MCNC: 19 MG/DL (ref 8–23)
CALCIUM SERPL-MCNC: 10 MG/DL (ref 8.7–10.5)
CHLORIDE SERPL-SCNC: 111 MMOL/L (ref 95–110)
CO2 SERPL-SCNC: 23 MMOL/L (ref 23–29)
CREAT SERPL-MCNC: 1 MG/DL (ref 0.5–1.4)
ERYTHROCYTE [DISTWIDTH] IN BLOOD BY AUTOMATED COUNT: 14.6 % (ref 11.5–14.5)
GFR SERPLBLD CREATININE-BSD FMLA CKD-EPI: 55 ML/MIN/1.73/M2
GLUCOSE SERPL-MCNC: 101 MG/DL (ref 70–110)
HCT VFR BLD AUTO: 34.9 % (ref 37–48.5)
HGB BLD-MCNC: 10.1 GM/DL (ref 12–16)
IMM GRANULOCYTES # BLD AUTO: 0.02 K/UL (ref 0–0.04)
IMM GRANULOCYTES NFR BLD AUTO: 0.3 % (ref 0–0.5)
LYMPHOCYTES # BLD AUTO: 1.51 K/UL (ref 1–4.8)
MAGNESIUM SERPL-MCNC: 1.4 MG/DL (ref 1.6–2.6)
MCH RBC QN AUTO: 25.7 PG (ref 27–31)
MCHC RBC AUTO-ENTMCNC: 28.9 G/DL (ref 32–36)
MCV RBC AUTO: 89 FL (ref 82–98)
NUCLEATED RBC (/100WBC) (OHS): 0 /100 WBC
PHOSPHATE SERPL-MCNC: 2.9 MG/DL (ref 2.7–4.5)
PLATELET # BLD AUTO: 269 K/UL (ref 150–450)
PMV BLD AUTO: 10.1 FL (ref 9.2–12.9)
POCT GLUCOSE: 109 MG/DL (ref 70–110)
POCT GLUCOSE: 140 MG/DL (ref 70–110)
POCT GLUCOSE: 159 MG/DL (ref 70–110)
POTASSIUM SERPL-SCNC: 4 MMOL/L (ref 3.5–5.1)
PROT SERPL-MCNC: 5.6 GM/DL (ref 6–8.4)
RBC # BLD AUTO: 3.93 M/UL (ref 4–5.4)
RELATIVE EOSINOPHIL (OHS): 3.4 %
RELATIVE LYMPHOCYTE (OHS): 24.4 % (ref 18–48)
RELATIVE MONOCYTE (OHS): 8.3 % (ref 4–15)
RELATIVE NEUTROPHIL (OHS): 63 % (ref 38–73)
SODIUM SERPL-SCNC: 141 MMOL/L (ref 136–145)
WBC # BLD AUTO: 6.18 K/UL (ref 3.9–12.7)

## 2025-04-30 PROCEDURE — 25000003 PHARM REV CODE 250

## 2025-04-30 PROCEDURE — 84460 ALANINE AMINO (ALT) (SGPT): CPT | Performed by: PHYSICIAN ASSISTANT

## 2025-04-30 PROCEDURE — 25000003 PHARM REV CODE 250: Performed by: PSYCHIATRY & NEUROLOGY

## 2025-04-30 PROCEDURE — 97535 SELF CARE MNGMENT TRAINING: CPT

## 2025-04-30 PROCEDURE — 36415 COLL VENOUS BLD VENIPUNCTURE: CPT | Performed by: PHYSICIAN ASSISTANT

## 2025-04-30 PROCEDURE — 97530 THERAPEUTIC ACTIVITIES: CPT

## 2025-04-30 PROCEDURE — 83735 ASSAY OF MAGNESIUM: CPT | Performed by: PHYSICIAN ASSISTANT

## 2025-04-30 PROCEDURE — 92526 ORAL FUNCTION THERAPY: CPT

## 2025-04-30 PROCEDURE — 99233 SBSQ HOSP IP/OBS HIGH 50: CPT | Mod: GC,,, | Performed by: PSYCHIATRY & NEUROLOGY

## 2025-04-30 PROCEDURE — 63600175 PHARM REV CODE 636 W HCPCS: Performed by: PHYSICIAN ASSISTANT

## 2025-04-30 PROCEDURE — 25000003 PHARM REV CODE 250: Performed by: PHYSICIAN ASSISTANT

## 2025-04-30 PROCEDURE — 92507 TX SP LANG VOICE COMM INDIV: CPT

## 2025-04-30 PROCEDURE — 25000003 PHARM REV CODE 250: Performed by: STUDENT IN AN ORGANIZED HEALTH CARE EDUCATION/TRAINING PROGRAM

## 2025-04-30 PROCEDURE — 85025 COMPLETE CBC W/AUTO DIFF WBC: CPT | Performed by: PHYSICIAN ASSISTANT

## 2025-04-30 PROCEDURE — 84100 ASSAY OF PHOSPHORUS: CPT | Performed by: PHYSICIAN ASSISTANT

## 2025-04-30 RX ORDER — NAPROXEN SODIUM 220 MG/1
81 TABLET, FILM COATED ORAL DAILY
Qty: 30 TABLET | Refills: 11 | Status: ON HOLD | OUTPATIENT
Start: 2025-04-30 | End: 2026-04-30

## 2025-04-30 RX ORDER — CLOPIDOGREL BISULFATE 75 MG/1
75 TABLET ORAL DAILY
Qty: 30 TABLET | Refills: 11 | Status: ON HOLD | OUTPATIENT
Start: 2025-04-30 | End: 2026-04-30

## 2025-04-30 RX ORDER — NITROFURANTOIN 25; 75 MG/1; MG/1
100 CAPSULE ORAL EVERY 12 HOURS
Qty: 13 CAPSULE | Refills: 0 | Status: ON HOLD | OUTPATIENT
Start: 2025-04-30 | End: 2025-05-07

## 2025-04-30 RX ORDER — TELMISARTAN 40 MG/1
40 TABLET ORAL DAILY
Qty: 90 TABLET | Refills: 3 | Status: ON HOLD | OUTPATIENT
Start: 2025-04-30

## 2025-04-30 RX ADMIN — ASPIRIN 81 MG CHEWABLE TABLET 81 MG: 81 TABLET CHEWABLE at 09:04

## 2025-04-30 RX ADMIN — ATORVASTATIN CALCIUM 40 MG: 40 TABLET, FILM COATED ORAL at 09:04

## 2025-04-30 RX ADMIN — NITROFURANTOIN MONOHYDRATE/MACROCRYSTALS 100 MG: 25; 75 CAPSULE ORAL at 09:04

## 2025-04-30 RX ADMIN — WHITE PETROLATUM: 1.75 OINTMENT TOPICAL at 09:04

## 2025-04-30 RX ADMIN — AMLODIPINE BESYLATE 5 MG: 5 TABLET ORAL at 09:04

## 2025-04-30 RX ADMIN — CLOPIDOGREL 75 MG: 75 TABLET ORAL at 09:04

## 2025-04-30 RX ADMIN — INSULIN ASPART 2 UNITS: 100 INJECTION, SOLUTION INTRAVENOUS; SUBCUTANEOUS at 12:04

## 2025-04-30 NOTE — PROGRESS NOTES
Andre Carver - Neurosurgery ([facility])  Vascular Neurology  Comprehensive Stroke Center  Progress Note    Assessment/Plan:     * Embolic stroke involving left middle cerebral artery  Ms. Delilah Williamson is a 84 y/o female with PMHx of HTN, HLD, DM2 that was transferred from [facility] to [facility] for further eval and management of acute L MCA stroke. She initially presented to OSH with acute onset L MCA syndrome (RSW, RFD, L gaze, aphasia) upon waking up at 8am 4/23, approx 1 hr PTA. LKW 5pm 4/22. Telestroke NIHSS 12. CTH showed subtle early ischemic changes in L insular cortex, CTA revealed L M1/M2 occlusion. MRI for wake up protocol showed acute infarcts in L insular cortex and L BG with corresponding FLAIR changes. No lytics recommended due to FLAIR changes, however given fair ASPECTS patient transferred for possible intervention. On arrival to [facility] neuro exam with NIHSS 17, patient taken to IR for DSA and is now s/p L M1/2 thrombectomy with TICI 2c reperfusion. Repeat MRI brain shows stable acute L MCA territory infarcts involving subinsular and BG regions. TTE shows EF 55-60%, severe LAE, and no WMA. Etiology of stroke ESUS at this time, 30d cardiac event monitor will be ordered at discharge for further stroke workup and eval for possible pAF.    04/30/2025 NAEON. Day 6/12 of antibiotics. Bowel movement yesterday with suppository. Blood pressure better controlled after starting amlodipine; will restart half-dose home telmisartan on discharge for further monitoring while at rehab. Patient to follow up with PCP concerning restarting full dose antiHTN, lasix, and potassium supplementation. Patient will continue on DAPT for at least 30 days and will continue ASA indefinitely. Patient scheduled to receive cardiac event monitor post-discharge from [facility]. Follow up with Vascular Neurology.       Antithrombotics for secondary stroke prevention: Antiplatelets: Aspirin: 81 mg daily  Clopidogrel: 75 mg daily    Statins  for secondary stroke prevention and hyperlipidemia, if present: Statins: Atorvastatin- 40 mg daily    Aggressive risk factor modification: HTN, DM, HLD     Rehab efforts: The patient has been evaluated by a stroke team provider and the therapy needs have been fully considered based off the presenting complaints and exam findings. The following therapy evaluations are needed: PT evaluate and treat, OT evaluate and treat, SLP evaluate and treat, PM&R evaluate for appropriate placement    Diagnostics ordered/pending: None     VTE prophylaxis: Heparin 5000 units SQ every 8 hours  Mechanical prophylaxis: Place SCDs    BP parameters: Infarct: SBP <160    Plan:   - chemical VTE prophylaxis held secondary to urology procedure, add back as indicated   - risk factor modification as above   - statin as above   - aspirin and clopidogrel as above   - PT/OT recommend high intensity therapy, discharging to inpatient rehab    Constipation  RESOLVED; BM 4/29  Last documented bowel movement 4/22  Recent CT abdomen without obstruction    -continue senna BID  -Continue miralax QD  -bisacodyl suppository PRN    Ureterolithiasis  Right sided obstructing stone noted on imaging.     Plan:   - s/p R ureteral stent placement 4/27  - urology consulted  - appreciate assistance   - f/u with Urology scheduled    Debility  Patient with Acute debility due to hemiplegia/hemiparesis. Latest AMPAC and GEMS scores have been reviewed. Evaluation for etiology is complete. Plan includes - Progressive mobility protocol initated  - PT/OT consulted  - Fall precautions in place  - Physical Medicine/Rehab consulted.    UTI (urinary tract infection)  -UA on admission with 1+ blood, + nitrites, 3+ leukocyte esterase, many bacteria  -Ucx with GNR >100K CFU, susceptibility pending  -CT C/A/P demonstrates obstructive R ureteral stone  -s/p R ureteral stent placement 4/27  -Urology recommending extended course of antibiotics  -Pansensitive culture    Plan:    -Extended course of antibiotics to 12 days  -s/p CTX 4/25-4/27  -Continue macrobid 100mg BID (EOT 5/6/24)    Lymphadenopathy  -CTA head and neck noted to have mediastinal lymphadenopathy. Designated chest CT obtained and revealed mediastinal and bilateral hilar lymphadenopathy, mild bilateral smooth interlobular septal thickening and trace bilateral pleural effusions, and 2 mm right lower lobe nodule.  No prior imaging available to compare.   -Per chart review, no significant smoking history or known h/o cancer.   -Unclear whether malignant vs autoimmune/inflammatory process, although no apparent signs of infection at this time.    Plan:   -CT C/A/P showed right lung nodule and lymphadenopathy previously noted   -f/u with primary care provider    Cytotoxic brain edema  -Area of cerebral edema identified when reviewing brain imaging in the Left MCA territory, there is not mass effect associated with it.   -Admitted to [facility] post-IR for close neuro monitoring and observation. Neuro exam and follow up imaging has remained stable and patient stepped down to NPU on 4/25.    Plan:   -Continue frequent q4h neuro checks as any acute changes or worsening neuro status may signify expansion of the insult and/or area of the edema, which may require acute interventions to prevent loss of function and/or death.  -Obtain stat CTH for any new or worsening neuro changes and notify primary team immediately    Controlled type 2 diabetes mellitus without complication, without long-term current use of insulin  -Stroke risk factor  -A1c 4.9    Plan:   -Glucose goal while inpatient 140-180, avoid hypoglycemia  -Hold home anti-hyperglycemics while admitted  -[facility] [place] PRN    Hyperlipidemia  -Stroke risk factor  -LDL 77.6, goal <70    Plan:   -Atorvastatin 40mg daily    HTN (hypertension)  -Stroke risk factor    Plan:   -SBP goal <160, maintain MAP >65  -BP range in the last 24 hrs: BP  Min: 147/72  Max: 174/70  -Continue home amlodipine 5mg  QD  -Continue home telmisartan at half dose on discharge with plans to uptitrate as tolerated back to full dose of 80mg.  -Follow up with PCP for further management      Aphasia  -Due to stroke  -Continues to have mild expressive aphasia    Plan:   -Aggressive therapy  -PT/OT/SLP eval and treat    Hemiplegia  -Due to stroke  -Improving although continues to have RSW    Plan:   -Aggressive therapy  -PT/OT/SLP eval and treat. Rec high intensity, discharging to inpatient rehab         04/24/2025 Repeat MRI Brain without contrast overnight with stable infarct, no hemorrhage. Ok to continue DAPT for secondary stroke prevention. TTE with EF 55-60%, +LAE, normal wall motion. Etiology of stroke likely ESUS. Patient will need cardiac event monitor at discharge. Exam overall improved post IR with TICI 2C. Ok to stepdown to NPU.   4/25/2025 NAEON, neuro exam stable and continues to have RSW and speech difficulty. Started on rocephin for abx for UTI, urine cx with GNR and susceptibilities pending. Stepped down to NPU this morning. CT chest yesterday shows mediastinal and bilateral hilar lymphadenopathy, will obtain CT C/A/P for malignancy eval. Dispo planning ongoing for IPR, pending auth/acceptance for [facility] rehab.  4/26/2025 NAEON, neuro exam stable. Continuing antibiotics for UTI. CT C/A/P for malignancy eval complete, continued to show right lung nodule and lymphadenopathy. Also showed evidence of right obstructing renal stone. Dispo pending IPR placement.   4/27/2025 NAEON. Patient will complete antibiotics for UTI today. Urology consulted for obstructing right stone, plan for stent placement today. Dispo pending IPR placement.   4/28/2025 NAEON. Ureteral stent placed yesterday. Antibiotics extended to 12d course following stent placement. Escalating bowel regimen. Stable medical and neurological status. Dispo pending IPR placement.  04/29/2025 NAEON. Day 5/12 of antibiotics. Continue to escalate bowel regimen. Had  hypertension today up to ; starting home amlodipine.  04/30/2025 NAEON. Day 6/12. Bowel movement yesterday with suppository. Blood pressure better controlled after starting amlodipine; will restart half-dose home telmisartan on discharge for further monitoring while at rehab. Patient to follow up with PCP concerning restarting full dose antiHTN, lasix, and potassium supplementation. Patient will continue on DAPT for at least 30 days and will continue ASA indefinitely. Patient scheduled to receive cardiac event monitor post-discharge from [facility]. Follow up with Vascular Neurology.     STROKE DOCUMENTATION   Acute Stroke Times   Last Known Normal Date: 04/22/25  Last Known Normal Time: 1700  Stroke Team Called Date: 04/23/25  Stroke Team Called Time: 0915 (Telestroke at OSH)  Stroke Team Arrival Date: 04/23/25  Stroke Team Arrival Time: 0923 (OSH)  CT Interpretation Time: 0923  Thrombolytic Therapy Recommended: No  CTA Interpretation Time: 0945 (OSH)  Thrombectomy Recommended: Yes  MRI Acute Stroke Protocol Interpretation Time: 1000 (OSH)  Decision to Treat Time for IR: 1000 (Telestroke)    NIH Scale:  1a. Level of Consciousness: 0-->Alert, keenly responsive  1b. LOC Questions: 0-->Answers both questions correctly  1c. LOC Commands: 0-->Performs both tasks correctly  2. Best Gaze: 0-->Normal  3. Visual: 0-->No visual loss  4. Facial Palsy: 0-->Normal symmetrical movements  5a. Motor Arm, Left: 0-->No drift, limb holds 90 (or 45) degrees for full 10 secs  5b. Motor Arm, Right: 0-->No drift, limb holds 90 (or 45) degrees for full 10 secs  6a. Motor Leg, Left: 0-->No drift, leg holds 30 degree position for full 5 secs  6b. Motor Leg, Right: 0-->No drift, leg holds 30 degree position for full 5 secs  7. Limb Ataxia: 0-->Absent  8. Sensory: 0-->Normal, no sensory loss  9. Best Language: 0-->No aphasia, normal  10. Dysarthria: 0-->Normal  11. Extinction and Inattention (formerly Neglect): 0-->No abnormality  Total (NIH  Stroke Scale): 0       Modified Hugo Score: 2  [place] Coma Scale:    ABCD2 Score:    [id number] Score:   HAS -BLED Score:   ICH Score:   Hunt & Bazzi Classification:      Hemorrhagic change of an Ischemic Stroke: Does this patient have an ischemic stroke with hemorrhagic changes? No     Neurologic Chief Complaint: L MCA stroke    Subjective:     Interval History: Patient is seen for follow-up neurological assessment and treatment recommendations: See hospital course.    HPI, Past Medical, Family, and Social History remains the same as documented in the initial encounter.     Review of Systems   Constitutional: Negative.    Eyes:  Negative for visual disturbance.   Respiratory: Negative.     Cardiovascular: Negative.    Gastrointestinal: Negative.    Genitourinary: Negative.    Neurological:  Positive for speech difficulty and weakness.     Scheduled Meds:   amLODIPine  5 mg Oral Daily    aspirin  81 mg Oral Daily    atorvastatin  40 mg Oral Daily    clopidogreL  75 mg Oral Daily    latanoprost  1 drop Both Eyes QHS    nitrofurantoin (macrocrystal-monohydrate)  100 mg Oral Q12H    polyethylene glycol  17 g Oral Daily    senna-docusate  1 tablet Oral BID    white petrolatum   Topical (Top) Daily     Continuous Infusions:      PRN Meds:  Current Facility-Administered Medications:     acetaminophen, 650 mg, Oral, Q6H PRN    bisacodyL, 10 mg, Rectal, Daily PRN    dextrose 50%, 12.5 g, Intravenous, PRN    dextrose 50%, 12.5 g, Intravenous, PRN    diphenhydrAMINE, 25 mg, Intravenous, Q6H PRN    fentaNYL, 25 mcg, Intravenous, Q5 Min PRN    glucagon (human recombinant), 1 mg, Intramuscular, PRN    glucagon (human recombinant), 1 mg, Intramuscular, PRN    hydrALAZINE, 10 mg, Intravenous, Q4H PRN    HYDROmorphone, 0.2 mg, Intravenous, Q5 Min PRN    HYDROmorphone, 0.2 mg, Intravenous, Q5 Min PRN    insulin aspart U-100, 0-10 Units, Subcutaneous, Q6H PRN    labetalol, 10 mg, Intravenous, Q4H PRN    ondansetron, 4 mg,  Intravenous, Once PRN    ondansetron, 4 mg, Intravenous, Daily PRN    prochlorperazine, 5 mg, Intravenous, Q30 Min PRN    sodium chloride 0.9%, 10 mL, Intravenous, PRN    sodium chloride 0.9%, 10 mL, Intravenous, PRN    sodium chloride 0.9%, 3 mL, Intravenous, PRN    Objective:     Vital Signs (Most Recent):  Temp: 98.5 °F (36.9 °C) (04/30/25 1136)  Pulse: 60 (04/30/25 1442)  Resp: 18 (04/30/25 1136)  BP: (!) 153/81 (04/30/25 1442)  SpO2: 99 % (04/30/25 1136)  BP Location: Right arm    Vital Signs Range (Last 24H):  Temp:  [97.4 °F (36.3 °C)-98.5 °F (36.9 °C)]   Pulse:  [53-68]   Resp:  [18-20]   BP: (147-174)/(63-81)   SpO2:  [97 %-99 %]   BP Location: Right arm       Physical Exam  Vitals and nursing note reviewed.   Constitutional:       General: She is not in acute distress.  HENT:      Head: Normocephalic and atraumatic.   Eyes:      Extraocular Movements: Extraocular movements intact.      Conjunctiva/sclera: Conjunctivae normal.      Pupils: Pupils are equal, round, and reactive to light.   Cardiovascular:      Rate and Rhythm: Normal rate and regular rhythm.      Heart sounds: No murmur heard.  Pulmonary:      Effort: Pulmonary effort is normal. No respiratory distress.   Abdominal:      General: Abdomen is flat. There is no distension.      Palpations: Abdomen is soft.   Skin:     General: Skin is warm.   Neurological:      Mental Status: She is alert.      Comments: See below for neuro exam   Psychiatric:         Mood and Affect: Mood normal.         Behavior: Behavior normal. Behavior is cooperative.              Neurological Exam:   LOC: alert  Attention Span: Good   Language: Expressive aphasia  Articulation: Dysarthria  Orientation: Person, Place, Time   Visual Fields: Full  EOM (CN III, IV, VI): Full/intact  Facial Movement (CN VII): Lower facial weakness on the Right  Motor: Arm left  Normal 5/5  Leg left  Normal 5/5  Arm right  Paresis: 4/5  Leg right Paresis: 4/5  Sensation: Intact to light touch,  temperature and vibration    Laboratory:  CMP:   Recent Labs   Lab 04/30/25  0814   CALCIUM 10.0   ALBUMIN 2.5*   PROT 5.6*      K 4.0   CO2 23   *   BUN 19   CREATININE 1.0   ALKPHOS 55   ALT 17   AST 20   BILITOT 0.3     CBC:   Recent Labs   Lab 04/30/25  0814   WBC 6.18   RBC 3.93*   HGB 10.1*   HCT 34.9*      MCV 89   MCH 25.7*   MCHC 28.9*       Diagnostic Results     Brain Imaging:  MRI brain without contrast 4/24/2025  Impression:  Stable distribution of acute infarction in the MCA territory centered within the left subinsular cortex and the left basal ganglia.  Hemosiderin deposition within the left basal ganglia.  The findings may reflect microscopic blood products.  Attention on follow-up suggested.    MRI brain without contrast @ OSH 4/23/2025  Impression:  1. Regions of diffusion restriction involving the left basal ganglia and left insular cortex compatible with acute ischemia.  No evidence of associated hemorrhage or mass effect.  2. Involutional changes as above.    CTH without contrast @ OSH 4/23/2025  Impression:  1.  No acute intracranial hemorrhage, no hydrocephalus, herniation or midline shift.  2.  Ill-defined low-attenuation in the left insular cortex, and adjacent basal ganglia, possibly from small vessel ischemic disease and or edema from a subacute infarct, consider correlation with MRI and history.       Vessel Imaging:  IR cerebral angiogram 4/23/2025  Impression:  1. There is a left M1 segment occlusion.  Aspiration thrombectomy was performed (ADAPT) Technique was performed with restoration TICI 2C Flow in the left middle cerebral artery territory.  2.  Follow-up right vertebral angiogram demonstrated some collateral flow to the left posterior MCA branches.  3.  Normal right common carotid angiogram.    CTA Head and Neck @ OSH - 4/23/2025  Impression:  1.  Focal high-grade stenosis/narrowing, with suspected thrombus/soft plaque in the left MCA  2.  Suspicious/pathologic  mediastinal lymphadenopathy, while this may be related to granulomatous disease, lymphoma, metastasis/malignancy is a consideration and correlation with the laboratory values, history and CT chest follow-up would be warranted.  3.  No clinically significant stenosis or large vessel occlusion in the neck.       Cardiac Evaluation:   TTE 4/24/2025    Left Ventricle: The left ventricle is normal in size. Normal wall thickness. Normal wall motion. There is normal systolic function with a visually estimated ejection fraction of 55 - 60%. There is normal diastolic function.    Right Ventricle: Right ventricular enlargement. Wall thickness is normal. Systolic function is normal.    Left Atrium: Severely dilated    Mitral Valve: There is mild regurgitation.    Tricuspid Valve: There is mild regurgitation.    Pulmonic Valve: There is mild regurgitation.    Pulmonary Artery: The estimated pulmonary artery systolic pressure is 37 mmHg.    IVC/SVC: Normal venous pressure at 3 mmHg.      Galindo Melton MD  Comprehensive Stroke Center  Department of Vascular Neurology   [facility])

## 2025-04-30 NOTE — ASSESSMENT & PLAN NOTE
-Area of cerebral edema identified when reviewing brain imaging in the Left MCA territory, there is not mass effect associated with it.   -Admitted to [facility] post-IR for close neuro monitoring and observation. Neuro exam and follow up imaging has remained stable and patient stepped down to NPU on 4/25.    Plan:   -Continue frequent q4h neuro checks as any acute changes or worsening neuro status may signify expansion of the insult and/or area of the edema, which may require acute interventions to prevent loss of function and/or death.  -Obtain stat CTH for any new or worsening neuro changes and notify primary team immediately

## 2025-04-30 NOTE — PT/OT/SLP PROGRESS
Speech Language Pathology Treatment    Patient Name:  Delilah Williamson   MRN:  [id number]  Admitting Diagnosis: Embolic stroke involving left middle cerebral artery    Recommendations:                 General Recommendations:  Speech/language therapy and Cognitive-linguistic therapy  Diet recommendations:  Regular Diet - IDDSI Level 7, Liquid Diet Level: Thin liquids - IDDSI Level 0   Aspiration Precautions: 1 bite/sip at a time, Alternating bites/sips, Check for pocketing/oral residue, Eliminate distractions, Feed only when awake/alert, Frequent oral care, HOB to 90 degrees, Small bites/sips, and Standard aspiration precautions   General Precautions: Standard, aspiration, fall  Communication strategies:  provide increased time to answer and go to room if call light pushed    Assessment:     Delilah Williamson is a 85 y.o. female with an SLP diagnosis of Aphasia and Dysarthria.  She presents with tolerance of regular diet with thin liquids.    Subjective     Pt awake and alert upon arrival. Nursing cleared for session.    Pain/Comfort:  Pain Rating 1: 0/10    Respiratory Status: Room air    Objective:     Has the patient been evaluated by SLP for swallowing?   Yes  Keep patient NPO? No   Current Respiratory Status:        Pt seen for ongoing swallow and speech therapy. Pt awake and alert upon arrival. Pt reports tolerance of diet. Pt administered trials of thin liquids ruthie straw and chris cracker. Pt demonstrating use of lingual sweep to clear residue in R buccal cavity independently result in full oral clearance. No overt sign of aspiration. Recommend continuing regular diet with thin liquids. Nursing present administering PO meds 1 at a time with sips of water. Pt with no difficulty. Pt completing convergent naming task of abstract categories with 100% accuracy when provided min-mod cues. Pt's speech 100% intelligible in conversation with pt stating her speech sounded good to her. SLP provided education regarding role of  SLP, aspiration precautions, signs of aspiration, diet recommendations, and SLP POC. Pt expressed understanding. SLP will continue to follow.     Goals:   Multidisciplinary Problems       SLP Goals          Problem: SLP    Goal Priority Disciplines Outcome   SLP Goal     SLP Progressing   Description: 1. Patient will follow simple one step directions with min verbal cues with 50% accuracy.  2. Pt will participate in ongoing diagnostic dysphagia therapy   3. Patient will complete automatic speech tasks with 80% accuracy given verbal model from clinician.  4. Patient will asnwer simple yes/no questions with 80% accuracy given min verbal cues from clinician.                         Plan:     Patient to be seen:  4 x/week   Plan of Care expires:  05/08/25  Plan of Care reviewed with:  patient   SLP Follow-Up:  Yes       Discharge recommendations:  High Intensity Therapy   Barriers to Discharge:  Level of Skilled Assistance Needed      Time Tracking:     SLP Treatment Date:   04/30/25  Speech Start Time:  0906  Speech Stop Time:  0923     Speech Total Time (min):  17 min    Billable Minutes: Speech Therapy Individual 9 and Treatment Swallowing Dysfunction 8    04/30/2025

## 2025-04-30 NOTE — ASSESSMENT & PLAN NOTE
Ms. Delilah Williamson is a 86 y/o female with PMHx of HTN, HLD, DM2 that was transferred from [facility] to [facility] for further eval and management of acute L MCA stroke. She initially presented to OSH with acute onset L MCA syndrome (RSW, RFD, L gaze, aphasia) upon waking up at 8am 4/23, approx 1 hr PTA. LKW 5pm 4/22. Telestroke NIHSS 12. CTH showed subtle early ischemic changes in L insular cortex, CTA revealed L M1/M2 occlusion. MRI for wake up protocol showed acute infarcts in L insular cortex and L BG with corresponding FLAIR changes. No lytics recommended due to FLAIR changes, however given fair ASPECTS patient transferred for possible intervention. On arrival to [facility] neuro exam with NIHSS 17, patient taken to IR for DSA and is now s/p L M1/2 thrombectomy with TICI 2c reperfusion. Repeat MRI brain shows stable acute L MCA territory infarcts involving subinsular and BG regions. TTE shows EF 55-60%, severe LAE, and no WMA. Etiology of stroke ESUS at this time, 30d cardiac event monitor will be ordered at discharge for further stroke workup and eval for possible pAF.    04/30/2025 NAEON. Day 6/12 of antibiotics. Bowel movement yesterday with suppository. Blood pressure better controlled after starting amlodipine; will restart half-dose home telmisartan on discharge for further monitoring while at rehab. Patient to follow up with PCP concerning restarting full dose antiHTN, lasix, and potassium supplementation. Patient will continue on DAPT for at least 30 days and will continue ASA indefinitely. Patient scheduled to receive cardiac event monitor post-discharge from [facility]. Follow up with Vascular Neurology.       Antithrombotics for secondary stroke prevention: Antiplatelets: Aspirin: 81 mg daily  Clopidogrel: 75 mg daily    Statins for secondary stroke prevention and hyperlipidemia, if present: Statins: Atorvastatin- 40 mg daily    Aggressive risk factor modification: HTN, DM, HLD     Rehab efforts: The  patient has been evaluated by a stroke team provider and the therapy needs have been fully considered based off the presenting complaints and exam findings. The following therapy evaluations are needed: PT evaluate and treat, OT evaluate and treat, SLP evaluate and treat, PM&R evaluate for appropriate placement    Diagnostics ordered/pending: None     VTE prophylaxis: Heparin 5000 units SQ every 8 hours  Mechanical prophylaxis: Place SCDs    BP parameters: Infarct: SBP <160    Plan:   - chemical VTE prophylaxis held secondary to urology procedure, add back as indicated   - risk factor modification as above   - statin as above   - aspirin and clopidogrel as above   - PT/OT recommend high intensity therapy, discharging to inpatient rehab

## 2025-04-30 NOTE — ASSESSMENT & PLAN NOTE
Patient with Acute debility due to hemiplegia/hemiparesis. Latest AMPAC and GEMS scores have been reviewed. Evaluation for etiology is complete. Plan includes - Progressive mobility protocol initated  - PT/OT consulted  - Fall precautions in place  - Physical Medicine/Rehab consulted.

## 2025-04-30 NOTE — ASSESSMENT & PLAN NOTE
-CTA head and neck noted to have mediastinal lymphadenopathy. Designated chest CT obtained and revealed mediastinal and bilateral hilar lymphadenopathy, mild bilateral smooth interlobular septal thickening and trace bilateral pleural effusions, and 2 mm right lower lobe nodule.  No prior imaging available to compare.   -Per chart review, no significant smoking history or known h/o cancer.   -Unclear whether malignant vs autoimmune/inflammatory process, although no apparent signs of infection at this time.    Plan:   -CT C/A/P showed right lung nodule and lymphadenopathy previously noted   -f/u with primary care provider

## 2025-04-30 NOTE — ASSESSMENT & PLAN NOTE
-Stroke risk factor  -A1c 4.9    Plan:   -Glucose goal while inpatient 140-180, avoid hypoglycemia  -Hold home anti-hyperglycemics while admitted  -[facility] SSI PRN

## 2025-04-30 NOTE — ASSESSMENT & PLAN NOTE
-Stroke risk factor    Plan:   -SBP goal <160, maintain MAP >65  -BP range in the last 24 hrs: BP  Min: 147/72  Max: 174/70  -Continue home amlodipine 5mg QD  -Continue home telmisartan at half dose on discharge with plans to uptitrate as tolerated back to full dose of 80mg.  -Follow up with PCP for further management

## 2025-04-30 NOTE — ASSESSMENT & PLAN NOTE
Ms. Delilah Williamson is a 84 y/o female with PMHx of HTN, HLD, DM2 that was transferred from [facility] to [facility] for further eval and management of acute L MCA stroke. She initially presented to OSH with acute onset L MCA syndrome (RSW, RFD, L gaze, aphasia) upon waking up at 8am 4/23, approx 1 hr PTA. LKW 5pm 4/22. Telestroke NIHSS 12. CTH showed subtle early ischemic changes in L insular cortex, CTA revealed L M1/M2 occlusion. MRI for wake up protocol showed acute infarcts in L insular cortex and L BG with corresponding FLAIR changes. No lytics recommended due to FLAIR changes, however given fair ASPECTS patient transferred for possible intervention. On arrival to [facility] neuro exam with NIHSS 17, patient taken to IR for DSA and is now s/p L M1/2 thrombectomy with TICI 2c reperfusion. Repeat MRI brain shows stable acute L MCA territory infarcts involving subinsular and BG regions. TTE shows EF 55-60%, severe LAE, and no WMA. Etiology of stroke ESUS at this time, 30d cardiac event monitor will be ordered at discharge for further stroke workup and eval for possible pAF.    04/30/2025 NAEON. Day 6/12 of antibiotics. Bowel movement yesterday with suppository. Blood pressure better controlled after starting amlodipine; will restart half-dose home telmisartan on discharge for further monitoring while at rehab. Patient to follow up with PCP concerning restarting full dose antiHTN, lasix, and potassium supplementation. Patient will continue on DAPT for at least 30 days and will continue ASA indefinitely. Patient scheduled to receive cardiac event monitor post-discharge from [facility]. Follow up with Vascular Neurology.       Antithrombotics for secondary stroke prevention: Antiplatelets: Aspirin: 81 mg daily  Clopidogrel: 75 mg daily    Statins for secondary stroke prevention and hyperlipidemia, if present: Statins: Atorvastatin- 40 mg daily    Aggressive risk factor modification: HTN, DM, HLD     Rehab efforts: The  patient has been evaluated by a stroke team provider and the therapy needs have been fully considered based off the presenting complaints and exam findings. The following therapy evaluations are needed: PT evaluate and treat, OT evaluate and treat, SLP evaluate and treat, PM&R evaluate for appropriate placement    Diagnostics ordered/pending: None     VTE prophylaxis: Heparin 5000 units SQ every 8 hours  Mechanical prophylaxis: Place SCDs    BP parameters: Infarct: SBP <160    Plan:   - chemical VTE prophylaxis held secondary to urology procedure, add back as indicated   - risk factor modification as above   - statin as above   - aspirin and clopidogrel as above   - PT/OT recommend high intensity therapy, discharging to inpatient rehab

## 2025-04-30 NOTE — ASSESSMENT & PLAN NOTE
-Stroke risk factor  -A1c 4.9    Plan:   -Glucose goal while inpatient 140-180, avoid hypoglycemia  -Hold home anti-hyperglycemics while admitted  -[facility] SSI PRN

## 2025-04-30 NOTE — ASSESSMENT & PLAN NOTE
Right sided obstructing stone noted on imaging.     Plan:   - s/p R ureteral stent placement 4/27  - urology consulted  - appreciate assistance   - f/u with Urology scheduled

## 2025-04-30 NOTE — DISCHARGE SUMMARY
Andre Carver - Neurosurgery ([facility])  Vascular Neurology  Comprehensive Stroke Center  Discharge Summary     Summary:     Admit Date: 4/23/2025 11:06 AM    Discharge Date and Time: No discharge date for patient encounter.    Attending Physician: Tyson Wallace MD     Discharge Provider: Galindo Melton MD    History of Present Illness: 86 y/o female with HTN, HLD, DM [place] [facility] [place] [facility] due to a L M1-M2 occlusion requiring thrombectomy. The patient initially presented to the OSH today, 4/23 with acute onset R sided weakness, R face droop, L gaze preference, and inability to talk. LKN 1700 yesterday, 4/22.     Hospital Course (synopsis of major diagnoses, care, treatment, and services provided during the course of the hospital stay): 04/24/2025 Repeat MRI Brain without contrast overnight with stable infarct, no hemorrhage. Ok to continue DAPT for secondary stroke prevention. TTE with EF 55-60%, +LAE, normal wall motion. Etiology of stroke likely ESUS. Patient will need cardiac event monitor at discharge. Exam overall improved post IR with TICI 2C. Ok to stepdown to NPU.   4/25/2025 NAEON, neuro exam stable and continues to have RSW and speech difficulty. Started on rocephin for abx for UTI, urine cx with GNR and susceptibilities pending. Stepped down to NPU this morning. CT chest yesterday shows mediastinal and bilateral hilar lymphadenopathy, will obtain CT C/A/P for malignancy eval. Dispo planning ongoing for IPR, pending auth/acceptance for NS rehab.  4/26/2025 NAEON, neuro exam stable. Continuing antibiotics for UTI. CT C/A/P for malignancy eval complete, continued to show right lung nodule and lymphadenopathy. Also showed evidence of right obstructing renal stone. Dispo pending IPR placement.   4/27/2025 NAEON. Patient will complete antibiotics for UTI today. Urology consulted for obstructing right stone, plan for stent placement today. Dispo pending IPR placement.    4/28/2025 NAEON. Ureteral stent placed yesterday. Antibiotics extended to 12d course following stent placement. Escalating bowel regimen. Stable medical and neurological status. Dispo pending IPR placement.  04/29/2025 NAEON. Day 5/12 of antibiotics. Continue to escalate bowel regimen. Had hypertension today up to ; starting home amlodipine.  04/30/2025 NAEON. Day 6/12. Bowel movement yesterday with suppository. Blood pressure better controlled after starting amlodipine; will restart half-dose home telmisartan on discharge for further monitoring while at rehab. Patient to follow up with PCP concerning restarting full dose antiHTN, lasix, and potassium supplementation. Patient will continue on DAPT for at least 30 days and will continue ASA indefinitely. Patient scheduled to receive cardiac event monitor post-discharge from [facility]. Follow up with Vascular Neurology.     Goals of Care Treatment Preferences:  Code Status: Full Code      Stroke Etiology: Ischemic Undetermined Cause Cryptogenic Embolism / ESUS (Embolic Stroke of Undetermined Source)    STROKE DOCUMENTATION   Acute Stroke Times   Last Known Normal Date: 04/22/25  Last Known Normal Time: 1700  Stroke Team Called Date: 04/23/25  Stroke Team Called Time: 0915 (Telestroke at OSH)  Stroke Team Arrival Date: 04/23/25  Stroke Team Arrival Time: 0923 (OSH)  CT Interpretation Time: 0923  Thrombolytic Therapy Recommended: No  CTA Interpretation Time: 0945 (OSH)  Thrombectomy Recommended: Yes  MRI Acute Stroke Protocol Interpretation Time: 1000 (OSH)  Decision to Treat Time for IR: 1000 (Telestroke)     NIH Scale:  1a. Level of Consciousness: 0-->Alert, keenly responsive  1b. LOC Questions: 0-->Answers both questions correctly  1c. LOC Commands: 0-->Performs both tasks correctly  2. Best Gaze: 0-->Normal  3. Visual: 0-->No visual loss  4. Facial Palsy: 0-->Normal symmetrical movements  5a. Motor Arm, Left: 0-->No drift, limb holds 90 (or 45) degrees for full  10 secs  5b. Motor Arm, Right: 0-->No drift, limb holds 90 (or 45) degrees for full 10 secs  6a. Motor Leg, Left: 0-->No drift, leg holds 30 degree position for full 5 secs  6b. Motor Leg, Right: 0-->No drift, leg holds 30 degree position for full 5 secs  7. Limb Ataxia: 0-->Absent  8. Sensory: 0-->Normal, no sensory loss  9. Best Language: 0-->No aphasia, normal  10. Dysarthria: 0-->Normal  11. Extinction and Inattention (formerly Neglect): 0-->No abnormality  Total (NIH Stroke Scale): 0        Modified Hugo Score: 2  Margoth Coma Scale:    ABCD2 Score:    [id number] Score:   HAS -BLED Score:   ICH Score:   Hunt & Bazzi Classification:       Assessment/Plan:     Diagnostic Results:      Brain Imaging:   MRI brain without contrast 4/24/2025  Impression:  Stable distribution of acute infarction in the MCA territory centered within the left subinsular cortex and the left basal ganglia.  Hemosiderin deposition within the left basal ganglia.  The findings may reflect microscopic blood products.  Attention on follow-up suggested.     MRI brain without contrast @ OSH 4/23/2025  Impression:  1. Regions of diffusion restriction involving the left basal ganglia and left insular cortex compatible with acute ischemia.  No evidence of associated hemorrhage or mass effect.  2. Involutional changes as above.     CTH without contrast @ OSH 4/23/2025  Impression:  1.  No acute intracranial hemorrhage, no hydrocephalus, herniation or midline shift.  2.  Ill-defined low-attenuation in the left insular cortex, and adjacent basal ganglia, possibly from small vessel ischemic disease and or edema from a subacute infarct, consider correlation with MRI and history.        Vessel Imaging:  IR cerebral angiogram 4/23/2025  Impression:  1. There is a left M1 segment occlusion.  Aspiration thrombectomy was performed (ADAPT) Technique was performed with restoration TICI 2C Flow in the left middle cerebral artery territory.  2.  Follow-up  right vertebral angiogram demonstrated some collateral flow to the left posterior MCA branches.  3.  Normal right common carotid angiogram.     CTA Head and Neck @ OSH - 4/23/2025  Impression:  1.  Focal high-grade stenosis/narrowing, with suspected thrombus/soft plaque in the left MCA  2.  Suspicious/pathologic mediastinal lymphadenopathy, while this may be related to granulomatous disease, lymphoma, metastasis/malignancy is a consideration and correlation with the laboratory values, history and CT chest follow-up would be warranted.  3.  No clinically significant stenosis or large vessel occlusion in the neck.        Cardiac Evaluation:   TTE 4/24/2025    Left Ventricle: The left ventricle is normal in size. Normal wall thickness. Normal wall motion. There is normal systolic function with a visually estimated ejection fraction of 55 - 60%. There is normal diastolic function.    Right Ventricle: Right ventricular enlargement. Wall thickness is normal. Systolic function is normal.    Left Atrium: Severely dilated    Mitral Valve: There is mild regurgitation.    Tricuspid Valve: There is mild regurgitation.    Pulmonic Valve: There is mild regurgitation.    Pulmonary Artery: The estimated pulmonary artery systolic pressure is 37 mmHg.    IVC/SVC: Normal venous pressure at 3 mmHg.    Interventions: Thrombectomy    Complications: None    Disposition: Rehab Facility    Final Active Diagnoses:    Diagnosis Date Noted POA    PRINCIPAL PROBLEM:  Embolic stroke involving left middle cerebral artery [I63.412] 04/23/2025 Yes    Constipation [K59.00] 04/28/2025 Yes    Ureterolithiasis [N20.1] 04/27/2025 Yes    UTI (urinary tract infection) [N39.0] 04/25/2025 Yes    Debility [R53.81] 04/25/2025 Yes    Severe obesity (BMI 35.0-35.9 with comorbidity) [E66.01, Z68.35] 04/25/2025 Not Applicable    Lymphadenopathy [R59.1] 04/24/2025 Yes    Aphasia [R47.01] 04/23/2025 Yes    Hemiplegia [G81.90] 04/23/2025 Yes    HTN (hypertension)  [I10] 04/23/2025 Yes    Hyperlipidemia [E78.5] 04/23/2025 Yes    Controlled type 2 diabetes mellitus without complication, without long-term current use of insulin [E11.9] 04/23/2025 Yes    Cytotoxic brain edema [G93.6] 04/23/2025 Yes      Problems Resolved During this Admission:     Neuro  * Embolic stroke involving left middle cerebral artery  Ms. Delilah Williamson is a 86 y/o female with PMHx of HTN, HLD, DM2 that was transferred from [facility] to [facility] for further eval and management of acute L MCA stroke. She initially presented to OSH with acute onset L MCA syndrome (RSW, RFD, L gaze, aphasia) upon waking up at 8am 4/23, approx 1 hr PTA. LKW 5pm 4/22. Telestroke NIHSS 12. CTH showed subtle early ischemic changes in L insular cortex, CTA revealed L M1/M2 occlusion. MRI for wake up protocol showed acute infarcts in L insular cortex and L BG with corresponding FLAIR changes. No lytics recommended due to FLAIR changes, however given fair ASPECTS patient transferred for possible intervention. On arrival to [facility] neuro exam with NIHSS 17, patient taken to IR for DSA and is now s/p L M1/2 thrombectomy with TICI 2c reperfusion. Repeat MRI brain shows stable acute L MCA territory infarcts involving subinsular and BG regions. TTE shows EF 55-60%, severe LAE, and no WMA. Etiology of stroke ESUS at this time, 30d cardiac event monitor will be ordered at discharge for further stroke workup and eval for possible pAF.    04/30/2025 NAEON. Day 6/12 of antibiotics. Bowel movement yesterday with suppository. Blood pressure better controlled after starting amlodipine; will restart half-dose home telmisartan on discharge for further monitoring while at rehab. Patient to follow up with PCP concerning restarting full dose antiHTN, lasix, and potassium supplementation. Patient will continue on DAPT for at least 30 days and will continue ASA indefinitely. Patient scheduled to receive cardiac event monitor post-discharge  from IPR. Follow up with Vascular Neurology.       Antithrombotics for secondary stroke prevention: Antiplatelets: Aspirin: 81 mg daily  Clopidogrel: 75 mg daily    Statins for secondary stroke prevention and hyperlipidemia, if present: Statins: Atorvastatin- 40 mg daily    Aggressive risk factor modification: HTN, DM, HLD     Rehab efforts: The patient has been evaluated by a stroke team provider and the therapy needs have been fully considered based off the presenting complaints and exam findings. The following therapy evaluations are needed: PT evaluate and treat, OT evaluate and treat, SLP evaluate and treat, PM&R evaluate for appropriate placement    Diagnostics ordered/pending: None     VTE prophylaxis: Heparin 5000 units SQ every 8 hours  Mechanical prophylaxis: Place SCDs    BP parameters: Infarct: SBP <160    Plan:   - chemical VTE prophylaxis held secondary to urology procedure, add back as indicated   - risk factor modification as above   - statin as above   - aspirin and clopidogrel as above   - PT/OT recommend high intensity therapy, discharging to inpatient rehab    Cytotoxic brain edema  -Area of cerebral edema identified when reviewing brain imaging in the Left MCA territory, there is not mass effect associated with it.   -Admitted to [facility] post-IR for close neuro monitoring and observation. Neuro exam and follow up imaging has remained stable and patient stepped down to NPU on 4/25.    Plan:   -Continue frequent q4h neuro checks as any acute changes or worsening neuro status may signify expansion of the insult and/or area of the edema, which may require acute interventions to prevent loss of function and/or death.  -Obtain stat CTH for any new or worsening neuro changes and notify primary team immediately    Aphasia  -Due to stroke  -Continues to have mild expressive aphasia    Plan:   -Aggressive therapy  -PT/OT/SLP eval and treat    Hemiplegia  -Due to stroke  -Improving although continues to have  RSW    Plan:   -Aggressive therapy  -PT/OT/SLP eval and treat. Rec high intensity, discharging to inpatient rehab    Cardiac/Vascular  Hyperlipidemia  -Stroke risk factor  -LDL 77.6, goal <70    Plan:   -Atorvastatin 40mg daily    HTN (hypertension)  -Stroke risk factor    Plan:   -SBP goal <160, maintain MAP >65  -BP range in the last 24 hrs: BP  Min: 147/72  Max: 174/70  -Continue home amlodipine 5mg QD  -Continue home telmisartan at half dose on discharge with plans to uptitrate as tolerated back to full dose of 80mg.  -Follow up with PCP for further management      Renal/  Ureterolithiasis  Right sided obstructing stone noted on imaging.     Plan:   - s/p R ureteral stent placement 4/27  - urology consulted  - appreciate assistance   - f/u with Urology scheduled    UTI (urinary tract infection)  -UA on admission with 1+ blood, + nitrites, 3+ leukocyte esterase, many bacteria  -Ucx with GNR >100K CFU, susceptibility pending  -CT C/A/P demonstrates obstructive R ureteral stone  -s/p R ureteral stent placement 4/27  -Urology recommending extended course of antibiotics  -Pansensitive culture    Plan:   -Extended course of antibiotics to 12 days  -s/p CTX 4/25-4/27  -Continue macrobid 100mg BID (EOT 5/6/24)    Endocrine  Controlled type 2 diabetes mellitus without complication, without long-term current use of insulin  -Stroke risk factor  -A1c 4.9    Plan:   -Glucose goal while inpatient 140-180, avoid hypoglycemia  -Hold home anti-hyperglycemics while admitted  -[facility]SI PRN    GI  Constipation  RESOLVED; BM 4/29  Last documented bowel movement 4/22  Recent CT abdomen without obstruction    -continue senna BID  -Continue miralax QD  -bisacodyl suppository PRN    Other  Debility  Patient with Acute debility due to hemiplegia/hemiparesis. Latest AMPAC and GEMS scores have been reviewed. Evaluation for etiology is complete. Plan includes - Progressive mobility protocol initated  - PT/OT consulted  - Fall precautions  in place  - Physical Medicine/Rehab consulted.    Lymphadenopathy  -CTA head and neck noted to have mediastinal lymphadenopathy. Designated chest CT obtained and revealed mediastinal and bilateral hilar lymphadenopathy, mild bilateral smooth interlobular septal thickening and trace bilateral pleural effusions, and 2 mm right lower lobe nodule.  No prior imaging available to compare.   -Per chart review, no significant smoking history or known h/o cancer.   -Unclear whether malignant vs autoimmune/inflammatory process, although no apparent signs of infection at this time.    Plan:   -CT C/A/P showed right lung nodule and lymphadenopathy previously noted   -f/u with primary care provider        Recommendations:     Post-discharge complication risks: Falls, Urinary tract infections    Stroke Education given to: patient and family    Follow-up in Stroke Clinic in 4-6 weeks.     Discharge Plan:  Antithrombotics: Aspirin 81mg, Clopidogrel 75mg  Statin: Atorvastatin 40mg  Aggresive risk factor modification:  Hypertension  Diabetes  High Cholesterol    Follow Up:   Contact information for follow-up providers       [facility] Follow up.    Specialties: Physical Therapy, Occupational Therapy  Contact information:  [address] [address] [address]  [phone]               [facility] VASCULAR NEUROLOGY. Schedule an appointment as soon as possible for a visit in 1 month(s).    Specialty: Vascular Neurology  Contact information:  [address]  [facility] [id number] [phone]             Juan Abarca MD Follow up in 1 month(s).    Specialty: Family Medicine  Contact information:  [address] [facility] [address]  [phone]               [facility] UROLOGY Follow up.    Specialty: Urology  Contact information:  [address]  [facility] [id number] [phone]                     Contact information for after-discharge care       Destination       [facility]  [facility] .    Service: Inpatient Rehabilitation  Contact information:  [address] [facility] [address]  [phone]                                   Patient Instructions:      Ambulatory referral/consult to Vascular Neurology   Standing Status: Future   Referral Priority: Routine Referral Type: Consultation   Referral Reason: Specialty Services Required   Requested Specialty: Vascular Neurology   Number of Visits Requested: 1     Ambulatory referral/consult to Internal Medicine   Standing Status: Future   Referral Priority: Routine Referral Type: Consultation   Referral Reason: Specialty Services Required   Requested Specialty: Internal Medicine   Number of Visits Requested: 1     Cardiac event monitor   Standing Status: Future Standing Exp. Date: 04/30/26     Order Specific Question Answer Comments   Cardiac Event Monitor Auto Trigger    Release to patient Immediate        Medications:  Reconciled Home Medications:      Medication List        PAUSE taking these medications      furosemide 20 MG tablet  Wait to take this until: May 19, 2025  Please follow up with PCP before restarting this medication  Commonly known as: LASIX  Take 20 mg by mouth once daily.     potassium chloride SA 10 MEQ tablet  Wait to take this until: May 19, 2025  Talk with your PCP about restarting this medication with lasix.  Commonly known as: K-DUR,KLOR-CON M  Take 10 mEq by mouth once daily.            START taking these medications      aspirin 81 MG Chew  Take 1 tablet (81 mg total) by mouth once daily.     clopidogreL 75 mg tablet  Commonly known as: PLAVIX  Take 1 tablet (75 mg total) by mouth once daily.     nitrofurantoin (macrocrystal-monohydrate) 100 MG capsule  Commonly known as: MACROBID  Take 1 capsule (100 mg total) by mouth every 12 (twelve) hours. for 13 doses            CHANGE how you take these medications      telmisartan 40 MG Tab  Commonly known as: MICARDIS  Take  1 tablet (40 mg total) by mouth once daily.  What changed:   medication strength  how much to take            CONTINUE taking these medications      amLODIPine 5 MG tablet  Commonly known as: NORVASC  Take 5 mg by mouth once daily.     atorvastatin 40 MG tablet  Commonly known as: LIPITOR  Take 40 mg by mouth once daily.     FIBER PLUS MULITVITAMIN ORAL  Take by mouth.     glimepiride 1 MG tablet  Commonly known as: AMARYL  Take 1 tablet (1 mg total) by mouth before breakfast.     latanoprost 0.005 % ophthalmic solution  INSTILL 1 DROP INTO BOTH EYES AT BEDTIME AS DIRECTED INSTILL 1 DROP INTO BOTH EYES AT BEDTIME     metoprolol succinate 50 MG 24 hr tablet  Commonly known as: TOPROL-XL  Take 50 mg by mouth every evening.            STOP taking these medications      ezetimibe 10 mg tablet  Commonly known as: MARIANA Melton MD  Comprehensive Stroke Center  Department of Vascular Neurology   [facility])

## 2025-04-30 NOTE — PT/OT/SLP PROGRESS
"Occupational Therapy   Treatment    Name: Delilah Williamson  MRN: [id number]  Admitting Diagnosis:  Embolic stroke involving left middle cerebral artery  3 Days Post-Op    Recommendations:     Discharge Recommendations: High Intensity Therapy  Discharge Equipment Recommendations:  bedside commode, walker, rolling  Barriers to discharge:  None    Assessment:     Delilah Williamson is a 85 y.o. female with a medical diagnosis of Embolic stroke involving left middle cerebral artery.  Pt tolerated session well and without incident, but she continues to require (A) with ADLs and mobility.  She presents with the following.  Performance deficits affecting function are weakness, impaired endurance, impaired self care skills, impaired functional mobility, gait instability, impaired balance, decreased coordination, decreased lower extremity function, decreased upper extremity function.     Rehab Prognosis:  Good; patient would benefit from acute skilled OT services to address these deficits and reach maximum level of function.       Plan:     Patient to be seen 4 x/week to address the above listed problems via self-care/home management, therapeutic activities, therapeutic exercises, neuromuscular re-education  Plan of Care Expires: 05/24/25  Plan of Care Reviewed with: patient    Subjective   Pt was pleasant and agreeable to therapy.  Chief Complaint: none  Patient/Family Comments/goals: "I'm discharging today." - to rehab  Pain/Comfort:  Pain Rating 1: 0/10  Pain Rating Post-Intervention 1: 0/10    Objective:     Communicated with: nurse prior to session.  Patient found HOB elevated with peripheral IV, PureWick, bed alarm, telemetry, SCD, pressure relief boots with PCT present upon OT entry to room.    General Precautions: Standard, fall, aspiration    Orthopedic Precautions:N/A  Braces: N/A  Respiratory Status: Room air     Occupational Performance:     Bed Mobility:    Patient completed Supine to Sit to the R with moderate " assistance for trunk and LE management  Pt scooted anteriorly to EOB to place her feet on the floor with CGA.  Patient completed Sit to Supine with contact guard assistance     Functional Mobility/Transfers:  Patient completed Sit <> Stand Transfer from EOB x 1 trial with minimum assistance  with  rolling walker   Functional Mobility: Pt took ~4-5 lateral steps to the R toward HOB with Min A with RW.   She declined to sit in the bedside chair due to awaiting d/c.     Activities of Daily Living:  Grooming: stand by assistance to wash her face and perform oral care with tray table setup while seated EOB       [facility] 6 Click ADL: 15    Treatment & Education:  Pt edu on role of OT, POC, safety when performing self care tasks, benefit of performing OOB activity, and safety when performing functional transfers and functional mobility.    - Self care tasks completed-- as noted above      Patient left HOB elevated with all lines intact, call button in reach, and bed alarm on    GOALS:   Multidisciplinary Problems       Occupational Therapy Goals          Problem: Occupational Therapy    Goal Priority Disciplines Outcome Interventions   Occupational Therapy Goal     OT, PT/OT Progressing    Description: Goals to be met by: 5/24/25     Patient will increase functional independence with ADLs by performing:    UE Dressing with Stand-by Assistance.  LE Dressing with Minimal Assistance.  Grooming while standing at sink with Minimal Assistance.  Toileting from toilet with Contact Guard Assistance for hygiene and clothing management.   Sitting at edge of bed x10 minutes with Supervision.  Supine to sit with Minimal Assistance.  Step transfer with SBA and RW prn  Toilet transfer to toilet with SBA.  Increased functional strength to 4/5 for RUE.  Upper extremity exercise program x10 reps per handout, with assistance as needed.                         DME Justifications:  Patient has a mobility limitation that significantly impairs  their ability to participate in one or more mobility related activities of daily living, including toileting. This deficit can be resolved by using a bedside commode. Patient demonstrates mobility limitations that will cause them to be confined to one room at home without bathroom access for up to 30 days. Using a bedside commode will greatly improve the patient's ability to participate in MRADLs.    Delilah's mobility limitation cannot be sufficiently resolved by the use of a cane. Her functional mobility deficit can be sufficiently resolved with the use of a Rolling Walker. Patient's mobility limitation significantly impairs their ability to participate in one of more activities of daily living.  The use of a RW will significantly improve the patient's ability to participate in MRADLS and the patient will use it on regular basis in the home.    Time Tracking:     OT Date of Treatment: 04/30/25  OT Start Time: 1104  OT Stop Time: 1127  OT Total Time (min): 23 min    Billable Minutes:Self Care/Home Management 10 min  Therapeutic Activity 13 min    OT/NILAY: OT          4/30/2025

## 2025-04-30 NOTE — PLAN OF CARE
Problem: Stroke, Ischemic (Includes Transient Ischemic Attack)  Goal: Optimal Coping  Outcome: Progressing  Goal: Effective Bowel Elimination  Outcome: Progressing  Goal: Optimal Cerebral Tissue Perfusion  Outcome: Progressing  Goal: Optimal Cognitive Function  Outcome: Progressing  Goal: Improved Communication Skills  Outcome: Progressing  Goal: Optimal Functional Ability  Outcome: Progressing  Goal: Optimal Nutrition Intake  Outcome: Progressing  Goal: Effective Oxygenation and Ventilation  Outcome: Progressing  Goal: Improved Sensorimotor Function  Outcome: Progressing  Goal: Safe and Effective Swallow  Outcome: Progressing  Goal: Effective Urinary Elimination  Outcome: Progressing       POC updated and reviewed with the patient at the bedside. Questions regarding POC were encouraged and addressed. Tele maintained per provider's order. Patient is AOX 4 at this time. NAEON. Seizure, fall, and safety precautions maintained, no signs of injury noted during shift. Patient repositioned independently/ with assistance in bed for comfort. Upon exiting room, patient's bed locked in low position, side rails up x 3, bed alarm set, with call light within reach. Instructed patient to call staff for mobility, verbalized understanding.  Stroke education reviewed with the patient at the bedside, see education flowsheets for details. No acute signs of distress noted at this time.

## 2025-04-30 NOTE — ASSESSMENT & PLAN NOTE
RESOLVED; BM 4/29  Last documented bowel movement 4/22  Recent CT abdomen without obstruction    -continue senna BID  -Continue miralax QD  -bisacodyl suppository PRN

## 2025-04-30 NOTE — PLAN OF CARE
Message received from [facility] informing this CM that they are ready for the patient. W/C van arranged for an estimated pick-up time of 14:30PM. The nurse can call report to [phone]. Call placed to patient's jennifer Campo ([phone]). She was notified of above and expressed understanding.    Dyana Kenyon RN  Ext [phone]

## 2025-04-30 NOTE — NURSING
Report called to [facility]. Pt informed of transfer. PIV removed. BP taken before pt transfer, within Pt parameters.denies pain or discomfort at present.

## 2025-04-30 NOTE — PLAN OF CARE
[facility] (Hospital)  Discharge Final Note    Primary Care Provider: Juan Abarca MD    Expected Discharge Date: 4/30/2025      Future Appointments   Date Time Provider Department Center   5/8/2025  1:40 PM Aleksey Lott MD [facility] UROLOGY [facility]   6/13/2025 10:00 AM MONITOR, ARRHYTHMIA EVENT [facility]          Final Discharge Note (most recent)       Final Note - 04/30/25 1415          Final Note    Assessment Type Final Discharge Note     Anticipated Discharge Disposition Rehab Facility     What phone number can be called within the next 1-3 days to see how you are doing after discharge? [phone]     Hospital Resources/Appts/Education Provided Appointments scheduled and added to AVS        Post-Acute Status    Post-Acute Authorization Placement     Post-Acute Placement Status Set-up Complete/Auth obtained   [facility]                    Important Message from Medicare             Contact Info       [facility]   Specialty: Physical Therapy, Occupational Therapy    [address] [address] [address]   Phone: [phone]       Next Steps: Follow up            Dyana Kenyon RN  Ext [phone]

## 2025-04-30 NOTE — ASSESSMENT & PLAN NOTE
HR=88 bpm, ZPSH=438/59 mmhg, SpO2=98.0 %, Resp=12 B/min, EtCO2=22 mmHg, Apnea=1 Seconds, Pain=0, Bush=2 RESOLVED; BM 4/29  Last documented bowel movement 4/22  Recent CT abdomen without obstruction    -continue senna BID  -Continue miralax QD  -bisacodyl suppository PRN

## 2025-04-30 NOTE — PLAN OF CARE
[facility]    FACILITY TRANSFER ORDERS      Patient Name: Delilah Williamson  YOB: 1940    PCP: Juan Abarca MD   PCP Address: [address] [place] [address] / [place] [address]  PCP Phone Number: [phone]  PCP Fax: [phone]    Encounter Date: 04/30/2025    Admit to: Inpatient Rehab    Vital Signs:  Routine    Diagnoses:   Active Hospital Problems    Diagnosis  POA    *Embolic stroke involving left middle cerebral artery [I63.412]  Yes    Constipation [K59.00]  Yes    Ureterolithiasis [N20.1]  Yes    UTI (urinary tract infection) [N39.0]  Yes    Debility [R53.81]  Yes     Patient with Acute debility due to hemiplegia/hemiparesis. Latest AMPAC and GEMS scores have been reviewed. Evaluation for etiology is complete. Plan includes - Progressive mobility protocol initated  - PT/OT consulted  - Fall precautions in place  - Physical Medicine/Rehab consulted.        Severe obesity (BMI 35.0-35.9 with comorbidity) [E66.01, Z68.35]  Not Applicable    Lymphadenopathy [R59.1]  Yes    Aphasia [R47.01]  Yes    Hemiplegia [G81.90]  Yes     Therapy to evaluate and treat       HTN (hypertension) [I10]  Yes    Hyperlipidemia [E78.5]  Yes    Controlled type 2 diabetes mellitus without complication, without long-term current use of insulin [E11.9]  Yes    Cytotoxic brain edema [G93.6]  Yes      Resolved Hospital Problems   No resolved problems to display.       Allergies:Review of patient's allergies indicates:  No Known Allergies    Diet: regular diet    Activities: Activity as tolerated    Goals of Care Treatment Preferences:  Code Status: Full Code      Nursing: Routine per facility     Labs:  Routine per facility     CONSULTS:    Physical Therapy to evaluate and treat. , Occupational Therapy to evaluate and treat., and Speech Therapy to evaluate and treat for Language and Swallowing.    MISCELLANEOUS CARE:  Diabetes Care:   SN to perform and educate Diabetic management with blood glucose monitoring:,  Fingerstick blood sugar a.m. and p.m., and Report CBG < 60 or > 350 to physician.    WOUND CARE ORDERS  Yes: Pressure Ulcer(s) Stage II :   Location: sacrum    BID & PRN - gluteal cleft, R lower ABD fold - cleanse gently w/ no rinse bath wipes, pat dry and apply Triad to affected area.     Daily - BL elbows, R cheek - cleanse gently w/ NS, pat dry and apply Aquaphor to crusted abrasions.              Medications: Review discharge medications with patient and family and provide education.         Medication List        PAUSE taking these medications      furosemide 20 MG tablet  Wait to take this until: May 19, 2025  Please follow up with PCP before restarting this medication  Commonly known as: LASIX  Take 20 mg by mouth once daily.     potassium chloride SA 10 MEQ tablet  Wait to take this until: May 19, 2025  Talk with your PCP about restarting this medication with lasix.  Commonly known as: K-DUR,KLOR-CON M  Take 10 mEq by mouth once daily.            START taking these medications      aspirin 81 MG Chew  Take 1 tablet (81 mg total) by mouth once daily.     clopidogreL 75 mg tablet  Commonly known as: PLAVIX  Take 1 tablet (75 mg total) by mouth once daily.     nitrofurantoin (macrocrystal-monohydrate) 100 MG capsule  Commonly known as: MACROBID  Take 1 capsule (100 mg total) by mouth every 12 (twelve) hours. for 13 doses            CHANGE how you take these medications      telmisartan 40 MG Tab  Commonly known as: MICARDIS  Take 1 tablet (40 mg total) by mouth once daily.  What changed:   medication strength  how much to take            CONTINUE taking these medications      amLODIPine 5 MG tablet  Commonly known as: NORVASC  Take 5 mg by mouth once daily.     atorvastatin 40 MG tablet  Commonly known as: LIPITOR  Take 40 mg by mouth once daily.     FIBER PLUS MULITVITAMIN ORAL  Take by mouth.     glimepiride 1 MG tablet  Commonly known as: AMARYL  Take 1 tablet (1 mg total) by mouth before breakfast.      latanoprost 0.005 % ophthalmic solution  INSTILL 1 DROP INTO BOTH EYES AT BEDTIME AS DIRECTED INSTILL 1 DROP INTO BOTH EYES AT BEDTIME     metoprolol succinate 50 MG 24 hr tablet  Commonly known as: TOPROL-XL  Take 50 mg by mouth every evening.            STOP taking these medications      ezetimibe 10 mg tablet  Commonly known as: ZETIA                Immunizations Administered as of 4/30/2025       Name Date Dose VIS Date Route Exp Date    COVID-19, MRNA, LN-S, PF (Pfizer) (Purple Cap) 10/7/2021  9:20 AM 0.3 mL 12/12/2020 Intramuscular 10/31/2021    Site: Left deltoid     Given By: Jaycee Patiño LPN     : [employer]     Lot: [id number]     COVID-19, MRNA, LN-S, PF (Pfizer) (Purple Cap) 2/2/2021  9:27 AM 0.3 mL 12/12/2020 Intramuscular 5/31/2021    Site: Left deltoid     Given By: Pascale Zamora LPN     : [employer]     Lot: [id number]     COVID-19, MRNA, LN-S, PF (Pfizer) (Purple Cap) 1/12/2021  9:22 AM 0.3 mL 12/12/2020 Intramuscular 4/30/2021    Site: Left deltoid     Given By: Mima Hawkins LPN     : [employer]     Lot: [id number]             This patient has had both covid vaccinations    Some patients may experience side effects after vaccination.  These may include fever, headache, muscle or joint aches.  Most symptoms resolve with 24-48 hours and do not require urgent medical evaluation unless they persist for more than 72 hours or symptoms are concerning for an unrelated medical condition.          _________________________________  Galindo Melton MD  04/30/2025

## 2025-04-30 NOTE — SUBJECTIVE & OBJECTIVE
Neurologic Chief Complaint: L MCA stroke    Subjective:     Interval History: Patient is seen for follow-up neurological assessment and treatment recommendations: See hospital course.    HPI, Past Medical, Family, and Social History remains the same as documented in the initial encounter.     Review of Systems   Constitutional: Negative.    Eyes:  Negative for visual disturbance.   Respiratory: Negative.     Cardiovascular: Negative.    Gastrointestinal: Negative.    Genitourinary: Negative.    Neurological:  Positive for speech difficulty and weakness.     Scheduled Meds:   amLODIPine  5 mg Oral Daily    aspirin  81 mg Oral Daily    atorvastatin  40 mg Oral Daily    clopidogreL  75 mg Oral Daily    latanoprost  1 drop Both Eyes QHS    nitrofurantoin (macrocrystal-monohydrate)  100 mg Oral Q12H    polyethylene glycol  17 g Oral Daily    senna-docusate  1 tablet Oral BID    white petrolatum   Topical (Top) Daily     Continuous Infusions:      PRN Meds:  Current Facility-Administered Medications:     acetaminophen, 650 mg, Oral, Q6H PRN    bisacodyL, 10 mg, Rectal, Daily PRN    dextrose 50%, 12.5 g, Intravenous, PRN    dextrose 50%, 12.5 g, Intravenous, PRN    diphenhydrAMINE, 25 mg, Intravenous, Q6H PRN    fentaNYL, 25 mcg, Intravenous, Q5 Min PRN    glucagon (human recombinant), 1 mg, Intramuscular, PRN    glucagon (human recombinant), 1 mg, Intramuscular, PRN    hydrALAZINE, 10 mg, Intravenous, Q4H PRN    HYDROmorphone, 0.2 mg, Intravenous, Q5 Min PRN    HYDROmorphone, 0.2 mg, Intravenous, Q5 Min PRN    insulin aspart U-100, 0-10 Units, Subcutaneous, Q6H PRN    labetalol, 10 mg, Intravenous, Q4H PRN    ondansetron, 4 mg, Intravenous, Once PRN    ondansetron, 4 mg, Intravenous, Daily PRN    prochlorperazine, 5 mg, Intravenous, Q30 Min PRN    sodium chloride 0.9%, 10 mL, Intravenous, PRN    sodium chloride 0.9%, 10 mL, Intravenous, PRN    sodium chloride 0.9%, 3 mL, Intravenous, PRN    Objective:     Vital Signs  (Most Recent):  Temp: 98.5 °F (36.9 °C) (04/30/25 1136)  Pulse: 60 (04/30/25 1442)  Resp: 18 (04/30/25 1136)  BP: (!) 153/81 (04/30/25 1442)  SpO2: 99 % (04/30/25 1136)  BP Location: Right arm    Vital Signs Range (Last 24H):  Temp:  [97.4 °F (36.3 °C)-98.5 °F (36.9 °C)]   Pulse:  [53-68]   Resp:  [18-20]   BP: (147-174)/(63-81)   SpO2:  [97 %-99 %]   BP Location: Right arm       Physical Exam  Vitals and nursing note reviewed.   Constitutional:       General: She is not in acute distress.  HENT:      Head: Normocephalic and atraumatic.   Eyes:      Extraocular Movements: Extraocular movements intact.      Conjunctiva/sclera: Conjunctivae normal.      Pupils: Pupils are equal, round, and reactive to light.   Cardiovascular:      Rate and Rhythm: Normal rate and regular rhythm.      Heart sounds: No murmur heard.  Pulmonary:      Effort: Pulmonary effort is normal. No respiratory distress.   Abdominal:      General: Abdomen is flat. There is no distension.      Palpations: Abdomen is soft.   Skin:     General: Skin is warm.   Neurological:      Mental Status: She is alert.      Comments: See below for neuro exam   Psychiatric:         Mood and Affect: Mood normal.         Behavior: Behavior normal. Behavior is cooperative.              Neurological Exam:   LOC: alert  Attention Span: Good   Language: Expressive aphasia  Articulation: Dysarthria  Orientation: Person, Place, Time   Visual Fields: Full  EOM (CN III, IV, VI): Full/intact  Facial Movement (CN VII): Lower facial weakness on the Right  Motor: Arm left  Normal 5/5  Leg left  Normal 5/5  Arm right  Paresis: 4/5  Leg right Paresis: 4/5  Sensation: Intact to light touch, temperature and vibration    Laboratory:  CMP:   Recent Labs   Lab 04/30/25  0814   CALCIUM 10.0   ALBUMIN 2.5*   PROT 5.6*      K 4.0   CO2 23   *   BUN 19   CREATININE 1.0   ALKPHOS 55   ALT 17   AST 20   BILITOT 0.3     CBC:   Recent Labs   Lab 04/30/25  0814   WBC 6.18   RBC  3.93*   HGB 10.1*   HCT 34.9*      MCV 89   MCH 25.7*   MCHC 28.9*       Diagnostic Results     Brain Imaging:  MRI brain without contrast 4/24/2025  Impression:  Stable distribution of acute infarction in the MCA territory centered within the left subinsular cortex and the left basal ganglia.  Hemosiderin deposition within the left basal ganglia.  The findings may reflect microscopic blood products.  Attention on follow-up suggested.    MRI brain without contrast @ OSH 4/23/2025  Impression:  1. Regions of diffusion restriction involving the left basal ganglia and left insular cortex compatible with acute ischemia.  No evidence of associated hemorrhage or mass effect.  2. Involutional changes as above.    CTH without contrast @ OSH 4/23/2025  Impression:  1.  No acute intracranial hemorrhage, no hydrocephalus, herniation or midline shift.  2.  Ill-defined low-attenuation in the left insular cortex, and adjacent basal ganglia, possibly from small vessel ischemic disease and or edema from a subacute infarct, consider correlation with MRI and history.       Vessel Imaging:  IR cerebral angiogram 4/23/2025  Impression:  1. There is a left M1 segment occlusion.  Aspiration thrombectomy was performed (ADAPT) Technique was performed with restoration TICI 2C Flow in the left middle cerebral artery territory.  2.  Follow-up right vertebral angiogram demonstrated some collateral flow to the left posterior MCA branches.  3.  Normal right common carotid angiogram.    CTA Head and Neck @ OSH - 4/23/2025  Impression:  1.  Focal high-grade stenosis/narrowing, with suspected thrombus/soft plaque in the left MCA  2.  Suspicious/pathologic mediastinal lymphadenopathy, while this may be related to granulomatous disease, lymphoma, metastasis/malignancy is a consideration and correlation with the laboratory values, history and CT chest follow-up would be warranted.  3.  No clinically significant stenosis or large vessel  occlusion in the neck.       Cardiac Evaluation:   TTE 4/24/2025    Left Ventricle: The left ventricle is normal in size. Normal wall thickness. Normal wall motion. There is normal systolic function with a visually estimated ejection fraction of 55 - 60%. There is normal diastolic function.    Right Ventricle: Right ventricular enlargement. Wall thickness is normal. Systolic function is normal.    Left Atrium: Severely dilated    Mitral Valve: There is mild regurgitation.    Tricuspid Valve: There is mild regurgitation.    Pulmonic Valve: There is mild regurgitation.    Pulmonary Artery: The estimated pulmonary artery systolic pressure is 37 mmHg.    IVC/SVC: Normal venous pressure at 3 mmHg.

## 2025-04-30 NOTE — ASSESSMENT & PLAN NOTE
-Due to stroke  -Improving although continues to have RSW    Plan:   -Aggressive therapy  -PT/OT/SLP eval and treat. Rec high intensity, discharging to inpatient rehab

## 2025-04-30 NOTE — ASSESSMENT & PLAN NOTE
-Area of cerebral edema identified when reviewing brain imaging in the Left MCA territory, there is not mass effect associated with it.   -Admitted to [facility] post-IR for close neuro monitoring and observation. Neuro exam and follow up imaging has remained stable and patient stepped down to NPU on 4/25.    Plan:   -Continue frequent q4h neuro checks as any acute changes or worsening neuro status may signify expansion of the insult and/or area of the edema, which may require acute interventions to prevent loss of function and/or death.  -Obtain stat CTH for any new or worsening neuro changes and notify primary team immediately

## 2025-05-01 NOTE — ANESTHESIA POSTPROCEDURE EVALUATION
Anesthesia Post Evaluation    Patient: Delilah Williamson    Procedure(s) Performed: * No procedures listed *    Final Anesthesia Type: general      Patient location during evaluation: PACU  Patient participation: Yes- Able to Participate  Level of consciousness: awake  Post-procedure vital signs: reviewed and stable  Pain management: adequate  Airway patency: patent    PONV status at discharge: No PONV  Anesthetic complications: no      Cardiovascular status: blood pressure returned to baseline  Respiratory status: unassisted  Hydration status: euvolemic  Follow-up not needed.              Vitals Value Taken Time   /75 04/30/25 15:18   Temp 36.8 °C (98.2 °F) 04/30/25 15:18   Pulse 59 04/30/25 15:19   Resp 18 04/30/25 15:18   SpO2 99 % 04/30/25 15:18         No case tracking events are documented in the log.      Pain/Fernando Score: No data recorded

## 2025-05-13 ENCOUNTER — TELEPHONE (OUTPATIENT)
Dept: UROLOGY | Facility: CLINIC | Age: 85
End: 2025-05-13
Payer: MEDICARE

## 2025-05-13 NOTE — TELEPHONE ENCOUNTER
Call placed at numbers listed on chart, to make an appt on 5/22/25 at 11:30 no answer, unable to leave message.

## 2025-05-14 ENCOUNTER — TELEPHONE (OUTPATIENT)
Dept: UROLOGY | Facility: CLINIC | Age: 85
End: 2025-05-14
Payer: MEDICARE

## 2025-05-26 ENCOUNTER — DOCUMENTATION ONLY (OUTPATIENT)
Dept: UROLOGY | Facility: HOSPITAL | Age: 85
End: 2025-05-26
Payer: MEDICARE

## 2025-05-26 NOTE — PROGRESS NOTES
Ms Williamson is a 85 year old female who underwent right ureteral JJ stent placement on 4/27/25. The patient was set up for outpatient follow up for definitive stone management discussion. The patient did not present for her appointment. She has been lost to follow up since. I called the patient today to ensure she follows up for definitive stone and stent management. I informed the patient of risks associated with retained stents, including encrustation, infection, sepsis, death. The patient verbalized understanding. She would like to see [facility] for outpatient follow up. I will send a message to [facility] for follow up.     Richa Macario, DO  PGY-1 Urology

## 2025-05-27 ENCOUNTER — TELEPHONE (OUTPATIENT)
Dept: UROLOGY | Facility: CLINIC | Age: 85
End: 2025-05-27
Payer: MEDICARE

## 2025-05-27 NOTE — TELEPHONE ENCOUNTER
Spoke with patient explained to patient will need to see  or can follow up with  the doctor that placed the stent. Patient states she is trying to get a appointment with  and she thought he was in the same office. Informed patient  was not in the same office provided phone number to give his office a call

## 2025-05-27 NOTE — TELEPHONE ENCOUNTER
----- Message from Nurse Karis sent at 5/27/2025  8:13 AM CDT -----  Please give patient a call to schedule appointment with   ----- Message -----  From: Emelina Valentin  Sent: 5/26/2025   3:13 PM CDT  To: Karis Liu LPN      ----- Message -----  From: Richa Macario MD  Sent: 5/26/2025   3:08 PM CDT  To: [facility] Staff    Please schedule this patient for follow up in Dr Head's clinic in 1-2 weeks for outpatient discussion of stone management. She is a 85 year old female who underwent right ureteral JJ stent placement on 4/27/25. The patient was lost to follow up since. She would like to see a physician in [place] and is hoping to see Dr Head.Thanks,Richa Macario MD

## 2025-05-30 ENCOUNTER — TELEPHONE (OUTPATIENT)
Dept: UROLOGY | Facility: CLINIC | Age: 85
End: 2025-05-30
Payer: MEDICARE

## 2025-06-02 ENCOUNTER — TELEPHONE (OUTPATIENT)
Dept: UROLOGY | Facility: HOSPITAL | Age: 85
End: 2025-06-02
Payer: MEDICARE

## 2025-06-05 ENCOUNTER — OFFICE VISIT (OUTPATIENT)
Dept: UROLOGY | Facility: CLINIC | Age: 85
End: 2025-06-05
Payer: MEDICARE

## 2025-06-05 ENCOUNTER — HOME CARE VISIT (OUTPATIENT)
Dept: NEUROLOGY | Facility: HOSPITAL | Age: 85
End: 2025-06-05
Payer: MEDICARE

## 2025-06-05 VITALS
HEIGHT: 67 IN | BODY MASS INDEX: 30.1 KG/M2 | DIASTOLIC BLOOD PRESSURE: 71 MMHG | WEIGHT: 191.81 LBS | HEART RATE: 70 BPM | SYSTOLIC BLOOD PRESSURE: 158 MMHG

## 2025-06-05 DIAGNOSIS — Z96.0 S/P URETERAL STENT PLACEMENT: ICD-10-CM

## 2025-06-05 DIAGNOSIS — N10 ACUTE PYELONEPHRITIS: Primary | ICD-10-CM

## 2025-06-05 DIAGNOSIS — I63.412 EMBOLIC STROKE INVOLVING LEFT MIDDLE CEREBRAL ARTERY: ICD-10-CM

## 2025-06-05 DIAGNOSIS — N20.1 URETERAL STONE: ICD-10-CM

## 2025-06-05 PROCEDURE — 87186 SC STD MICRODIL/AGAR DIL: CPT | Performed by: UROLOGY

## 2025-06-05 PROCEDURE — 99999 PR PBB SHADOW E&M-EST. PATIENT-LVL III: CPT | Mod: PBBFAC,,, | Performed by: UROLOGY

## 2025-06-05 RX ORDER — CIPROFLOXACIN 500 MG/1
500 TABLET, FILM COATED ORAL 2 TIMES DAILY
Qty: 14 TABLET | Refills: 0 | Status: SHIPPED | OUTPATIENT
Start: 2025-06-05 | End: 2025-06-12

## 2025-06-05 NOTE — H&P (VIEW-ONLY)
CC: right ureteral stone, pyelonephritis, s/p right ureteral stent placement    History of Present Illness             Delilah Williamson is a 85 y.o. woman who is here for the evaluation of Nephrolithiasis (Hosp follow up stones, has right stent -first stone . No hx of stones )  85-year-old female with past medical history significant for hypertension, hyperlipidemia, diabetes mellitus, glaucoma presented to hospital on 04/23/2025 with acute left middle cerebral artery CVA, found lying on floor by the family members. Patient with right-sided weakness, facial droop and aphasia. Patient did have a left middle cerebral artery thrombectomy with TICI 2C perfusion. MRI of the head showed left basal ganglion acute ischemic infarct. Patient with nephrolithiasis needing a ureteral stent, UTI treated with antibiotics. Needing assistance with ADLs, transfers, mobility is transferred to us for further rehabilitation.   She was admitted to rehab on 4/30 and was discharged on 5/13/25.    A new pt referred by her PCP, Juan Abarca MD   Pt was referred to me by Dr. Moreno for her stone treatment following right ureteral stent placement.    Hx of right ureterolithiasis with infected urine.  Date: 04/27/2025   Pre-Op Diagnosis: Right ureterolithiasis    Post-Op Diagnosis: same   Procedure(s) Performed:    1. Cystoscopy with right ureteral JJ stent placement  2. Fluoroscopy < 1 h  Findings:  Radiopaque stone  film. No pyonephrosis upon stent placement.   Drains:  6 Panamanian x 24 cm right JJ ureteral stent without strings    4/26/25  CT  Kidneys/ Ureters: There is an obstructing stone in the right proximal ureter measuring 6 mm (series 302, image 79) with associated inflammatory stranding surrounding the ureter and mild right-sided hydronephrosis.  Nonspecific bilateral perinephric stranding.  The kidneys are normal in size and location.  Kidneys enhance symmetrically.  No left-sided nephrolithiasis.  No left-sided  hydroureteronephrosis.  Impression:   1. Lymphadenopathy throughout the chest and mediastinum.  No pathologic lymph node enlargement in the abdomen or pelvis.  2. Obstructing right proximal ureteral stone with surrounding ureteral inflammation.      Past Medical History:   Diagnosis Date    Diabetes mellitus, type 2     Glaucoma     Hyperlipidemia     Hypertension      Past Surgical History:   Procedure Laterality Date    CATARACT EXTRACTION      CYSTOSCOPY W/ URETERAL STENT PLACEMENT Right 4/27/2025    Procedure: CYSTOSCOPY, WITH URETERAL STENT INSERTION;  Surgeon: Bill Moreno MD;  Location: [facility] OR [address] [place] [address];  Service: Urology;  Laterality: Right;    ROTATOR CUFF REPAIR       Social History[1]  Family History   Problem Relation Name Age of Onset    Breast cancer Paternal Aunt       Allergy:  Review of patient's allergies indicates:  No Known Allergies  Encounter Medications[2]  Review of Systems   ROS  Physical Exam     Vitals:    06/05/25 1458   BP: (!) 158/71   Pulse: 70     Physical Exam      LABS:  Lab Results   Component Value Date    CREATININE 1.1 05/12/2025    CREATININE 1.0 05/02/2025    CREATININE 1.0 04/30/2025     Urine Culture   Date Value Ref Range Status   04/23/2025 >100,000 cfu/ml Klebsiella pneumoniae (A)  Final     Hemoglobin A1C   Date Value Ref Range Status   12/20/2021 6.3 (H) 4.0 - 5.6 % Final     Comment:     ADA Screening Guidelines:  5.7-6.4%  Consistent with prediabetes  >or=6.5%  Consistent with diabetes    High levels of fetal hemoglobin interfere with the HbA1C  assay. Heterozygous hemoglobin variants (HbS, HgC, etc)do  not significantly interfere with this assay.   However, presence of multiple variants may affect accuracy.     06/17/2021 6.6 (H) 4.0 - 5.6 % Final     Comment:     ADA Screening Guidelines:  5.7-6.4%  Consistent with prediabetes  >or=6.5%  Consistent with diabetes    High levels of fetal hemoglobin interfere with the HbA1C  assay. Heterozygous hemoglobin variants  (HbS, HgC, etc)do  not significantly interfere with this assay.   However, presence of multiple variants may affect accuracy.       Hemoglobin A1c   Date Value Ref Range Status   04/23/2025 4.9 4.0 - 5.6 % Final     Comment:     ADA Screening Guidelines:  5.7-6.4%  Consistent with prediabetes  >=6.5%  Consistent with diabetes    High levels of fetal hemoglobin interfere with the HbA1C  assay. Heterozygous hemoglobin variants (HbS, HgC, etc)do  not significantly interfere with this assay.   However, presence of multiple variants may affect accuracy.     UA today positive leuk :    Assessment and Plan:  Delilah was seen today for nephrolithiasis.    Diagnoses and all orders for this visit:    Acute pyelonephritis  -     Urine Culture High Risk  -     ciprofloxacin HCl (CIPRO) 500 MG tablet; Take 1 tablet (500 mg total) by mouth 2 (two) times daily. for 14 doses    Ureteral stone, right    Embolic stroke involving left middle cerebral artery      S/p right ureteral stent for obstructing stone in the right proximal ureter measuring 6 mm since 4/27/25.  Will schedule right ureteroscopic surgery stone removal.  OK to continue baby ASA daily.  Pt reports that she is not taking Plavix anymore.  But if she does, she should stop it 5 days before her surgery.  Will cover her with prophylactic abx Cipro for her surgery next week.  All questions answered.  A surgery brochure given.    Follow-up:  Follow up in about 5 days (around 6/10/2025), or right ureteroscopy laser lithotripsy stone removal, ureteral stent change.                [1]   Social History  Tobacco Use    Smoking status: Never    Smokeless tobacco: Never   Substance Use Topics    Alcohol use: Not Currently    Drug use: Never   [2]   Outpatient Encounter Medications as of 6/5/2025   Medication Sig Dispense Refill    acac/inulin/c-lose/mv-mn/folic (FIBER PLUS MULITVITAMIN ORAL) Take by mouth.      amLODIPine (NORVASC) 5 MG tablet Take 5 mg by mouth once daily.       aspirin 81 MG Chew Take 1 tablet (81 mg total) by mouth once daily. 30 tablet 11    atorvastatin (LIPITOR) 40 MG tablet Take 40 mg by mouth once daily.      ciprofloxacin HCl (CIPRO) 500 MG tablet Take 1 tablet (500 mg total) by mouth 2 (two) times daily. for 14 doses 14 tablet 0    clopidogreL (PLAVIX) 75 mg tablet Take 1 tablet (75 mg total) by mouth once daily. 30 tablet 11    furosemide (LASIX) 20 MG tablet Take 20 mg by mouth once daily.      glimepiride (AMARYL) 1 MG tablet Take 1 tablet (1 mg total) by mouth before breakfast. 90 tablet 3    latanoprost 0.005 % ophthalmic solution INSTILL 1 DROP INTO BOTH EYES AT BEDTIME AS DIRECTED INSTILL 1 DROP INTO BOTH EYES AT BEDTIME      metoprolol succinate (TOPROL-XL) 50 MG 24 hr tablet Take 50 mg by mouth every evening.      potassium chloride SA (K-DUR,KLOR-CON M) 10 MEQ tablet Take 10 mEq by mouth once daily.      telmisartan (MICARDIS) 40 MG Tab Take 1 tablet (40 mg total) by mouth once daily. 90 tablet 3     No facility-administered encounter medications on file as of 6/5/2025.

## 2025-06-06 RX ORDER — METFORMIN HYDROCHLORIDE 500 MG/1
500 TABLET ORAL
COMMUNITY
Start: 2025-05-14

## 2025-06-06 NOTE — PRE-PROCEDURE INSTRUCTIONS
PreOp Instructions given:   - Verbal medication information (what to hold and what to take)   - NPO guidelines   NO SOLID FOOD, MILK OR MILK PRODUCTS 8 HOURS PRIOR TO SX START TIME.  YOU MAY ONLY HAVE SIPS OF WATER WITH MORNING MEDICATIONS (1) HOUR PRIOR TO ARRIVAL TO HOSPITAL.   - Arrival place directions given; time to be given the day before procedure by the   Surgeon's Office [facility]  - Bathing with antibacterial soap   - Don't wear any jewelry or bring any valuables AM of surgery   - No makeup or moisturizer to face   - No perfume/cologne, powder, lotions or aftershave   Pt. verbalized understanding.   Pt denies any h/o Anesthesia/Sedation complications or side effects.  Patient does not know arrival time.  Explained that this information comes from the surgeon's office and if they haven't heard from them by 2 or 3 pm to call the office.  Patient stated an understanding.

## 2025-06-07 LAB — BACTERIA UR CULT: ABNORMAL

## 2025-06-09 ENCOUNTER — RESULTS FOLLOW-UP (OUTPATIENT)
Dept: UROLOGY | Facility: CLINIC | Age: 85
End: 2025-06-09

## 2025-06-09 ENCOUNTER — TELEPHONE (OUTPATIENT)
Dept: UROLOGY | Facility: CLINIC | Age: 85
End: 2025-06-09
Payer: MEDICARE

## 2025-06-09 NOTE — TELEPHONE ENCOUNTER
Pt would like to postpone her surgery tomorrow because she has developed a productive cough.    She has been on cipro which is a right abx for positive urine culture.  I am going to ask Ro my surgery scheduler to reschedule her surgery to next Weds 6/18/25.    I asked the pt to contact her PCP to evaluate and manage her productive cough so that she can undergo her kidney stone surgery next week.

## 2025-06-10 NOTE — ANESTHESIA PAT ROS NOTE
06/10/2025  Delilah Williamson is a 85 y.o., female.      Pre-op Assessment          Review of Systems  Anesthesia Hx:  No problems with previous Anesthesia   History of prior surgery of interest to airway management or planning:  Previous anesthesia: General 4/27/2025 Cysto with stent with general anesthesia.  Procedure performed at an [facility].       Denies Family Hx of Anesthesia complications.    Denies Personal Hx of Anesthesia complications.                    Hematology/Oncology:  Hematology Normal   Oncology Normal                                   EENT/Dental:  EENT/Dental Normal           Cardiovascular:     Hypertension                                    Hypertension         Pulmonary:         Case rescheduled due to cough at pt's request. Pulmonary Symptoms:  are productive cough.                 Renal/:  Renal/ Normal                 Hepatic/GI:  Hepatic/GI Normal        Not Taking GLP-1 Agonists            Musculoskeletal:  Musculoskeletal Normal                Neurological:   CVA                       CVA - Cerebrovasular Accident                 Endocrine:  Diabetes    Diabetes                      Dermatological:  Skin Normal    Psych:  Psychiatric Normal                         Anesthesia Assessment: Preoperative EQUATION    Planned Procedure: Procedure(s) (LRB):  CYSTOURETEROSCOPY, W/ HOLMIUM LASER LITHOTRIPSY OF URETERAL CALCULUS & STENT INSERTION (Right)  CYSTOSCOPY, WITH URETERAL STENT REMOVAL (Right)  Requested Anesthesia Type:General  Surgeon: Aleksey Lott MD  Service: Urology  Known or anticipated Date of Surgery:6/18/2025    Surgeon notes: reviewed    Electronic QUestionnaire Assessment completed via nurse interview with patient.        Triage considerations:     The patient has no apparent active cardiac condition (No unstable coronary Syndrome such as severe unstable  angina or recent [<1 month] myocardial infarction, decompensated CHF, severe valvular   disease or significant arrhythmia)    Previous anesthesia records:GETA and No problems  4/27/2025 Cysto with stent  Airway:  Mallampati: III   Mouth Opening: Normal  TM Distance: Normal  Tongue: Normal  Neck ROM: Normal ROM    Last PCP note: within 3 months , outside [facility]   Subspecialty notes: Neurology, Urology    Other important co-morbidities: DM2, HLD, HTN, and Hx CVA, CKD      Tests already available:  Available tests,  within 3 months , within [facility] .   5/12/2025 CBC, CMP  4/24/2025 EKG            Instructions given. (See in Nurse's note)    Optimization:  Anesthesia Preop Clinic Assessment  Indicated: Not required for this procedure.    Medical Opinion Indicated       Plan:    Testing:  None Needed     Consultation:Patient's PCP for a statement of optimization      Patient  has previously scheduled Medical Appointment:Not at this time prior to surgery    Navigation:  Consults scheduled.             Results will be tracked by Preop Clinic.              No tests, anesthesia preop clinic visit    6/16/2025  OS Clearance noted and scanned to media with OS records.

## 2025-06-11 ENCOUNTER — TELEPHONE (OUTPATIENT)
Dept: CARDIOLOGY | Facility: HOSPITAL | Age: 85
End: 2025-06-11
Payer: MEDICARE

## 2025-06-11 NOTE — TELEPHONE ENCOUNTER
Attempted to contact pt @ the preferred listed # of [phone] on this afternoon to confirm 6/13/25 appt for 30 day Event Monitor.  Phone line rang 10+ times, no answer unable to leave a message.  Called second listed # of [phone], line rang then recording that stated VM not set up.  Unable to leave a message.

## 2025-06-17 ENCOUNTER — TELEPHONE (OUTPATIENT)
Dept: UROLOGY | Facility: CLINIC | Age: 85
End: 2025-06-17
Payer: MEDICARE

## 2025-06-18 ENCOUNTER — ANESTHESIA EVENT (OUTPATIENT)
Dept: SURGERY | Facility: HOSPITAL | Age: 85
End: 2025-06-18
Payer: MEDICARE

## 2025-06-18 ENCOUNTER — ANESTHESIA (OUTPATIENT)
Dept: SURGERY | Facility: HOSPITAL | Age: 85
End: 2025-06-18
Payer: MEDICARE

## 2025-06-18 ENCOUNTER — HOSPITAL ENCOUNTER (OUTPATIENT)
Facility: HOSPITAL | Age: 85
Discharge: HOME | End: 2025-06-18
Attending: UROLOGY | Admitting: UROLOGY
Payer: MEDICARE

## 2025-06-18 VITALS
BODY MASS INDEX: 28.04 KG/M2 | DIASTOLIC BLOOD PRESSURE: 71 MMHG | OXYGEN SATURATION: 96 % | RESPIRATION RATE: 18 BRPM | WEIGHT: 179 LBS | TEMPERATURE: 98 F | HEART RATE: 62 BPM | SYSTOLIC BLOOD PRESSURE: 163 MMHG

## 2025-06-18 DIAGNOSIS — N20.1 URETEROLITHIASIS: ICD-10-CM

## 2025-06-18 DIAGNOSIS — N20.1 URETERAL STONE: Primary | ICD-10-CM

## 2025-06-18 LAB
POCT GLUCOSE: 82 MG/DL (ref 70–110)
POCT GLUCOSE: 90 MG/DL (ref 70–110)

## 2025-06-18 PROCEDURE — C1894 INTRO/SHEATH, NON-LASER: HCPCS | Performed by: UROLOGY

## 2025-06-18 PROCEDURE — C1769 GUIDE WIRE: HCPCS | Performed by: UROLOGY

## 2025-06-18 PROCEDURE — 36000709 HC OR TIME LEV III EA ADD 15 MIN: Performed by: UROLOGY

## 2025-06-18 PROCEDURE — 52356 CYSTO/URETERO W/LITHOTRIPSY: CPT | Mod: RT,,, | Performed by: UROLOGY

## 2025-06-18 PROCEDURE — 37000008 HC ANESTHESIA 1ST 15 MINUTES: Performed by: UROLOGY

## 2025-06-18 PROCEDURE — 36000708 HC OR TIME LEV III 1ST 15 MIN: Performed by: UROLOGY

## 2025-06-18 PROCEDURE — 27201423 OPTIME MED/SURG SUP & DEVICES STERILE SUPPLY: Performed by: UROLOGY

## 2025-06-18 PROCEDURE — 25000003 PHARM REV CODE 250: Performed by: NURSE ANESTHETIST, CERTIFIED REGISTERED

## 2025-06-18 PROCEDURE — 52353 CYSTOURETERO W/LITHOTRIPSY: CPT | Mod: RT,,, | Performed by: UROLOGY

## 2025-06-18 PROCEDURE — 71000044 HC DOSC ROUTINE RECOVERY FIRST HOUR: Performed by: UROLOGY

## 2025-06-18 PROCEDURE — 82962 GLUCOSE BLOOD TEST: CPT | Performed by: UROLOGY

## 2025-06-18 PROCEDURE — 87086 URINE CULTURE/COLONY COUNT: CPT | Performed by: UROLOGY

## 2025-06-18 PROCEDURE — 63600175 PHARM REV CODE 636 W HCPCS: Performed by: NURSE ANESTHETIST, CERTIFIED REGISTERED

## 2025-06-18 PROCEDURE — 37000009 HC ANESTHESIA EA ADD 15 MINS: Performed by: UROLOGY

## 2025-06-18 PROCEDURE — 71000015 HC POSTOP RECOV 1ST HR: Performed by: UROLOGY

## 2025-06-18 RX ORDER — ONDANSETRON HYDROCHLORIDE 2 MG/ML
INJECTION, SOLUTION INTRAVENOUS
Status: DISCONTINUED | OUTPATIENT
Start: 2025-06-18 | End: 2025-06-18

## 2025-06-18 RX ORDER — CIPROFLOXACIN 500 MG/1
500 TABLET, FILM COATED ORAL 2 TIMES DAILY
Qty: 14 TABLET | Refills: 0 | Status: SHIPPED | OUTPATIENT
Start: 2025-06-18 | End: 2025-06-25

## 2025-06-18 RX ORDER — DEXAMETHASONE SODIUM PHOSPHATE 4 MG/ML
INJECTION, SOLUTION INTRA-ARTICULAR; INTRALESIONAL; INTRAMUSCULAR; INTRAVENOUS; SOFT TISSUE
Status: DISCONTINUED | OUTPATIENT
Start: 2025-06-18 | End: 2025-06-18

## 2025-06-18 RX ORDER — CEFAZOLIN 2 G/1
2 INJECTION, POWDER, FOR SOLUTION INTRAMUSCULAR; INTRAVENOUS
Status: DISCONTINUED | OUTPATIENT
Start: 2025-06-18 | End: 2025-06-18 | Stop reason: HOSPADM

## 2025-06-18 RX ORDER — PHENAZOPYRIDINE HYDROCHLORIDE 100 MG/1
100 TABLET, FILM COATED ORAL 3 TIMES DAILY PRN
Qty: 15 TABLET | Refills: 0 | Status: SHIPPED | OUTPATIENT
Start: 2025-06-18 | End: 2025-06-23

## 2025-06-18 RX ORDER — HYDRALAZINE HYDROCHLORIDE 20 MG/ML
10 INJECTION INTRAMUSCULAR; INTRAVENOUS
Status: DISCONTINUED | OUTPATIENT
Start: 2025-06-18 | End: 2025-06-18 | Stop reason: HOSPADM

## 2025-06-18 RX ORDER — FENTANYL CITRATE 50 UG/ML
INJECTION, SOLUTION INTRAMUSCULAR; INTRAVENOUS
Status: DISCONTINUED | OUTPATIENT
Start: 2025-06-18 | End: 2025-06-18

## 2025-06-18 RX ORDER — LIDOCAINE HYDROCHLORIDE 20 MG/ML
INJECTION INTRAVENOUS
Status: DISCONTINUED | OUTPATIENT
Start: 2025-06-18 | End: 2025-06-18

## 2025-06-18 RX ORDER — PROPOFOL 10 MG/ML
VIAL (ML) INTRAVENOUS
Status: DISCONTINUED | OUTPATIENT
Start: 2025-06-18 | End: 2025-06-18

## 2025-06-18 RX ORDER — GLUCAGON 1 MG
1 KIT INJECTION
Status: DISCONTINUED | OUTPATIENT
Start: 2025-06-18 | End: 2025-06-18 | Stop reason: HOSPADM

## 2025-06-18 RX ORDER — FENTANYL CITRATE 50 UG/ML
25 INJECTION, SOLUTION INTRAMUSCULAR; INTRAVENOUS EVERY 5 MIN PRN
Status: DISCONTINUED | OUTPATIENT
Start: 2025-06-18 | End: 2025-06-18 | Stop reason: HOSPADM

## 2025-06-18 RX ORDER — ROCURONIUM BROMIDE 10 MG/ML
INJECTION, SOLUTION INTRAVENOUS
Status: DISCONTINUED | OUTPATIENT
Start: 2025-06-18 | End: 2025-06-18

## 2025-06-18 RX ORDER — SODIUM CHLORIDE 0.9 % (FLUSH) 0.9 %
10 SYRINGE (ML) INJECTION
OUTPATIENT
Start: 2025-06-18

## 2025-06-18 RX ORDER — CEFAZOLIN SODIUM 1 G/3ML
INJECTION, POWDER, FOR SOLUTION INTRAMUSCULAR; INTRAVENOUS
Status: DISCONTINUED | OUTPATIENT
Start: 2025-06-18 | End: 2025-06-18

## 2025-06-18 RX ADMIN — DEXAMETHASONE SODIUM PHOSPHATE 4 MG: 4 INJECTION, SOLUTION INTRAMUSCULAR; INTRAVENOUS at 02:06

## 2025-06-18 RX ADMIN — SUGAMMADEX 200 MG: 100 INJECTION, SOLUTION INTRAVENOUS at 03:06

## 2025-06-18 RX ADMIN — FENTANYL CITRATE 50 MCG: 50 INJECTION, SOLUTION INTRAMUSCULAR; INTRAVENOUS at 02:06

## 2025-06-18 RX ADMIN — PROPOFOL 110 MG: 10 INJECTION, EMULSION INTRAVENOUS at 02:06

## 2025-06-18 RX ADMIN — LIDOCAINE HYDROCHLORIDE 100 MG: 20 INJECTION INTRAVENOUS at 02:06

## 2025-06-18 RX ADMIN — ROCURONIUM BROMIDE 50 MG: 10 INJECTION, SOLUTION INTRAVENOUS at 02:06

## 2025-06-18 RX ADMIN — ONDANSETRON 4 MG: 2 INJECTION INTRAMUSCULAR; INTRAVENOUS at 03:06

## 2025-06-18 RX ADMIN — CEFAZOLIN 2 G: 330 INJECTION, POWDER, FOR SOLUTION INTRAMUSCULAR; INTRAVENOUS at 02:06

## 2025-06-18 RX ADMIN — SODIUM CHLORIDE: 9 INJECTION, SOLUTION INTRAVENOUS at 02:06

## 2025-06-18 NOTE — DISCHARGE SUMMARY
[facility] - [facility] (1st Fl)  Discharge Note  Short Stay    Procedure(s) (LRB):  CYSTOSCOPY (Right)  REMOVAL-STENT (Right)  NEPHROSCOPY (Right)  URETEROSCOPY, WITH LASER LITHOTRIPSY (Right)      OUTCOME: Patient tolerated treatment/procedure well without complication and is now ready for discharge.    DISPOSITION: Home or Self Care    FINAL DIAGNOSIS:  <principal problem not specified>    FOLLOWUP: In clinic    DISCHARGE INSTRUCTIONS:  No discharge procedures on file.     TIME SPENT ON DISCHARGE: 15 minutes

## 2025-06-18 NOTE — PROGRESS NOTES
Discharge instructions reviewed w/pt and family, verbalized understanding. PT in NADN.No complaints at this time. Tolerated liquids w/ no issues. To be d/c'd home w/ family. Dr. Rowe states pt does not have to urinate before d/c home.

## 2025-06-18 NOTE — TRANSFER OF CARE
Anesthesia Transfer of Care Note    Patient: Delilah Williamson    Procedure(s) Performed: Procedure(s) (LRB):  CYSTOSCOPY (Right)  REMOVAL-STENT (Right)  NEPHROSCOPY (Right)  URETEROSCOPY, WITH LASER LITHOTRIPSY (Right)    Patient location: PACU    Anesthesia Type: general    Transport from OR: Transported from OR on 6-10 L/min O2 by face mask with adequate spontaneous ventilation    Post pain: adequate analgesia    Post assessment: no apparent anesthetic complications and tolerated procedure well    Post vital signs: stable    Level of consciousness: awake    Nausea/Vomiting: no nausea/vomiting    Complications: none    Transfer of care protocol was followed      Last vitals: Visit Vitals  BP (!) 187/84 (BP Location: Left arm, Patient Position: Lying)   Pulse 65   Temp 36.3 °C (97.3 °F) (Temporal)   Resp 20   Wt 81.2 kg (179 lb)   SpO2 99%   Breastfeeding No   BMI 28.04 kg/m²

## 2025-06-18 NOTE — PLAN OF CARE
Discharge instructions given and reviewed with pt and family w verbalized understanding. VSS. Denies N/V, tolerating PO fluids. No c/o pain. IV removed. No new prescriptions received. Responsible person available for transport home.

## 2025-06-18 NOTE — ANESTHESIA PREPROCEDURE EVALUATION
[facility]  Anesthesia Pre-Operative Evaluation         Patient Name: Delilah Williamson  YOB: 1940  MRN: [id number]    SUBJECTIVE:     Procedure(s) (LRB):  CYSTOURETEROSCOPY, W/ HOLMIUM LASER LITHOTRIPSY OF URETERAL CALCULUS & STENT INSERTION (Right)  CYSTOSCOPY, WITH URETERAL STENT REMOVAL (Right)       06/18/2025      Delilah Williamson is a 85 y.o. female w/ a significant PMHx of HTN, DM2, LMCA stroke s/p IR thrombectomy on 04/23, R hemiplegia, UTI, and Ureterolithiasis.    Patient now presents for the above procedure(s).    Results for orders placed during the hospital encounter of 04/23/25    Echo    Interpretation Summary    Left Ventricle: The left ventricle is normal in size. Normal wall thickness. Normal wall motion. There is normal systolic function with a visually estimated ejection fraction of 55 - 60%. There is normal diastolic function.    Right Ventricle: Right ventricular enlargement. Wall thickness is normal. Systolic function is normal.    Left Atrium: Severely dilated    Mitral Valve: There is mild regurgitation.    Tricuspid Valve: There is mild regurgitation.    Pulmonic Valve: There is mild regurgitation.    Pulmonary Artery: The estimated pulmonary artery systolic pressure is 37 mmHg.    IVC/SVC: Normal venous pressure at 3 mmHg.      LDA:        Peripheral IV - Single Lumen 04/23/25 1618 20 G Anterior;Left;Proximal Forearm (Active)   Site Assessment Clean;Dry;Intact 04/27/25 0800   Line Securement Device Secured with sutureless device 04/27/25 0800   Extremity Assessment Distal to IV No abnormal discoloration;No redness;No swelling 04/27/25 0800   Line Status Saline locked 04/27/25 0800   Dressing Status Clean;Intact;Dry 04/27/25 0800   Dressing Intervention Integrity maintained 04/27/25 0800   Dressing Change Due 04/27/25 04/26/25 2000   Site Change Due 04/27/25 04/26/25 2000   Reason Not Rotated Not due 04/26/25 2000   Number of days: 3            Peripheral IV -  Single Lumen 04/25/25 20 G Anterior;Left Upper Arm (Active)   Site Assessment Clean;Dry;Intact 04/27/25 0800   Line Securement Device Secured with sutureless device 04/27/25 0800   Extremity Assessment Distal to IV No abnormal discoloration 04/27/25 0800   Line Status Infusing 04/27/25 0800   Dressing Status Clean;Dry;Intact 04/27/25 0800   Dressing Intervention Integrity maintained 04/27/25 0800   Dressing Change Due 04/29/25 04/26/25 2000   Site Change Due 04/29/25 04/26/25 2000   Reason Not Rotated Not due 04/26/25 2000   Number of days: 2       Female External Urinary Catheter w/ Suction 04/23/25 1830 (Active)   Skin no redness;no breakdown 04/27/25 0800   Tolerance no signs/symptoms of discomfort 04/27/25 0800   Suction Continuous suction at 40 mmHg 04/26/25 2000   Date of last wick change 04/26/25 04/26/25 2000   Time of last wick change 2000 04/26/25 2000   Output (mL) 1000 mL 04/27/25 0300   Number of days: 3       Prev airway:     Intubation     Date/Time: 4/23/2025 11:28 AM     Performed by: Radha Vale CRNA  Authorized by: Tyson Delgado MD    Intubation:     Induction:  Rapid sequence induction    Intubated:  Postinduction    Mask Ventilation:  Not attempted    Attempts:  1    Attempted By:  CRNA    Method of Intubation:  Video laryngoscopy    Blade:  Valencia 3    Laryngeal View Grade: Grade I - full view of cords      Difficult Airway Encountered?: No      Complications:  None    Airway Device:  Oral endotracheal tube    Airway Device Size:  7.5    Style/Cuff Inflation:  Cuffed (inflated to minimal occlusive pressure)    Inflation Amount (mL):  5    Tube secured:  21    Secured at:  The lips    Placement Verified By:  Capnometry    Complicating Factors:  None    Findings Post-Intubation:  BS equal bilateral and atraumatic/condition of teeth unchanged       Drips:         Problem List[1]    Review of patient's allergies indicates:  No Known Allergies    Current Inpatient  Medications:        Medications Ordered Prior to Encounter[2]    Past Surgical History:   Procedure Laterality Date    CATARACT EXTRACTION      CYSTOSCOPY W/ URETERAL STENT PLACEMENT Right 4/27/2025    Procedure: CYSTOSCOPY, WITH URETERAL STENT INSERTION;  Surgeon: Bill Moreno MD;  Location: [facility] OR [address] [place] [address];  Service: Urology;  Laterality: Right;    ROTATOR CUFF REPAIR         Social History[3]    OBJECTIVE:     Vital Signs Range (Last 24H):         Significant Labs:  Lab Results   Component Value Date    WBC 4.93 05/12/2025    HGB 9.3 (L) 05/12/2025    HCT 30.1 (L) 05/12/2025     05/12/2025    CHOL 130 04/23/2025    TRIG 92 04/23/2025    HDL 34 (L) 04/23/2025    ALT 25 05/12/2025    AST 32 05/12/2025     05/12/2025    K 3.9 05/12/2025     05/12/2025    CREATININE 1.1 05/12/2025    BUN 23 05/12/2025    CO2 23 05/12/2025    TSH 1.747 04/23/2025    INR 1.0 04/23/2025    HGBA1C 4.9 04/23/2025       Diagnostic Studies: No relevant studies.    EKG:   Results for orders placed or performed in visit on 04/24/25   EKG 12-lead    Collection Time: 04/24/25 12:57 AM   Result Value Ref Range    QRS Duration 114 ms    [facility] QTC Calculation 456 ms    Narrative    Test Reason :    Vent. Rate :  53 BPM     Atrial Rate :  53 BPM     P-R Int : 148 ms          QRS Dur : 114 ms      QT Int : 486 ms       P-R-T Axes :  81  66  78 degrees    QTcB Int : 456 ms    Sinus bradycardia  Low voltage QRS in the limb leads  Borderline Abnormal ECG  When compared with ECG of 23-Apr-2025 10:04,  No significant change was found  Confirmed by Magen Gómez ([id number]) on 4/24/2025 12:15:26 PM    Referred By: MICHELLE HERNANDEZ           Confirmed By: Magen Gómez       2D ECHO:  TTE:  Results for orders placed or performed during the hospital encounter of 04/23/25   Echo   Result Value Ref Range    LV Diastolic Volume 120 mL    Echo EF Estimated 75 %    LV Systolic Volume 30 mL    IVS 0.9 0.6 - 1.1 cm    LVIDd 5.0 3.5 - 6.0 cm    LVIDs 2.8  2.1 - 4.0 cm    LVOT diameter 1.9 cm    PW 0.7 0.6 - 1.1 cm    AV LVOT peak gradient 6 mmHg    LVOT mn grad 3 mmHg    LVOT peak min 1.2 m/s    LVOT peak VTI 27.6 cm    RV S' 27.41 cm/s    LA size 2.8 cm    Left Atrium Major Axis 6.1 cm    Left Atrium Minor Axis 5.8 cm    LA Vol (MOD) 87 mL    RA Major Axis 4.52 cm    RA Area 14.2 cm2    AV valve area 2.4 cm2    AV area by cont VTI 2.4 cm2    AV peak gradient 9 mmHg    AV mean gradient 5 mmHg    Ao peak min 1.5 m/s    Ao VTI 32.4 cm    MV Peak A Min 0.46 m/s    E wave deceleration time 227 ms    MV Peak E Min 0.54 m/s    E/A ratio 1.17     LV LATERAL E/E' RATIO 6.0     LV SEPTAL E/E' RATIO 7.7     TDI LATERAL 0.09 m/s    TDI SEPTAL 0.07 m/s    TV peak gradient 33 mmHg    TR Max Min 2.9 m/s    Ascending aorta 3.0 cm    STJ 2.5 cm    IVC diameter 1.22 cm    Sinus 3.37 cm    RA Width 3.65 cm    LA WIDTH 5.0 cm    TAPSE 3.2 cm    LVOT stroke volume 78.2 cm3    LVOT area 2.8 cm2    FS 44.0 28 - 44 %    Left Ventricle Relative Wall Thickness 0.28 cm    LV mass 135.8 g    E/E' ratio 7 m/s    LA Vol 71 cm3    AV Velocity Ratio 0.80     AV index (prosthetic) 0.85     KEVIN by Velocity Ratio 2.3 cm²    Mean e' 0.08 m/s    TV resting pulmonary artery pressure 37 mmHg    RV TB RVSP 6 mmHg    Est. RA pres 3 mmHg    Narrative      Left Ventricle: The left ventricle is normal in size. Normal wall   thickness. Normal wall motion. There is normal systolic function with a   visually estimated ejection fraction of 55 - 60%. There is normal   diastolic function.    Right Ventricle: Right ventricular enlargement. Wall thickness is   normal. Systolic function is normal.    Left Atrium: Severely dilated    Mitral Valve: There is mild regurgitation.    Tricuspid Valve: There is mild regurgitation.    Pulmonic Valve: There is mild regurgitation.    Pulmonary Artery: The estimated pulmonary artery systolic pressure is   37 mmHg.    IVC/SVC: Normal venous pressure at 3 mmHg.         ROMAN:  No  results found for this or any previous visit.    ASSESSMENT/PLAN:           Pre-op Assessment    I have reviewed the Patient Summary Reports.     I have reviewed the Nursing Notes. I have reviewed the NPO Status.   I have reviewed the Medications.     Review of Systems  Anesthesia Hx:  No problems with previous Anesthesia   History of prior surgery of interest to airway management or planning:          Denies Family Hx of Anesthesia complications.    Denies Personal Hx of Anesthesia complications.                    Hematology/Oncology:  Hematology Normal                                     EENT/Dental:  EENT/Dental Normal           Cardiovascular:     Hypertension           hyperlipidemia                               Pulmonary:  Pulmonary Normal                       Renal/:  Renal/ Normal                 Hepatic/GI:  Hepatic/GI Normal                    Musculoskeletal:  Musculoskeletal Normal                Neurological:   CVA                                    Endocrine:  Diabetes, type 2               Physical Exam  General: Well nourished, Cooperative, Alert and Oriented    Airway:  Mallampati: III / II  Mouth Opening: Normal  TM Distance: Normal  Tongue: Normal  Neck ROM: Normal ROM    Dental:  Any loose or missing teeth verified with patient  Chest/Lungs:  Normal Respiratory Rate    Heart:  Rate: Normal        Anesthesia Plan  Type of Anesthesia, risks & benefits discussed:    Anesthesia Type: Gen Supraglottic Airway, Gen ETT  Intra-op Monitoring Plan: Standard ASA Monitors  Post Op Pain Control Plan: multimodal analgesia and IV/PO Opioids PRN  Induction:  IV  Airway Plan: Direct and Video, Post-Induction  Informed Consent: Informed consent signed with the Patient and all parties understand the risks and agree with anesthesia plan.  All questions answered.   ASA Score: 3  Day of Surgery Review of History & Physical: H&P Update referred to the surgeon/provider.    Ready For Surgery From Anesthesia  Perspective.     .             [1]   Patient Active Problem List  Diagnosis    Hemiplegia    Aphasia    Embolic stroke involving left middle cerebral artery    HTN (hypertension)    Hyperlipidemia    Controlled type 2 diabetes mellitus without complication, without long-term current use of insulin    Cytotoxic brain edema    Lymphadenopathy    UTI (urinary tract infection)    Debility    Severe obesity (BMI 35.0-35.9 with comorbidity)    Ureteral stone, right    Constipation   [2]   No current facility-administered medications on file prior to encounter.     Current Outpatient Medications on File Prior to Encounter   Medication Sig Dispense Refill    metFORMIN (GLUCOPHAGE) 500 MG tablet Take 500 mg by mouth daily with breakfast.      acac/inulin/c-lose/mv-mn/folic (FIBER PLUS MULITVITAMIN ORAL) Take by mouth.      amLODIPine (NORVASC) 5 MG tablet Take 5 mg by mouth once daily.      aspirin 81 MG Chew Take 1 tablet (81 mg total) by mouth once daily. 30 tablet 11    atorvastatin (LIPITOR) 40 MG tablet Take 40 mg by mouth once daily.      clopidogreL (PLAVIX) 75 mg tablet Take 1 tablet (75 mg total) by mouth once daily. 30 tablet 11    furosemide (LASIX) 20 MG tablet Take 20 mg by mouth once daily. (Patient not taking: Reported on 6/6/2025)      glimepiride (AMARYL) 1 MG tablet Take 1 tablet (1 mg total) by mouth before breakfast. (Patient not taking: Reported on 6/6/2025) 90 tablet 3    latanoprost 0.005 % ophthalmic solution INSTILL 1 DROP INTO BOTH EYES AT BEDTIME AS DIRECTED INSTILL 1 DROP INTO BOTH EYES AT BEDTIME      metoprolol succinate (TOPROL-XL) 50 MG 24 hr tablet Take 50 mg by mouth every evening. (Patient not taking: Reported on 6/6/2025)      potassium chloride SA (K-DUR,KLOR-CON M) 10 MEQ tablet Take 10 mEq by mouth once daily. (Patient not taking: Reported on 6/6/2025)      telmisartan (MICARDIS) 40 MG Tab Take 1 tablet (40 mg total) by mouth once daily. (Patient not taking: Reported on 6/6/2025) 90  tablet 3   [3]   Social History  Socioeconomic History    Marital status: Single   Tobacco Use    Smoking status: Never    Smokeless tobacco: Never   Substance and Sexual Activity    Alcohol use: Not Currently    Drug use: Never    Sexual activity: Not Currently     Social Drivers of Health     Financial Resource Strain: Low Risk  (5/1/2025)    Received from [facility]    Overall Financial Resource Strain (CARDIA)     Difficulty of Paying Living Expenses: Not very hard   Food Insecurity: No Food Insecurity (5/1/2025)    Received from [facility]    Hunger Vital Sign     Worried About Running Out of Food in the Last Year: Never true     Ran Out of Food in the Last Year: Never true   Transportation Needs: No Transportation Needs (5/12/2025)    Received from [facility]     SDOH Transportation Source     Has lack of transportation kept you from medical appointments or from getting medications?: No     Has lack of transportation kept you from meetings, work, or from getting things needed for daily living?: No   Stress: No Stress Concern Present (4/30/2025)    Received from [facility] - Occupational Stress Questionnaire     Feeling of Stress : Not at all   Housing Stability: Low Risk  (5/1/2025)    Received from [facility]    Housing Stability Vital Sign     Unable to Pay for Housing in the Last Year: No     Number of Times Moved in the Last Year: 0     Homeless in the Last Year: No

## 2025-06-18 NOTE — OP NOTE
[facility] - Surgery ([facility])  Urology Department  Operative Note    Date: 06/18/2025    Pre-Op Diagnosis:   Right ureteral stone  Patient Active Problem List    Diagnosis Date Noted    Constipation 04/28/2025    Ureteral stone, right 04/27/2025    UTI (urinary tract infection) 04/25/2025    Debility 04/25/2025    Severe obesity (BMI 35.0-35.9 with comorbidity) 04/25/2025    Lymphadenopathy 04/24/2025    Hemiplegia 04/23/2025    Aphasia 04/23/2025    Embolic stroke involving left middle cerebral artery 04/23/2025    HTN (hypertension) 04/23/2025    Hyperlipidemia 04/23/2025    Controlled type 2 diabetes mellitus without complication, without long-term current use of insulin 04/23/2025    Cytotoxic brain edema 04/23/2025     Post-Op Diagnosis: right kidney stone     Procedure(s) Performed:   1.  Right ureteroscopy  2.  Cystoscopy  3.  Right Ureteral Stent Removal   4.  Right Pyeloscopy   5.  Fluoro < 1 h    Specimen(s): Urine for Culture     Staff Surgeon: Aleksey Lott MD    Assistant Surgeon: Shalom Rowe MD    Anesthesia: General endotracheal anesthesia    Indications: Delilah Williamson is a 85 y.o. female with a right ureteral stone, presenting for definitive stone management.  She currently does have a JJ ureteral stent in place.      Findings: No ureteral stone seen on ureteroscopy. Stone encountered in the lower pole during pyeloscopy.     Estimated Blood Loss: min    Drains: None    Procedure in detail:  After informed consent was obtained, the patient was brought the the cystoscopy suite and placed in the supine position.  SCDs were applied and working.  Anesthesia was administered.  The patient was then placed in the dorsal lithotomy position and prepped and draped in the usual sterile fashion.      A rigid cystoscope in a 22 Fr sheath was introduced into the patient's urethra.  This passed easily.  The entire urethra was visualized which showed no strictures or masses.  Formal cystoscopy was performed which  revealed no masses or lesions suspicious for malignancy, no bladder stones, no bladder diverticula, no trabeculations.  The ureteral orifices were visualized in the normal anatomic position bilaterally. The right existing ureteral stent was grasped and brought to the urethral meatus.     A motion wire was passed up the right ureteral stent and up into the kidney.  This passed easily and placement was confirmed using fluoro.  The cystoscope was removed keeping the wire in place.     A semirigid ureteroscope was passed into the patient's bladder alongside the wire under direct vision.  It was then passed through the right ureteral orifice alongside the wire.  No stone was encountered in the ureter. A stiff glide wire was inserted through the ureteroscope into the renal pelvis.  The ureteroscope was removed, leaving both wires in place.      A 12/14 ureteral access sheath was then passed over the stiff glide wire under fluoroscopic guidance.  Subsequently, the flexible ureteroscope was passed into the patient's ureter through the access sheath under direct vision. Flexible pyeloscopy was performed systematically.  A stone was encountered in the lower pole.      The stone was dusted using the laser. Pyeloscopy was again performed, and no further stone fragments large enough to be basketed were identified. The entire course of the ureter was visualized as the ureteroscope and access sheath were simultaneously removed.  There were no significant ureteral fragments left behind.     The patient tolerated the procedure well and was transferred to the recovery room in stable condition.      Disposition:  The patient will follow up with Dr. Lott in three months with a renal ultrasound.     Shalom Rowe MD

## 2025-06-18 NOTE — PLAN OF CARE
Preop assessment complete. Family at bedside. All questions and concerns addressed. No apparent distress noted.

## 2025-06-18 NOTE — ANESTHESIA PROCEDURE NOTES
Intubation    Date/Time: 6/18/2025 2:48 PM    Performed by: Kenyetta Kaur CRNA  Authorized by: Otf Fournier MD    Intubation:     Induction:  Intravenous    Intubated:  Postinduction    Mask Ventilation:  Easy mask    Attempts:  1    Attempted By:  CRNA    Method of Intubation:  Video laryngoscopy    Blade:  Valencia 3    Laryngeal View Grade: Grade I - full view of cords      Difficult Airway Encountered?: No      Complications:  None    Airway Device:  Oral endotracheal tube    Airway Device Size:  7.0    Style/Cuff Inflation:  Cuffed (inflated to minimal occlusive pressure)    Inflation Amount (mL):  7    Tube secured:  22    Secured at:  The lips    Placement Verified By:  Capnometry    Complicating Factors:  None    Findings Post-Intubation:  BS equal bilateral and atraumatic/condition of teeth unchanged

## 2025-06-18 NOTE — INTERVAL H&P NOTE
The patient has been examined and the H&P has been reviewed:    I concur with the findings and no changes have occurred since H&P was written.    Surgery risks, benefits and alternative options discussed and understood by patient/family.    Urine Dip: Trace leukocytes, nitrite negative, trace blood.      There are no hospital problems to display for this patient.

## 2025-06-19 DIAGNOSIS — N20.1 URETERAL STONE: Primary | ICD-10-CM

## 2025-06-19 LAB — BACTERIA UR CULT: ABNORMAL

## 2025-06-19 NOTE — ANESTHESIA POSTPROCEDURE EVALUATION
Anesthesia Post Evaluation    Patient: Delilah Williamson    Procedure(s) Performed: Procedure(s) (LRB):  CYSTOSCOPY (Right)  REMOVAL-STENT (Right)  NEPHROSCOPY (Right)  URETEROSCOPY, WITH LASER LITHOTRIPSY (Right)    Final Anesthesia Type: general      Patient location during evaluation: PACU  Patient participation: Yes- Able to Participate  Level of consciousness: responds to stimulation  Post-procedure vital signs: reviewed and stable  Pain management: adequate  Airway patency: patent  WICHO mitigation strategies: Multimodal analgesia  PONV status at discharge: No PONV  Anesthetic complications: no      Cardiovascular status: hemodynamically stable  Respiratory status: spontaneous ventilation and room air  Hydration status: euvolemic  Follow-up not needed.              Vitals Value Taken Time   /71 06/18/25 16:45   Temp 36.5 °C (97.7 °F) 06/18/25 16:45   Pulse 66 06/18/25 16:45   Resp 19 06/18/25 16:45   SpO2 96 % 06/18/25 16:45         No case tracking events are documented in the log.      Pain/Fernando Score: Fernando Score: 10 (6/18/2025  4:45 PM)

## 2025-06-20 ENCOUNTER — RESULTS FOLLOW-UP (OUTPATIENT)
Dept: UROLOGY | Facility: CLINIC | Age: 85
End: 2025-06-20

## 2025-06-20 ENCOUNTER — TELEPHONE (OUTPATIENT)
Dept: UROLOGY | Facility: CLINIC | Age: 85
End: 2025-06-20
Payer: MEDICARE

## 2025-06-20 DIAGNOSIS — B37.49 CANDIDA UTI: Primary | ICD-10-CM

## 2025-06-20 RX ORDER — FLUCONAZOLE 100 MG/1
100 TABLET ORAL DAILY
Qty: 7 TABLET | Refills: 0 | Status: SHIPPED | OUTPATIENT
Start: 2025-06-20 | End: 2025-06-27

## 2025-06-20 NOTE — TELEPHONE ENCOUNTER
Stop cipro  and take diflucan for candida UTI    Candida UTI  -     fluconazole (DIFLUCAN) 100 MG tablet; Take 1 tablet (100 mg total) by mouth once daily. for 7 days  Dispense: 7 tablet; Refill: 0

## 2025-06-20 NOTE — TELEPHONE ENCOUNTER
Pt notified. She states that she does not take plavix and wants it removed from her chart. I todl her that I would relay the message

## 2025-07-21 NOTE — PROGRESS NOTES
Home visit attempt, phone visit instead  Patient is staying with her sister and was not sure of the address. She attempted to tell me directions, but was unsure of street names. I was not able to talk to residence owner for directions to home. Pt states she is progressing well and well cared for. Post stroke education provided. Emotional support given. Will follow up

## 2025-07-29 ENCOUNTER — HOSPITAL ENCOUNTER (INPATIENT)
Facility: HOSPITAL | Age: 85
LOS: 7 days | Discharge: NURSING, REHAB OR LONG-TERM CARE | DRG: 552 | End: 2025-08-05
Attending: EMERGENCY MEDICINE
Payer: MEDICARE

## 2025-07-29 DIAGNOSIS — E11.9 CONTROLLED TYPE 2 DIABETES MELLITUS WITHOUT COMPLICATION, WITHOUT LONG-TERM CURRENT USE OF INSULIN: ICD-10-CM

## 2025-07-29 DIAGNOSIS — E78.5 HYPERLIPIDEMIA, UNSPECIFIED HYPERLIPIDEMIA TYPE: ICD-10-CM

## 2025-07-29 DIAGNOSIS — I10 PRIMARY HYPERTENSION: ICD-10-CM

## 2025-07-29 DIAGNOSIS — R53.1 LEFT-SIDED WEAKNESS: ICD-10-CM

## 2025-07-29 DIAGNOSIS — N30.90 BLADDER INFECTION: ICD-10-CM

## 2025-07-29 DIAGNOSIS — I63.9 ACUTE ISCHEMIC STROKE: ICD-10-CM

## 2025-07-29 DIAGNOSIS — I63.9 CVA (CEREBRAL VASCULAR ACCIDENT): ICD-10-CM

## 2025-07-29 DIAGNOSIS — M48.02 CERVICAL STENOSIS OF SPINAL CANAL: Primary | ICD-10-CM

## 2025-07-29 DIAGNOSIS — R29.818 ACUTE FOCAL NEUROLOGICAL DEFICIT: ICD-10-CM

## 2025-07-29 PROBLEM — H10.9 CONJUNCTIVITIS: Status: ACTIVE | Noted: 2025-07-29

## 2025-07-29 LAB
ABSOLUTE EOSINOPHIL (SMH): 0.06 K/UL
ABSOLUTE MONOCYTE (SMH): 0.69 K/UL (ref 0.3–1)
ABSOLUTE NEUTROPHIL COUNT (SMH): 8.3 K/UL (ref 1.8–7.7)
ALBUMIN SERPL-MCNC: 3.7 G/DL (ref 3.5–5.2)
ALP SERPL-CCNC: 57 UNIT/L (ref 55–135)
ALT SERPL-CCNC: 25 UNIT/L (ref 10–44)
ANION GAP (SMH): 7 MMOL/L (ref 8–16)
AST SERPL-CCNC: 57 UNIT/L (ref 10–40)
BACTERIA #/AREA URNS AUTO: ABNORMAL /HPF
BASOPHILS # BLD AUTO: 0.02 K/UL
BASOPHILS NFR BLD AUTO: 0.2 %
BILIRUB SERPL-MCNC: 0.8 MG/DL (ref 0.1–1)
BILIRUB UR QL STRIP.AUTO: NEGATIVE
BUN SERPL-MCNC: 18 MG/DL (ref 8–23)
CALCIUM SERPL-MCNC: 11.4 MG/DL (ref 8.7–10.5)
CHLORIDE SERPL-SCNC: 105 MMOL/L (ref 95–110)
CHOLEST SERPL-MCNC: 103 MG/DL (ref 120–199)
CHOLEST/HDLC SERPL: 2.7 {RATIO} (ref 2–5)
CLARITY UR: ABNORMAL
CO2 SERPL-SCNC: 29 MMOL/L (ref 23–29)
COLOR UR AUTO: YELLOW
CREAT SERPL-MCNC: 0.9 MG/DL (ref 0.5–1.4)
CREAT SERPL-MCNC: 1 MG/DL (ref 0.5–1.4)
EAG (SMH): 117 MG/DL (ref 68–131)
ERYTHROCYTE [DISTWIDTH] IN BLOOD BY AUTOMATED COUNT: 16.4 % (ref 11.5–14.5)
GFR SERPLBLD CREATININE-BSD FMLA CKD-EPI: >60 ML/MIN/1.73/M2
GLUCOSE SERPL-MCNC: 146 MG/DL (ref 70–110)
GLUCOSE UR QL STRIP: ABNORMAL
HBA1C MFR BLD: 5.7 % (ref 4.5–6.2)
HCT VFR BLD AUTO: 35.9 % (ref 37–48.5)
HCV AB SERPL QL IA: NORMAL
HDLC SERPL-MCNC: 38 MG/DL (ref 40–75)
HDLC SERPL: 36.9 % (ref 20–50)
HGB BLD-MCNC: 11.4 GM/DL (ref 12–16)
HGB UR QL STRIP: ABNORMAL
HIV 1+2 AB+HIV1 P24 AG SERPL QL IA: NORMAL
IMM GRANULOCYTES # BLD AUTO: 0.02 K/UL (ref 0–0.04)
IMM GRANULOCYTES NFR BLD AUTO: 0.2 % (ref 0–0.5)
INFLUENZA A MOLECULAR (OHS): NEGATIVE
INFLUENZA B MOLECULAR (OHS): NEGATIVE
INR PPP: 1.1 (ref 0.8–1.2)
KETONES UR QL STRIP: NEGATIVE
LDLC SERPL CALC-MCNC: 52.4 MG/DL (ref 63–159)
LEUKOCYTE ESTERASE UR QL STRIP: ABNORMAL
LYMPHOCYTES # BLD AUTO: 1.47 K/UL (ref 1–4.8)
MCH RBC QN AUTO: 24.6 PG (ref 27–31)
MCHC RBC AUTO-ENTMCNC: 31.8 G/DL (ref 32–36)
MCV RBC AUTO: 78 FL (ref 82–98)
MICROSCOPIC COMMENT: ABNORMAL
NITRITE UR QL STRIP: POSITIVE
NONHDLC SERPL-MCNC: 65 MG/DL
NUCLEATED RBC (/100WBC) (SMH): 0 /100 WBC
OHS QRS DURATION: 102 MS
OHS QTC CALCULATION: 437 MS
PH UR STRIP: 6 [PH]
PLATELET # BLD AUTO: 247 K/UL (ref 150–450)
PMV BLD AUTO: 10.5 FL (ref 9.2–12.9)
POCT GLUCOSE: 136 MG/DL (ref 70–110)
POTASSIUM SERPL-SCNC: 3.5 MMOL/L (ref 3.5–5.1)
PROT SERPL-MCNC: 6.6 GM/DL (ref 6–8.4)
PROT UR QL STRIP: ABNORMAL
PROTHROMBIN TIME: 11.7 SECONDS (ref 9–12.5)
RBC # BLD AUTO: 4.63 M/UL (ref 4–5.4)
RBC #/AREA URNS AUTO: 12 /HPF
RELATIVE EOSINOPHIL (SMH): 0.6 % (ref 0–8)
RELATIVE LYMPHOCYTE (SMH): 13.9 % (ref 18–48)
RELATIVE MONOCYTE (SMH): 6.5 % (ref 4–15)
RELATIVE NEUTROPHIL (SMH): 78.6 % (ref 38–73)
SAMPLE: NORMAL
SARS-COV-2 RDRP RESP QL NAA+PROBE: NEGATIVE
SODIUM SERPL-SCNC: 141 MMOL/L (ref 136–145)
SP GR UR STRIP: 1.02
TRIGL SERPL-MCNC: 63 MG/DL (ref 30–150)
TSH SERPL-ACNC: 1.53 UIU/ML (ref 0.34–5.6)
UROBILINOGEN UR STRIP-ACNC: NEGATIVE EU/DL
WBC # BLD AUTO: 10.59 K/UL (ref 3.9–12.7)
WBC #/AREA URNS AUTO: >100 /HPF
WBC CLUMPS UR QL AUTO: ABNORMAL

## 2025-07-29 PROCEDURE — U0002 COVID-19 LAB TEST NON-CDC: HCPCS | Performed by: EMERGENCY MEDICINE

## 2025-07-29 PROCEDURE — 85025 COMPLETE CBC W/AUTO DIFF WBC: CPT | Performed by: NURSE PRACTITIONER

## 2025-07-29 PROCEDURE — 99285 EMERGENCY DEPT VISIT HI MDM: CPT | Mod: 25

## 2025-07-29 PROCEDURE — 25000003 PHARM REV CODE 250: Performed by: EMERGENCY MEDICINE

## 2025-07-29 PROCEDURE — 99900035 HC TECH TIME PER 15 MIN (STAT)

## 2025-07-29 PROCEDURE — 86803 HEPATITIS C AB TEST: CPT | Performed by: EMERGENCY MEDICINE

## 2025-07-29 PROCEDURE — 36415 COLL VENOUS BLD VENIPUNCTURE: CPT

## 2025-07-29 PROCEDURE — 63600175 PHARM REV CODE 636 W HCPCS: Performed by: EMERGENCY MEDICINE

## 2025-07-29 PROCEDURE — 25500020 PHARM REV CODE 255: Performed by: EMERGENCY MEDICINE

## 2025-07-29 PROCEDURE — 99448 NTRPROF PH1/NTRNET/EHR 21-30: CPT | Mod: ,,, | Performed by: STUDENT IN AN ORGANIZED HEALTH CARE EDUCATION/TRAINING PROGRAM

## 2025-07-29 PROCEDURE — 21400001 HC TELEMETRY ROOM

## 2025-07-29 PROCEDURE — 82565 ASSAY OF CREATININE: CPT

## 2025-07-29 PROCEDURE — 99900031 HC PATIENT EDUCATION (STAT)

## 2025-07-29 PROCEDURE — 83036 HEMOGLOBIN GLYCOSYLATED A1C: CPT

## 2025-07-29 PROCEDURE — 84443 ASSAY THYROID STIM HORMONE: CPT | Performed by: NURSE PRACTITIONER

## 2025-07-29 PROCEDURE — 80061 LIPID PANEL: CPT | Performed by: NURSE PRACTITIONER

## 2025-07-29 PROCEDURE — 84075 ASSAY ALKALINE PHOSPHATASE: CPT | Performed by: NURSE PRACTITIONER

## 2025-07-29 PROCEDURE — 63600175 PHARM REV CODE 636 W HCPCS

## 2025-07-29 PROCEDURE — 87389 HIV-1 AG W/HIV-1&-2 AB AG IA: CPT | Performed by: EMERGENCY MEDICINE

## 2025-07-29 PROCEDURE — 87502 INFLUENZA DNA AMP PROBE: CPT | Performed by: EMERGENCY MEDICINE

## 2025-07-29 PROCEDURE — 87086 URINE CULTURE/COLONY COUNT: CPT | Performed by: EMERGENCY MEDICINE

## 2025-07-29 PROCEDURE — 96374 THER/PROPH/DIAG INJ IV PUSH: CPT

## 2025-07-29 PROCEDURE — 93005 ELECTROCARDIOGRAM TRACING: CPT | Performed by: GENERAL PRACTICE

## 2025-07-29 PROCEDURE — 85610 PROTHROMBIN TIME: CPT | Performed by: NURSE PRACTITIONER

## 2025-07-29 PROCEDURE — 11000001 HC ACUTE MED/SURG PRIVATE ROOM

## 2025-07-29 PROCEDURE — 36415 COLL VENOUS BLD VENIPUNCTURE: CPT | Performed by: NURSE PRACTITIONER

## 2025-07-29 PROCEDURE — 25000003 PHARM REV CODE 250

## 2025-07-29 PROCEDURE — 94761 N-INVAS EAR/PLS OXIMETRY MLT: CPT

## 2025-07-29 PROCEDURE — 93010 ELECTROCARDIOGRAM REPORT: CPT | Mod: ,,, | Performed by: GENERAL PRACTICE

## 2025-07-29 PROCEDURE — 82962 GLUCOSE BLOOD TEST: CPT

## 2025-07-29 PROCEDURE — 81003 URINALYSIS AUTO W/O SCOPE: CPT | Performed by: EMERGENCY MEDICINE

## 2025-07-29 RX ORDER — CEFTRIAXONE 1 G/1
1 INJECTION, POWDER, FOR SOLUTION INTRAMUSCULAR; INTRAVENOUS
Status: DISCONTINUED | OUTPATIENT
Start: 2025-07-30 | End: 2025-08-04

## 2025-07-29 RX ORDER — ASPIRIN 81 MG/1
81 TABLET ORAL DAILY
Status: DISCONTINUED | OUTPATIENT
Start: 2025-07-30 | End: 2025-08-05 | Stop reason: HOSPADM

## 2025-07-29 RX ORDER — CLOPIDOGREL BISULFATE 75 MG/1
300 TABLET ORAL
Status: COMPLETED | OUTPATIENT
Start: 2025-07-29 | End: 2025-07-29

## 2025-07-29 RX ORDER — BISACODYL 10 MG/1
10 SUPPOSITORY RECTAL DAILY PRN
Status: DISCONTINUED | OUTPATIENT
Start: 2025-07-29 | End: 2025-08-05 | Stop reason: HOSPADM

## 2025-07-29 RX ORDER — TELMISARTAN 80 MG/1
80 TABLET ORAL DAILY
COMMUNITY

## 2025-07-29 RX ORDER — SODIUM CHLORIDE 0.9 % (FLUSH) 0.9 %
10 SYRINGE (ML) INJECTION
Status: DISCONTINUED | OUTPATIENT
Start: 2025-07-29 | End: 2025-08-05 | Stop reason: HOSPADM

## 2025-07-29 RX ORDER — ASPIRIN 325 MG
325 TABLET ORAL
Status: COMPLETED | OUTPATIENT
Start: 2025-07-29 | End: 2025-07-29

## 2025-07-29 RX ORDER — ENOXAPARIN SODIUM 100 MG/ML
40 INJECTION SUBCUTANEOUS EVERY 24 HOURS
Status: DISCONTINUED | OUTPATIENT
Start: 2025-07-29 | End: 2025-08-05 | Stop reason: HOSPADM

## 2025-07-29 RX ORDER — CLOPIDOGREL BISULFATE 75 MG/1
75 TABLET ORAL DAILY
Status: DISCONTINUED | OUTPATIENT
Start: 2025-07-30 | End: 2025-08-05 | Stop reason: HOSPADM

## 2025-07-29 RX ORDER — CEFTRIAXONE 1 G/1
1 INJECTION, POWDER, FOR SOLUTION INTRAMUSCULAR; INTRAVENOUS ONCE
Status: COMPLETED | OUTPATIENT
Start: 2025-07-29 | End: 2025-07-29

## 2025-07-29 RX ORDER — ATORVASTATIN CALCIUM 40 MG/1
40 TABLET, FILM COATED ORAL DAILY
Status: DISCONTINUED | OUTPATIENT
Start: 2025-07-29 | End: 2025-08-05 | Stop reason: HOSPADM

## 2025-07-29 RX ORDER — LABETALOL HYDROCHLORIDE 5 MG/ML
10 INJECTION, SOLUTION INTRAVENOUS
Status: DISCONTINUED | OUTPATIENT
Start: 2025-07-29 | End: 2025-07-31

## 2025-07-29 RX ORDER — POLYMYXIN B SULFATE AND TRIMETHOPRIM 1; 10000 MG/ML; [USP'U]/ML
1 SOLUTION OPHTHALMIC EVERY 6 HOURS
Status: DISCONTINUED | OUTPATIENT
Start: 2025-07-29 | End: 2025-07-31

## 2025-07-29 RX ADMIN — CLOPIDOGREL BISULFATE 300 MG: 75 TABLET, FILM COATED ORAL at 12:07

## 2025-07-29 RX ADMIN — ASPIRIN 325 MG: 325 TABLET ORAL at 12:07

## 2025-07-29 RX ADMIN — CEFTRIAXONE SODIUM 1 G: 1 INJECTION, POWDER, FOR SOLUTION INTRAMUSCULAR; INTRAVENOUS at 01:07

## 2025-07-29 RX ADMIN — ENOXAPARIN SODIUM 40 MG: 40 INJECTION SUBCUTANEOUS at 05:07

## 2025-07-29 RX ADMIN — IOHEXOL 100 ML: 350 INJECTION, SOLUTION INTRAVENOUS at 11:07

## 2025-07-29 RX ADMIN — ATORVASTATIN CALCIUM 40 MG: 40 TABLET, FILM COATED ORAL at 05:07

## 2025-07-29 NOTE — HPI
This is a pleasant 85-year-old lady with comorbid conditions of hyperlipidemia, hypertension, debility related to age who presents to the emergency department after concerns of left-sided weakness.  Patient reportedly had a fall yesterday and daughter-in-law stayed with her, however, upon waking this morning they noticed that she was unable to keep her left arm against gravity.  Code stroke was activated in the emergency department, CT head negative, CTA head and neck with no large vessel occlusion and MRI negative although clinically patient with 1/5 strength of the left upper extremity with no associated musculoskeletal patent.  Left lower extremity 2/5.  This is potentially an MRI negative CVA.  Continue dual antiplatelet therapy and neurology consult to follow.  Urinalysis frankly positive for infection.  Continue ceftriaxone.  Admission to hospital medicine with the above workup.

## 2025-07-29 NOTE — PLAN OF CARE
[facility] - Emergency Dept  Initial Discharge Assessment       Primary Care Provider: Juan Abarca MD    Admission Diagnosis: Acute focal neurological deficit [R29.818]    Admission Date: 7/29/2025  Expected Discharge Date:     CM met with patient bedside. Melanie Osman Nephews wife was present in the room assisting with assessment due to pt resting. CM verified demographics, insurance, supports, and PCP. CM assessed patient's needs. Patient is able to complete ADLs independently with assist of ambulatory equipment. Melanie verified at home DME: Wheelchair, Walker, Bedside Commode.  Melanie verified No HH, No Dialysis, No Blood Thinners, and No Oxygen.     Melanie TEJEDA states pt lives alone and has family as neighbors to assist pt when needed. States pt had a stroke in June 2025 and went to [facility] after then home.     Pt takes 81 mg Aspirin.    Melanie TEJEDA verified pharmacy of choice[facility] [place] [facility] [place] confirmed Melanie BENITO niece or Magen BENITO nephew, or Melanie TEJEDA nephkristyn wife will be source of transportation at the time of discharge.     Transition of Care Barriers: None    Payor: [employer] [facility] / Plan: [employer] CHOICES / Product Type: Medicare Advantage /     Extended Emergency Contact Information  Primary Emergency Contact: Melanie Delgado  Mobile Phone: [phone]  Relation: Relative  Secondary Emergency Contact: Frankie Delgado  Mobile Phone: [phone]  Relation: Relative    Discharge Plan A: Home  Discharge Plan B: Home Health      [id number] [place] [facility] [address]  [address]  [place] [address]  Phone: [phone] Fax: [phone]    [facility] - [place], [place] - [address] [facility]  [address] [address]  Phone: [phone] Fax: [phone]      Initial Assessment (most recent)       Adult Discharge Assessment - 07/29/25 8016          Discharge Assessment    Assessment Type Discharge  Planning Assessment     Confirmed/corrected address, phone number and insurance Yes     Confirmed Demographics Correct on Facesheet     Source of Information other (see comments)   Nephews Wife Melanie Osman    Communicated ALINA with patient/caregiver Date not available/Unable to determine     People in Home alone     Do you expect to return to your current living situation? Yes     Do you have help at home or someone to help you manage your care at home? No     Prior to hospitilization cognitive status: Alert/Oriented     Current cognitive status: --   Pt resting    Walking or Climbing Stairs Difficulty yes     Dressing/Bathing Difficulty no     Equipment Currently Used at Home wheelchair;walker, rolling;bedside commode     Readmission within 30 days? No     Patient currently being followed by outpatient case management? No     Do you currently have service(s) that help you manage your care at home? No     Do you take prescription medications? Yes     Do you have prescription coverage? Yes     Do you have any problems affording any of your prescribed medications? No     Is the patient taking medications as prescribed? yes     Who is going to help you get home at discharge? Melanie Liu     How do you get to doctors appointments? family or friend will provide     Are you on dialysis? No     Do you take coumadin? No     Discharge Plan A Home     Discharge Plan B Home Health     DME Needed Upon Discharge  none     Discharge Plan discussed with: --   Nephews wife    Transition of Care Barriers None

## 2025-07-29 NOTE — ED NOTES
Bed: 26  Expected date:   Expected time:   Means of arrival:   Comments:  Rm 22   Follow up in 12 months.  Call sooner with any questions or concerns.    Our office will call you with your echocardiogram results once they are available      Chest Echocardiography (Transthoracic)     During an echo, images of your heart appear on a monitor.   An echocardiogram (echo) is an imaging test that uses ultrasound. It helps your healthcare provider look for symptoms that may be related how well your heart works.  A transthoracic echocardiogram is sometimes called TTE. It may also be called surface echocardiogram. This is because the images are taken from the surface of the chest wall. It helps your provider assess your heart. A more invasive type of echocardiogram involves the ultrasound probe being passed into the esophagus to get images (transesophageal).  This test:  · Is safe and generally painless  · May cause discomfort from the echo probe being pressed against the bony areas of the chest. This goes away once the probe is moved.  · Can be done in a hospital, test center, or doctor’s office  · Bounces harmless sound waves (ultrasound) off the heart using a device that looks like a microphone (transducer or probe)  · Allows your healthcare provider to look at the size and shape of your heart. The provider can also see the size, thickness, and movement of your heart's walls, and the heart's pumping strength.  · Shows if the heart valves are working correctly. It can also show if blood is leaking backward through your heart valves (regurgitation), or if the heart valves are too narrow (stenosis).  · Shows if you have a tumor or infectious growth around your heart valves  · Will help your healthcare provider find out if there are problems with the outer lining of your heart (pericardium)  · Shows problems with the large blood vessels that enter and leave the heart  · Shows blood clots in the heart chambers  · Shows abnormal holes between heart chambers  Before your echo  · Discuss any questions or  concerns you have with your healthcare provider.  · Mention any over-the-counter or prescription medicines, herbs, or supplements you’re taking.  · Allow extra time for checking in. Bring your insurance cards and ID.  · Wear a 2-piece outfit for the test. You may be asked to remove clothing and jewelry from the waist up. If so, you’ll be given a short hospital gown.  · An IV (intravenous) catheter may be put into a vein in your arm or hand. This will be done if contrast or bubbles will be injected during the study.    During your echo  · Small pads (electrodes) are placed on your chest to monitor your heartbeat.  · A transducer coated with gel is moved firmly over your chest. This device creates the sound waves that make images of your heart. If you are overweight, the technician may have to apply more pressure to the chest wall to improve the quality of the images. This pressure can be uncomfortable over bony areas. Tell your technician if you are uncomfortable.  · At times, you may be asked to exhale and hold your breath for a few seconds. Air in your lungs can affect the images.  · The transducer may also be used to do a Doppler study. This test measures the direction and speed of blood flowing through the heart. During the test, you may hear a “whooshing” sound. This is the sound of blood flowing through the heart.  · The technician may use IV contrast to improve the image quality. Or they may use agitated saline to follow blood flow through the chambers of the heart.  · The images of your heart are stored electronically. This is so your healthcare provider can review them later.    After your echo  · Return to normal activity unless your healthcare provider tells you otherwise.  · Keep any follow-up appointments.    ========================================    Thank you for attending your Cardiology appointment today. We aim to make your appointment as positive as possible.    We hope that your questions have  been answered and you understand any plans that have been discussed. If you have any questions or concerns please call us at your convenience at [phone]. You will be connected with our nursing staff.    You may receive a survey, by mail about your experience with us today. If you cannot give us the highest score possible for each question, we would appreciate any feedback on how we can do better in the future    Please finalize the following details regarding your follow up visit-    1. Date and location of your follow up visit. If you would like to confirm your upcoming appointment, please contact our department. We will be happy to address your questions and concerns.    2. Please take a card with our contact information.    3. Please consider signing up for [email or web address]. Go to [email or web address]. This will guide you in setting up an online account to view lab results, request medication refills, schedule an appointment, pay bills and send an email message and more.    Thank You.    Kinza Cui MD  [address] [place] [address] [phone]    Cardiology Phone Nurse- [phone].

## 2025-07-29 NOTE — TELEMEDICINE CONSULT
[facility]  Vascular Neurology  Comprehensive Stroke Center  TeleVascular Neurology Interprofessional Consult Note           Consult Information  Consult to Telemedicine - Acute Stroke  Consult performed by: Laura Pizarro MD  Consult ordered by: Sendy Hamilton NP          Consulting Provider: SENDY HAMILTON   Patient Location:  [facility] EMERGENCY DEPARTMENT     Summary of patient's symptoms:  84 yo female w/ recent L M2 occlusion s/p thrombectomy, DM II, HTN, HLD who presents w/ LUE weakness and LFD.   LKE 14:00 the day PTA when she was dropped off at home post her appointment w/ PCP.   Found by family today w/ the above deficits.      Images personally reviewed and interpreted:  Study: Head CT and CTA Head & Neck  Study Interpretation: No acute intracranial abnormalities, specifically no early signs of ischemia and no intracranial hemorrhage.   No LVO, no hemodynamically significant stenosis.           Assessment and plan:  OOW for TNK based on LKW.   CTA H/N w/o LVO.   Admit for evaluation of R hemispheric infarct, w/ concern for underlying paroxysmal afib based known LAE on previus imaging and now suspected 2nd CE event.   Recommendations:    MRI brain to evaluate for presence and/or extent of ischemic injury  Allow for permissive HTNsion up to 220/110 until AIS is r/o w/ imaging   Lipid profile, A1c, TSH  ECHO w/o bubble study to r/o LV thrombus  PT/OT/SLP eval and Rx  IVF to allow for maximum cerebral perfusion  HOB flat   Load aspirin 325 mg & clopidogrel 300 mg x 1 now, followed by daily aspirin 81 mg /  clopidogrel 75 mg x 30 days followed by monotherapy thereafter  High intensity statin for LDL goal <70 long term     Recommendations discussed w/ Dr. Roberts       I spent approximately 30 minutes on this encounter. More than half of that time was spent communicating with the consulting provider and coordinating patient care.       Laura Pizarro MD        This encounter was  conducted as an interprofessional communication between providers at the [facility] and vascular neurologist. The interaction was completed over the phone or via secure messaging (electronic medical record - [facility] Secure Chat).     Once this note was completed, a written copy was sent back to the provider via fax or electronic medical record.

## 2025-07-29 NOTE — PHARMACY MED REC
"Admission Medication History     The home medication history was taken by Jerome Chamberlain.    You may go to "Admission" then "Reconcile Home Medications" tabs to review and/or act upon these items.     The home medication list has been updated by the Pharmacy department.   Please read ALL comments highlighted in yellow.   Please address this information as you see fit.    Feel free to contact us if you have any questions or require assistance.        Medications listed below were obtained from: Patient/family and Analytic software- ADTELLIGENCE  Medications Ordered Prior to Encounter[1]    Potential issues to be addressed PRIOR TO DISCHARGE  Patient reported not taking the following medications: (metorprolol succ 50, potassium 10,telmisartan 40). These medications remain on the home medication list. Please address accordingly.     Jerome Chamberlain  [id number]                  .               [1]   No current facility-administered medications on file prior to encounter.     Current Outpatient Medications on File Prior to Encounter   Medication Sig Dispense Refill    acac/inulin/c-lose/mv-mn/folic (FIBER PLUS MULITVITAMIN ORAL) Take by mouth.      amLODIPine (NORVASC) 5 MG tablet Take 5 mg by mouth once daily.      aspirin 81 MG Chew Take 1 tablet (81 mg total) by mouth once daily. 30 tablet 11    atorvastatin (LIPITOR) 40 MG tablet Take 40 mg by mouth once daily.      furosemide (LASIX) 20 MG tablet Take 20 mg by mouth once daily.      latanoprost 0.005 % ophthalmic solution INSTILL 1 DROP INTO BOTH EYES AT BEDTIME AS DIRECTED INSTILL 1 DROP INTO BOTH EYES AT BEDTIME      metFORMIN (GLUCOPHAGE) 500 MG tablet Take 500 mg by mouth daily with breakfast.      glimepiride (AMARYL) 1 MG tablet Take 1 tablet (1 mg total) by mouth before breakfast. (Patient not taking: Reported on 6/6/2025) 90 tablet 3    metoprolol succinate (TOPROL-XL) 50 MG 24 hr tablet Take 50 mg by mouth every evening. (Patient not taking: Reported on 6/6/2025)      " potassium chloride SA (K-DUR,KLOR-CON M) 10 MEQ tablet Take 10 mEq by mouth once daily. (Patient not taking: Reported on 6/6/2025)      telmisartan (MICARDIS) 80 MG Tab Take 80 mg by mouth once daily.      [DISCONTINUED] telmisartan (MICARDIS) 40 MG Tab Take 1 tablet (40 mg total) by mouth once daily. (Patient not taking: Reported on 6/6/2025) 90 tablet 3

## 2025-07-29 NOTE — ASSESSMENT & PLAN NOTE
MRI negative CVA?  Antithrombotics for secondary stroke prevention: Antiplatelets: Aspirin: 81 mg daily  Clopidogrel: 75 mg daily    Statins for secondary stroke prevention and hyperlipidemia, if present:   Statins: Atorvastatin- 40 mg daily    Aggressive risk factor modification: HTN, HLD     Rehab efforts: The patient has been evaluated by a stroke team provider and the therapy needs have been fully considered based off the presenting complaints and exam findings. The following therapy evaluations are needed: PT evaluate and treat, OT evaluate and treat, SLP evaluate and treat    Diagnostics ordered/pending: CTA head and neck negative for LVO although segment narrowing is noted, MRI negative, echo with bubble study pending    VTE prophylaxis: Enoxaparin 40 mg SQ every 24 hours    BP parameters: Infarct: No intervention, SBP <220    Neuro consult to follow

## 2025-07-29 NOTE — H&P
[facility] - Emergency Dept  Hospital Medicine  History & Physical    Patient Name: Delilah Williamson  MRN: [id number]  Patient Class: IP- Inpatient  Admission Date: 7/29/2025  Attending Physician: Hannah Mendiola MD   Primary Care Provider: Juan Abarca MD         Patient information was obtained from patient and ER records.     Subjective:     Principal Problem:CVA (cerebral vascular accident)    Chief Complaint:   Chief Complaint   Patient presents with    Extremity Weakness     Pt presents with left side arm weakness, unable to hold it up. Pt family states they took her to the Dr. Yesterday and left her house at 1400 she was normal. She does have a history of stroke in April they removed a clot.         HPI: This is a pleasant 85-year-old lady with comorbid conditions of hyperlipidemia, hypertension, debility related to age who presents to the emergency department after concerns of left-sided weakness.  Patient reportedly had a fall yesterday and daughter-in-law stayed with her, however, upon waking this morning they noticed that she was unable to keep her left arm against gravity.  Code stroke was activated in the emergency department, CT head negative, CTA head and neck with no large vessel occlusion and MRI negative although clinically patient with 1/5 strength of the left upper extremity with no associated musculoskeletal patent.  Left lower extremity 2/5.  This is potentially an MRI negative CVA.  Continue dual antiplatelet therapy and neurology consult to follow.  Urinalysis frankly positive for infection.  Continue ceftriaxone.  Admission to hospital medicine with the above workup.    Past Medical History:   Diagnosis Date    Diabetes mellitus, type 2     Glaucoma     Hyperlipidemia     Hypertension     Stroke        Past Surgical History:   Procedure Laterality Date    CATARACT EXTRACTION      CYSTOSCOPY Right 6/18/2025    Procedure: CYSTOSCOPY;  Surgeon: Aleksey Lott MD;   Location: [facility] OR [facility] FLR;  Service: Urology;  Laterality: Right;    CYSTOSCOPY W/ URETERAL STENT PLACEMENT Right 4/27/2025    Procedure: CYSTOSCOPY, WITH URETERAL STENT INSERTION;  Surgeon: Bill Moreno MD;  [facility];  Service: Urology;  Laterality: Right;    NEPHROSCOPY Right 6/18/2025    Procedure: NEPHROSCOPY;  Surgeon: Aleksey Lott MD;  Location: [facility] FLR;  Service: Urology;  Laterality: Right;    REMOVAL-STENT Right 6/18/2025    Procedure: REMOVAL-STENT;  Surgeon: Aleksey Lott MD;  [facility];  Service: Urology;  Laterality: Right;    ROTATOR CUFF REPAIR      URETEROSCOPY, WITH LASER LITHOTRIPSY Right 6/18/2025    Procedure: URETEROSCOPY, WITH LASER LITHOTRIPSY;  Surgeon: Aleksey Lott MD;  Location: [facility] OR [address] [place] [address];  Service: Urology;  Laterality: Right;       Review of patient's allergies indicates:  No Known Allergies    No current facility-administered medications on file prior to encounter.     Current Outpatient Medications on File Prior to Encounter   Medication Sig    acac/inulin/c-lose/mv-mn/folic (FIBER PLUS MULITVITAMIN ORAL) Take by mouth.    amLODIPine (NORVASC) 5 MG tablet Take 5 mg by mouth once daily.    aspirin 81 MG Chew Take 1 tablet (81 mg total) by mouth once daily.    atorvastatin (LIPITOR) 40 MG tablet Take 40 mg by mouth once daily.    furosemide (LASIX) 20 MG tablet Take 20 mg by mouth once daily.    latanoprost 0.005 % ophthalmic solution INSTILL 1 DROP INTO BOTH EYES AT BEDTIME AS DIRECTED INSTILL 1 DROP INTO BOTH EYES AT BEDTIME    metFORMIN (GLUCOPHAGE) 500 MG tablet Take 500 mg by mouth daily with breakfast.    glimepiride (AMARYL) 1 MG tablet Take 1 tablet (1 mg total) by mouth before breakfast. (Patient not taking: Reported on 6/6/2025)    metoprolol succinate (TOPROL-XL) 50 MG 24 hr tablet Take 50 mg by mouth every evening. (Patient not taking: Reported on 6/6/2025)    potassium chloride SA (K-DUR,KLOR-CON M) 10 MEQ tablet Take  10 mEq by mouth once daily. (Patient not taking: Reported on 6/6/2025)    telmisartan (MICARDIS) 80 MG Tab Take 80 mg by mouth once daily.    [DISCONTINUED] telmisartan (MICARDIS) 40 MG Tab Take 1 tablet (40 mg total) by mouth once daily. (Patient not taking: Reported on 6/6/2025)     Family History       Problem Relation (Age of Onset)    Breast cancer Paternal Aunt          Tobacco Use    Smoking status: Never    Smokeless tobacco: Never   Substance and Sexual Activity    Alcohol use: Not Currently    Drug use: Never    Sexual activity: Not Currently     Review of Systems   Constitutional:  Negative for chills and fatigue.   Respiratory:  Negative for shortness of breath.    Cardiovascular:  Negative for chest pain.   Genitourinary:  Positive for dysuria.   Neurological:  Positive for weakness.   Psychiatric/Behavioral:  Negative for agitation and confusion.      Objective:     Vital Signs (Most Recent):  Temp: 98.5 °F (36.9 °C) (07/29/25 1129)  Pulse: 65 (07/29/25 1330)  Resp: 18 (07/29/25 1330)  BP: (!) 161/72 (07/29/25 1330)  SpO2: 96 % (07/29/25 1330) Vital Signs (24h Range):  Temp:  [98.5 °F (36.9 °C)] 98.5 °F (36.9 °C)  Pulse:  [65-67] 65  Resp:  [16-19] 18  SpO2:  [96 %-97 %] 96 %  BP: (159-171)/(72-84) 161/72     Weight: 81.2 kg (179 lb)  Body mass index is 28.04 kg/m².     Physical Exam  HENT:      Head: Atraumatic.   Eyes:      General:         Right eye: Discharge present.         Left eye: Discharge present.  Cardiovascular:      Rate and Rhythm: Normal rate.   Pulmonary:      Effort: Pulmonary effort is normal. No respiratory distress.      Breath sounds: No wheezing or rales.   Abdominal:      General: There is no distension.      Palpations: Abdomen is soft.      Tenderness: There is no abdominal tenderness.   Musculoskeletal:      Right lower leg: Edema present.      Left lower leg: Edema present.   Skin:     General: Skin is warm and dry.   Neurological:      General: No focal deficit present.       Mental Status: She is alert and oriented to person, place, and time.   Psychiatric:         Mood and Affect: Mood normal.         Behavior: Behavior normal.                Significant Labs: All pertinent labs within the past 24 hours have been reviewed.  BMP:   Recent Labs   Lab 07/29/25  1142   *      K 3.5      CO2 29   BUN 18   CREATININE 0.9   CALCIUM 11.4*     CBC:   Recent Labs   Lab 07/29/25  1142   WBC 10.59   HGB 11.4*   HCT 35.9*          Significant Imaging: I have reviewed all pertinent imaging results/findings within the past 24 hours.  Assessment/Plan:     Assessment & Plan  CVA (cerebral vascular accident)  MRI negative CVA?  Antithrombotics for secondary stroke prevention: Antiplatelets: Aspirin: 81 mg daily  Clopidogrel: 75 mg daily    Statins for secondary stroke prevention and hyperlipidemia, if present:   Statins: Atorvastatin- 40 mg daily    Aggressive risk factor modification: HTN, HLD     Rehab efforts: The patient has been evaluated by a stroke team provider and the therapy needs have been fully considered based off the presenting complaints and exam findings. The following therapy evaluations are needed: PT evaluate and treat, OT evaluate and treat, SLP evaluate and treat    Diagnostics ordered/pending: CTA head and neck negative for LVO although segment narrowing is noted, MRI negative, echo with bubble study pending    VTE prophylaxis: Enoxaparin 40 mg SQ every 24 hours    BP parameters: Infarct: No intervention, SBP <220    Neuro consult to follow    Hyperlipidemia  Continue statin    UTI (urinary tract infection)  Continue ceftriaxone  Follow culture    Conjunctivitis  Trimethoprin-polymyxin B QID x5 days      VTE Risk Mitigation (From admission, onward)           Ordered     enoxaparin injection 40 mg  Daily         07/29/25 1404     IP VTE HIGH RISK PATIENT  Once         07/29/25 1404     Place sequential compression device  Until discontinued          07/29/25 1404                                                Hannah Mendiola MD  Department of Hospital Medicine  [facility] - Emergency Dept

## 2025-07-29 NOTE — ED PROVIDER NOTES
Encounter Date: 7/29/2025       History     Chief Complaint   Patient presents with    Extremity Weakness     Pt presents with left side arm weakness, unable to hold it up. Pt family states they took her to the Dr. Yesterday and left her house at 1400 she was normal. She does have a history of stroke in April they removed a clot.      85-year-old female presents with left arm weakness that began yesterday.  No family currently in the room with the patient.  Stroke alert was called on arrival.  I did not see the patient until she got back to room 22.  Patient states she has a history of a stroke in April of this year, her right side was affected at that time.  Now her left arm is weak and has been weak since yesterday.  Patient is unsure of what time her symptoms began yesterday.   Recovered essentially most of her function on the right sided seems like.  No family with the patient on arrival, all history from the patient.    The history is provided by the patient.     Review of patient's allergies indicates:  No Known Allergies  Past Medical History:   Diagnosis Date    Diabetes mellitus, type 2     Glaucoma     Hyperlipidemia     Hypertension     Stroke      Past Surgical History:   Procedure Laterality Date    CATARACT EXTRACTION      CYSTOSCOPY Right 6/18/2025    Procedure: CYSTOSCOPY;  Surgeon: Aleksey Lott MD;  Location: [address];  Service: Urology;  Laterality: Right;    CYSTOSCOPY W/ URETERAL STENT PLACEMENT Right 4/27/2025    Procedure: CYSTOSCOPY, WITH URETERAL STENT INSERTION;  Surgeon: Bill Moreno MD;  Location: [address];  Service: Urology;  Laterality: Right;    NEPHROSCOPY Right 6/18/2025    Procedure: NEPHROSCOPY;  Surgeon: Aleksey Lott MD;  Location: [address];  Service: Urology;  Laterality: Right;    REMOVAL-STENT Right 6/18/2025    Procedure: REMOVAL-STENT;  Surgeon: Aleksey Lott MD;  Location: [facility] OR [address] [place] [address];  Service: Urology;  Laterality: Right;    ROTATOR CUFF  REPAIR      URETEROSCOPY, WITH LASER LITHOTRIPSY Right 6/18/2025    Procedure: URETEROSCOPY, WITH LASER LITHOTRIPSY;  Surgeon: Aleksey Lott MD;  Location: [facility] OR [address] [place] [address];  Service: Urology;  Laterality: Right;     Family History   Problem Relation Name Age of Onset    Breast cancer Paternal Aunt       Social History[1]  Review of Systems   Respiratory: Negative.     Cardiovascular: Negative.    Neurological:  Positive for weakness.       Physical Exam     Initial Vitals [07/29/25 1129]   BP Pulse Resp Temp SpO2   (!) 159/77 67 16 98.5 °F (36.9 °C) 97 %      MAP       --         Physical Exam    Nursing note and vitals reviewed.  Constitutional: She appears well-developed and well-nourished. She is not diaphoretic. No distress.   Elderly female no acute distress   HENT:   Head: Normocephalic and atraumatic.   Eyes: EOM are normal.   Neck: Neck supple.   Normal range of motion.  Cardiovascular:  Normal rate, regular rhythm and normal heart sounds.     Exam reveals no gallop and no friction rub.       No murmur heard.  Pulmonary/Chest: Breath sounds normal. No respiratory distress. She has no wheezes. She has no rhonchi. She has no rales.   Abdominal: She exhibits no distension. There is no abdominal tenderness. There is no rebound.   Musculoskeletal:         General: Normal range of motion.      Cervical back: Normal range of motion and neck supple.     Neurological: She is alert and oriented to person, place, and time.   Left upper extremity weakness on exam, able to lift it off of the gurney but it is very weak.  Other limb seem unaffected.   Skin: Skin is warm and dry.   Psychiatric: She has a normal mood and affect. Her behavior is normal. Judgment and thought content normal.         ED Course   Procedures  Labs Reviewed   COMPREHENSIVE METABOLIC PANEL - Abnormal       Result Value    Sodium 141      Potassium 3.5      Chloride 105      CO2 29      Glucose 146 (*)     BUN 18      Creatinine 0.9      Calcium  11.4 (*)     Protein Total 6.6      Albumin 3.7      Bilirubin Total 0.8      ALP 57      AST 57 (*)     ALT 25      Anion Gap 7 (*)     eGFR >60     LIPID PANEL - Abnormal    Cholesterol Total 103 (*)     Triglyceride 63      HDL Cholesterol 38 (*)     LDL Cholesterol 52.4 (*)     HDL/Cholesterol Ratio 36.9      Cholesterol/HDL Ratio 2.7      Non HDL Cholesterol 65     CBC WITH DIFFERENTIAL - Abnormal    WBC 10.59      RBC 4.63      Hgb 11.4 (*)     Hct 35.9 (*)     MCV 78 (*)     MCH 24.6 (*)     MCHC 31.8 (*)     RDW 16.4 (*)     Platelet Count 247      MPV 10.5      Nucleated RBC 0      Neut % 78.6 (*)     Lymph % 13.9 (*)     Mono % 6.5      Eos % 0.6      Basophil % 0.2      Imm Grans % 0.2      Neut # 8.3 (*)     Lymph # 1.47      Mono # 0.69      Eos # 0.06      Baso # 0.02      Imm Grans # 0.02     URINALYSIS, REFLEX TO URINE CULTURE - Abnormal    Color, UA Yellow      Appearance, UA Hazy (*)     Spec Grav UA 1.020      pH, UA 6.0      Protein, UA Trace (*)     Glucose, UA Trace (*)     Ketones, UA Negative      Blood, UA 2+ (*)     Bilirubin, UA Negative      Urobilinogen, UA Negative      Nitrites, UA Positive (*)     Leukocyte Esterase, UA 3+ (*)    URINALYSIS MICROSCOPIC - Abnormal    RBC, UA 12 (*)     WBC, UA >100 (*)     WBC Clumps, UA Few (*)     Bacteria, UA Rare      Microscopic Comment       POCT GLUCOSE - Abnormal    POCT Glucose 136 (*)    INFLUENZA A & B BY MOLECULAR - Normal    INFLUENZA A MOLECULAR Negative      INFLUENZA B MOLECULAR  Negative     PROTIME-INR - Normal    PT 11.7      INR 1.1     TSH - Normal    TSH 1.531     SARS-COV-2 RNA AMPLIFICATION, QUAL - Normal    SARS COV-2 Molecular Negative     CULTURE, URINE   CBC W/ AUTO DIFFERENTIAL    Narrative:     The following orders were created for panel order CBC W/ AUTO DIFFERENTIAL.  Procedure                               Abnormality         Status                     ---------                               -----------         ------                      CBC with Differential[[id number]]       Abnormal            Final result                 Please view results for these tests on the individual orders.   HEPATITIS C ANTIBODY   HEP C VIRUS HOLD SPECIMEN   HIV 1 / 2 ANTIBODY   HIV VIRUS CONFIRMATION HOLD SPECIMEN   HEMOGLOBIN A1C   ISTAT CREATININE    POC Creatinine 1.0      Sample VENOUS     POCT GLUCOSE MONITORING CONTINUOUS        ECG Results              ECG 12 lead (In process)        Collection Time Result Time QRS Duration OHS QTC Calculation    07/29/25 12:26:06 07/29/25 12:32:22 102 437                     In process by Interface, Lab In [facility] (07/29/25 12:32:29)                   Narrative:    Test Reason : R29.818,    Vent. Rate :  67 BPM     Atrial Rate :  67 BPM     P-R Int : 144 ms          QRS Dur : 102 ms      QT Int : 414 ms       P-R-T Axes :  87 111 119 degrees    QTcB Int : 437 ms    Normal sinus rhythm  Right axis deviation  Pulmonary disease pattern  Nonspecific ST abnormality  Abnormal ECG  When compared with ECG of 24-Apr-2025 00:57,  The axis Shifted right    Referred By: AAAREFERRAL SELF           Confirmed By:                                   Imaging Results              MRI Brain Without Contrast (In process)                      CTA Head and Neck (xpd) (Final result)  Result time 07/29/25 12:18:32      Final result by Francisco Varma IV, MD (07/29/25 12:18:32)                   Impression:      Scattered atheromatous changes without findings of high-grade stenosis or occlusion of the major cervical arteries.    There is mild-moderate narrowing of the distal left middle cerebral artery at site of previously described high-grade stenosis.    Moderate  narrowing involving 1 of the posterior branches of the right MCA.    Mediastinal lymphadenopathy.      Electronically signed by: Francisco Varma  Date:    07/29/2025  Time:    12:18               Narrative:      CMS MANDATED QUALITY DATA - CT RADIATION - 436    All CT scans at  this facility utilize dose modulation, iterative reconstruction, and/or weight based dosing when appropriate to reduce radiation dose to as low as reasonably achievable.    EXAMINATION:  CTA HEAD AND NECK (XPD)    CLINICAL HISTORY:  Neuro deficit, acute, stroke suspected;    TECHNIQUE:  The examination utilizes 100 cc of intravenous Omnipaque 350.    COMPARISON:  04/23/2025    FINDINGS:  Calcified plaque formation is observed within the arch of the aorta with scattered plaque formation extending into the aortic branches.  There is mild tortuosity of the cervical segments of the carotid arteries bilaterally.  Atheromatous changes are present at the level of the carotid bulbs extending into the proximal segments of the internal and external carotid arteries bilaterally without findings of hemodynamically significant stenosis.    The cervical segments of the vertebral arteries are patent.    Moderate multifocal atheromatous narrowing involves the intracranial segments of the internal carotid arteries bilaterally.    The anterior cerebral arteries and major branches are patent.    There are mild-to-moderate degrees of atheromatous narrowing of the distal left M1 segment at site of previously demonstrated high-grade stenosis.  The left MCA branches enhance appropriately.    There is moderate luminal narrowing involving 1 of the posterior branch vessels of the right middle cerebral artery (series 4, image 270)..  The right MCA and branches appear otherwise patent.    There is a dominant right vertebral artery.  The intracranial segments of the vertebral arteries, basilar artery and posterior cerebral arteries are patent.    No aneurysm or vascular malformation identified.    A left upper pole thyroid nodule is again noted and unchanged.  There are multiple enlarged mediastinal lymph nodes, similar in appearance to the previous examination.    There is a trace amount of dependent right-sided pleural fluid and mild  interstitial prominence within the visualized pulmonary apices.                                       CT HEAD FOR CODE STROKE (Final result)  Result time 07/29/25 11:46:26      Final result by Mike Marquez MD (07/29/25 11:46:26)                   Impression:      1. No acute intracranial hemorrhage, mass effect, or loss of gray-white differentiation.  2. Stable scattered chronic microvascular ischemic changes and generalized cerebral atrophy.  3. RESULT NOTIFICATION: Findings were discussed by Dr Marquez with, and acknowledged by TAMI COLBERT at 11:40 hours.      Electronically signed by: Mike Marquez  Date:    07/29/2025  Time:    11:46               Narrative:    EXAMINATION:  CT HEAD FOR CODE STROKE    CLINICAL HISTORY:  Neuro deficit, acute, stroke suspected;    TECHNIQUE:  Thin axial noncontrast imaging through the brain was performed.    FINDINGS:  Comparison to multiple prior exams.  There is no acute intracranial hemorrhage, with no mass effect or abnormal extra-axial fluid. Scattered areas of nonspecific hypoattenuation involve the white matter, and the left basal ganglia, with gray-white differentiation maintained.    There is stable generalized prominence of the cortical sulci and ventricles. The cerebellum and brainstem are unremarkable.  There are carotid siphon vascular calcifications.    The paranasal sinuses and mastoid air cells are clear.  There are no acute fractures or destructive osseous lesions.                                       Medications   cefTRIAXone injection 1 g (has no administration in time range)   sodium chloride 0.9% flush 10 mL (has no administration in time range)   sodium chloride 0.9% bolus 500 mL 500 mL (has no administration in time range)   labetaloL injection 10 mg (has no administration in time range)   bisacodyL suppository 10 mg (has no administration in time range)   enoxaparin injection 40 mg (has no administration in time range)   aspirin EC tablet 81 mg (has no  administration in time range)   atorvastatin tablet 40 mg (has no administration in time range)   clopidogreL tablet 75 mg (has no administration in time range)   iohexoL (OMNIPAQUE 350) injection 100 mL (100 mLs Intravenous Given 7/29/25 1155)   clopidogreL tablet 300 mg (300 mg Oral Given 7/29/25 1244)   aspirin tablet 325 mg (325 mg Oral Given 7/29/25 1244)   cefTRIAXone injection 1 g (1 g Intravenous Given 7/29/25 1356)     Medical Decision Making  85-year-old female with a history of previous stroke in April of this year diabetes hypertension bladder infection presents with left arm weakness since yesterday.  She is awake and alert, no confusion.  No facial droop or slurred speech.  Code stroke called on arrival.  Patient does know the candidate for thrombolytics because of the time course.  Stroke neurology recommends dual antiplatelet therapy which was given.  They recommend admission to the hospital for further stroke workup.  The patient does appear to have bladder infection, given IV antibiotics in the ER.  No evidence of sepsis.  No distress in the emergency room.  Hospital Medicine will admit.    Amount and/or Complexity of Data Reviewed  External Data Reviewed: labs, radiology and notes.  Labs:  Decision-making details documented in ED Course.  Radiology:  Decision-making details documented in ED Course.    Risk  OTC drugs.  Prescription drug management.  Decision regarding hospitalization.               ED Course as of 07/29/25 1405   Tue Jul 29, 2025   1157 WBC: 10.59 [EF]   1157 Hemoglobin(!): 11.4 [EF]   1157 Platelet Count: 247 [EF]   1157 BP(!): 159/77 [EF]   1157 MAP (mmHg): 104 [EF]   1157 Temp: 98.5 °F (36.9 °C) [EF]   1157 Temp Source: Oral [EF]   1157 Pulse: 67 [EF]   1157 Resp: 16 [EF]   1157 SpO2: 97 % [EF]   1207 CT HEAD FOR CODE STROKE [EF]   1207 Last known normal: yesterday, unk time  Provider contact time: 1140  Head CT Read by MD: see rads note  Neurology consult ordered by ED MD:  arrival    VAN positive? N (weakness plus any other cortical finding. If so, call stroke alert out to 24 hours):    Weakness: Yes    Visual changes: No  Aphasia: No  Neglect: No    NIH score: 2  TPa administered: N, OOW (if not, why not)     [EF]   1228 CTA Head and Neck (xpd) [EF]   1229 Rectal temp 98.6° [EF]   1229 Sinus rhythm 67 beats per minute right axis no ST elevation or depression pulmonary disease pattern independently interpreted EKGs are similar to prior [EF]   1249 NITRITE UA(!): Positive [EF]   1249 Leukocyte Esterase, UA(!): 3+ [EF]   1338 Dr quiñones to admit, case d/w dr quiñones [EF]      ED Course User Index  [EF] Walter Roberts MD                               Clinical Impression:  Final diagnoses:  [R53.1] Left-sided weakness (Primary)  [N30.90] Bladder infection          ED Disposition Condition    Admit                       [1]   Social History  Tobacco Use    Smoking status: Never    Smokeless tobacco: Never   Substance Use Topics    Alcohol use: Not Currently    Drug use: Never        Walter Roberts MD  07/29/25 1977     Yes - the patient is able to be screened

## 2025-07-29 NOTE — SUBJECTIVE & OBJECTIVE
Past Medical History:   Diagnosis Date    Diabetes mellitus, type 2     Glaucoma     Hyperlipidemia     Hypertension     Stroke        Past Surgical History:   Procedure Laterality Date    CATARACT EXTRACTION      CYSTOSCOPY Right 6/18/2025    Procedure: CYSTOSCOPY;  Surgeon: Aleksey Lott MD;  Location: [facility] OR [address] [place] [address];  Service: Urology;  Laterality: Right;    CYSTOSCOPY W/ URETERAL STENT PLACEMENT Right 4/27/2025    Procedure: CYSTOSCOPY, WITH URETERAL STENT INSERTION;  Surgeon: Bill Moreno MD;  [facility];  Service: Urology;  Laterality: Right;    NEPHROSCOPY Right 6/18/2025    Procedure: NEPHROSCOPY;  Surgeon: Aleksey Lott MD;  [facility];  Service: Urology;  Laterality: Right;    REMOVAL-STENT Right 6/18/2025    Procedure: REMOVAL-STENT;  Surgeon: Aleksey Lott MD;  [facility];  Service: Urology;  Laterality: Right;    ROTATOR CUFF REPAIR      URETEROSCOPY, WITH LASER LITHOTRIPSY Right 6/18/2025    Procedure: URETEROSCOPY, WITH LASER LITHOTRIPSY;  Surgeon: Aleksey Lott MD;  Location: [facility] OR [address] [place] [address];  Service: Urology;  Laterality: Right;       Review of patient's allergies indicates:  No Known Allergies    No current facility-administered medications on file prior to encounter.     Current Outpatient Medications on File Prior to Encounter   Medication Sig    acac/inulin/c-lose/mv-mn/folic (FIBER PLUS MULITVITAMIN ORAL) Take by mouth.    amLODIPine (NORVASC) 5 MG tablet Take 5 mg by mouth once daily.    aspirin 81 MG Chew Take 1 tablet (81 mg total) by mouth once daily.    atorvastatin (LIPITOR) 40 MG tablet Take 40 mg by mouth once daily.    furosemide (LASIX) 20 MG tablet Take 20 mg by mouth once daily.    latanoprost 0.005 % ophthalmic solution INSTILL 1 DROP INTO BOTH EYES AT BEDTIME AS DIRECTED INSTILL 1 DROP INTO BOTH EYES AT BEDTIME    metFORMIN (GLUCOPHAGE) 500 MG tablet Take 500 mg by mouth daily with breakfast.    glimepiride (AMARYL) 1 MG  tablet Take 1 tablet (1 mg total) by mouth before breakfast. (Patient not taking: Reported on 6/6/2025)    metoprolol succinate (TOPROL-XL) 50 MG 24 hr tablet Take 50 mg by mouth every evening. (Patient not taking: Reported on 6/6/2025)    potassium chloride SA (K-DUR,KLOR-CON M) 10 MEQ tablet Take 10 mEq by mouth once daily. (Patient not taking: Reported on 6/6/2025)    telmisartan (MICARDIS) 80 MG Tab Take 80 mg by mouth once daily.    [DISCONTINUED] telmisartan (MICARDIS) 40 MG Tab Take 1 tablet (40 mg total) by mouth once daily. (Patient not taking: Reported on 6/6/2025)     Family History       Problem Relation (Age of Onset)    Breast cancer Paternal Aunt          Tobacco Use    Smoking status: Never    Smokeless tobacco: Never   Substance and Sexual Activity    Alcohol use: Not Currently    Drug use: Never    Sexual activity: Not Currently     Review of Systems   Constitutional:  Negative for chills and fatigue.   Respiratory:  Negative for shortness of breath.    Cardiovascular:  Negative for chest pain.   Genitourinary:  Positive for dysuria.   Neurological:  Positive for weakness.   Psychiatric/Behavioral:  Negative for agitation and confusion.      Objective:     Vital Signs (Most Recent):  Temp: 98.5 °F (36.9 °C) (07/29/25 1129)  Pulse: 65 (07/29/25 1330)  Resp: 18 (07/29/25 1330)  BP: (!) 161/72 (07/29/25 1330)  SpO2: 96 % (07/29/25 1330) Vital Signs (24h Range):  Temp:  [98.5 °F (36.9 °C)] 98.5 °F (36.9 °C)  Pulse:  [65-67] 65  Resp:  [16-19] 18  SpO2:  [96 %-97 %] 96 %  BP: (159-171)/(72-84) 161/72     Weight: 81.2 kg (179 lb)  Body mass index is 28.04 kg/m².     Physical Exam  HENT:      Head: Atraumatic.   Eyes:      General:         Right eye: Discharge present.         Left eye: Discharge present.  Cardiovascular:      Rate and Rhythm: Normal rate.   Pulmonary:      Effort: Pulmonary effort is normal. No respiratory distress.      Breath sounds: No wheezing or rales.   Abdominal:      General:  There is no distension.      Palpations: Abdomen is soft.      Tenderness: There is no abdominal tenderness.   Musculoskeletal:      Right lower leg: Edema present.      Left lower leg: Edema present.   Skin:     General: Skin is warm and dry.   Neurological:      General: No focal deficit present.      Mental Status: She is alert and oriented to person, place, and time.   Psychiatric:         Mood and Affect: Mood normal.         Behavior: Behavior normal.                Significant Labs: All pertinent labs within the past 24 hours have been reviewed.  BMP:   Recent Labs   Lab 07/29/25  1142   *      K 3.5      CO2 29   BUN 18   CREATININE 0.9   CALCIUM 11.4*     CBC:   Recent Labs   Lab 07/29/25  1142   WBC 10.59   HGB 11.4*   HCT 35.9*          Significant Imaging: I have reviewed all pertinent imaging results/findings within the past 24 hours.

## 2025-07-30 ENCOUNTER — CLINICAL SUPPORT (OUTPATIENT)
Dept: CARDIOLOGY | Facility: HOSPITAL | Age: 85
End: 2025-07-30
Payer: MEDICARE

## 2025-07-30 LAB
ABSOLUTE EOSINOPHIL (SMH): 0.15 K/UL
ABSOLUTE MONOCYTE (SMH): 0.66 K/UL (ref 0.3–1)
ABSOLUTE NEUTROPHIL COUNT (SMH): 6.1 K/UL (ref 1.8–7.7)
ALBUMIN SERPL-MCNC: 3.4 G/DL (ref 3.5–5.2)
ALP SERPL-CCNC: 58 UNIT/L (ref 55–135)
ALT SERPL-CCNC: 25 UNIT/L (ref 10–44)
ANION GAP (SMH): 8 MMOL/L (ref 8–16)
AORTIC ROOT ANNULUS: 2.9 CM
AORTIC VALVE CUSP SEPERATION: 1.7 CM
APICAL FOUR CHAMBER EJECTION FRACTION: 61 %
APICAL TWO CHAMBER EJECTION FRACTION: 63 %
APTT PPP: 26.2 SECONDS (ref 21–32)
AST SERPL-CCNC: 49 UNIT/L (ref 10–40)
AV INDEX (PROSTH): 0.69
AV MEAN GRADIENT: 5 MMHG
AV PEAK GRADIENT: 8 MMHG
AV VALVE AREA BY VELOCITY RATIO: 2.2 CM²
AV VALVE AREA: 2.2 CM²
AV VELOCITY RATIO: 0.71
BASOPHILS # BLD AUTO: 0.03 K/UL
BASOPHILS NFR BLD AUTO: 0.4 %
BILIRUB SERPL-MCNC: 0.8 MG/DL (ref 0.1–1)
BSA FOR ECHO PROCEDURE: 1.9 M2
BUN SERPL-MCNC: 15 MG/DL (ref 8–23)
CALCIUM SERPL-MCNC: 11.2 MG/DL (ref 8.7–10.5)
CHLORIDE SERPL-SCNC: 105 MMOL/L (ref 95–110)
CO2 SERPL-SCNC: 28 MMOL/L (ref 23–29)
CREAT SERPL-MCNC: 0.8 MG/DL (ref 0.5–1.4)
CV ECHO LV RWT: 0.47 CM
DOP CALC AO PEAK VEL: 1.4 M/S
DOP CALC AO VTI: 32.6 CM
DOP CALC LVOT AREA: 3.1 CM2
DOP CALC LVOT DIAMETER: 2 CM
DOP CALC LVOT PEAK VEL: 1 M/S
DOP CALC MV VTI: 30.5 CM
DOP CALCLVOT PEAK VEL VTI: 22.6 CM
E WAVE DECELERATION TIME: 261 MSEC
E/A RATIO: 1.27
E/E' RATIO: 9 M/S
ECHO LV POSTERIOR WALL: 1 CM (ref 0.6–1.1)
ERYTHROCYTE [DISTWIDTH] IN BLOOD BY AUTOMATED COUNT: 16.3 % (ref 11.5–14.5)
FRACTIONAL SHORTENING: 27.9 % (ref 28–44)
GFR SERPLBLD CREATININE-BSD FMLA CKD-EPI: >60 ML/MIN/1.73/M2
GLUCOSE SERPL-MCNC: 90 MG/DL (ref 70–110)
HCT VFR BLD AUTO: 36.8 % (ref 37–48.5)
HGB BLD-MCNC: 11.6 GM/DL (ref 12–16)
IMM GRANULOCYTES # BLD AUTO: 0.02 K/UL (ref 0–0.04)
IMM GRANULOCYTES NFR BLD AUTO: 0.2 % (ref 0–0.5)
INR PPP: 1.1 (ref 0.8–1.2)
INTERVENTRICULAR SEPTUM: 1.4 CM (ref 0.6–1.1)
IVC DIAMETER: 1.43 CM
LEFT ATRIUM AREA SYSTOLIC (APICAL 2 CHAMBER): 18.5 CM2
LEFT ATRIUM AREA SYSTOLIC (APICAL 4 CHAMBER): 20.6 CM2
LEFT ATRIUM SIZE: 3.6 CM
LEFT INTERNAL DIMENSION IN SYSTOLE: 3.1 CM (ref 2.1–4)
LEFT VENTRICLE DIASTOLIC VOLUME INDEX: 43.09 ML/M2
LEFT VENTRICLE DIASTOLIC VOLUME: 81 ML
LEFT VENTRICLE END DIASTOLIC VOLUME APICAL 2 CHAMBER: 68.2 ML
LEFT VENTRICLE END DIASTOLIC VOLUME APICAL 4 CHAMBER: 72.3 ML
LEFT VENTRICLE END SYSTOLIC VOLUME APICAL 2 CHAMBER: 68 ML
LEFT VENTRICLE END SYSTOLIC VOLUME APICAL 4 CHAMBER: 53.7 ML
LEFT VENTRICLE MASS INDEX: 98.2 G/M2
LEFT VENTRICLE SYSTOLIC VOLUME INDEX: 19.1 ML/M2
LEFT VENTRICLE SYSTOLIC VOLUME: 36 ML
LEFT VENTRICULAR INTERNAL DIMENSION IN DIASTOLE: 4.3 CM (ref 3.5–6)
LEFT VENTRICULAR MASS: 184.7 G
LV LATERAL E/E' RATIO: 8.8 M/S
LV SEPTAL E/E' RATIO: 8.8 M/S
LVED V (TEICH): 81.3 ML
LVES V (TEICH): 36.4 ML
LVOT MG: 2 MMHG
LVOT MV: 0.66 CM/S
LYMPHOCYTES # BLD AUTO: 1.4 K/UL (ref 1–4.8)
MAGNESIUM SERPL-MCNC: 1.2 MG/DL (ref 1.6–2.6)
MCH RBC QN AUTO: 24.3 PG (ref 27–31)
MCHC RBC AUTO-ENTMCNC: 31.5 G/DL (ref 32–36)
MCV RBC AUTO: 77 FL (ref 82–98)
MV MEAN GRADIENT: 2 MMHG
MV PEAK A VEL: 0.55 M/S
MV PEAK E VEL: 0.7 M/S
MV PEAK GRADIENT: 3 MMHG
MV STENOSIS PRESSURE HALF TIME: 78 MS
MV VALVE AREA BY CONTINUITY EQUATION: 2.33 CM2
MV VALVE AREA P 1/2 METHOD: 2.82 CM2
NUCLEATED RBC (/100WBC) (SMH): 0 /100 WBC
OHS CV CPX PATIENT HEIGHT IN: 67
OHS LV EJECTION FRACTION SIMPSONS BIPLANE MOD: 64 %
PHOSPHATE SERPL-MCNC: 2.5 MG/DL (ref 2.7–4.5)
PISA TR MAX VEL: 3 M/S
PLATELET # BLD AUTO: 252 K/UL (ref 150–450)
PMV BLD AUTO: 10.9 FL (ref 9.2–12.9)
POTASSIUM SERPL-SCNC: 3 MMOL/L (ref 3.5–5.1)
PROT SERPL-MCNC: 6.5 GM/DL (ref 6–8.4)
PROTHROMBIN TIME: 11.7 SECONDS (ref 9–12.5)
PV MV: 0.8 M/S
PV PEAK GRADIENT: 5 MMHG
PV PEAK VELOCITY: 1.17 M/S
RBC # BLD AUTO: 4.77 M/UL (ref 4–5.4)
RELATIVE EOSINOPHIL (SMH): 1.8 % (ref 0–8)
RELATIVE LYMPHOCYTE (SMH): 16.7 % (ref 18–48)
RELATIVE MONOCYTE (SMH): 7.9 % (ref 4–15)
RELATIVE NEUTROPHIL (SMH): 73 % (ref 38–73)
RIGHT ATRIUM END SYSTOLIC VOLUME APICAL 4 CHAMBER INDEX BSA: 18.72 ML/M2
RIGHT ATRIUM VOLUME AREA LENGTH APICAL 4 CHAMBER: 35.2 ML
RV TISSUE DOPPLER FREE WALL SYSTOLIC VELOCITY 1 (APICAL 4 CHAMBER VIEW): 10.2 CM/S
SODIUM SERPL-SCNC: 141 MMOL/L (ref 136–145)
TDI LATERAL: 0.08 M/S
TDI SEPTAL: 0.08 M/S
TDI: 0.08 M/S
TR MAX PG: 36 MMHG
WBC # BLD AUTO: 8.4 K/UL (ref 3.9–12.7)
Z-SCORE OF LEFT VENTRICULAR DIMENSION IN END DIASTOLE: -2
Z-SCORE OF LEFT VENTRICULAR DIMENSION IN END SYSTOLE: -0.34

## 2025-07-30 PROCEDURE — 92523 SPEECH SOUND LANG COMPREHEN: CPT

## 2025-07-30 PROCEDURE — 99900035 HC TECH TIME PER 15 MIN (STAT)

## 2025-07-30 PROCEDURE — 94761 N-INVAS EAR/PLS OXIMETRY MLT: CPT

## 2025-07-30 PROCEDURE — 92610 EVALUATE SWALLOWING FUNCTION: CPT

## 2025-07-30 PROCEDURE — 93306 TTE W/DOPPLER COMPLETE: CPT | Mod: 26,,, | Performed by: GENERAL PRACTICE

## 2025-07-30 PROCEDURE — 97110 THERAPEUTIC EXERCISES: CPT

## 2025-07-30 PROCEDURE — 25000003 PHARM REV CODE 250

## 2025-07-30 PROCEDURE — 97535 SELF CARE MNGMENT TRAINING: CPT

## 2025-07-30 PROCEDURE — 84100 ASSAY OF PHOSPHORUS: CPT

## 2025-07-30 PROCEDURE — 97165 OT EVAL LOW COMPLEX 30 MIN: CPT

## 2025-07-30 PROCEDURE — G0427 INPT/ED TELECONSULT70: HCPCS | Mod: GT,,, | Performed by: NURSE PRACTITIONER

## 2025-07-30 PROCEDURE — 85025 COMPLETE CBC W/AUTO DIFF WBC: CPT

## 2025-07-30 PROCEDURE — 63600175 PHARM REV CODE 636 W HCPCS

## 2025-07-30 PROCEDURE — 94799 UNLISTED PULMONARY SVC/PX: CPT

## 2025-07-30 PROCEDURE — 97162 PT EVAL MOD COMPLEX 30 MIN: CPT

## 2025-07-30 PROCEDURE — 85730 THROMBOPLASTIN TIME PARTIAL: CPT

## 2025-07-30 PROCEDURE — 99900031 HC PATIENT EDUCATION (STAT)

## 2025-07-30 PROCEDURE — 80053 COMPREHEN METABOLIC PANEL: CPT

## 2025-07-30 PROCEDURE — 93306 TTE W/DOPPLER COMPLETE: CPT

## 2025-07-30 PROCEDURE — 97530 THERAPEUTIC ACTIVITIES: CPT

## 2025-07-30 PROCEDURE — 21400001 HC TELEMETRY ROOM

## 2025-07-30 PROCEDURE — 85610 PROTHROMBIN TIME: CPT

## 2025-07-30 PROCEDURE — 36415 COLL VENOUS BLD VENIPUNCTURE: CPT

## 2025-07-30 PROCEDURE — 83735 ASSAY OF MAGNESIUM: CPT

## 2025-07-30 RX ORDER — METHOCARBAMOL 750 MG/1
750 TABLET, FILM COATED ORAL 3 TIMES DAILY
Status: DISCONTINUED | OUTPATIENT
Start: 2025-07-30 | End: 2025-08-05 | Stop reason: HOSPADM

## 2025-07-30 RX ORDER — MAGNESIUM SULFATE HEPTAHYDRATE 40 MG/ML
2 INJECTION, SOLUTION INTRAVENOUS ONCE
Status: COMPLETED | OUTPATIENT
Start: 2025-07-30 | End: 2025-07-30

## 2025-07-30 RX ORDER — AMLODIPINE BESYLATE 5 MG/1
5 TABLET ORAL DAILY
Status: DISCONTINUED | OUTPATIENT
Start: 2025-07-30 | End: 2025-07-31

## 2025-07-30 RX ORDER — VALSARTAN 80 MG/1
320 TABLET ORAL DAILY
Status: DISCONTINUED | OUTPATIENT
Start: 2025-07-30 | End: 2025-08-05 | Stop reason: HOSPADM

## 2025-07-30 RX ORDER — DEXAMETHASONE SODIUM PHOSPHATE 4 MG/ML
4 INJECTION, SOLUTION INTRA-ARTICULAR; INTRALESIONAL; INTRAMUSCULAR; INTRAVENOUS; SOFT TISSUE EVERY 6 HOURS
Status: DISCONTINUED | OUTPATIENT
Start: 2025-07-30 | End: 2025-08-02

## 2025-07-30 RX ORDER — POTASSIUM CHLORIDE 7.45 MG/ML
10 INJECTION INTRAVENOUS
Status: COMPLETED | OUTPATIENT
Start: 2025-07-30 | End: 2025-07-30

## 2025-07-30 RX ADMIN — POLYMYXIN B SULFATE AND TRIMETHOPRIM 1 DROP: 10000; 1 SOLUTION OPHTHALMIC at 01:07

## 2025-07-30 RX ADMIN — POLYMYXIN B SULFATE AND TRIMETHOPRIM 1 DROP: 10000; 1 SOLUTION OPHTHALMIC at 05:07

## 2025-07-30 RX ADMIN — MAGNESIUM SULFATE HEPTAHYDRATE 2 G: 40 INJECTION, SOLUTION INTRAVENOUS at 09:07

## 2025-07-30 RX ADMIN — AMLODIPINE BESYLATE 5 MG: 5 TABLET ORAL at 09:07

## 2025-07-30 RX ADMIN — ASPIRIN 81 MG: 81 TABLET, DELAYED RELEASE ORAL at 08:07

## 2025-07-30 RX ADMIN — POTASSIUM CHLORIDE 10 MEQ: 7.46 INJECTION, SOLUTION INTRAVENOUS at 02:07

## 2025-07-30 RX ADMIN — POTASSIUM CHLORIDE 10 MEQ: 7.46 INJECTION, SOLUTION INTRAVENOUS at 11:07

## 2025-07-30 RX ADMIN — CEFTRIAXONE SODIUM 1 G: 1 INJECTION, POWDER, FOR SOLUTION INTRAMUSCULAR; INTRAVENOUS at 01:07

## 2025-07-30 RX ADMIN — VALSARTAN 320 MG: 80 TABLET, FILM COATED ORAL at 09:07

## 2025-07-30 RX ADMIN — POTASSIUM CHLORIDE 10 MEQ: 7.46 INJECTION, SOLUTION INTRAVENOUS at 10:07

## 2025-07-30 RX ADMIN — DEXAMETHASONE SODIUM PHOSPHATE 4 MG: 4 INJECTION, SOLUTION INTRA-ARTICULAR; INTRALESIONAL; INTRAMUSCULAR; INTRAVENOUS; SOFT TISSUE at 06:07

## 2025-07-30 RX ADMIN — ENOXAPARIN SODIUM 40 MG: 40 INJECTION SUBCUTANEOUS at 05:07

## 2025-07-30 RX ADMIN — ATORVASTATIN CALCIUM 40 MG: 40 TABLET, FILM COATED ORAL at 08:07

## 2025-07-30 RX ADMIN — METHOCARBAMOL 750 MG: 750 TABLET ORAL at 09:07

## 2025-07-30 RX ADMIN — CLOPIDOGREL 75 MG: 75 TABLET ORAL at 08:07

## 2025-07-30 RX ADMIN — MAGNESIUM SULFATE HEPTAHYDRATE 2 G: 40 INJECTION, SOLUTION INTRAVENOUS at 11:07

## 2025-07-30 NOTE — PLAN OF CARE
1. Use CSS indep in sentences she read, 100% acc, indep.  2. Demo comprehension of complex info indep, 100% acc without repetition.  3. Solve functional word math/time/money calculation problems, 90% acc, modif indep.  4. Use preferred memory strategies SBA to set and recall functional info 90% acc.  5. Sustain attention on simple task, 30 secs, indep.

## 2025-07-30 NOTE — PT/OT/SLP EVAL
Physical Therapy Evaluation    Patient Name:  Delilah Williamson   MRN:  [id number]    Recommendations:     Discharge Recommendations: Moderate Intensity Therapy   Discharge Equipment Recommendations: none   Barriers to discharge: Decreased caregiver support    Assessment:     Delilah Williamson is a 85 y.o. female admitted with a medical diagnosis of CVA (cerebral vascular accident).  She presents with the following impairments/functional limitations: weakness, impaired endurance, impaired functional mobility, gait instability, impaired balance, decreased lower extremity function, impaired cardiopulmonary response to activity .    Pt seen supine in bed and agreeable to PT. Pt stated that she is hungry and had nothing to eat since yesterday- currently NPO for cardio. Pt with hx of recent discharged from [facility] with stroke April 2025 with R paresis. Pt was discharged home and functional, ambulatory/cooks/cleans. Pt with onset of LUE weakness. Pt requiring mod assist for mobility to sit EOB. Extra time to mobilize. Pt stood with mod assist and able to take several small side steps.   MONO- is home alone.    Rehab Prognosis: Fair; patient would benefit from acute skilled PT services to address these deficits and reach maximum level of function.    Recent Surgery: * No surgery found *      Plan:     During this hospitalization, patient to be seen 6 x/week to address the identified rehab impairments via gait training, therapeutic activities, therapeutic exercises, neuromuscular re-education and progress toward the following goals:    Plan of Care Expires:  08/15/25    Subjective   Niece arrived while with Pt and said that pt was doing well after discharged from [facility]  Chief Complaint:  gross weakness, LUE weak  Patient/Family Comments/goals: get well  Pain/Comfort:  Pain Rating 1: 0/10    Patients cultural, spiritual, Anabaptist conflicts given the current situation:      Living Environment:  Home alone- has sisters nearby but her sister  is currently in hospital  Prior to admission, patients level of function was amb/functional.  Equipment used at home: walker, rolling, wheelchair.  DME owned (not currently used): none.  Upon discharge, patient will have assistance from family.    Objective:     Communicated with nurse Valerio prior to session.  Patient found HOB elevated with telemetry, bed alarm, peripheral IV, PureWick  upon PT entry to room.    General Precautions: Standard, fall  Orthopedic Precautions:N/A   Braces: N/A  Respiratory Status: Room air    Exams:  Postural Exam:  Patient presented with the following abnormalities:    -       Rounded shoulders  -       Forward head  -       BMI 26.41  RLE ROM: WFL  RLE Strength: Deficits: 3/5  LLE ROM: WFL  LLE Strength: Deficits: 3/5    Functional Mobility:  Bed Mobility:     Rolling Right: moderate assistance  Scooting: moderate assistance  Supine to Sit: moderate assistance  Sit to Supine: moderate assistance  Transfers:     Sit to Stand:  moderate assistance with no AD few small side steps to R      AM-PAC 6 CLICK MOBILITY  Total Score:11       Treatment & Education:  Patient was educated on the importance of OOB activity and functional mobility to negate negative effects of prolonged bed rest during hospitalization, safe transfers and ambulation, and D/C planning   Thera ex in supine and while at EOB  Stood x 2 trials and able to take side steps  Slow to mobilize- education for thera ex    Patient left HOB elevated with all lines intact, call button in reach, bed alarm on, and nurse Valerio present.    GOALS:   Multidisciplinary Problems       Physical Therapy Goals          Problem: Physical Therapy    Goal Priority Disciplines Outcome Interventions   Physical Therapy Goal     PT, PT/OT Progressing    Description: Goals to be met by: 08-     Patient will increase functional independence with mobility by performin. Supine to sit with MInimal Assistance  2. Sit to stand  transfer with Minimal Assistance  3. Bed to chair transfer with Minimal Assistance using Rolling Walker  4. Gait  x 50 feet with Minimal Assistance using Rolling Walker.   5. Lower extremity exercise program x20 reps                        DME Justifications:  No DME recommended requiring DME justifications    History:     Past Medical History:   Diagnosis Date    Diabetes mellitus, type 2     Glaucoma     Hyperlipidemia     Hypertension     Stroke        Past Surgical History:   Procedure Laterality Date    CATARACT EXTRACTION      CYSTOSCOPY Right 6/18/2025    Procedure: CYSTOSCOPY;  Surgeon: Aleksey Lott MD;  Location: [facility] OR [address] [place] [address];  Service: Urology;  Laterality: Right;    CYSTOSCOPY W/ URETERAL STENT PLACEMENT Right 4/27/2025    Procedure: CYSTOSCOPY, WITH URETERAL STENT INSERTION;  Surgeon: Bill Moreno MD;  [facility];  Service: Urology;  Laterality: Right;    NEPHROSCOPY Right 6/18/2025    Procedure: NEPHROSCOPY;  Surgeon: Aleksey Lott MD;  Location: [facility] OR [address] [place] [address];  Service: Urology;  Laterality: Right;    REMOVAL-STENT Right 6/18/2025    Procedure: REMOVAL-STENT;  Surgeon: Aleksey Lott MD;  Location: [facility] OR [address] [place] [address];  Service: Urology;  Laterality: Right;    ROTATOR CUFF REPAIR      URETEROSCOPY, WITH LASER LITHOTRIPSY Right 6/18/2025    Procedure: URETEROSCOPY, WITH LASER LITHOTRIPSY;  Surgeon: Aleksey Lott MD;  Location: [facility] OR [address] [place] [address];  Service: Urology;  Laterality: Right;       Time Tracking:     PT Received On: 07/30/25  PT Start Time: 0936     PT Stop Time: 1013  PT Total Time (min): 37 min     Billable Minutes: Evaluation 10, Therapeutic Activity 17, and Therapeutic Exercise 10      07/30/2025

## 2025-07-30 NOTE — PLAN OF CARE
Goals to be met by: 8/30/25     Patient will increase functional independence with ADLs by performing:    UE Dressing with Supervision.  LE Dressing with Supervision.  Grooming while standing at sink with Supervision.  Toileting from toilet with Supervision for hygiene and clothing management.   Bathing from  shower chair/bench with Supervision.  Toilet transfer to toilet with Supervision.

## 2025-07-30 NOTE — PROGRESS NOTES
[facility] Medicine  Progress Note    Patient Name: Delilah Williamson  MRN: [id number]  Patient Class: IP- Inpatient   Admission Date: 7/29/2025  Length of Stay: 1 days  Attending Physician: Sara Dickinson MD  Primary Care Provider: Juan Abarca MD        Subjective     Principal Problem:CVA (cerebral vascular accident)        HPI:  This is a pleasant 85-year-old lady with comorbid conditions of hyperlipidemia, hypertension, debility related to age who presents to the emergency department after concerns of left-sided weakness.  Patient reportedly had a fall yesterday and daughter-in-law stayed with her, however, upon waking this morning they noticed that she was unable to keep her left arm against gravity.  Code stroke was activated in the emergency department, CT head negative, CTA head and neck with no large vessel occlusion and MRI negative although clinically patient with 1/5 strength of the left upper extremity with no associated musculoskeletal patent.  Left lower extremity 2/5.  This is potentially an MRI negative CVA.  Continue dual antiplatelet therapy and neurology consult to follow.  Urinalysis frankly positive for infection.  Continue ceftriaxone.  Admission to hospital medicine with the above workup.    Overview/Hospital Course:  No notes on file    Interval History:  Patient seen and examined.  NAD.  Continues with left sided weakness.  Neurology consulted.  PT/OT/ST to work with patient today.  Urine culture prelim staph aureus, pending final, continue rocephin.    Review of Systems   Respiratory:  Negative for shortness of breath and wheezing.    Cardiovascular:  Negative for chest pain and palpitations.   Gastrointestinal:  Negative for abdominal pain, nausea and vomiting.   Genitourinary:  Positive for dysuria.   Neurological:  Positive for weakness.     Objective:     Vital Signs (Most Recent):  Temp: 97.6 °F (36.4 °C) (07/30/25 1128)  Pulse: 66 (07/30/25 1128)  Resp:  18 (07/30/25 1128)  BP: (!) 187/74 (07/30/25 1128)  SpO2: 100 % (07/30/25 1128) Vital Signs (24h Range):  Temp:  [97.2 °F (36.2 °C)-97.8 °F (36.6 °C)] 97.6 °F (36.4 °C)  Pulse:  [58-82] 66  Resp:  [16-20] 18  SpO2:  [95 %-100 %] 100 %  BP: (120-192)/(72-82) 187/74     Weight: 76.5 kg (168 lb 10.4 oz)  Body mass index is 26.41 kg/m².    Intake/Output Summary (Last 24 hours) at 7/30/2025 1347  Last data filed at 7/30/2025 0944  Gross per 24 hour   Intake 0 ml   Output 450 ml   Net -450 ml         Physical Exam  Vitals and nursing note reviewed.   Constitutional:       General: She is not in acute distress.  HENT:      Mouth/Throat:      Mouth: Mucous membranes are dry.   Abdominal:      General: Bowel sounds are normal. There is no distension.      Palpations: Abdomen is soft.      Tenderness: There is no abdominal tenderness.   Musculoskeletal:      Right lower leg: Edema present.      Left lower leg: Edema present.   Neurological:      Mental Status: She is alert and oriented to person, place, and time.      Motor: Weakness (LUE/LLE) present.   Psychiatric:         Mood and Affect: Mood normal.               Significant Labs: All pertinent labs within the past 24 hours have been reviewed.  Recent Lab Results         07/30/25  0448   07/29/25  1447        Albumin 3.4         ALP 58         ALT 25         Anion Gap 8         PTT 26.2  Comment: Refer to local heparin nomogram for intensity/dose specific therapeutic range.         AST 49         Baso # 0.03         Basophil % 0.4         BILIRUBIN TOTAL 0.8  Comment: For infants and newborns, interpretation of results should be based   on gestational age, weight and in agreement with clinical   observations.    Premature Infant recommended reference ranges:   0-24 hours:  <8.0 mg/dL   24-48 hours: <12.0 mg/dL   3-5 days:    <15.0 mg/dL   6-29 days:   <15.0 mg/dL         BUN 15         Calcium 11.2         Chloride 105         CO2 28         Creatinine 0.8         eGFR  >60         Eos # 0.15         Eos % 1.8         Estimated Avg Glucose   117       Glucose 90         Hematocrit 36.8         Hemoglobin 11.6         Hemoglobin A1C External   5.7  Comment: According to ADA guidelines, hemoglobin A1C <7.0% represents  optimal control in non-pregnant diabetic patients.  Different  metrics may apply to specific populations.      Standards of Medical Care in Diabetes - 2016.    For the purpose of screening for the presence of diabetes:  <5.7%     Consistent with the absence of diabetes  5.7-6.4%  Consistent with increasing risk for diabetes             (prediabetes)  >or=6.5%  Consistent with diabetes    Currently no consensus exists for use of hemoglobin A1C  for diagnosis of diabetes for children.       Immature Grans (Abs) 0.02  Comment: Mild elevation in immature granulocytes is non specific and can be seen in a variety of conditions including stress response, acute inflammation, trauma and pregnancy. Correlation with other laboratory and clinical findings is essential.         Immature Granulocytes 0.2         INR 1.1  Comment: Coumadin Therapy:    2.0 - 3.0 for INR for all indicators except mechanical heart valves    and antiphospholipid syndromes which should use 2.5 - 3.5.         Lymph # 1.40         LYMPH % 16.7         Magnesium  1.2         MCH 24.3         MCHC 31.5         MCV 77         Mono # 0.66         Mono % 7.9         MPV 10.9         Neut # 6.1         Neut % 73.0         nRBC 0         Phosphorus Level 2.5         Platelet Count 252         Potassium 3.0         PROTEIN TOTAL 6.5         PT 11.7         RBC 4.77         RDW 16.3         Sodium 141         WBC 8.40                 Significant Imaging: I have reviewed all pertinent imaging results/findings within the past 24 hours.      Assessment & Plan  CVA (cerebral vascular accident)  MRI negative CVA?  Antithrombotics for secondary stroke prevention: Antiplatelets: Aspirin: 81 mg daily  Clopidogrel: 75 mg  daily    Statins for secondary stroke prevention and hyperlipidemia, if present:   Statins: Atorvastatin- 40 mg daily    Aggressive risk factor modification: HTN, HLD     Rehab efforts: The patient has been evaluated by a stroke team provider and the therapy needs have been fully considered based off the presenting complaints and exam findings. The following therapy evaluations are needed: PT evaluate and treat, OT evaluate and treat, SLP evaluate and treat    Diagnostics ordered/pending: CTA head and neck negative for LVO although segment narrowing is noted, MRI negative, echo with bubble study pending    VTE prophylaxis: Enoxaparin 40 mg SQ every 24 hours    BP parameters: Infarct: No intervention, SBP <220    Neuro consult to follow    Hyperlipidemia  Continue statin    UTI (urinary tract infection)  Continue ceftriaxone  Follow culture    Conjunctivitis  Trimethoprin-polymyxin B QID x5 days    VTE Risk Mitigation (From admission, onward)           Ordered     enoxaparin injection 40 mg  Daily         07/29/25 1404     IP VTE HIGH RISK PATIENT  Once         07/29/25 1404     Place sequential compression device  Until discontinued         07/29/25 1404                    Discharge Planning   ALINA: 8/1/2025     Code Status: DNR   Medical Readiness for Discharge Date:   Discharge Plan A: Home                  Please place Justification for JACOB Henning  Department of Hospital Medicine   [facility]

## 2025-07-30 NOTE — PT/OT/SLP EVAL
Speech Language Pathology Evaluation  Cognitive/Bedside Swallow    Patient Name:  Delilah Williamson   MRN:  [id number]  Admitting Diagnosis: CVA (cerebral vascular accident)    Recommendations:                  General Recommendations:  Speech/language therapy and Cognitive-linguistic therapy  Diet recommendations:  Soft & Bite Sized Diet - IDDSI Level 6, Thin liquids - IDDSI Level 0   Aspiration Precautions: Assistance with meals and Feed only when awake/alert   General Precautions: Standard, fall (IDDSI 6 soft and bite ssized)  Communication strategies:  frequently needs repetition to understand, provide increased time to answer and go to room if call light pushed  Discharge recommendations:  Low Intensity Therapy   Barriers to Discharge:  None    Assessment:     Delilah Williamson is a 85 y.o. 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  She presents with delayed processing, mild dysarthria, decreased sustained attention and auditory comprehension, and decreased verbal problemsolving.  Recommend tx.  Recommend IDDSI 6 soft and  bite sized textures due to difficulty managing utensils; may need to be fed.    History:     Past Medical History:   Diagnosis Date    Diabetes mellitus, type 2     Glaucoma     Hyperlipidemia     Hypertension     Stroke        Past Surgical History:   Procedure Laterality Date    CATARACT EXTRACTION      CYSTOSCOPY Right 6/18/2025    Procedure: CYSTOSCOPY;  Surgeon: Aleksey Lott MD;  Location: [facility] OR [address] [place] [address];  Service: Urology;  Laterality: Right;    CYSTOSCOPY W/ URETERAL STENT PLACEMENT Right 4/27/2025    Procedure: CYSTOSCOPY, WITH URETERAL STENT INSERTION;  Surgeon: Bill Moreno MD;  Location: [address];  Service: Urology;  Laterality: Right;    NEPHROSCOPY Right 6/18/2025    Procedure: NEPHROSCOPY;  Surgeon: Aleksey Lott MD;  Location: [address];  Service: Urology;  Laterality: Right;    REMOVAL-STENT Right 6/18/2025    Procedure:  REMOVAL-STENT;  Surgeon: Aleksey Lott MD;  Location: [facility] OR [address] [place] [address];  Service: Urology;  Laterality: Right;    ROTATOR CUFF REPAIR      URETEROSCOPY, WITH LASER LITHOTRIPSY Right 6/18/2025    Procedure: URETEROSCOPY, WITH LASER LITHOTRIPSY;  Surgeon: Aleksey Lott MD;  Location: [facility] OR [address] [place] [address];  Service: Urology;  Laterality: Right;       Social History: Patient lives in the community.    Prior Intubation HX:  none this admission    Modified Barium Swallow: none in Epic    MRI Brain - No convincing abnormal diffusion restriction is seen to suggest an acute infarct, and no abnormal signal intensity is seen to suggest an acute intracranial bleed.  There is no hydrocephalus, herniation or midline shift in the basal/suprasellar cisterns are patent.     There is a small linear defect in the left mid insular cortex (image 13) compatible with a remote infarct.     Overall there is very mild periventricular deep cerebral white matter T2 FLAIR hyperintensity, a nonspecific finding in this age group which can be seen in any diffuse white matter process, but one which is most commonly associated with small vessel ischemic disease. There is mild cerebral/cerebellar atrophy for age, with a slight frontal/parietal predominance.    Chest X-Rays: none recent    Prior diet: regular textures andliquids.    Subjective     I'm awake.   Patient goals: home     Objective:     Cognitive Status:    Alert, slowed responses, oriented except MEY; Mild immediate memory deficits, moderate delayed memory deficits.  Decreased sustained attention and alternating attention.  Decreased math/time/money functional problem solving    Bandar Cognitive Assessment (MoCA) score (adjusted for education level) - 11 out of 25 indicating moderate cognitive-linguistic deficits    Receptive Language/Comprehension:  mildly decreased auditory comprehension,      Pragmatics:  intact    Expressive Language: decreased word finding, divergent and convergent  naming     Motor Speech: Mild-moderate dysarthria due to low volume voice, slowed rate and imprecision    Voice: Low volume, clear quality    Visual-Spatial: WFL    Reading: WFL     Written Expression: Not tested due to weakness, difficulty holding pen    Oral Musculature Evaluation  Oral Musculature: left weakness  Dentition: upper dentures (lower partial denture)  Secretion Management: adequate  Mucosal Quality: adequate  Mandibular Strength and Mobility: WFL  Oral Labial Strength and Mobility: impaired coordination  Lingual Strength and Mobility: WFL  Velar Elevation: WFL  Buccal Strength and Mobility: WFL  Volitional Cough: adequate  Volitional Swallow: rise and tilt palpated  Voice Prior to PO Intake: clear, mild dysarthria, slowing, very low volume    Bedside Swallow Eval:   Consistencies Assessed:  Thin liquids via cup and straw, 3 oz water challenge  Puree applesauce  Mixed consistencies peaches in thin juice  Solids cracker     Oral Phase:   WFL    Pharyngeal Phase:   no overt clinical signs/symptoms of aspiration  no overt clinical signs/symptoms of pharyngeal dysphagia    Compensatory Strategies  None    Treatment: education re impressions and recommendations    Goals:   Multidisciplinary Problems       SLP Goals          Problem: SLP    Goal Priority Disciplines Outcome   SLP Goal    High SLP    Description: 1. Use CSS indep in sentences she read, 100% acc, indep.  2. Demo comprehension of complex info indep, 100% acc without repetition.  3. Solve functional word math/time/money calculation problems, 90% acc, modif indep.  4. Use preferred memory strategies SBA to set and recall functional info 90% acc.  5. Sustain attention on simple task, 30 secs, indep.                       Plan:     Patient to be seen:  3 x/week   Plan of Care expires:  08/29/25  Plan of Care reviewed with:  patient   SLP Follow-Up:  Yes       Time Tracking:     SLP Treatment Date:   07/30/25  Speech Start Time:  1050  Speech Stop  Time:  1125     Speech Total Time (min):  35 min    Billable Minutes: Eval 25  and Eval Swallow and Oral Function 10    07/30/2025

## 2025-07-30 NOTE — PLAN OF CARE
SW attempted to drop the SNF list off to pt at bedside but pt was getting an ultrasound. SW will attempt later.

## 2025-07-30 NOTE — SUBJECTIVE & OBJECTIVE
HPI:  85 y.o. female with prior stroke,HTN, HLD, DM2, glaucoma presented with left arm weakness since 7/28/2025. Stroke code was activated and patient discussed with TeleStroke team.  Not treated with thrombolytic therapy due to being out of the treatment window.  No LVO noted on imaging.  Patient admitted for further stroke workup.  Of note, patient had a fall 7/28/2025.  Weakness was noted the next morning.    Patient continues with left arm weakness.  Has never had left sided weakness before.  History of stroke but affected the right side.  Reports symptoms resolved.  Patient denies vision changes, double vision, facial weakness, swallowing difficulty. Does report leg weakness but both legs appear weak. Some pain reported to knees.      Right mild face, left arm, sensation to left forehead only bilateral lower leg poximal weaknes 4/5 distally         Images personally reviewed and interpreted:  Study: Head CT, CTA Head & Neck, and MRI Brain  Study Interpretation: CT head: No early infarct signs.  No hemorrhage.  No mass effect. CTA head and neck: No high-grade stenosis or proximal occlusion noted. MRI brain: no diffusion restriction.  No hemorrhage.  Remote left insular infarct.    Additional studies reviewed:   Study: Cardiac_Neuro: 2D echo  Study Interpretation: 4/23/2025 EF 55-60%; no wall motion abnormality noted; no thrombus/vegetation; left atrium severely dilated.      Laboratory studies reviewed:  BMP:   Lab Results   Component Value Date     07/30/2025    K 3.0 (L) 07/30/2025     07/30/2025    CO2 28 07/30/2025    BUN 15 07/30/2025    BUN 19 04/30/2025    CREATININE 0.8 07/30/2025    CREATININE 1.0 04/30/2025    CALCIUM 11.2 (H) 07/30/2025     CBC:   Lab Results   Component Value Date    WBC 8.40 07/30/2025    WBC 6.18 04/30/2025    RBC 4.77 07/30/2025    HGB 11.6 (L) 07/30/2025    HCT 36.8 (L) 07/30/2025     07/30/2025    MCV 77 (L) 07/30/2025    MCH 24.3 (L) 07/30/2025    MCHC 31.5  (L) 07/30/2025     Lipid Panel:   Lab Results   Component Value Date    CHOL 103 (L) 07/29/2025    LDLCALC 52.4 (L) 07/29/2025    LDLCALC 77.6 04/23/2025    HDL 38 (L) 07/29/2025    HDL 34 (L) 04/23/2025    TRIG 63 07/29/2025     Coagulation:   Lab Results   Component Value Date    INR 1.1 07/30/2025    APTT 26.2 07/30/2025     Hgb A1C:   Lab Results   Component Value Date    HGBA1C 5.7 07/29/2025     TSH:   Lab Results   Component Value Date    TSH 1.531 07/29/2025       Documentation personally reviewed:  Notes: Notes: ED notes, H&P, and Consult notes from: VN     Assessment and plan:  85 y.o. female with prior stroke,HTN, HLD, DM2, glaucoma presented with profound left arm weakness with no face involvement.  MRI with no acute findings.  Patient with fall prior to weakness.  Unclear if related to symptoms.  Would benefit with C-spine MRI to rule out spinal involvement.  Patient with recent history of embolic stroke s/p thrombectomy concerning for possible PAF (TTE with severe LAE). Given this history a small negative stroke event cannot be excluded.    Of note, patient was supposed to be discharged with 30 day event monitor but patient states she did not receive this.      Recommendations  If MRI C-spine unremarkable would do ASA 81mg daily with Plavix 75mg daily x 21 days then monotherapy with ASA thereafter  Continue home statin - currently at goal LDL  Aggressive stroke risk factor modification: HTN, HLD, DM2  MRI C-spine  Follow therapy recommendations  Can slowly reduce BP - avoid aggressive BP lowering and sudden drops in BP that can lead to hypoperfusion  30 day event monitor with autotrigger (CV05).  Please call [phone] to notify the tech (the order does not automatically cross over).  The device will be mailed to the patient.    If pending studies are unremarkable, no further inpatient stroke workup needed and patient can dispo with therapy recommendations once medically ready  Please contact us with  any further questions or concerns or if patient has any acute neurological changes (new symptoms, worsening deficits).      Post charge discharge plan:  Clinic follow up: Vascular Neurology    Visit Type: in person or virtual  Timeframe: 4 weeks    Collaborating Physician, Dr. Alicea, was available during today's encounter. Any change to plan along with cosign to appear in the EMR.

## 2025-07-30 NOTE — CONSULTS
[facility]  Adult Nutrition  Education Short Note    Admit Date: 7/29/2025   Total duration of encounter: 1 day   Patient Age: 85 y.o.    Nutrition Education    Previous education: no    Diet at home: Regular    Handouts provided: Heart Healthy - Reduced Sodium Nutrition Therapy     Comments: Discussed importance of eating a balanced diet with whole grains, fruits and vegetables, and lean protein sources. Encouraged heart-healthy unsaturated fats while limiting saturated fats, trans fats, and cholesterol intake. Reviewed high sodium foods that should be avoided. Food labels, salt free seasonings, and recommended sodium intake reviewed. All questions/concerns were addressed.       Discussed with: patient    Educational Need? yes    Barriers: none identified    Interventions: Fluid modified diet and Sodium modified diet    Patient and/or family comprehend instructions: yes    Outcome: Verbalizes understanding     Thanks for the consult!    Gunnar Abel RD 07/30/2025 4:21 PM

## 2025-07-30 NOTE — PT/OT/SLP EVAL
Occupational Therapy   Evaluation    Name: Delilah Williamson  MRN: [id number]  Admitting Diagnosis: Acute ischemic stroke  Recent Surgery: * No surgery found *      Recommendations:     Discharge Recommendations: Moderate Intensity Therapy  Discharge Equipment Recommendations:  to be determined by next level of care  Barriers to discharge:  Other (Comment) (increased assistance with ADLs)    Assessment:     Delilah Williamson is a 85 y.o. female with a medical diagnosis of Acute ischemic stroke.  Performance deficits affecting function: weakness, impaired endurance, impaired sensation, impaired self care skills, impaired functional mobility, gait instability, decreased upper extremity function, decreased lower extremity function, impaired cardiopulmonary response to activity.      Rehab Prognosis: Fair; patient would benefit from acute skilled OT services to address these deficits and reach maximum level of function.       Plan:     Patient to be seen 5 x/week to address the above listed problems via self-care/home management, therapeutic activities, therapeutic exercises  Plan of Care Expires:    Plan of Care Reviewed with: patient    Subjective     Chief Complaint: none stated  Patient/Family Comments/goals:  to get better    Occupational Profile:  Living Environment: pt lives alone in mobile home with 0 JAYSHREE, family are neighbors, pt sponge bathes and has a standard toilet  Previous level of function: Mod I with ADLs  Roles and Routines: mother   Equipment Used at Home: bedside commode, walker, rolling, wheelchair  Assistance upon Discharge: family    Pain/Comfort:  Pain Rating 1: 0/10    Patients cultural, spiritual, Faith conflicts given the current situation: no    Objective:     Communicated with: nurse prior to session.  Patient found HOB elevated with bed alarm, peripheral IV, telemetry upon OT entry to room.    General Precautions: Standard, fall, aspiration  Orthopedic Precautions: N/A  Braces:  N/A  Respiratory Status: Room air    Occupational Performance:    Bed Mobility:    Patient completed Scooting/Bridging with moderate assistance  Patient completed Supine to Sit with moderate assistance  Patient completed Sit to Supine with moderate assistance    Activities of Daily Living:  Grooming: contact guard assistance and minimum assistance oral care seated supported EOB using non dominant UE   Lower Body Dressing: moderate assistance and maximal assistance don/doff sock seated supported EOB    Toileting: declined at the time       Cognitive/Visual Perceptual:  Cognitive/Psychosocial Skills:     -       Oriented to: Person, Place, Time, and Situation   -       Follows Commands/attention:Follows multistep  commands  -       Communication: clear/fluent  -       Memory: No Deficits noted  -       Safety awareness/insight to disability: impaired   -       Mood/Affect/Coping skills/emotional control: Appropriate to situation, Cooperative, Lethargic, and Pleasant    Physical Exam:  Dominant hand:    -       Left  Upper Extremity Range of Motion:     -       Right Upper Extremity: WNL  -       Left Upper Extremity: Deficits: Pt has some shoulder elevation and wrist extension. Pt was unable to perform movements at the elbow.   Upper Extremity Strength:    -       Right Upper Extremity: WFL  -       Left Upper Extremity: Deficits: overall weakness noted   Strength:    -       Right Upper Extremity: WFL  -       Left Upper Extremity: WFL except weakness noted  Fine Motor Coordination:    -       Intact  Right hand thumb/finger opposition skills and Right hand, diadochokinesis skill   -       Impaired  Left hand thumb/finger opposition skills unable and Left hand, diadochokinesis skill impairment noted      AMPAC 6 Click ADL:  AMPAC Total Score: 17    Treatment & Education:  -Pt educated on role of OT and POC, use of call light to assist with any needs/transfers, importance of EOB/OOB activities to help negate the  negative impact of prolonged hospital stay.       Patient left HOB elevated with all lines intact, call button in reach, bed alarm on, and nurse notified    GOALS:   Multidisciplinary Problems       Occupational Therapy Goals          Problem: Occupational Therapy    Goal Priority Disciplines Outcome Interventions   Occupational Therapy Goal     OT, PT/OT     Description: Goals to be met by: 8/30/25     Patient will increase functional independence with ADLs by performing:    UE Dressing with Supervision.  LE Dressing with Supervision.  Grooming while standing at sink with Supervision.  Toileting from toilet with Supervision for hygiene and clothing management.   Bathing from  shower chair/bench with Supervision.  Toilet transfer to toilet with Supervision.                             History:     Past Medical History:   Diagnosis Date    Diabetes mellitus, type 2     Glaucoma     Hyperlipidemia     Hypertension     Stroke          Past Surgical History:   Procedure Laterality Date    CATARACT EXTRACTION      CYSTOSCOPY Right 6/18/2025    Procedure: CYSTOSCOPY;  Surgeon: Aleksey Lott MD;  Location: [facility] OR [address] [place] [address];  Service: Urology;  Laterality: Right;    CYSTOSCOPY W/ URETERAL STENT PLACEMENT Right 4/27/2025    Procedure: CYSTOSCOPY, WITH URETERAL STENT INSERTION;  Surgeon: Bill Moreno MD;  Location: [facility] OR [address] [place] [address];  Service: Urology;  Laterality: Right;    NEPHROSCOPY Right 6/18/2025    Procedure: NEPHROSCOPY;  Surgeon: Aleksey Lott MD;  Location: [facility] OR [address] [place] [address];  Service: Urology;  Laterality: Right;    REMOVAL-STENT Right 6/18/2025    Procedure: REMOVAL-STENT;  Surgeon: Aleksey Lott MD;  Location: [facility] OR [address] [place] [address];  Service: Urology;  Laterality: Right;    ROTATOR CUFF REPAIR      URETEROSCOPY, WITH LASER LITHOTRIPSY Right 6/18/2025    Procedure: URETEROSCOPY, WITH LASER LITHOTRIPSY;  Surgeon: Aleksey Lott MD;  Location: [facility] OR [address] [place] [address];  Service: Urology;  Laterality: Right;       Time  Tracking:     OT Date of Treatment: 07/30/25  OT Start Time: 1017  OT Stop Time: 1035  OT Total Time (min): 18 min    Billable Minutes:Evaluation 10  Self Care/Home Management 8    7/30/2025

## 2025-07-30 NOTE — TELEMEDICINE CONSULT
[facility]  Tele-Vascular Neurology   Consult Note      Consult Information  Inpatient Consult to Neurology Services (General Neurology)  Consult performed by: Sharon Marti NP  Consult ordered by: Hannah Mendiola MD          Consulting Provider: SENDY GARRETT   Current Providers  No providers found    Patient Location:  [facility] PROGRESSIVE CARE UNIT IP Unit    Spoke hospital nurse at bedside with patient assisting consultant.  Patient information was obtained from patient.       Vascular Neurology Documentation  Acute Stroke Times   Last Known Normal Date: 07/28/25  Last Known Normal Time: 1400    NIH Scale:  1a. Level of Consciousness: 0-->Alert, keenly responsive  1b. LOC Questions: 0-->Answers both questions correctly  1c. LOC Commands: 0-->Performs both tasks correctly  2. Best Gaze: 0-->Normal  3. Visual: 0-->No visual loss  4. Facial Palsy: 1-->Minor paralysis (flattened nasolabial fold, asymmetry on smiling)  5a. Motor Arm, Left: 3-->No effort against gravity, limb falls  5b. Motor Arm, Right: 0-->No drift, limb holds 90 (or 45) degrees for full 10 secs  6a. Motor Leg, Left: 2-->Some effort against gravity, leg falls to bed by 5 secs, but has some effort against gravity  6b. Motor Leg, Right: 2-->Some effort against gravity, leg falls to bed by 5 secs, but has some effort against gravity  7. Limb Ataxia: 0-->Absent  8. Sensory: 1-->Mild-to-moderate sensory loss, patient feels pinprick is less sharp or is dull on the affected side, or there is a loss of superficial pain with pinprick, but patient is aware of being touched  9. Best Language: 0-->No aphasia, normal  10. Dysarthria: 0-->Normal  11. Extinction and Inattention (formerly Neglect): 0-->No abnormality  Total (NIH Stroke Scale): 9      Modified Porter: Score: 1  [place] Coma Scale:     ABCD2 Score:    [id number] Score:    HAS -BLED Score:    ICH Score:    Hunt & Bazzi Classification:      Blood pressure (!) 187/74, pulse 66,  "temperature 97.6 °F (36.4 °C), temperature source Oral, resp. rate 18, height 5' 7" (1.702 m), weight 76.5 kg (168 lb 10.4 oz), SpO2 100%, not currently breastfeeding.    VAN Stroke Assessment: Negative    Medical Decision Making  HPI:  85 y.o. female with prior stroke,HTN, HLD, DM2, glaucoma presented with left arm weakness since 7/28/2025. Stroke code was activated and patient discussed with TeleStroke team.  Not treated with thrombolytic therapy due to being out of the treatment window.  No LVO noted on imaging.  Patient admitted for further stroke workup.  Of note, patient had a fall 7/28/2025.  Weakness was noted the next morning.    Patient continues with left arm weakness.  Has never had left sided weakness before.  History of stroke but affected the right side.  Reports symptoms resolved.  Patient denies vision changes, double vision, facial weakness, swallowing difficulty. Does report leg weakness but both legs appear weak. Some pain reported to knees.         Images personally reviewed and interpreted:  Study: Head CT, CTA Head & Neck, and MRI Brain  Study Interpretation: CT head: No early infarct signs.  No hemorrhage.  No mass effect. CTA head and neck: No high-grade stenosis or proximal occlusion noted. MRI brain: no diffusion restriction.  No hemorrhage.  Remote left insular infarct.    Additional studies reviewed:   Study: Cardiac_Neuro: 2D echo  Study Interpretation: 4/23/2025 EF 55-60%; no wall motion abnormality noted; no thrombus/vegetation; left atrium severely dilated.      Laboratory studies reviewed:  BMP:   Lab Results   Component Value Date     07/30/2025    K 3.0 (L) 07/30/2025     07/30/2025    CO2 28 07/30/2025    BUN 15 07/30/2025    BUN 19 04/30/2025    CREATININE 0.8 07/30/2025    CREATININE 1.0 04/30/2025    CALCIUM 11.2 (H) 07/30/2025     CBC:   Lab Results   Component Value Date    WBC 8.40 07/30/2025    WBC 6.18 04/30/2025    RBC 4.77 07/30/2025    HGB 11.6 (L) " 07/30/2025    HCT 36.8 (L) 07/30/2025     07/30/2025    MCV 77 (L) 07/30/2025    MCH 24.3 (L) 07/30/2025    MCHC 31.5 (L) 07/30/2025     Lipid Panel:   Lab Results   Component Value Date    CHOL 103 (L) 07/29/2025    LDLCALC 52.4 (L) 07/29/2025    LDLCALC 77.6 04/23/2025    HDL 38 (L) 07/29/2025    HDL 34 (L) 04/23/2025    TRIG 63 07/29/2025     Coagulation:   Lab Results   Component Value Date    INR 1.1 07/30/2025    APTT 26.2 07/30/2025     Hgb A1C:   Lab Results   Component Value Date    HGBA1C 5.7 07/29/2025     TSH:   Lab Results   Component Value Date    TSH 1.531 07/29/2025       Documentation personally reviewed:  Notes: Notes: ED notes, H&P, and Consult notes from: VN     Assessment and plan:  85 y.o. female with prior stroke,HTN, HLD, DM2, glaucoma presented with profound left arm weakness with no face involvement.  MRI with no acute findings.  Patient with fall prior to weakness.  Unclear if related to symptoms.  Would benefit with C-spine MRI to rule out spinal involvement.  Patient with recent history of embolic stroke s/p thrombectomy concerning for possible PAF (TTE with severe LAE). Given this history a small negative stroke event cannot be excluded.    Of note, patient was supposed to be discharged with 30 day event monitor but patient states she did not receive this.      Recommendations  If MRI C-spine unremarkable would do ASA 81mg daily with Plavix 75mg daily x 21 days then monotherapy with ASA thereafter  Continue home statin - currently at goal LDL  Aggressive stroke risk factor modification: HTN, HLD, DM2  MRI C-spine  Follow therapy recommendations  Can slowly reduce BP - avoid aggressive BP lowering and sudden drops in BP that can lead to hypoperfusion  30 day event monitor with autotrigger (CV05).  Please call [phone] to notify the tech (the order does not automatically cross over).  The device will be mailed to the patient.    If pending studies are unremarkable, no further  inpatient stroke workup needed and patient can dispo with therapy recommendations once medically ready  Please contact us with any further questions or concerns or if patient has any acute neurological changes (new symptoms, worsening deficits).      Post charge discharge plan:  Clinic follow up: Vascular Neurology    Visit Type: in person or virtual  Timeframe: 4 weeks    Collaborating Physician, Dr. Alicea, was available during today's encounter. Any change to plan along with cosign to appear in the EMR.      Additional Physical Exam, History, & ROS  ROS  Physical Exam    Neurological Exam completed with assistance from bedside NP  LOC: alert  Attention Span: Good   Language: No aphasia  Articulation: No dysarthria  Orientation: Person, Place, Time   Visual Fields: Full  EOM (CN III, IV, VI): Full/intact  Facial Sensation (CN V): Facial sensory loss mild to left forehead only; intact sensation to lower face  Facial Movement (CN VII): Lower facial weakness on the Right  Motor: Arm left  Falls to bed; no antigravity  Leg left  minimal antigravity; proximal weakness; 4/5 distal strength appears similar to right  Arm right  Full antigravity; full power noted  Leg right  minimal antigravity; proximal weakness; 4/5 distal strength appears similar to left  Cerebellum: No evidence of appendicular or axial ataxia to upper extremities; unable to assess lower due to bilateral weakness  Sensation: Intact to light touch      Past Medical History:   Diagnosis Date    Diabetes mellitus, type 2     Glaucoma     Hyperlipidemia     Hypertension     Stroke      Past Surgical History:   Procedure Laterality Date    CATARACT EXTRACTION      CYSTOSCOPY Right 6/18/2025    Procedure: CYSTOSCOPY;  Surgeon: Aleksey Lott MD;  Location: [facility] OR [address] [place] [address];  Service: Urology;  Laterality: Right;    CYSTOSCOPY W/ URETERAL STENT PLACEMENT Right 4/27/2025    Procedure: CYSTOSCOPY, WITH URETERAL STENT INSERTION;  Surgeon: Bill Moreno MD;   Location: [facility] OR [address] [place] [address];  Service: Urology;  Laterality: Right;    NEPHROSCOPY Right 6/18/2025    Procedure: NEPHROSCOPY;  Surgeon: Aleksey Lott MD;  Location: [facility] OR [address] [place] [address];  Service: Urology;  Laterality: Right;    REMOVAL-STENT Right 6/18/2025    Procedure: REMOVAL-STENT;  Surgeon: Aleksey Lott MD;  Location: [facility] OR [address] [place] [address];  Service: Urology;  Laterality: Right;    ROTATOR CUFF REPAIR      URETEROSCOPY, WITH LASER LITHOTRIPSY Right 6/18/2025    Procedure: URETEROSCOPY, WITH LASER LITHOTRIPSY;  Surgeon: Aleksey Lott MD;  Location: [facility] OR [address] [place] [address];  Service: Urology;  Laterality: Right;     Family History   Problem Relation Name Age of Onset    Breast cancer Paternal Aunt         Diagnoses  Problem Noted   Acute Ischemic Stroke 4/23/2025   Htn (Hypertension) 4/23/2025   Hyperlipidemia 4/23/2025   Controlled Type 2 Diabetes Mellitus Without Complication, Without Long-Term Current Use of Insulin 4/23/2025       Sharon Marti NP    Neurology consultation requested by spoke provider. Audiovisual encounter with the patient performed using a secure connection.  Results and impressions from the visit are documented on this note and were communicated to the consulting provider/team via direct communication. The note has been shared for addition to the patients electronic medical record.

## 2025-07-30 NOTE — CONSULTS
20G x 1.75IN PIV placed in Left Upper Arm by KEAGAN using Ultrasound Guidance.    Indication: PVA  Attempts: 1      Recommended Care and Maintenance:  Flush and Lock every 12 hours (once per shift if not in use) and PRN before and after each use.

## 2025-07-30 NOTE — PLAN OF CARE
Problem: Physical Therapy  Goal: Physical Therapy Goal  Description: Goals to be met by: 08-     Patient will increase functional independence with mobility by performin. Supine to sit with MInimal Assistance  2. Sit to stand transfer with Minimal Assistance  3. Bed to chair transfer with Minimal Assistance using Rolling Walker  4. Gait  x 50 feet with Minimal Assistance using Rolling Walker.   5. Lower extremity exercise program x20 reps   Outcome: Progressing   PT eval and treat. Pt seen for thera ex in supine. Mod assist to sit eob and to stand. Took small side steps. L UE weakness. MONO

## 2025-07-30 NOTE — SUBJECTIVE & OBJECTIVE
Interval History:  Patient seen and examined.  NAD.  Continues with left sided weakness.  Neurology consulted.  PT/OT/ST to work with patient today.  Urine culture prelim staph aureus, pending final, continue rocephin.    Review of Systems   Respiratory:  Negative for shortness of breath and wheezing.    Cardiovascular:  Negative for chest pain and palpitations.   Gastrointestinal:  Negative for abdominal pain, nausea and vomiting.   Genitourinary:  Positive for dysuria.   Neurological:  Positive for weakness.     Objective:     Vital Signs (Most Recent):  Temp: 97.6 °F (36.4 °C) (07/30/25 1128)  Pulse: 66 (07/30/25 1128)  Resp: 18 (07/30/25 1128)  BP: (!) 187/74 (07/30/25 1128)  SpO2: 100 % (07/30/25 1128) Vital Signs (24h Range):  Temp:  [97.2 °F (36.2 °C)-97.8 °F (36.6 °C)] 97.6 °F (36.4 °C)  Pulse:  [58-82] 66  Resp:  [16-20] 18  SpO2:  [95 %-100 %] 100 %  BP: (120-192)/(72-82) 187/74     Weight: 76.5 kg (168 lb 10.4 oz)  Body mass index is 26.41 kg/m².    Intake/Output Summary (Last 24 hours) at 7/30/2025 1347  Last data filed at 7/30/2025 0944  Gross per 24 hour   Intake 0 ml   Output 450 ml   Net -450 ml         Physical Exam  Vitals and nursing note reviewed.   Constitutional:       General: She is not in acute distress.  HENT:      Mouth/Throat:      Mouth: Mucous membranes are dry.   Abdominal:      General: Bowel sounds are normal. There is no distension.      Palpations: Abdomen is soft.      Tenderness: There is no abdominal tenderness.   Musculoskeletal:      Right lower leg: Edema present.      Left lower leg: Edema present.   Neurological:      Mental Status: She is alert and oriented to person, place, and time.      Motor: Weakness (LUE/LLE) present.   Psychiatric:         Mood and Affect: Mood normal.               Significant Labs: All pertinent labs within the past 24 hours have been reviewed.  Recent Lab Results         07/30/25  0448   07/29/25  1447        Albumin 3.4         ALP 58          ALT 25         Anion Gap 8         PTT 26.2  Comment: Refer to local heparin nomogram for intensity/dose specific therapeutic range.         AST 49         Baso # 0.03         Basophil % 0.4         BILIRUBIN TOTAL 0.8  Comment: For infants and newborns, interpretation of results should be based   on gestational age, weight and in agreement with clinical   observations.    Premature Infant recommended reference ranges:   0-24 hours:  <8.0 mg/dL   24-48 hours: <12.0 mg/dL   3-5 days:    <15.0 mg/dL   6-29 days:   <15.0 mg/dL         BUN 15         Calcium 11.2         Chloride 105         CO2 28         Creatinine 0.8         eGFR >60         Eos # 0.15         Eos % 1.8         Estimated Avg Glucose   117       Glucose 90         Hematocrit 36.8         Hemoglobin 11.6         Hemoglobin A1C External   5.7  Comment: According to ADA guidelines, hemoglobin A1C <7.0% represents  optimal control in non-pregnant diabetic patients.  Different  metrics may apply to specific populations.      Standards of Medical Care in Diabetes - 2016.    For the purpose of screening for the presence of diabetes:  <5.7%     Consistent with the absence of diabetes  5.7-6.4%  Consistent with increasing risk for diabetes             (prediabetes)  >or=6.5%  Consistent with diabetes    Currently no consensus exists for use of hemoglobin A1C  for diagnosis of diabetes for children.       Immature Grans (Abs) 0.02  Comment: Mild elevation in immature granulocytes is non specific and can be seen in a variety of conditions including stress response, acute inflammation, trauma and pregnancy. Correlation with other laboratory and clinical findings is essential.         Immature Granulocytes 0.2         INR 1.1  Comment: Coumadin Therapy:    2.0 - 3.0 for INR for all indicators except mechanical heart valves    and antiphospholipid syndromes which should use 2.5 - 3.5.         Lymph # 1.40         LYMPH % 16.7         Magnesium  1.2         MCH  24.3         MCHC 31.5         MCV 77         Mono # 0.66         Mono % 7.9         MPV 10.9         Neut # 6.1         Neut % 73.0         nRBC 0         Phosphorus Level 2.5         Platelet Count 252         Potassium 3.0         PROTEIN TOTAL 6.5         PT 11.7         RBC 4.77         RDW 16.3         Sodium 141         WBC 8.40                 Significant Imaging: I have reviewed all pertinent imaging results/findings within the past 24 hours.

## 2025-07-31 PROBLEM — M48.02 CERVICAL STENOSIS OF SPINAL CANAL: Status: ACTIVE | Noted: 2025-07-31

## 2025-07-31 PROBLEM — M54.12 CERVICAL RADICULOPATHY: Status: ACTIVE | Noted: 2025-07-31

## 2025-07-31 PROBLEM — R73.9 HYPERGLYCEMIA: Status: ACTIVE | Noted: 2025-07-31

## 2025-07-31 LAB
ABSOLUTE EOSINOPHIL (SMH): 0 K/UL
ABSOLUTE MONOCYTE (SMH): 0.07 K/UL (ref 0.3–1)
ABSOLUTE NEUTROPHIL COUNT (SMH): 5.8 K/UL (ref 1.8–7.7)
ALBUMIN SERPL-MCNC: 3.6 G/DL (ref 3.5–5.2)
ALP SERPL-CCNC: 65 UNIT/L (ref 55–135)
ALT SERPL-CCNC: 25 UNIT/L (ref 10–44)
ANION GAP (SMH): 6 MMOL/L (ref 8–16)
AST SERPL-CCNC: 42 UNIT/L (ref 10–40)
BACTERIA UR CULT: ABNORMAL
BASOPHILS # BLD AUTO: 0 K/UL
BASOPHILS NFR BLD AUTO: 0 %
BILIRUB SERPL-MCNC: 0.6 MG/DL (ref 0.1–1)
BUN SERPL-MCNC: 15 MG/DL (ref 8–23)
CALCIUM SERPL-MCNC: 10.5 MG/DL (ref 8.7–10.5)
CHLORIDE SERPL-SCNC: 104 MMOL/L (ref 95–110)
CO2 SERPL-SCNC: 28 MMOL/L (ref 23–29)
CREAT SERPL-MCNC: 0.7 MG/DL (ref 0.5–1.4)
ERYTHROCYTE [DISTWIDTH] IN BLOOD BY AUTOMATED COUNT: 16.2 % (ref 11.5–14.5)
GFR SERPLBLD CREATININE-BSD FMLA CKD-EPI: >60 ML/MIN/1.73/M2
GLUCOSE SERPL-MCNC: 170 MG/DL (ref 70–110)
HCT VFR BLD AUTO: 39.1 % (ref 37–48.5)
HGB BLD-MCNC: 12.2 GM/DL (ref 12–16)
IMM GRANULOCYTES # BLD AUTO: 0.03 K/UL (ref 0–0.04)
IMM GRANULOCYTES NFR BLD AUTO: 0.4 % (ref 0–0.5)
LYMPHOCYTES # BLD AUTO: 1.14 K/UL (ref 1–4.8)
MAGNESIUM SERPL-MCNC: 1.7 MG/DL (ref 1.6–2.6)
MCH RBC QN AUTO: 24.2 PG (ref 27–31)
MCHC RBC AUTO-ENTMCNC: 31.2 G/DL (ref 32–36)
MCV RBC AUTO: 77 FL (ref 82–98)
NUCLEATED RBC (/100WBC) (SMH): 0 /100 WBC
PLATELET # BLD AUTO: 242 K/UL (ref 150–450)
PMV BLD AUTO: 10.4 FL (ref 9.2–12.9)
POCT GLUCOSE: 196 MG/DL (ref 70–110)
POCT GLUCOSE: 203 MG/DL (ref 70–110)
POTASSIUM SERPL-SCNC: 4 MMOL/L (ref 3.5–5.1)
PROT SERPL-MCNC: 6.7 GM/DL (ref 6–8.4)
RBC # BLD AUTO: 5.05 M/UL (ref 4–5.4)
RELATIVE EOSINOPHIL (SMH): 0 % (ref 0–8)
RELATIVE LYMPHOCYTE (SMH): 16.3 % (ref 18–48)
RELATIVE MONOCYTE (SMH): 1 % (ref 4–15)
RELATIVE NEUTROPHIL (SMH): 82.3 % (ref 38–73)
SODIUM SERPL-SCNC: 138 MMOL/L (ref 136–145)
WBC # BLD AUTO: 7 K/UL (ref 3.9–12.7)

## 2025-07-31 PROCEDURE — 94761 N-INVAS EAR/PLS OXIMETRY MLT: CPT

## 2025-07-31 PROCEDURE — 25000003 PHARM REV CODE 250

## 2025-07-31 PROCEDURE — 36415 COLL VENOUS BLD VENIPUNCTURE: CPT

## 2025-07-31 PROCEDURE — 21400001 HC TELEMETRY ROOM

## 2025-07-31 PROCEDURE — 99900031 HC PATIENT EDUCATION (STAT)

## 2025-07-31 PROCEDURE — 80053 COMPREHEN METABOLIC PANEL: CPT

## 2025-07-31 PROCEDURE — 92507 TX SP LANG VOICE COMM INDIV: CPT

## 2025-07-31 PROCEDURE — 97530 THERAPEUTIC ACTIVITIES: CPT | Mod: CQ

## 2025-07-31 PROCEDURE — 83735 ASSAY OF MAGNESIUM: CPT

## 2025-07-31 PROCEDURE — 99222 1ST HOSP IP/OBS MODERATE 55: CPT | Mod: ,,, | Performed by: NEUROLOGICAL SURGERY

## 2025-07-31 PROCEDURE — 63600175 PHARM REV CODE 636 W HCPCS

## 2025-07-31 PROCEDURE — 97535 SELF CARE MNGMENT TRAINING: CPT

## 2025-07-31 PROCEDURE — 85025 COMPLETE CBC W/AUTO DIFF WBC: CPT

## 2025-07-31 PROCEDURE — 97110 THERAPEUTIC EXERCISES: CPT | Mod: CQ

## 2025-07-31 PROCEDURE — 99900035 HC TECH TIME PER 15 MIN (STAT)

## 2025-07-31 RX ORDER — PANTOPRAZOLE SODIUM 40 MG/1
40 TABLET, DELAYED RELEASE ORAL DAILY
Status: DISCONTINUED | OUTPATIENT
Start: 2025-08-01 | End: 2025-08-05 | Stop reason: HOSPADM

## 2025-07-31 RX ORDER — IBUPROFEN 200 MG
24 TABLET ORAL
Status: DISCONTINUED | OUTPATIENT
Start: 2025-07-31 | End: 2025-08-05 | Stop reason: HOSPADM

## 2025-07-31 RX ORDER — AMLODIPINE BESYLATE 5 MG/1
10 TABLET ORAL DAILY
Status: DISCONTINUED | OUTPATIENT
Start: 2025-07-31 | End: 2025-08-05 | Stop reason: HOSPADM

## 2025-07-31 RX ORDER — GLUCAGON 1 MG
1 KIT INJECTION
Status: DISCONTINUED | OUTPATIENT
Start: 2025-07-31 | End: 2025-08-05 | Stop reason: HOSPADM

## 2025-07-31 RX ORDER — HYDRALAZINE HYDROCHLORIDE 20 MG/ML
10 INJECTION INTRAMUSCULAR; INTRAVENOUS EVERY 6 HOURS PRN
Status: DISCONTINUED | OUTPATIENT
Start: 2025-07-31 | End: 2025-08-03

## 2025-07-31 RX ORDER — IBUPROFEN 200 MG
16 TABLET ORAL
Status: DISCONTINUED | OUTPATIENT
Start: 2025-07-31 | End: 2025-08-05 | Stop reason: HOSPADM

## 2025-07-31 RX ORDER — INSULIN ASPART 100 [IU]/ML
0-5 INJECTION, SOLUTION INTRAVENOUS; SUBCUTANEOUS
Status: DISCONTINUED | OUTPATIENT
Start: 2025-07-31 | End: 2025-08-05 | Stop reason: HOSPADM

## 2025-07-31 RX ORDER — MAGNESIUM SULFATE HEPTAHYDRATE 40 MG/ML
2 INJECTION, SOLUTION INTRAVENOUS ONCE
Status: COMPLETED | OUTPATIENT
Start: 2025-07-31 | End: 2025-07-31

## 2025-07-31 RX ORDER — PANTOPRAZOLE SODIUM 40 MG/10ML
40 INJECTION, POWDER, LYOPHILIZED, FOR SOLUTION INTRAVENOUS DAILY
Status: DISCONTINUED | OUTPATIENT
Start: 2025-07-31 | End: 2025-07-31

## 2025-07-31 RX ADMIN — MAGNESIUM SULFATE HEPTAHYDRATE 2 G: 40 INJECTION, SOLUTION INTRAVENOUS at 09:07

## 2025-07-31 RX ADMIN — POLYMYXIN B SULFATE AND TRIMETHOPRIM 1 DROP: 10000; 1 SOLUTION OPHTHALMIC at 12:07

## 2025-07-31 RX ADMIN — AMLODIPINE BESYLATE 10 MG: 5 TABLET ORAL at 09:07

## 2025-07-31 RX ADMIN — VALSARTAN 320 MG: 80 TABLET, FILM COATED ORAL at 09:07

## 2025-07-31 RX ADMIN — POLYMYXIN B SULFATE AND TRIMETHOPRIM 1 DROP: 10000; 1 SOLUTION OPHTHALMIC at 06:07

## 2025-07-31 RX ADMIN — INSULIN ASPART 2 UNITS: 100 INJECTION, SOLUTION INTRAVENOUS; SUBCUTANEOUS at 06:07

## 2025-07-31 RX ADMIN — DEXAMETHASONE SODIUM PHOSPHATE 4 MG: 4 INJECTION, SOLUTION INTRA-ARTICULAR; INTRALESIONAL; INTRAMUSCULAR; INTRAVENOUS; SOFT TISSUE at 12:07

## 2025-07-31 RX ADMIN — HYDRALAZINE HYDROCHLORIDE 10 MG: 20 INJECTION, SOLUTION INTRAMUSCULAR; INTRAVENOUS at 05:07

## 2025-07-31 RX ADMIN — PANTOPRAZOLE SODIUM 40 MG: 40 INJECTION, POWDER, FOR SOLUTION INTRAVENOUS at 11:07

## 2025-07-31 RX ADMIN — POLYMYXIN B SULFATE AND TRIMETHOPRIM 1 DROP: 10000; 1 SOLUTION OPHTHALMIC at 01:07

## 2025-07-31 RX ADMIN — DEXAMETHASONE SODIUM PHOSPHATE 4 MG: 4 INJECTION, SOLUTION INTRA-ARTICULAR; INTRALESIONAL; INTRAMUSCULAR; INTRAVENOUS; SOFT TISSUE at 06:07

## 2025-07-31 RX ADMIN — METHOCARBAMOL 750 MG: 750 TABLET ORAL at 02:07

## 2025-07-31 RX ADMIN — METHOCARBAMOL 750 MG: 750 TABLET ORAL at 09:07

## 2025-07-31 RX ADMIN — ASPIRIN 81 MG: 81 TABLET, DELAYED RELEASE ORAL at 09:07

## 2025-07-31 RX ADMIN — DEXAMETHASONE SODIUM PHOSPHATE 4 MG: 4 INJECTION, SOLUTION INTRA-ARTICULAR; INTRALESIONAL; INTRAMUSCULAR; INTRAVENOUS; SOFT TISSUE at 01:07

## 2025-07-31 RX ADMIN — METHOCARBAMOL 750 MG: 750 TABLET ORAL at 08:07

## 2025-07-31 RX ADMIN — ATORVASTATIN CALCIUM 40 MG: 40 TABLET, FILM COATED ORAL at 09:07

## 2025-07-31 RX ADMIN — CEFTRIAXONE SODIUM 1 G: 1 INJECTION, POWDER, FOR SOLUTION INTRAMUSCULAR; INTRAVENOUS at 12:07

## 2025-07-31 RX ADMIN — DEXAMETHASONE SODIUM PHOSPHATE 4 MG: 4 INJECTION, SOLUTION INTRA-ARTICULAR; INTRALESIONAL; INTRAMUSCULAR; INTRAVENOUS; SOFT TISSUE at 05:07

## 2025-07-31 RX ADMIN — CLOPIDOGREL 75 MG: 75 TABLET ORAL at 09:07

## 2025-07-31 RX ADMIN — ENOXAPARIN SODIUM 40 MG: 40 INJECTION SUBCUTANEOUS at 05:07

## 2025-07-31 NOTE — PT/OT/SLP PROGRESS
"Speech Language Pathology Treatment    Patient Name:  Delilah Williamson   MRN:  [id number]  Admitting Diagnosis: Acute ischemic stroke    Recommendations:                 General Recommendations:  Speech/language therapy and Cognitive-linguistic therapy  Diet recommendations:  Soft & Bite Sized Diet - IDDSI Level 6, Liquid Diet Level: Thin liquids - IDDSI Level 0   Aspiration Precautions: Standard aspiration precautions   General Precautions: Standard, fall (IDDSI 6 soft and bite ssized)  Communication strategies:  none  Discharge recommendations:  Moderate Intensity Therapy   Barriers to Discharge:  None    Assessment:     Delilah Williamson is a 85 y.o. 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  She presents with mild dysarthria in conversation and ability to increase speech intelligibility when cued. Patient with appropriate recall of all medical information.     Subjective     "I told them it was my neck."  Patient goals: "I need to go to rehab"     Pain/Comfort:  Pain Rating 1: 0/10    Respiratory Status: Room air    Objective:     Has the patient been evaluated by SLP for swallowing?   Yes  Keep patient NPO? No   Current Respiratory Status:        Patient seen for speech and cognitive therapy. Patient noted with mild dysarthria but able to use speech strategies when cued. Patient demonstrating recall of all medical information and of therapy. Was able to recall what she worked on with speech the previous day. Patient able to list items needed from the grocery if cooking her favorite meal independently. Completed functional calculations with mod A. Patient falling asleep at end of session.     Goals:   Multidisciplinary Problems       SLP Goals          Problem: SLP    Goal Priority Disciplines Outcome   SLP Goal    High SLP    Description: 1. Use CSS indep in sentences she read, 100% acc, indep.  2. Demo comprehension of complex info indep, 100% acc without repetition.  3. Solve " functional word math/time/money calculation problems, 90% acc, modif indep.  4. Use preferred memory strategies SBA to set and recall functional info 90% acc.  5. Sustain attention on simple task, 30 secs, indep.                       Plan:     Patient to be seen:  3 x/week   Plan of Care expires:  08/29/25  Plan of Care reviewed with:  patient   SLP Follow-Up:  Yes       Time Tracking:     SLP Treatment Date:   07/31/25  Speech Start Time:  1106  Speech Stop Time:  1130     Speech Total Time (min):  24 min    Billable Minutes: Speech Therapy Individual 8 and Self Care/Home Management Training 16    07/31/2025

## 2025-07-31 NOTE — PROGRESS NOTES
[facility] Medicine  Progress Note    Patient Name: Delilah Williamson  MRN: [id number]  Patient Class: IP- Inpatient   Admission Date: 7/29/2025  Length of Stay: 2 days  Attending Physician: Sara Dickinson MD  Primary Care Provider: Juan Abarca MD        Subjective     Principal Problem:Cervical radiculopathy        HPI:  This is a pleasant 85-year-old lady with comorbid conditions of hyperlipidemia, hypertension, debility related to age who presents to the emergency department after concerns of left-sided weakness.  Patient reportedly had a fall yesterday and daughter-in-law stayed with her, however, upon waking this morning they noticed that she was unable to keep her left arm against gravity.  Code stroke was activated in the emergency department, CT head negative, CTA head and neck with no large vessel occlusion and MRI negative although clinically patient with 1/5 strength of the left upper extremity with no associated musculoskeletal patent.  Left lower extremity 2/5.  This is potentially an MRI negative CVA.  Continue dual antiplatelet therapy and neurology consult to follow.  Urinalysis frankly positive for infection.  Continue ceftriaxone.  Admission to hospital medicine with the above workup.    Overview/Hospital Course:  No notes on file    Interval History:  Patient seen and examined.  NAD.  MRI cervical spine showed degenerative change and disc disease throughout the cervical spine.   Neurosurgery consulted.  PT/OT/ST to work with patient today.  Started on Decadron 4 mg every 6 hours 7/30.  Has mild improvement in left upper extremity weakness today.  No left lower extremity weakness today.  Urine culture + staph aureus, continue rocephin.  Blood pressure elevated, increase amlodipine to 10 mg daily.  PT and OT recommending moderate intensity therapy.  Case management following for SNF placement.  Family at bedside updated on plan of care.      Review of Systems    Respiratory:  Negative for shortness of breath and wheezing.    Cardiovascular:  Negative for chest pain and palpitations.   Gastrointestinal:  Negative for abdominal pain, nausea and vomiting.   Genitourinary:  Positive for dysuria.   Neurological:  Positive for weakness.     Objective:     Vital Signs (Most Recent):  Temp: 97.5 °F (36.4 °C) (07/31/25 0329)  Pulse: 66 (07/31/25 1158)  Resp: 18 (07/30/25 2312)  BP: (!) 173/76 (07/31/25 1158)  SpO2: 98 % (07/31/25 1158) Vital Signs (24h Range):  Temp:  [97.5 °F (36.4 °C)-97.6 °F (36.4 °C)] 97.5 °F (36.4 °C)  Pulse:  [59-68] 66  Resp:  [16-18] 18  SpO2:  [96 %-100 %] 98 %  BP: (173-193)/(74-91) 173/76     Weight: 76.5 kg (168 lb 10.4 oz)  Body mass index is 26.41 kg/m².    Intake/Output Summary (Last 24 hours) at 7/31/2025 1241  Last data filed at 7/31/2025 0934  Gross per 24 hour   Intake 780 ml   Output 1150 ml   Net -370 ml         Physical Exam  Vitals and nursing note reviewed.   Constitutional:       General: She is not in acute distress.  HENT:      Mouth/Throat:      Mouth: Mucous membranes are moist.      Pharynx: Oropharynx is clear.   Abdominal:      General: Bowel sounds are normal. There is no distension.      Palpations: Abdomen is soft.      Tenderness: There is no abdominal tenderness.   Neurological:      Mental Status: She is alert and oriented to person, place, and time.      Motor: Weakness (LUE) present.   Psychiatric:         Mood and Affect: Mood normal.               Significant Labs: All pertinent labs within the past 24 hours have been reviewed.  Recent Lab Results         07/31/25  0504   07/30/25  1526        A2C EF   63       A4C EF   61       Albumin 3.6         ALP 65         ALT 25         Anion Gap 6         Ao root annulus   2.9       Ao peak jagruti   1.4       Ao VTI   32.6       AST 42         AV valve area   2.2       KEVIN by Velocity Ratio   2.2       AORTIC VALVE CUSP SEPERATION   1.70       AV mean gradient   5       AV index  (prosthetic)   0.69       AV peak gradient   8       AV Velocity Ratio   0.71       Baso # 0.00         Basophil % 0.0         BILIRUBIN TOTAL 0.6  Comment: For infants and newborns, interpretation of results should be based   on gestational age, weight and in agreement with clinical   observations.    Premature Infant recommended reference ranges:   0-24 hours:  <8.0 mg/dL   24-48 hours: <12.0 mg/dL   3-5 days:    <15.0 mg/dL   6-29 days:   <15.0 mg/dL         BSA   1.9       BUN 15         Calcium 10.5         Chloride 104         CO2 28         Creatinine 0.7         Left Ventricle Relative Wall Thickness   0.47       E/A ratio   1.27       E/E' ratio   9       eGFR >60         Eos # 0.00         Eos % 0.0         E wave deceleration time   261       FS   27.9       Glucose 170         Hematocrit 39.1         Hemoglobin 12.2         Immature Grans (Abs) 0.03  Comment: Mild elevation in immature granulocytes is non specific and can be seen in a variety of conditions including stress response, acute inflammation, trauma and pregnancy. Correlation with other laboratory and clinical findings is essential.         Immature Granulocytes 0.4         IVC diameter   1.43       IVSd   1.4       LA area A2C   18.50       LA area A4C   20.60       LA size   3.6       LVOT area   3.1       LV LATERAL E/E' RATIO   8.8       LV SEPTAL E/E' RATIO   8.8       LV EDV BP   81       LV Diastolic Volume Index   43.09       Left Ventricular End Diastolic Volume by Teichholz Method   81.30       LV EDV A2C   [id number]       LV EDV A4C   72.30       Left Ventricular End Systolic Volume by Teichholz Method   36.40       LV ESV A2C   68.00       LV ESV A4C   53.70       LVIDd   4.3       LVIDs   3.1       LV mass   184.7       LV Mass Index   98.2       Left Ventricular Outflow Tract Mean Gradient   2.00       Left Ventricular Outflow Tract Mean Velocity   0.66       LVOT diameter   2.0       LVOT peak jagruti   1.0       LVOT peak  VTI   22.6       LV ESV BP   36       LV Systolic Volume Index   19.1       Lymph # 1.14         LYMPH % 16.3         Magnesium  1.7         MCH 24.2         MCHC 31.2         MCV 77         Mean e'   0.08       Mono # 0.07         Mono % 1.0         MPV 10.4         MV valve area p 1/2 method   2.82       MV valve area by continuity eq   2.33       MV mean gradient   2       MV peak gradient   3       MV Peak A Min   0.55       MV Peak E Min   0.70       MV stenosis pressure 1/2 time   78.00       MV VTI   30.5       Neut # 5.8         Neut % 82.3         nRBC 0         OHS CV CPX PATIENT HEIGHT IN   67       Carrero's Biplane MOD Ejection Fraction   64       Platelet Count 242         Potassium 4.0         PROTEIN TOTAL 6.7         Pulmonary Valve Mean Velocity   0.80       PV peak gradient   5       PV PEAK VELOCITY   1.17       PW   1.0       RA vol index   18.72       RA area length vol   35.20       RBC 5.05         RDW 16.2         RV S'   10.20       Sodium 138         TDI SEPTAL   0.08       TDI LATERAL   0.08       Triscuspid Valve Regurgitation Peak Gradient   36       TR Max Min   3.0       WBC 7.00         ZLVIDD   -2.00       ZLVIDS   -0.34               Significant Imaging: I have reviewed all pertinent imaging results/findings within the past 24 hours.      Assessment & Plan  Cervical stenosis of spinal canal  -Presented with LUE weakness  -CT C-spine: Multilevel cervical disc bulging/disc protrusions, including left paracentral disc protrusion at C5-C6, with spinal canal stenosis and left neural foraminal narrowing.   -MRI C-spine: Degenerative change and disc disease throughout the cervical spine most pronounced at C3-C4, C4-C5, and C5-C6.   -Neurosurgery consulted  -Decadron 4mg every 6 hours  -PT/OT  Hyperlipidemia  Continue statin    UTI (urinary tract infection)  -Urine culture +staph aureus  -Continue rocephin    Conjunctivitis  Trimethoprin-polymyxin B QID x5 days    HTN  (hypertension)  Patient's blood pressure range in the last 24 hours was: BP  Min: 173/76  Max: 187/85.The patient's inpatient anti-hypertensive regimen is listed below:  Current Antihypertensives  , Daily, Oral  labetaloL injection 10 mg, Every 15 min PRN, Intravenous  valsartan tablet 320 mg, Daily, Oral  amLODIPine tablet 10 mg, Daily, Oral    Plan  - BP is uncontrolled, will adjust as follows: increase amlodipine  Controlled type 2 diabetes mellitus without complication, without long-term current use of insulin  -Has history of DM 2, stopped taking metformin this year  -Hgba1c 5.7 on 7/29/25  -Blood glucose elevated likely 2/2 decadron  -SSI for coverage while on steroids    VTE Risk Mitigation (From admission, onward)           Ordered     enoxaparin injection 40 mg  Daily         07/29/25 1404     IP VTE HIGH RISK PATIENT  Once         07/29/25 1404     Place sequential compression device  Until discontinued         07/29/25 1404                    Discharge Planning   ALINA: 8/4/2025     Code Status: DNR   Medical Readiness for Discharge Date:   Discharge Plan A: Skilled Nursing Facility                  Please place Justification for DME      JACOB Justice  Department of Hospital Medicine   [facility]

## 2025-07-31 NOTE — PT/OT/SLP PROGRESS
Physical Therapy Treatment    Patient Name:  Delilah Williamson   MRN:  [id number]    Recommendations:     Discharge Recommendations: Moderate Intensity Therapy  Discharge Equipment Recommendations: none  Barriers to discharge: Decreased caregiver support and Decreased functional mobility    Assessment:     Delilah Williamson is a 85 y.o. female admitted with a medical diagnosis of Acute ischemic stroke.  She presents with the following impairments/functional limitations: weakness, impaired endurance, impaired self care skills, impaired functional mobility, gait instability, impaired balance, decreased upper extremity function, decreased lower extremity function, decreased safety awareness, pain, decreased ROM, impaired cardiopulmonary response to activity . Pt required [place] assist to place L UE onto rw. Standing balance improved with increased time and VI for postural correction. Noted decreased B foot clearance with side steps along eob. LE exercises completed seated eob with decreased ROM to B knee ext.     Rehab Prognosis: Fair; patient would benefit from acute skilled PT services to address these deficits and reach maximum level of function.    Recent Surgery: * No surgery found *      Plan:     During this hospitalization, patient to be seen 6 x/week to address the identified rehab impairments via gait training, therapeutic activities, therapeutic exercises, neuromuscular re-education and progress toward the following goals:    Plan of Care Expires:  08/15/25    Subjective     Chief Complaint: L UE mobility  Patient/Family Comments/goals: Pt agreeable to PT.  Pain/Comfort:  Pain Rating 1: 0/10      Objective:     Communicated with  RN prior to session.  Patient found HOB elevated with bed alarm, telemetry, peripheral IV upon PT entry to room.     General Precautions: Standard, fall  Orthopedic Precautions: N/A  Braces: N/A  Respiratory Status: Room air     Functional Mobility:  Bed Mobility:     Supine to Sit: moderate  assistance  Sit to Supine: moderate assistance  Transfers:     Sit to Stand:  minimum assistance with rolling walker  Gait: 4 side steps mod a with rw      AM-PAC 6 CLICK MOBILITY          Treatment & Education:  Pt was educated on the following: call light use, importance of OOB activity and functional mobility to negate the negative effects of prolonged bed rest during this hospitalization, safe transfers/ambulation and discharge planning recommendations/options.     Pt performed B LE there ex sitting x 30 reps with vc for proper execution and joint protection: ap, laq, marching.    Patient left HOB elevated with all lines intact, call button in reach, and bed alarm on..    GOALS:   Multidisciplinary Problems       Physical Therapy Goals          Problem: Physical Therapy    Goal Priority Disciplines Outcome Interventions   Physical Therapy Goal     PT, PT/OT Progressing    Description: Goals to be met by: 08-     Patient will increase functional independence with mobility by performin. Supine to sit with MInimal Assistance  2. Sit to stand transfer with Minimal Assistance  3. Bed to chair transfer with Minimal Assistance using Rolling Walker  4. Gait  x 50 feet with Minimal Assistance using Rolling Walker.   5. Lower extremity exercise program x20 reps                            Time Tracking:     PT Received On: 25  PT Start Time: 959     PT Stop Time: 1024  PT Total Time (min): 25 min     Billable Minutes: Therapeutic Activity 10 and Therapeutic Exercise 15    Treatment Type: Treatment  PT/PTA: PTA     Number of PTA visits since last PT visit: 2025

## 2025-07-31 NOTE — NURSING
Patient is complaining of redness and swelling in right eye when putting eye drops in, Picture in chart. Precious NP notified. Eye drops discontinued.

## 2025-07-31 NOTE — ASSESSMENT & PLAN NOTE
Patient's blood pressure range in the last 24 hours was: BP  Min: 173/76  Max: 187/85.The patient's inpatient anti-hypertensive regimen is listed below:  Current Antihypertensives  , Daily, Oral  labetaloL injection 10 mg, Every 15 min PRN, Intravenous  valsartan tablet 320 mg, Daily, Oral  amLODIPine tablet 10 mg, Daily, Oral    Plan  - BP is uncontrolled, will adjust as follows: increase amlodipine

## 2025-07-31 NOTE — PLAN OF CARE
Problem: Physical Therapy  Goal: Physical Therapy Goal  Description: Goals to be met by: 08-     Patient will increase functional independence with mobility by performin. Supine to sit with MInimal Assistance  2. Sit to stand transfer with Minimal Assistance  3. Bed to chair transfer with Minimal Assistance using Rolling Walker  4. Gait  x 50 feet with Minimal Assistance using Rolling Walker.   5. Lower extremity exercise program x20 reps   Outcome: Progressing

## 2025-07-31 NOTE — ASSESSMENT & PLAN NOTE
-Presented with LUE weakness  -CT C-spine: Multilevel cervical disc bulging/disc protrusions, including left paracentral disc protrusion at C5-C6, with spinal canal stenosis and left neural foraminal narrowing.   -MRI C-spine: Degenerative change and disc disease throughout the cervical spine most pronounced at C3-C4, C4-C5, and C5-C6.   -Neurosurgery consulted  -Decadron 4mg every 6 hours  -PT/OT

## 2025-07-31 NOTE — PROGRESS NOTES
Pharmacist Intervention IV to PO Note    Delilah Williamson is a 85 y.o. female being treated with IV medication pantoprazole    Patient Data:    Vital Signs (Most Recent):  Temp: 97.9 °F (36.6 °C) (07/31/25 1158)  Pulse: 66 (07/31/25 1158)  Resp: 16 (07/31/25 1158)  BP: (!) 173/76 (07/31/25 1158)  SpO2: 98 % (07/31/25 1310) Vital Signs (72h Range):  Temp:  [97.2 °F (36.2 °C)-98.5 °F (36.9 °C)]   Pulse:  [58-82]   Resp:  [16-20]   BP: (120-193)/(72-91)   SpO2:  [95 %-100 %]      CBC:  Recent Labs   Lab 07/29/25  1142 07/30/25  0448 07/31/25  0504   WBC 10.59 8.40 7.00   RBC 4.63 4.77 5.05   HGB 11.4* 11.6* 12.2   HCT 35.9* 36.8* 39.1    252 242   MCV 78* 77* 77*   MCH 24.6* 24.3* 24.2*   MCHC 31.8* 31.5* 31.2*     CMP:     Recent Labs   Lab 07/29/25  1142 07/30/25  0448 07/31/25  0504   * 90 170*   CALCIUM 11.4* 11.2* 10.5   ALBUMIN 3.7 3.4* 3.6   PROT 6.6 6.5 6.7    141 138   K 3.5 3.0* 4.0   CO2 29 28 28    105 104   BUN 18 15 15   CREATININE 0.9 0.8 0.7   ALKPHOS 57 58 65   ALT 25 25 25   AST 57* 49* 42*   BILITOT 0.8 0.8 0.6       Dietary Orders:  Diet Orders            Dietary nutrition supplements By Mouth; BID; Boost Very High Calorie Nutritional Drink - Chocolate starting at 07/30 1621    Diet Soft & Bite Sized (IDDSI Level 6): Soft & Bite Sized starting at 07/30 1349            Based on the following criteria, this patient qualifies for intravenous to oral conversion:  [x] The patients gastrointestinal tract is functioning (tolerating medications via oral or enteral route for 24 hours and tolerating food or enteral feeds for 24 hours).  [x] The patient is hemodynamically stable for 24 hours (heart rate <100 beats per minute, systolic blood pressure >99 mm Hg, and respiratory rate <20 breaths per minute).  [x] The patient shows clinical improvement (afebrile for at least 24 hours and white blood cell count downtrending or normalized). Additionally, the patient must be non-neutropenic  (absolute neutrophil count >500 cells/mm3).  [x] For antimicrobials, the patient has received IV therapy for at least 24 hours.    IV medication Protonix will be changed to oral medication Protonix    Pharmacist's Name: Sophia Orr  Pharmacist's Extension: [phone]

## 2025-07-31 NOTE — PLAN OF CARE
Problem: Hospitalized Older Adult  Goal: Optimal Cognitive Function  Outcome: Progressing  Goal: Effective Bowel Elimination  Outcome: Progressing  Goal: Optimal Coping  Outcome: Progressing  Goal: Fluid and Electrolyte Balance  Outcome: Progressing  Goal: Optimal Functional Ability  Outcome: Progressing  Goal: Improved Oral Intake  Outcome: Progressing  Goal: Adequate Sleep/Rest  Outcome: Progressing  Goal: Effective Urinary Elimination  Outcome: Progressing     Problem: Stroke, Ischemic (Includes Transient Ischemic Attack)  Goal: Optimal Coping  Outcome: Progressing  Goal: Effective Bowel Elimination  Outcome: Progressing  Goal: Optimal Cerebral Tissue Perfusion  Outcome: Progressing  Goal: Optimal Cognitive Function  Outcome: Progressing  Goal: Improved Communication Skills  Outcome: Progressing  Goal: Optimal Functional Ability  Outcome: Progressing  Goal: Optimal Nutrition Intake  Outcome: Progressing  Goal: Effective Oxygenation and Ventilation  Outcome: Progressing  Goal: Improved Sensorimotor Function  Outcome: Progressing  Goal: Safe and Effective Swallow  Outcome: Progressing  Goal: Effective Urinary Elimination  Outcome: Progressing

## 2025-07-31 NOTE — PLAN OF CARE
SW met with pt and pt's niece at bedside to review discharge recommendation of SNF and is agreeable to plan    Patient/family provided list of facilities in-network with patient's payor plan. Providers that are owned, operated, or affiliated with [facility] are included on the list.     Notified that referral sent to below listed facilities from in-network list based on proximity to home/family support:   [facility]  2. Any facility in [place]/ [place]  3.  4.  5. (can send more than 5)    Patient/family instructed to identify preference.    Preferred Facility: (if more than 1, listed in order of descending preference)  [facility]        If an additional preferred facility not listed above is identified, additional referral to be sent. If above facilities unable to accept, will send additional referrals to in-network providers.

## 2025-07-31 NOTE — CONSULTS
[facility]  Neurosurgery  Consult Note    Consults  Subjective:     Chief Complaint/Reason for Admission:  Left upper extremity weakness    History of Present Illness: Delilah Fox is a 85-year-old female presented to emergency department on 07/29/2025 reporting left upper extremity weakness.  Cervical MRI demonstrate multilevel degenerative disc disease resulting in moderate canal stenosis at C3-4, C4-5 and C5-6.  CT and CTA head were negative for any acute findings.  Neurosurgery consulted.    On initiation of encounter, patient was found in bed, awake and alert with great niece and nursing staff at bedside.  Patient reported left arm weakness which began 3 days ago without an inciting event.  She has history of chronic neck pain which has since resolved.  She also uses adult diaper since stroke and has notice increase in incontinence in the last week.  She uses a walker to ambulate.  She denies upper extremity radicular pain, paresthesias, or falls.    She has past medical history of CVA (April 2025) without residual deficits (Plavix and aspirin), hypertension, hyperlipidemia, and diabetes.    Prescriptions Prior to Admission[1]    Review of patient's allergies indicates:  No Known Allergies    Past Medical History:   Diagnosis Date    Diabetes mellitus, type 2     Glaucoma     Hyperlipidemia     Hypertension     Stroke      Past Surgical History:   Procedure Laterality Date    CATARACT EXTRACTION      CYSTOSCOPY Right 6/18/2025    Procedure: CYSTOSCOPY;  Surgeon: Aleksey Lott MD;  Location: [address];  Service: Urology;  Laterality: Right;    CYSTOSCOPY W/ URETERAL STENT PLACEMENT Right 4/27/2025    Procedure: CYSTOSCOPY, WITH URETERAL STENT INSERTION;  Surgeon: Bill Moreno MD;  Location: [address];  Service: Urology;  Laterality: Right;    NEPHROSCOPY Right 6/18/2025    Procedure: NEPHROSCOPY;  Surgeon: Aleksey Lott MD;  Location: [facility] OR [address] [place] [address];  Service: Urology;   Laterality: Right;    REMOVAL-STENT Right 6/18/2025    Procedure: REMOVAL-STENT;  Surgeon: Aleksey Lott MD;  Location: [facility] OR [address] [place] [address];  Service: Urology;  Laterality: Right;    ROTATOR CUFF REPAIR      URETEROSCOPY, WITH LASER LITHOTRIPSY Right 6/18/2025    Procedure: URETEROSCOPY, WITH LASER LITHOTRIPSY;  Surgeon: Aleksey Lott MD;  Location: [facility] OR [address] [place] [address];  Service: Urology;  Laterality: Right;     Family History       Problem Relation (Age of Onset)    Breast cancer Paternal Aunt          Tobacco Use    Smoking status: Never    Smokeless tobacco: Never   Substance and Sexual Activity    Alcohol use: Not Currently    Drug use: Never    Sexual activity: Not Currently       Objective:     Weight: 76.5 kg (168 lb 10.4 oz)  Body mass index is 26.41 kg/m².  Vital Signs (Most Recent):  Temp: 97.9 °F (36.6 °C) (07/31/25 1158)  Pulse: 66 (07/31/25 1158)  Resp: 16 (07/31/25 1158)  BP: (!) 173/76 (07/31/25 1158)  SpO2: 98 % (07/31/25 1310) Vital Signs (24h Range):  Temp:  [97.5 °F (36.4 °C)-97.9 °F (36.6 °C)] 97.9 °F (36.6 °C)  Pulse:  [59-68] 66  Resp:  [16-18] 16  SpO2:  [96 %-100 %] 98 %  BP: (173-193)/(74-91) 173/76     Date 07/31/25 0700 - 08/01/25 0659   Shift 4298-3990 8969-9015 0171-2043 24 Hour Total   INTAKE   P.O. 480   480   Shift Total(mL/kg) 480(6.3)   480(6.3)   OUTPUT   Shift Total(mL/kg)       Weight (kg) 76.5 76.5 76.5 76.5         Neurosurgery Physical Exam  General:  No acute distress.  Neurologic: Alert and oriented. Thought content appropriate.  GCS: E4 V5 M6; Total: 15  Pulmonary: normal respirations, no signs of respiratory distress  Skin: Skin is warm, dry and intact.    Sensory: intact to light touch throughout  Motor Strength: Moves all extremities spontaneously with good tone.    Left upper extremity strength deltoid/biceps 3-/5, triceps/hand  4+/5   Right upper extremity strength deltoid/biceps/triceps/hand  5/5   Bilateral lower extremity strength iliopsoas/TA/EHL/gastrocnemius  5/5   No abnormal movements seen.     Mills's:  Negative bilaterally  Reflexes:   Biceps:  2+   Brachioradialis: 2+   Triceps: 2+   Patellar 2+           Significant Labs:  Recent Labs   Lab 07/30/25  0448 07/31/25  0504   GLU 90 170*    138   K 3.0* 4.0    104   CO2 28 28   BUN 15 15   CREATININE 0.8 0.7   CALCIUM 11.2* 10.5   MG 1.2* 1.7     Recent Labs   Lab 07/30/25  0448 07/31/25  0504   WBC 8.40 7.00   HGB 11.6* 12.2   HCT 36.8* 39.1    242     Recent Labs   Lab 07/30/25  0448   INR 1.1   APTT 26.2     Microbiology Results (last 7 days)       Procedure Component Value Units Date/Time    Urine culture [[id number]]  (Abnormal)  (Susceptibility) Collected: 07/29/25 1210    Order Status: Completed Specimen: Urine Updated: 07/31/25 0700     Urine Culture >100,000 cfu/ml Staphylococcus aureus    Influenza A & B by Molecular [[id number]]  (Normal) Collected: 07/29/25 1210    Order Status: Completed Specimen: Nasopharyngeal Swab Updated: 07/29/25 1306     INFLUENZA A MOLECULAR Negative     INFLUENZA B MOLECULAR  Negative            Assessment/Plan:     Cervical stenosis of spinal canal  Delilah Fox is a 85-year-old female presented to emergency department on 07/29/2025 reporting left upper extremity weakness.  Cervical MRI demonstrate multilevel degenerative disc disease resulting in moderate canal stenosis at C3-4, C4-5 and C5-6.  CT and CTA head were negative for any acute findings.  Neurosurgery consulted.    Cervical MRI demonstrates multilevel degenerative disc disease with moderate canal stenosis most significant at C5-6 with moderate to severe left recess narrowing.    Discussed operative management in regards to cervical pathology with patient and niece.  Given patient's debility and recent stroke she would need medical and cardiac clearance.  Patient stated she wanted to think about it before making any decisions.        Thank you for your consult.     TINO SWANSON,  ELIZABETH  Neurosurgery  [facility]         [1]   Medications Prior to Admission   Medication Sig Dispense Refill Last Dose/Taking    acac/inulin/c-lose/mv-mn/folic (FIBER PLUS MULITVITAMIN ORAL) Take by mouth.   7/29/2025 Morning    amLODIPine (NORVASC) 5 MG tablet Take 5 mg by mouth once daily.   7/29/2025 Morning    aspirin 81 MG Chew Take 1 tablet (81 mg total) by mouth once daily. 30 tablet 11 7/29/2025 Morning    atorvastatin (LIPITOR) 40 MG tablet Take 40 mg by mouth once daily.   7/29/2025 Morning    furosemide (LASIX) 20 MG tablet Take 20 mg by mouth once daily.   7/29/2025    latanoprost 0.005 % ophthalmic solution INSTILL 1 DROP INTO BOTH EYES AT BEDTIME AS DIRECTED INSTILL 1 DROP INTO BOTH EYES AT BEDTIME   7/28/2025 Evening    metFORMIN (GLUCOPHAGE) 500 MG tablet Take 500 mg by mouth daily with breakfast.   7/29/2025 Morning    glimepiride (AMARYL) 1 MG tablet Take 1 tablet (1 mg total) by mouth before breakfast. (Patient not taking: Reported on 6/6/2025) 90 tablet 3     metoprolol succinate (TOPROL-XL) 50 MG 24 hr tablet Take 50 mg by mouth every evening. (Patient not taking: Reported on 6/6/2025)       potassium chloride SA (K-DURSCOUTOR-CON M) 10 MEQ tablet Take 10 mEq by mouth once daily. (Patient not taking: Reported on 6/6/2025)       telmisartan (MICARDIS) 80 MG Tab Take 80 mg by mouth once daily.

## 2025-07-31 NOTE — SUBJECTIVE & OBJECTIVE
Interval History:  Patient seen and examined.  NAD.  MRI cervical spine showed degenerative change and disc disease throughout the cervical spine.   Neurosurgery consulted.  PT/OT/ST to work with patient today.  Started on Decadron 4 mg every 6 hours 7/30.  Has mild improvement in left upper extremity weakness today.  No left lower extremity weakness today.  Urine culture + staph aureus, continue rocephin.  Blood pressure elevated, increase amlodipine to 10 mg daily.  PT and OT recommending moderate intensity therapy.  Case management following for SNF placement.  Family at bedside updated on plan of care.      Review of Systems   Respiratory:  Negative for shortness of breath and wheezing.    Cardiovascular:  Negative for chest pain and palpitations.   Gastrointestinal:  Negative for abdominal pain, nausea and vomiting.   Genitourinary:  Positive for dysuria.   Neurological:  Positive for weakness.     Objective:     Vital Signs (Most Recent):  Temp: 97.5 °F (36.4 °C) (07/31/25 0329)  Pulse: 66 (07/31/25 1158)  Resp: 18 (07/30/25 2312)  BP: (!) 173/76 (07/31/25 1158)  SpO2: 98 % (07/31/25 1158) Vital Signs (24h Range):  Temp:  [97.5 °F (36.4 °C)-97.6 °F (36.4 °C)] 97.5 °F (36.4 °C)  Pulse:  [59-68] 66  Resp:  [16-18] 18  SpO2:  [96 %-100 %] 98 %  BP: (173-193)/(74-91) 173/76     Weight: 76.5 kg (168 lb 10.4 oz)  Body mass index is 26.41 kg/m².    Intake/Output Summary (Last 24 hours) at 7/31/2025 1241  Last data filed at 7/31/2025 0934  Gross per 24 hour   Intake 780 ml   Output 1150 ml   Net -370 ml         Physical Exam  Vitals and nursing note reviewed.   Constitutional:       General: She is not in acute distress.  HENT:      Mouth/Throat:      Mouth: Mucous membranes are moist.      Pharynx: Oropharynx is clear.   Abdominal:      General: Bowel sounds are normal. There is no distension.      Palpations: Abdomen is soft.      Tenderness: There is no abdominal tenderness.   Neurological:      Mental Status: She  is alert and oriented to person, place, and time.      Motor: Weakness (LUE) present.   Psychiatric:         Mood and Affect: Mood normal.               Significant Labs: All pertinent labs within the past 24 hours have been reviewed.  Recent Lab Results         07/31/25  0504   07/30/25  1526        A2C EF   63       A4C EF   61       Albumin 3.6         ALP 65         ALT 25         Anion Gap 6         Ao root annulus   2.9       Ao peak jagruti   1.4       Ao VTI   32.6       AST 42         AV valve area   2.2       KEVIN by Velocity Ratio   2.2       AORTIC VALVE CUSP SEPERATION   1.70       AV mean gradient   5       AV index (prosthetic)   0.69       AV peak gradient   8       AV Velocity Ratio   0.71       Baso # 0.00         Basophil % 0.0         BILIRUBIN TOTAL 0.6  Comment: For infants and newborns, interpretation of results should be based   on gestational age, weight and in agreement with clinical   observations.    Premature Infant recommended reference ranges:   0-24 hours:  <8.0 mg/dL   24-48 hours: <12.0 mg/dL   3-5 days:    <15.0 mg/dL   6-29 days:   <15.0 mg/dL         BSA   1.9       BUN 15         Calcium 10.5         Chloride 104         CO2 28         Creatinine 0.7         Left Ventricle Relative Wall Thickness   0.47       E/A ratio   1.27       E/E' ratio   9       eGFR >60         Eos # 0.00         Eos % 0.0         E wave deceleration time   261       FS   27.9       Glucose 170         Hematocrit 39.1         Hemoglobin 12.2         Immature Grans (Abs) 0.03  Comment: Mild elevation in immature granulocytes is non specific and can be seen in a variety of conditions including stress response, acute inflammation, trauma and pregnancy. Correlation with other laboratory and clinical findings is essential.         Immature Granulocytes 0.4         IVC diameter   1.43       IVSd   1.4       LA area A2C   18.50       LA area A4C   20.60       LA size   3.6       LVOT area   3.1       LV LATERAL E/E'  RATIO   8.8       LV SEPTAL E/E' RATIO   8.8       LV EDV BP   81       LV Diastolic Volume Index   43.09       Left Ventricular End Diastolic Volume by Teichholz Method   81.30       LV EDV A2C   [id number]       LV EDV A4C   72.30       Left Ventricular End Systolic Volume by Teichholz Method   36.40       LV ESV A2C   68.00       LV ESV A4C   53.70       LVIDd   4.3       LVIDs   3.1       LV mass   184.7       LV Mass Index   98.2       Left Ventricular Outflow Tract Mean Gradient   2.00       Left Ventricular Outflow Tract Mean Velocity   0.66       LVOT diameter   2.0       LVOT peak min   1.0       LVOT peak VTI   22.6       LV ESV BP   36       LV Systolic Volume Index   19.1       Lymph # 1.14         LYMPH % 16.3         Magnesium  1.7         MCH 24.2         MCHC 31.2         MCV 77         Mean e'   0.08       Mono # 0.07         Mono % 1.0         MPV 10.4         MV valve area p 1/2 method   2.82       MV valve area by continuity eq   2.33       MV mean gradient   2       MV peak gradient   3       MV Peak A Min   0.55       MV Peak E Min   0.70       MV stenosis pressure 1/2 time   78.00       MV VTI   30.5       Neut # 5.8         Neut % 82.3         nRBC 0         OHS CV CPX PATIENT HEIGHT IN   67       Carrero's Biplane MOD Ejection Fraction   64       Platelet Count 242         Potassium 4.0         PROTEIN TOTAL 6.7         Pulmonary Valve Mean Velocity   0.80       PV peak gradient   5       PV PEAK VELOCITY   1.17       PW   1.0       RA vol index   18.72       RA area length vol   35.20       RBC 5.05         RDW 16.2         RV S'   10.20       Sodium 138         TDI SEPTAL   0.08       TDI LATERAL   0.08       Triscuspid Valve Regurgitation Peak Gradient   36       TR Max Min   3.0       WBC 7.00         ZLVIDD   -2.00       ZLVIDS   -0.34               Significant Imaging: I have reviewed all pertinent imaging results/findings within the past 24 hours.

## 2025-07-31 NOTE — HPI
Delilah Fox is a 85-year-old female presented to emergency department on 07/29/2025 reporting left upper extremity weakness.  Cervical MRI demonstrate multilevel degenerative disc disease resulting in moderate canal stenosis at C3-4, C4-5 and C5-6.  CT and CTA head were negative for any acute findings.  Neurosurgery consulted.    On initiation of encounter, patient was found in bed, awake and alert with great niece and nursing staff at bedside.  Patient reported left arm weakness which began 3 days ago without an inciting event.  She has history of chronic neck pain which has since resolved.  She also uses adult diaper since stroke and has notice increase in incontinence in the last week.  She uses a walker to ambulate.  She denies upper extremity radicular pain, paresthesias, or falls.    She has past medical history of CVA (April 2025) without residual deficits (Plavix and aspirin), hypertension, hyperlipidemia, and diabetes.

## 2025-07-31 NOTE — ASSESSMENT & PLAN NOTE
Delilah Fox is a 85-year-old female presented to emergency department on 07/29/2025 reporting left upper extremity weakness.  Cervical MRI demonstrate multilevel degenerative disc disease resulting in moderate canal stenosis at C3-4, C4-5 and C5-6.  CT and CTA head were negative for any acute findings.  Neurosurgery consulted.    Recommendations to follow.

## 2025-07-31 NOTE — NURSING
Prceious COLBERT rounding on Ms Clay, Plan of care discussed all questions answered.     Wanda DALY also rounding on Ms Clay, Plan of care discussed all questions answered.    OK to take off Stroke Pathway.

## 2025-07-31 NOTE — ASSESSMENT & PLAN NOTE
-Has history of DM 2, stopped taking metformin this year  -Hgba1c 5.7 on 7/29/25  -Blood glucose elevated likely 2/2 decadron  -SSI for coverage while on steroids

## 2025-07-31 NOTE — PT/OT/SLP PROGRESS
Occupational Therapy      Patient Name:  Delilah Williamson   MRN:  [id number]    Patient not seen today secondary to (therapist unavailable).     7/31/2025

## 2025-07-31 NOTE — SUBJECTIVE & OBJECTIVE
Prescriptions Prior to Admission[1]    Review of patient's allergies indicates:  No Known Allergies    Past Medical History:   Diagnosis Date    Diabetes mellitus, type 2     Glaucoma     Hyperlipidemia     Hypertension     Stroke      Past Surgical History:   Procedure Laterality Date    CATARACT EXTRACTION      CYSTOSCOPY Right 6/18/2025    Procedure: CYSTOSCOPY;  Surgeon: Aleksey Lott MD;  [facility];  Service: Urology;  Laterality: Right;    CYSTOSCOPY W/ URETERAL STENT PLACEMENT Right 4/27/2025    Procedure: CYSTOSCOPY, WITH URETERAL STENT INSERTION;  Surgeon: Bill Moreno MD;  [facility];  Service: Urology;  Laterality: Right;    NEPHROSCOPY Right 6/18/2025    Procedure: NEPHROSCOPY;  Surgeon: Aleksey Lott MD;  [facility];  Service: Urology;  Laterality: Right;    REMOVAL-STENT Right 6/18/2025    Procedure: REMOVAL-STENT;  Surgeon: Aleksey Lott MD;  [facility];  Service: Urology;  Laterality: Right;    ROTATOR CUFF REPAIR      URETEROSCOPY, WITH LASER LITHOTRIPSY Right 6/18/2025    Procedure: URETEROSCOPY, WITH LASER LITHOTRIPSY;  Surgeon: Aleksey Lott MD;  [facility];  Service: Urology;  Laterality: Right;     Family History       Problem Relation (Age of Onset)    Breast cancer Paternal Aunt          Tobacco Use    Smoking status: Never    Smokeless tobacco: Never   Substance and Sexual Activity    Alcohol use: Not Currently    Drug use: Never    Sexual activity: Not Currently       Objective:     Weight: 76.5 kg (168 lb 10.4 oz)  Body mass index is 26.41 kg/m².  Vital Signs (Most Recent):  Temp: 97.9 °F (36.6 °C) (07/31/25 1158)  Pulse: 66 (07/31/25 1158)  Resp: 16 (07/31/25 1158)  BP: (!) 173/76 (07/31/25 1158)  SpO2: 98 % (07/31/25 1310) Vital Signs (24h Range):  Temp:  [97.5 °F (36.4 °C)-97.9 °F (36.6 °C)] 97.9 °F (36.6 °C)  Pulse:  [59-68] 66  Resp:  [16-18] 16  SpO2:  [96 %-100 %] 98 %  BP: (173-193)/(74-91) 173/76      Date 07/31/25 0700 - 08/01/25 0659   Shift 5859-5367 3678-5333 4427-6921 24 Hour Total   INTAKE   P.O. 480   480   Shift Total(mL/kg) 480(6.3)   480(6.3)   OUTPUT   Shift Total(mL/kg)       Weight (kg) 76.5 76.5 76.5 76.5         Neurosurgery Physical Exam  General:  No acute distress.  Neurologic: Alert and oriented. Thought content appropriate.  GCS: E4 V5 M6; Total: 15  Pulmonary: normal respirations, no signs of respiratory distress  Skin: Skin is warm, dry and intact.    Sensory: intact to light touch throughout  Motor Strength: Moves all extremities spontaneously with good tone.    Left upper extremity strength deltoid/biceps 3-/5, triceps/hand  4+/5   Right upper extremity strength deltoid/biceps/triceps/hand  5/5   Bilateral lower extremity strength iliopsoas/TA/EHL/gastrocnemius 5/5   No abnormal movements seen.     Mills's:  Negative bilaterally  Reflexes:   Biceps:  2+   Brachioradialis: 2+   Triceps: 2+   Patellar 2+           Significant Labs:  Recent Labs   Lab 07/30/25  0448 07/31/25  0504   GLU 90 170*    138   K 3.0* 4.0    104   CO2 28 28   BUN 15 15   CREATININE 0.8 0.7   CALCIUM 11.2* 10.5   MG 1.2* 1.7     Recent Labs   Lab 07/30/25  0448 07/31/25  0504   WBC 8.40 7.00   HGB 11.6* 12.2   HCT 36.8* 39.1    242     Recent Labs   Lab 07/30/25 0448   INR 1.1   APTT 26.2     Microbiology Results (last 7 days)       Procedure Component Value Units Date/Time    Urine culture [[id number]]  (Abnormal)  (Susceptibility) Collected: 07/29/25 1210    Order Status: Completed Specimen: Urine Updated: 07/31/25 0700     Urine Culture >100,000 cfu/ml Staphylococcus aureus    Influenza A & B by Molecular [[id number]]  (Normal) Collected: 07/29/25 1210    Order Status: Completed Specimen: Nasopharyngeal Swab Updated: 07/29/25 1306     INFLUENZA A MOLECULAR Negative     INFLUENZA B MOLECULAR  Negative                 [1]   Medications Prior to Admission   Medication Sig Dispense  Refill Last Dose/Taking    acac/inulin/c-lose/mv-mn/folic (FIBER PLUS MULITVITAMIN ORAL) Take by mouth.   7/29/2025 Morning    amLODIPine (NORVASC) 5 MG tablet Take 5 mg by mouth once daily.   7/29/2025 Morning    aspirin 81 MG Chew Take 1 tablet (81 mg total) by mouth once daily. 30 tablet 11 7/29/2025 Morning    atorvastatin (LIPITOR) 40 MG tablet Take 40 mg by mouth once daily.   7/29/2025 Morning    furosemide (LASIX) 20 MG tablet Take 20 mg by mouth once daily.   7/29/2025    latanoprost 0.005 % ophthalmic solution INSTILL 1 DROP INTO BOTH EYES AT BEDTIME AS DIRECTED INSTILL 1 DROP INTO BOTH EYES AT BEDTIME   7/28/2025 Evening    metFORMIN (GLUCOPHAGE) 500 MG tablet Take 500 mg by mouth daily with breakfast.   7/29/2025 Morning    glimepiride (AMARYL) 1 MG tablet Take 1 tablet (1 mg total) by mouth before breakfast. (Patient not taking: Reported on 6/6/2025) 90 tablet 3     metoprolol succinate (TOPROL-XL) 50 MG 24 hr tablet Take 50 mg by mouth every evening. (Patient not taking: Reported on 6/6/2025)       potassium chloride SA (K-DUR,KLOR-CON M) 10 MEQ tablet Take 10 mEq by mouth once daily. (Patient not taking: Reported on 6/6/2025)       telmisartan (MICARDIS) 80 MG Tab Take 80 mg by mouth once daily.

## 2025-07-31 NOTE — PLAN OF CARE
Problem: Oral Intake Inadequate  Goal: Improved Oral Intake  Intervention: Promote and Optimize Oral Intake  Flowsheets (Taken 7/31/2025 1613)  Nutrition Interventions:   diet adjusted   meals from home/family encouraged   supplemental drinks provided   food preferences provided     Problem: Skin Injury Risk Increased  Goal: Skin Health and Integrity  Intervention: Promote and Optimize Oral Intake  Flowsheets (Taken 7/31/2025 1613)  Nutrition Interventions:   diet adjusted   meals from home/family encouraged   supplemental drinks provided   food preferences provided

## 2025-07-31 NOTE — CARE UPDATE
07/30/25 2100   Patient Assessment/Suction   Level of Consciousness (AVPU) alert   Respiratory Effort Normal;Unlabored   Expansion/Accessory Muscles/Retractions no use of accessory muscles   All Lung Fields Breath Sounds equal bilaterally   PRE-TX-O2   Device (Oxygen Therapy) room air   SpO2 96 %   Pulse Oximetry Type Intermittent   $ Pulse Oximetry - Multiple Charge Pulse Oximetry - Multiple   Pulse 66   Resp 16   Education   $ Education Oxygen;15 min   Respiratory Evaluation   $ Care Plan Tech Time 15 min

## 2025-07-31 NOTE — CONSULTS
Critical access hospital  Wound Care    Patient Name:  Delilah Williamson   MRN:  [id number]  Date: 7/30/2025  Diagnosis: Acute ischemic stroke    History:     Past Medical History:   Diagnosis Date    Diabetes mellitus, type 2     Glaucoma     Hyperlipidemia     Hypertension     Stroke        Social History[1]    Precautions:     Allergies as of 07/29/2025    (No Known Allergies)       WO Assessment Details/Treatment     Wound care consulted for wounds present on admit. Patient noted with DTI to bilateral buttocks and abrasions to bilateral anterior knees. Cleaned areas and applied Triad. Orders noted. Wound care team to follow up as needed throughout hospital stay.        07/30/25 1855   WOCN Assessment   WOCN Total Time (mins) 45   Visit Date 07/30/25   Visit Time 1700   Consult Type New   WOCN Speciality Wound   Wound pressure;skin tear   Number of Wounds 4   Intervention assessed;changed;applied;chart review;coordination of care;orders   Teaching on-going        Wound 07/29/25 2020 Abrasion(s) Right anterior Knee   Date First Assessed/Time First Assessed: 07/29/25 2020   Present on Original Admission: Yes  Primary Wound Type: Abrasion(s)  Side: Right  Orientation: anterior  Location: Knee   Wound Image    Dressing Appearance Open to air   Drainage Amount None   Appearance Eschar   Care Cleansed with:;Antimicrobial agent;Soap and water;Applied:;Skin Barrier   Dressing Applied  (Triad)        Wound 07/29/25 2020 Pressure Injury Left Buttocks   Date First Assessed/Time First Assessed: 07/29/25 2020   Present on Original Admission: Yes  Primary Wound Type: Pressure Injury  Side: Left  Location: Buttocks   Wound Image    Pressure Injury Stage DTPI   Dressing Appearance Open to air   Drainage Amount None   Appearance Maroon;Red;Purple   Care Cleansed with:;Antimicrobial agent;Soap and water;Applied:;Skin Barrier   Dressing Applied  (Triad)        Wound 07/29/25 Abrasion(s) Left anterior Knee   Date First Assessed:  07/29/25   Present on Original Admission: Yes  Primary Wound Type: Abrasion(s)  Side: Left  Orientation: anterior  Location: Knee   Wound Image    Dressing Appearance Open to air   Drainage Amount None   Appearance Eschar   Care Cleansed with:;Antimicrobial agent;Soap and water;Applied:   Dressing Applied  (Triad)        Wound 07/29/25 Pressure Injury Right Buttocks   Date First Assessed: 07/29/25   Present on Original Admission: Yes  Primary Wound Type: Pressure Injury  Side: Right  Location: Buttocks   Wound Image    Pressure Injury Stage DTPI   Dressing Appearance Open to air   Drainage Amount None   Appearance Maroon;Purple;Red   Care Cleansed with:;Antimicrobial agent;Soap and water;Applied:   Dressing Applied  (Triad)            07/30/2025         [1]   Social History  Socioeconomic History    Marital status: Single   Tobacco Use    Smoking status: Never    Smokeless tobacco: Never   Substance and Sexual Activity    Alcohol use: Not Currently    Drug use: Never    Sexual activity: Not Currently     Social Drivers of Health     Financial Resource Strain: Low Risk  (7/30/2025)    Overall Financial Resource Strain (CARDIA)     Difficulty of Paying Living Expenses: Not hard at all   Food Insecurity: No Food Insecurity (7/30/2025)    Hunger Vital Sign     Worried About Running Out of Food in the Last Year: Never true     Ran Out of Food in the Last Year: Never true   Transportation Needs: No Transportation Needs (7/30/2025)    PRAPARE - Transportation     Lack of Transportation (Medical): No     Lack of Transportation (Non-Medical): No   Stress: No Stress Concern Present (7/30/2025)    [employer] - Occupational Stress Questionnaire     Feeling of Stress : Not at all   Housing Stability: Low Risk  (7/30/2025)    Housing Stability Vital Sign     Unable to Pay for Housing in the Last Year: No     Number of Times Moved in the Last Year: 0     Homeless in the Last Year: No

## 2025-07-31 NOTE — PROGRESS NOTES
[facility]  Adult Nutrition   Progress Note (Initial Assessment)     Admit Date: 7/29/2025   Total duration of encounter: 2 days     SUMMARY   Recommendations  1. Continue Soft diet per SLP. RD to monitor glucose labs and adjust diet PRN. 2. Consider Boost Glucose control chocolate with meals due to history and wound care needs.    Nutrition Goals:   Goal #1: PO intake will meet greater than or equal to 50% of estimated needs by RD follow up   Nutrition Goal Status #1: new  Goal #2: Lab values to trend toward normal range by RD follow up  Nutrition Goal Status #2: new    Nutrition Interventions: Texture modified diet, Medical food supplement therapy      Nutrition Diagnosis   Inadequate protein energy intake related to Acute illness, Swallowing difficulty as evidence by Weight loss greater than or equal to 5% in 1 month, Intake <75% estimated needs, Swallow study results and stroke pathway.  Status: New    Dietitian Rounds Brief  MST 2 screen. Patient unsure of weight loss with no reported poor intake / appetite.7%, 11 lb weight loss in 1 month per Epic. Patient eating 50% of soft diet. Family to bring appropriate foods per discussion. No visible signs of malnutrition; patient unable ot consent for NFPE at this time. RD changed supplement to Boost Glucose control and will monitor labs, intake, and status PRN.      Malnutrition Assessment     Skin (Micronutrient): wounds unhealed       Weight Loss (Malnutrition): greater than 5% in 1 month                         Diet order:   Diet Soft & Bite Sized (IDDSI Level 6)  Dietary nutrition supplements By Mouth; BID; Boost Very High Calorie Nutritional Drink - Chocolate   Oral Nutrition Supplement: Boost VHC BID             Evaluation of Received Nutrient/Fluid Intake  Energy Calories Required: not meeting needs  Protein Required: not meeting needs  Fluid Required: not meeting needs  Tolerance: tolerating     % Intake of Estimated Energy Needs: 25 - 50 %  %  "Meal Intake: 25 - 50 %      Intake/Output Summary (Last 24 hours) at 7/31/2025 1609  Last data filed at 7/31/2025 1405  Gross per 24 hour   Intake 780 ml   Output 850 ml   Net -70 ml        Anthropometrics  Height: 5' 7" (170.2 cm)  Height (inches): 67 in  Height Method: Stated  Weight: 76.5 kg (168 lb 10.4 oz)  Weight (lb): 168.65 lb  Weight Method: Bed Scale  Ideal Body Weight (IBW), Female: 135 lb  % Ideal Body Weight, Female (lb): 124.93 %  BMI (Calculated): 26.4  BMI Grade: 25 - 29.9 - overweight       Estimated/Assessed Needs  Weight Used For Calorie Calculations: 76.5 kg (168 lb 10.4 oz)  Energy Calorie Requirements (kcal): 1243-0170 kcal/day (25-30 kcal/kg).  Energy Need Method: Kcal/kg  Protein Requirements: 114-153 gm/day (1.5-2.0 gm/kg).  Weight Used For Protein Calculations: 76.5 kg (168 lb 10.4 oz)     Estimated Fluid Requirement Method: RDA Method  RDA Method (mL): 1912  CHO Requirement: 239-287 gm/day (15-19 CHO servings/day).    Reason for Assessment  Reason For Assessment: identified at risk by screening criteria (MST 2)  Diagnosis: stroke/CVA  General Information Comments: Patient with prior stroke,HTN, HLD, DM2, glaucoma presented with left arm weakness since 7/28/2025. Stroke code was activated . Also noted to have + UA.    Final diagnoses:  [R53.1] Left-sided weakness (Primary)  [N30.90] Bladder infection     Past Medical History:   Diagnosis Date    Diabetes mellitus, type 2     Glaucoma     Hyperlipidemia     Hypertension     Stroke         Nutrition/Diet History  Nutrition Intake History: No reported poor intake/appetite prior to admit per MST 2.  Food Preferences: Chocolate supplement.  Spiritual, Cultural Beliefs, Jew Practices, Values that Affect Care: no  Food Allergies: NKFA  Factors Affecting Nutritional Intake: difficulty/impaired swallowing, chewing difficulties/inability to chew food    Nutrition Risk Screen          Wound 07/29/25 2020 Abrasion(s) Right anterior Knee-Wound " Image: Images linked       Wound 07/29/25 2020 Pressure Injury Left Buttocks-Wound Image: Images linked       Wound 07/29/25 Abrasion(s) Left anterior Knee-Wound Image: Images linked       Wound 07/29/25 Pressure Injury Right Buttocks-Wound Image: Images linked  MST Score: 2  Have you recently lost weight without trying?: Unsure  Weight loss score: 2  Have you been eating poorly because of a decreased appetite?: No  Appetite score: 0       Weight History:  Wt Readings from Last 10 Encounters:   07/29/25 76.5 kg (168 lb 10.4 oz)   06/18/25 81.2 kg (179 lb)   06/05/25 87 kg (191 lb 12.8 oz)   04/28/25 87.4 kg (192 lb 10.9 oz)   04/23/25 102.1 kg (225 lb)   11/11/24 102.5 kg (226 lb)   06/10/24 97 kg (213 lb 13.5 oz)   06/06/24 98 kg (216 lb)   05/10/23 98 kg (216 lb 0.8 oz)   11/08/22 98 kg (216 lb)        Lab/Procedures/Meds:   Pertinent Labs Reviewed: reviewed  Pertinent Labs Comments: glucose < 170 mg/dL on regular diet.  Pertinent Medications Reviewed: reviewed  Medications:Pertinent Medications Reviewed  Scheduled Meds:   amLODIPine  10 mg Oral Daily    aspirin  81 mg Oral Daily    atorvastatin  40 mg Oral Daily    cefTRIAXone (Rocephin) IV (PEDS and ADULTS)  1 g Intravenous Q24H    clopidogreL  75 mg Oral Daily    dexAMETHasone (Decadron) IV (PEDS and ADULTS)  4 mg Intravenous Q6H    enoxparin  40 mg Subcutaneous Daily    methocarbamoL  750 mg Oral TID    [START ON 8/1/2025] pantoprazole  40 mg Oral Daily    polymyxin B sulf-trimethoprim  1 drop Both Eyes Q6H    valsartan  320 mg Oral Daily     Continuous Infusions:  PRN Meds:.  Current Facility-Administered Medications:     bisacodyL, 10 mg, Rectal, Daily PRN    dextrose 50%, 12.5 g, Intravenous, PRN    dextrose 50%, 25 g, Intravenous, PRN    glucagon (human recombinant), 1 mg, Intramuscular, PRN    glucose, 16 g, Oral, PRN    glucose, 24 g, Oral, PRN    insulin aspart U-100, 0-5 Units, Subcutaneous, QID (AC + HS) PRN    labetalol, 10 mg, Intravenous, Q15 Min  "PRN    sodium chloride 0.9%, 500 mL, Intravenous, PRN    sodium chloride 0.9%, 10 mL, Intravenous, PRN    Labs: Pertinent Labs Reviewed  Clinical Chemistry:  Recent Labs   Lab 07/29/25  1142 07/29/25  1142 07/30/25  0448 07/31/25  0504     --  141 138   K 3.5  --  3.0* 4.0     --  105 104   CO2 29  --  28 28   *  --  90 170*   BUN 18  --  15 15   CREATININE 0.9  --  0.8 0.7   CALCIUM 11.4*  --  11.2* 10.5   PROT 6.6  --  6.5 6.7   ALBUMIN 3.7  --  3.4* 3.6   BILITOT 0.8  --  0.8 0.6   ALKPHOS 57  --  58 65   AST 57*  --  49* 42*   ALT 25  --  25 25   ANIONGAP 7*  --  8 6*   MG  --    < > 1.2* 1.7   PHOS  --   --  2.5*  --     < > = values in this interval not displayed.     CBC:   Recent Labs   Lab 07/31/25  0504   WBC 7.00   RBC 5.05   HGB 12.2   HCT 39.1      MCV 77*   MCH 24.2*   MCHC 31.2*     Lipid Panel:  Recent Labs   Lab 07/29/25  1142   CHOL 103*   HDL 38*   LDLCALC 52.4*   TRIG 63   CHOLHDL 36.9     Cardiac Profile:  No results for input(s): "BNP", "CPK", "CPKMB", "TROPONINI", "CKTOTAL" in the last 168 hours.  Inflammatory Labs:  No results for input(s): "CRP" in the last 168 hours.  Diabetes:  Recent Labs   Lab 07/29/25  1127 07/29/25  1447   HGBA1C  --  5.7   POCTGLUCOSE 136*  --      Thyroid & Parathyroid:  Recent Labs   Lab 07/29/25  1142   TSH 1.531       Monitor and Evaluation  Monitor and Evaluation: Glucose/endocrine profile, Diet order, Food and beverage intake     Discharge Planning  Nutrition Discharge Planning: Diet texture per SLP, Therapeutic diet (comments), Oral supplement regimen (comments)  Therapeutic diet (comments): Diabetic Soft & Bite sized (IDDSI Level 6) diet    Nutrition Risk  Level of Risk/Frequency of Follow-up:  (2 x / week)     Nutrition Follow-Up  RD Follow-up?: Yes            "

## 2025-08-01 PROBLEM — H10.9 CONJUNCTIVITIS: Status: RESOLVED | Noted: 2025-07-29 | Resolved: 2025-08-01

## 2025-08-01 LAB
ABSOLUTE EOSINOPHIL (SMH): 0 K/UL
ABSOLUTE MONOCYTE (SMH): 0.27 K/UL (ref 0.3–1)
ABSOLUTE NEUTROPHIL COUNT (SMH): 8 K/UL (ref 1.8–7.7)
ALBUMIN SERPL-MCNC: 3.5 G/DL (ref 3.5–5.2)
ALP SERPL-CCNC: 67 UNIT/L (ref 55–135)
ALT SERPL-CCNC: 24 UNIT/L (ref 10–44)
ANION GAP (SMH): 9 MMOL/L (ref 8–16)
AST SERPL-CCNC: 28 UNIT/L (ref 10–40)
BASOPHILS # BLD AUTO: 0.03 K/UL
BASOPHILS NFR BLD AUTO: 0.3 %
BILIRUB SERPL-MCNC: 0.5 MG/DL (ref 0.1–1)
BUN SERPL-MCNC: 26 MG/DL (ref 8–23)
CALCIUM SERPL-MCNC: 10.4 MG/DL (ref 8.7–10.5)
CHLORIDE SERPL-SCNC: 102 MMOL/L (ref 95–110)
CO2 SERPL-SCNC: 25 MMOL/L (ref 23–29)
CREAT SERPL-MCNC: 0.8 MG/DL (ref 0.5–1.4)
ERYTHROCYTE [DISTWIDTH] IN BLOOD BY AUTOMATED COUNT: 16 % (ref 11.5–14.5)
GFR SERPLBLD CREATININE-BSD FMLA CKD-EPI: >60 ML/MIN/1.73/M2
GLUCOSE SERPL-MCNC: 232 MG/DL (ref 70–110)
HCT VFR BLD AUTO: 40.1 % (ref 37–48.5)
HGB BLD-MCNC: 12.6 GM/DL (ref 12–16)
IMM GRANULOCYTES # BLD AUTO: 0.07 K/UL (ref 0–0.04)
IMM GRANULOCYTES NFR BLD AUTO: 0.7 % (ref 0–0.5)
LYMPHOCYTES # BLD AUTO: 1.28 K/UL (ref 1–4.8)
MAGNESIUM SERPL-MCNC: 1.8 MG/DL (ref 1.6–2.6)
MCH RBC QN AUTO: 24.3 PG (ref 27–31)
MCHC RBC AUTO-ENTMCNC: 31.4 G/DL (ref 32–36)
MCV RBC AUTO: 77 FL (ref 82–98)
NUCLEATED RBC (/100WBC) (SMH): 0 /100 WBC
PLATELET # BLD AUTO: 312 K/UL (ref 150–450)
PMV BLD AUTO: 10.9 FL (ref 9.2–12.9)
POCT GLUCOSE: 202 MG/DL (ref 70–110)
POCT GLUCOSE: 213 MG/DL (ref 70–110)
POCT GLUCOSE: 234 MG/DL (ref 70–110)
POCT GLUCOSE: 277 MG/DL (ref 70–110)
POTASSIUM SERPL-SCNC: 3.7 MMOL/L (ref 3.5–5.1)
PROT SERPL-MCNC: 6.8 GM/DL (ref 6–8.4)
RBC # BLD AUTO: 5.18 M/UL (ref 4–5.4)
RELATIVE EOSINOPHIL (SMH): 0 % (ref 0–8)
RELATIVE LYMPHOCYTE (SMH): 13.3 % (ref 18–48)
RELATIVE MONOCYTE (SMH): 2.8 % (ref 4–15)
RELATIVE NEUTROPHIL (SMH): 82.9 % (ref 38–73)
SODIUM SERPL-SCNC: 136 MMOL/L (ref 136–145)
WBC # BLD AUTO: 9.61 K/UL (ref 3.9–12.7)

## 2025-08-01 PROCEDURE — 83735 ASSAY OF MAGNESIUM: CPT

## 2025-08-01 PROCEDURE — 25000003 PHARM REV CODE 250

## 2025-08-01 PROCEDURE — 85025 COMPLETE CBC W/AUTO DIFF WBC: CPT

## 2025-08-01 PROCEDURE — 36415 COLL VENOUS BLD VENIPUNCTURE: CPT

## 2025-08-01 PROCEDURE — 63600175 PHARM REV CODE 636 W HCPCS: Performed by: HOSPITALIST

## 2025-08-01 PROCEDURE — 30200315 PPD INTRADERMAL TEST REV CODE 302

## 2025-08-01 PROCEDURE — 99900031 HC PATIENT EDUCATION (STAT)

## 2025-08-01 PROCEDURE — 99900035 HC TECH TIME PER 15 MIN (STAT)

## 2025-08-01 PROCEDURE — 97110 THERAPEUTIC EXERCISES: CPT

## 2025-08-01 PROCEDURE — 86580 TB INTRADERMAL TEST: CPT

## 2025-08-01 PROCEDURE — 21400001 HC TELEMETRY ROOM

## 2025-08-01 PROCEDURE — 80053 COMPREHEN METABOLIC PANEL: CPT

## 2025-08-01 PROCEDURE — 97129 THER IVNTJ 1ST 15 MIN: CPT

## 2025-08-01 PROCEDURE — 92507 TX SP LANG VOICE COMM INDIV: CPT

## 2025-08-01 PROCEDURE — 63600175 PHARM REV CODE 636 W HCPCS

## 2025-08-01 PROCEDURE — 97530 THERAPEUTIC ACTIVITIES: CPT

## 2025-08-01 PROCEDURE — 25000003 PHARM REV CODE 250: Performed by: HOSPITALIST

## 2025-08-01 PROCEDURE — 94761 N-INVAS EAR/PLS OXIMETRY MLT: CPT

## 2025-08-01 PROCEDURE — 82962 GLUCOSE BLOOD TEST: CPT

## 2025-08-01 PROCEDURE — 25000003 PHARM REV CODE 250: Performed by: INTERNAL MEDICINE

## 2025-08-01 RX ORDER — MAGNESIUM SULFATE 1 G/100ML
1 INJECTION INTRAVENOUS ONCE
Status: COMPLETED | OUTPATIENT
Start: 2025-08-01 | End: 2025-08-01

## 2025-08-01 RX ORDER — POLYETHYLENE GLYCOL 3350 17 G/17G
17 POWDER, FOR SOLUTION ORAL DAILY
Status: DISCONTINUED | OUTPATIENT
Start: 2025-08-01 | End: 2025-08-05 | Stop reason: HOSPADM

## 2025-08-01 RX ADMIN — INSULIN ASPART 2 UNITS: 100 INJECTION, SOLUTION INTRAVENOUS; SUBCUTANEOUS at 09:08

## 2025-08-01 RX ADMIN — METHOCARBAMOL 750 MG: 750 TABLET ORAL at 09:08

## 2025-08-01 RX ADMIN — HYDRALAZINE HYDROCHLORIDE 10 MG: 20 INJECTION, SOLUTION INTRAMUSCULAR; INTRAVENOUS at 12:08

## 2025-08-01 RX ADMIN — MAGNESIUM SULFATE IN DEXTROSE 1 G: 10 INJECTION, SOLUTION INTRAVENOUS at 06:08

## 2025-08-01 RX ADMIN — TUBERCULIN PURIFIED PROTEIN DERIVATIVE 5 UNITS: 5 INJECTION, SOLUTION INTRADERMAL at 09:08

## 2025-08-01 RX ADMIN — DEXAMETHASONE SODIUM PHOSPHATE 4 MG: 4 INJECTION, SOLUTION INTRA-ARTICULAR; INTRALESIONAL; INTRAMUSCULAR; INTRAVENOUS; SOFT TISSUE at 06:08

## 2025-08-01 RX ADMIN — PANTOPRAZOLE SODIUM 40 MG: 40 TABLET, DELAYED RELEASE ORAL at 11:08

## 2025-08-01 RX ADMIN — POLYETHYLENE GLYCOL 3350 17 G: 17 POWDER, FOR SOLUTION ORAL at 09:08

## 2025-08-01 RX ADMIN — DEXAMETHASONE SODIUM PHOSPHATE 4 MG: 4 INJECTION, SOLUTION INTRA-ARTICULAR; INTRALESIONAL; INTRAMUSCULAR; INTRAVENOUS; SOFT TISSUE at 12:08

## 2025-08-01 RX ADMIN — POTASSIUM BICARBONATE 25 MEQ: 977.5 TABLET, EFFERVESCENT ORAL at 06:08

## 2025-08-01 RX ADMIN — ASPIRIN 81 MG: 81 TABLET, DELAYED RELEASE ORAL at 09:08

## 2025-08-01 RX ADMIN — DEXAMETHASONE SODIUM PHOSPHATE 4 MG: 4 INJECTION, SOLUTION INTRA-ARTICULAR; INTRALESIONAL; INTRAMUSCULAR; INTRAVENOUS; SOFT TISSUE at 11:08

## 2025-08-01 RX ADMIN — AMLODIPINE BESYLATE 10 MG: 5 TABLET ORAL at 09:08

## 2025-08-01 RX ADMIN — CEFTRIAXONE SODIUM 1 G: 1 INJECTION, POWDER, FOR SOLUTION INTRAMUSCULAR; INTRAVENOUS at 11:08

## 2025-08-01 RX ADMIN — METHOCARBAMOL 750 MG: 750 TABLET ORAL at 03:08

## 2025-08-01 RX ADMIN — INSULIN ASPART 2 UNITS: 100 INJECTION, SOLUTION INTRAVENOUS; SUBCUTANEOUS at 06:08

## 2025-08-01 RX ADMIN — INSULIN ASPART 2 UNITS: 100 INJECTION, SOLUTION INTRAVENOUS; SUBCUTANEOUS at 02:08

## 2025-08-01 RX ADMIN — VALSARTAN 320 MG: 80 TABLET, FILM COATED ORAL at 09:08

## 2025-08-01 RX ADMIN — ATORVASTATIN CALCIUM 40 MG: 40 TABLET, FILM COATED ORAL at 09:08

## 2025-08-01 RX ADMIN — CLOPIDOGREL 75 MG: 75 TABLET ORAL at 09:08

## 2025-08-01 RX ADMIN — ENOXAPARIN SODIUM 40 MG: 40 INJECTION SUBCUTANEOUS at 06:08

## 2025-08-01 RX ADMIN — BISACODYL 10 MG: 10 SUPPOSITORY RECTAL at 09:08

## 2025-08-01 RX ADMIN — INSULIN ASPART 1 UNITS: 100 INJECTION, SOLUTION INTRAVENOUS; SUBCUTANEOUS at 09:08

## 2025-08-01 NOTE — PLAN OF CARE
Problem: SLP  Goal: SLP Goal  Description: 1. Use CSS indep in sentences she read, 100% acc, indep.  2. Demo comprehension of complex info indep, 100% acc without repetition.  3. Solve functional word math/time/money calculation problems, 90% acc, modif indep.  4. Use preferred memory strategies SBA to set and recall functional info 90% acc.  5. Sustain attention on simple task, 30 secs, indep.  Outcome: Progressing   1. Used in imitation with 90% acc, but dropped to Georgette when producing self generated responses  2. Intro to memory strategies

## 2025-08-01 NOTE — PT/OT/SLP PROGRESS
Physical Therapy      Patient Name:  Delilah Williamson   MRN:  [id number]    Patient not seen today secondary to  working with speech at time of attempt. Will follow-up in p.m. if time permits.

## 2025-08-01 NOTE — PROGRESS NOTES
[facility] Medicine  Progress Note    Patient Name: Delilah Williamson  MRN: [id number]  Patient Class: IP- Inpatient   Admission Date: 7/29/2025  Length of Stay: 3 days  Attending Physician: Sara Dickinson MD  Primary Care Provider: Juan Abarca MD        Subjective     Principal Problem:Cervical stenosis of spinal canal        HPI:  This is a pleasant 85-year-old lady with comorbid conditions of hyperlipidemia, hypertension, debility related to age who presents to the emergency department after concerns of left-sided weakness.  Patient reportedly had a fall yesterday and daughter-in-law stayed with her, however, upon waking this morning they noticed that she was unable to keep her left arm against gravity.  Code stroke was activated in the emergency department, CT head negative, CTA head and neck with no large vessel occlusion and MRI negative although clinically patient with 1/5 strength of the left upper extremity with no associated musculoskeletal patent.  Left lower extremity 2/5.  This is potentially an MRI negative CVA.  Continue dual antiplatelet therapy and neurology consult to follow.  Urinalysis frankly positive for infection.  Continue ceftriaxone.  Admission to hospital medicine with the above workup.    Overview/Hospital Course:  No notes on file    Interval History:  Patient seen and examined.  NAD.  LUE weakness with slight improvement today.  She is able to hold a supplement shake in her left hand.  Dr. Walls evaluated patient today, patient would like to continue with nonoperative intervention at this time and pursue SNF placement.  Case management following for SNF placement.  We will continue on Decadron, plan to taper tomorrow according to neurosurgery's recs.  Blood pressure better controlled with the addition of PRN hydralazine, likely elevated 2/2 decadron.  Hyperglycemia also 2/2 decadron, continue SSI.    Review of Systems   Respiratory:  Negative for shortness  See TE dated today. of breath and wheezing.    Cardiovascular:  Negative for chest pain and palpitations.   Gastrointestinal:  Negative for abdominal pain, nausea and vomiting.   Genitourinary:  Negative for dysuria.   Neurological:  Positive for weakness.     Objective:     Vital Signs (Most Recent):  Temp: 97.5 °F (36.4 °C) (08/01/25 1047)  Pulse: 66 (08/01/25 1047)  Resp: 18 (08/01/25 1047)  BP: (!) 170/75 (08/01/25 1047)  SpO2: 99 % (08/01/25 1047) Vital Signs (24h Range):  Temp:  [97.5 °F (36.4 °C)-98 °F (36.7 °C)] 97.5 °F (36.4 °C)  Pulse:  [63-88] 66  Resp:  [16-18] 18  SpO2:  [96 %-100 %] 99 %  BP: (142-177)/(63-77) 170/75     Weight: 76.5 kg (168 lb 10.4 oz)  Body mass index is 26.41 kg/m².    Intake/Output Summary (Last 24 hours) at 8/1/2025 1312  Last data filed at 8/1/2025 0851  Gross per 24 hour   Intake 540 ml   Output 300 ml   Net 240 ml         Physical Exam  Vitals and nursing note reviewed.   Constitutional:       General: She is not in acute distress.  HENT:      Mouth/Throat:      Mouth: Mucous membranes are moist.      Pharynx: Oropharynx is clear.   Abdominal:      General: Bowel sounds are normal. There is no distension.      Palpations: Abdomen is soft.      Tenderness: There is no abdominal tenderness.   Neurological:      Mental Status: She is alert and oriented to person, place, and time.      Motor: Weakness present.      Comments: Unable to hold LUE against gravity, can open and close LUE  Strength LLE 4/5   Psychiatric:         Mood and Affect: Mood normal.               Significant Labs: All pertinent labs within the past 24 hours have been reviewed.  Recent Lab Results  (Last 5 results in the past 24 hours)        08/01/25  1049   08/01/25  0717   08/01/25  0502   08/01/25  0453   07/31/25  1936        Albumin     3.5           ALP     67           ALT     24           Anion Gap     9           AST     28           Baso #       0.03         Basophil %       0.3         BILIRUBIN TOTAL     0.5  Comment:  For infants and newborns, interpretation of results should be based   on gestational age, weight and in agreement with clinical   observations.    Premature Infant recommended reference ranges:   0-24 hours:  <8.0 mg/dL   24-48 hours: <12.0 mg/dL   3-5 days:    <15.0 mg/dL   6-29 days:   <15.0 mg/dL           BUN     26           Calcium     10.4           Chloride     102           CO2     25           Creatinine     0.8           eGFR     >60           Eos #       0.00         Eos %       0.0         Glucose     232           Hematocrit       40.1         Hemoglobin       12.6         Immature Grans (Abs)       0.07  Comment: Mild elevation in immature granulocytes is non specific and can be seen in a variety of conditions including stress response, acute inflammation, trauma and pregnancy. Correlation with other laboratory and clinical findings is essential.         Immature Granulocytes       0.7         Lymph #       1.28         LYMPH %       13.3         Magnesium      1.8           MCH       24.3         MCHC       31.4         MCV       77         Mono #       0.27         Mono %       2.8         MPV       10.9         Neut #       8.0         Neut %       82.9         nRBC       0         Platelet Count       312         POCT Glucose 213   202       196       Potassium     3.7           PROTEIN TOTAL     6.8           RBC       5.18         RDW       16.0         Sodium     136           WBC       9.61                                Significant Imaging: I have reviewed all pertinent imaging results/findings within the past 24 hours.      Assessment & Plan  Cervical stenosis of spinal canal  -Presented with LUE/LLE weakness  -CT C-spine: Multilevel cervical disc bulging/disc protrusions, including left paracentral disc protrusion at C5-C6, with spinal canal stenosis and left neural foraminal narrowing.   -MRI C-spine: Multifocal cervical stenosis, severe C3-4 C5-6. No cord signal change   -Neurosurgery  consulted, discussed operative intervention, patient would like to pursue nonoperative intervention at this time and SNF placement  -Decadron 4mg every 6 hours, begin tapering tomorrow per neurosurgery recs  -PT/OT  Hyperlipidemia  Continue statin    UTI (urinary tract infection)  -Urine culture +staph aureus  -Continue rocephin    Conjunctivitis (Resolved: 8/1/2025)  Trimethoprin-polymyxin B QID x5 days    HTN (hypertension)  Patient's blood pressure range in the last 24 hours was: BP  Min: 142/63  Max: 177/72.The patient's inpatient anti-hypertensive regimen is listed below:  Current Antihypertensives  , Daily, Oral  valsartan tablet 320 mg, Daily, Oral  amLODIPine tablet 10 mg, Daily, Oral  hydrALAZINE injection 10 mg, Every 6 hours PRN, Intravenous    Plan  - BP is uncontrolled, will adjust as follows: increase amlodipine and PRN hydralazine  - Uncontrolled HTN likely 2/2 decadron, continue PRN hydralazine for now  Controlled type 2 diabetes mellitus without complication, without long-term current use of insulin  -Has history of DM 2, reports that she does take metformin 500mg daily outpatient  -Hgba1c 5.7 on 7/29/25  -Blood glucose elevated likely 2/2 decadron  -SSI for coverage while on steroids    VTE Risk Mitigation (From admission, onward)           Ordered     enoxaparin injection 40 mg  Daily         07/29/25 1404     IP VTE HIGH RISK PATIENT  Once         07/29/25 1404                    Discharge Planning   ALINA: 8/5/2025     Code Status: DNR   Medical Readiness for Discharge Date:   Discharge Plan A: Skilled Nursing Facility                  Please place Justification for DME      JACOB Justice  Department of Hospital Medicine   [facility]

## 2025-08-01 NOTE — PT/OT/SLP PROGRESS
Speech Language Pathology Treatment    Patient Name:  Delilah Williamson   MRN:  [id number]  Admitting Diagnosis: Cervical stenosis of spinal canal    Recommendations:                 General Recommendations:  Speech/language therapy and Cognitive-linguistic therapy  Diet recommendations:  Soft & Bite Sized Diet - IDDSI Level 6, Liquid Diet Level: Thin liquids - IDDSI Level 0   Aspiration Precautions: Standard aspiration precautions   General Precautions: Standard, fall  Communication strategies:  provide increased time to answer  Discharge recommendations:  Moderate Intensity Therapy   Barriers to Discharge:  None    Assessment:     Delilah Williamson is a 85 y.o. 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  She presents with mildly-moderately increased rate and low volume voice, and general memory and problem solving deficits.  Cont POC.    Subjective     I'm still here  Patient goals: sleep     Objective:     Has the patient been evaluated by SLP for swallowing?   Yes  Keep patient NPO? No   Current Respiratory Status:        Pt seen for tx.  Alert and cooperative.  See Care Plan for tx details.    Goals:   Multidisciplinary Problems       SLP Goals          Problem: SLP    Goal Priority Disciplines Outcome   SLP Goal    High SLP Progressing   Description: 1. Use CSS indep in sentences she read, 100% acc, indep.  2. Demo comprehension of complex info indep, 100% acc without repetition.  3. Solve functional word math/time/money calculation problems, 90% acc, modif indep.  4. Use preferred memory strategies SBA to set and recall functional info 90% acc.  5. Sustain attention on simple task, 30 secs, indep.                       Plan:     Patient to be seen:  3 x/week   Plan of Care expires:  08/29/25  Plan of Care reviewed with:  patient   SLP Follow-Up:  Yes       Time Tracking:     SLP Treatment Date:   08/01/25  Speech Start Time:  1050  Speech Stop Time:  1120     Speech Total Time  (min):  30 min    Billable Minutes: Speech Therapy Individual 30    08/01/2025

## 2025-08-01 NOTE — ASSESSMENT & PLAN NOTE
Patient's blood pressure range in the last 24 hours was: BP  Min: 142/63  Max: 177/72.The patient's inpatient anti-hypertensive regimen is listed below:  Current Antihypertensives  , Daily, Oral  valsartan tablet 320 mg, Daily, Oral  amLODIPine tablet 10 mg, Daily, Oral  hydrALAZINE injection 10 mg, Every 6 hours PRN, Intravenous    Plan  - BP is uncontrolled, will adjust as follows: increase amlodipine and PRN hydralazine  - Uncontrolled HTN likely 2/2 decadron, continue PRN hydralazine for now

## 2025-08-01 NOTE — PLAN OF CARE
Problem: Hospitalized Older Adult  Goal: Optimal Cognitive Function  Outcome: Progressing  Goal: Optimal Coping  Outcome: Progressing  Goal: Fluid and Electrolyte Balance  Outcome: Progressing

## 2025-08-01 NOTE — PT/OT/SLP PROGRESS
Occupational Therapy   Treatment    Name: Delilah Williamson  MRN: [id number]  Admitting Diagnosis:  Cervical stenosis of spinal canal       Recommendations:     Discharge Recommendations: Moderate Intensity Therapy  Discharge Equipment Recommendations:  to be determined by next level of care  Barriers to discharge:  Decreased caregiver support    Assessment:     Delilah Williamson is a 85 y.o. female with a medical diagnosis of Cervical stenosis of spinal canal.  Performance deficits affecting function are impaired endurance, weakness, impaired self care skills, impaired functional mobility, gait instability, impaired balance, decreased upper extremity function.     Rehab Prognosis:  Fair; patient would benefit from acute skilled OT services to address these deficits and reach maximum level of function.       Plan:     Patient to be seen 5 x/week to address the above listed problems via self-care/home management, therapeutic activities, therapeutic exercises  Plan of Care Expires:    Plan of Care Reviewed with: patient    Subjective     Chief Complaint: LUE weakness  Patient/Family Comments/goals: improved LUE function  Pain/Comfort:  Pain Rating 1: 0/10  Pain Rating Post-Intervention 1: 0/10    Objective:     Communicated with: nurse prior to session.  Patient found HOB elevated with   upon OT entry to room.    General Precautions: Standard, fall, aspiration    Orthopedic Precautions:N/A  Braces: N/A    Treatment & Education:  Pt completed 2x10 AAROM bicep flexion, shoulder flexion, and shoulder abduction; pt educated on how to do self-assisted AAROM; pt demonstrated understanding    Patient left HOB elevated with all lines intact, call button in reach, and bed alarm on    GOALS:   Multidisciplinary Problems       Occupational Therapy Goals          Problem: Occupational Therapy    Goal Priority Disciplines Outcome Interventions   Occupational Therapy Goal     OT, PT/OT     Description: Goals to be met by: 8/30/25      Patient will increase functional independence with ADLs by performing:    UE Dressing with Supervision.  LE Dressing with Supervision.  Grooming while standing at sink with Supervision.  Toileting from toilet with Supervision for hygiene and clothing management.   Bathing from  shower chair/bench with Supervision.  Toilet transfer to toilet with Supervision.                         Time Tracking:     OT Date of Treatment: 08/01/25  OT Start Time: 1312  OT Stop Time: 1323  OT Total Time (min): 11 min    Billable Minutes:Therapeutic Exercise 11    OT/NILAY: OT          8/1/2025

## 2025-08-01 NOTE — ASSESSMENT & PLAN NOTE
-Presented with LUE/LLE weakness  -CT C-spine: Multilevel cervical disc bulging/disc protrusions, including left paracentral disc protrusion at C5-C6, with spinal canal stenosis and left neural foraminal narrowing.   -MRI C-spine: Multifocal cervical stenosis, severe C3-4 C5-6. No cord signal change   -Neurosurgery consulted, discussed operative intervention, patient would like to pursue nonoperative intervention at this time and SNF placement  -Decadron 4mg every 6 hours, begin tapering tomorrow per neurosurgery recs  -PT/OT

## 2025-08-01 NOTE — PT/OT/SLP PROGRESS
Physical Therapy Treatment    Patient Name:  Delilah Williamson   MRN:  [id number]    Recommendations:     Discharge Recommendations: Moderate Intensity Therapy  Discharge Equipment Recommendations: to be determined by next level of care  Barriers to discharge: Decreased caregiver support    Assessment:     Delilah Williamson is a 85 y.o. female admitted with a medical diagnosis of Cervical stenosis of spinal canal.  She presents with the following impairments/functional limitations: weakness, impaired endurance, impaired self care skills, impaired functional mobility, gait instability, impaired balance, decreased upper extremity function, decreased lower extremity function, decreased safety awareness, pain, decreased ROM, impaired cardiopulmonary response to activity     Pt presented with HOB elevated.  She was alert and oriented x4 and gave detailed summary of tests performed ,results, diagnosis.  Pt  required Mod A for bed mobility. CGA for sitting EOB with lean to the R. She t/f'ed to stand with Min/mod A x2  RW requiring cueing for  decreased anterior  trunk lean and increased base of support.     Rehab Prognosis: Good and Fair; patient would benefit from acute skilled PT services to address these deficits and reach maximum level of function.    Recent Surgery: * No surgery found *      Plan:     During this hospitalization, patient to be seen 6 x/week to address the identified rehab impairments via gait training, therapeutic activities, therapeutic exercises and progress toward the following goals:    Plan of Care Expires:  08/15/25    Subjective     Chief Complaint: weakness and decreased function of L UE  Patient/Family Comments/goals: PLOF  Pain/Comfort:  Pain Rating 1: 0/10      Objective:     Communicated with nurse  prior to session.  Patient found HOB elevated with bed alarm, telemetry, peripheral IV upon PT entry to room.     General Precautions: Standard, aspiration, fall  Orthopedic Precautions: N/A  Braces:  N/A  Respiratory Status: Room air     Functional Mobility:  Bed Mobility:     Scooting: moderate assistance  Supine to Sit: moderate assistance  Sit to Supine: moderate assistance  Transfers:     Sit to Stand:  minimum assistance and of 2 persons with rolling walker  Balance: CGA for sitting EOB with lean to the R.      AM-PAC 6 CLICK MOBILITY          Treatment & Education:  Pt was educated on safety, use of  call light  , verbal and tactile cueing for sitting and standing balance    Patient left HOB elevated with all lines intact, call button in reach, bed alarm on, and her niece  present..    GOALS:   Multidisciplinary Problems       Physical Therapy Goals          Problem: Physical Therapy    Goal Priority Disciplines Outcome Interventions   Physical Therapy Goal     PT, PT/OT Progressing    Description: Goals to be met by: 08-     Patient will increase functional independence with mobility by performin. Supine to sit with MInimal Assistance  2. Sit to stand transfer with Minimal Assistance  3. Bed to chair transfer with Minimal Assistance using Rolling Walker  4. Gait  x 50 feet with Minimal Assistance using Rolling Walker.   5. Lower extremity exercise program x20 reps                        DME Justifications:  No DME recommended requiring DME justifications    Time Tracking:     PT Received On: 25  PT Start Time: 1450     PT Stop Time: 1513   PT Total Time (min): 23 min     Billable Minutes: Therapeutic Activity 23 minutes    Treatment Type: Treatment  PT/PTA: PT     Number of PTA visits since last PT visit: 0     2025

## 2025-08-01 NOTE — ASSESSMENT & PLAN NOTE
-Has history of DM 2, reports that she does take metformin 500mg daily outpatient  -Hgba1c 5.7 on 7/29/25  -Blood glucose elevated likely 2/2 decadron  -SSI for coverage while on steroids

## 2025-08-01 NOTE — PLAN OF CARE
SW sent updated clinicals. Pt is clinical review @ [facility].      08/01/25 0903   Post-Acute Status   Post-Acute Authorization Placement   Post-Acute Placement Status Referrals Sent   Discharge Plan   Discharge Plan A Skilled Nursing Facility     2299-  extended referrals to Pre[facility], and [facility].

## 2025-08-01 NOTE — CARE UPDATE
07/31/25 2000   Patient Assessment/Suction   Level of Consciousness (AVPU) alert   Respiratory Effort Normal;Unlabored   Expansion/Accessory Muscles/Retractions no use of accessory muscles   All Lung Fields Breath Sounds equal bilaterally;clear   PRE-TX-O2   Device (Oxygen Therapy) room air   SpO2 100 %   Pulse Oximetry Type Intermittent   $ Pulse Oximetry - Multiple Charge Pulse Oximetry - Multiple   Pulse 73   Resp 16   Education   $ Education Oxygen;15 min   Respiratory Evaluation   $ Care Plan Tech Time 15 min

## 2025-08-01 NOTE — SUBJECTIVE & OBJECTIVE
Interval History:  Patient seen and examined.  NAD.  LUE weakness with slight improvement today.  She is able to hold a supplement shake in her left hand.  Dr. Walls evaluated patient today, patient would like to continue with nonoperative intervention at this time and pursue SNF placement.  Case management following for SNF placement.  We will continue on Decadron, plan to taper tomorrow according to neurosurgery's recs.  Blood pressure better controlled with the addition of PRN hydralazine, likely elevated 2/2 decadron.  Hyperglycemia also 2/2 decadron, continue SSI.    Review of Systems   Respiratory:  Negative for shortness of breath and wheezing.    Cardiovascular:  Negative for chest pain and palpitations.   Gastrointestinal:  Negative for abdominal pain, nausea and vomiting.   Genitourinary:  Negative for dysuria.   Neurological:  Positive for weakness.     Objective:     Vital Signs (Most Recent):  Temp: 97.5 °F (36.4 °C) (08/01/25 1047)  Pulse: 66 (08/01/25 1047)  Resp: 18 (08/01/25 1047)  BP: (!) 170/75 (08/01/25 1047)  SpO2: 99 % (08/01/25 1047) Vital Signs (24h Range):  Temp:  [97.5 °F (36.4 °C)-98 °F (36.7 °C)] 97.5 °F (36.4 °C)  Pulse:  [63-88] 66  Resp:  [16-18] 18  SpO2:  [96 %-100 %] 99 %  BP: (142-177)/(63-77) 170/75     Weight: 76.5 kg (168 lb 10.4 oz)  Body mass index is 26.41 kg/m².    Intake/Output Summary (Last 24 hours) at 8/1/2025 1312  Last data filed at 8/1/2025 0851  Gross per 24 hour   Intake 540 ml   Output 300 ml   Net 240 ml         Physical Exam  Vitals and nursing note reviewed.   Constitutional:       General: She is not in acute distress.  HENT:      Mouth/Throat:      Mouth: Mucous membranes are moist.      Pharynx: Oropharynx is clear.   Abdominal:      General: Bowel sounds are normal. There is no distension.      Palpations: Abdomen is soft.      Tenderness: There is no abdominal tenderness.   Neurological:      Mental Status: She is alert and oriented to person, place, and  time.      Motor: Weakness present.      Comments: Unable to hold LUE against gravity, can open and close LUE  Strength LLE 4/5   Psychiatric:         Mood and Affect: Mood normal.               Significant Labs: All pertinent labs within the past 24 hours have been reviewed.  Recent Lab Results  (Last 5 results in the past 24 hours)        08/01/25  1049   08/01/25  0717   08/01/25  0502   08/01/25  0453   07/31/25  1936        Albumin     3.5           ALP     67           ALT     24           Anion Gap     9           AST     28           Baso #       0.03         Basophil %       0.3         BILIRUBIN TOTAL     0.5  Comment: For infants and newborns, interpretation of results should be based   on gestational age, weight and in agreement with clinical   observations.    Premature Infant recommended reference ranges:   0-24 hours:  <8.0 mg/dL   24-48 hours: <12.0 mg/dL   3-5 days:    <15.0 mg/dL   6-29 days:   <15.0 mg/dL           BUN     26           Calcium     10.4           Chloride     102           CO2     25           Creatinine     0.8           eGFR     >60           Eos #       0.00         Eos %       0.0         Glucose     232           Hematocrit       40.1         Hemoglobin       12.6         Immature Grans (Abs)       0.07  Comment: Mild elevation in immature granulocytes is non specific and can be seen in a variety of conditions including stress response, acute inflammation, trauma and pregnancy. Correlation with other laboratory and clinical findings is essential.         Immature Granulocytes       0.7         Lymph #       1.28         LYMPH %       13.3         Magnesium      1.8           MCH       24.3         MCHC       31.4         MCV       77         Mono #       0.27         Mono %       2.8         MPV       10.9         Neut #       8.0         Neut %       82.9         nRBC       0         Platelet Count       312         POCT Glucose 213   202       196       Potassium     3.7            PROTEIN TOTAL     6.8           RBC       5.18         RDW       16.0         Sodium     136           WBC       9.61                                Significant Imaging: I have reviewed all pertinent imaging results/findings within the past 24 hours.

## 2025-08-02 LAB
ABSOLUTE EOSINOPHIL (SMH): 0 K/UL
ABSOLUTE MONOCYTE (SMH): 0.59 K/UL (ref 0.3–1)
ABSOLUTE NEUTROPHIL COUNT (SMH): 14.2 K/UL (ref 1.8–7.7)
ALBUMIN SERPL-MCNC: 3.4 G/DL (ref 3.5–5.2)
ALP SERPL-CCNC: 68 UNIT/L (ref 55–135)
ALT SERPL-CCNC: 21 UNIT/L (ref 10–44)
ANION GAP (SMH): 6 MMOL/L (ref 8–16)
AST SERPL-CCNC: 21 UNIT/L (ref 10–40)
BASOPHILS # BLD AUTO: 0.03 K/UL
BASOPHILS NFR BLD AUTO: 0.2 %
BILIRUB SERPL-MCNC: 0.4 MG/DL (ref 0.1–1)
BUN SERPL-MCNC: 32 MG/DL (ref 8–23)
CALCIUM SERPL-MCNC: 9.8 MG/DL (ref 8.7–10.5)
CHLORIDE SERPL-SCNC: 103 MMOL/L (ref 95–110)
CO2 SERPL-SCNC: 27 MMOL/L (ref 23–29)
CREAT SERPL-MCNC: 0.8 MG/DL (ref 0.5–1.4)
ERYTHROCYTE [DISTWIDTH] IN BLOOD BY AUTOMATED COUNT: 16.1 % (ref 11.5–14.5)
GFR SERPLBLD CREATININE-BSD FMLA CKD-EPI: >60 ML/MIN/1.73/M2
GLUCOSE SERPL-MCNC: 220 MG/DL (ref 70–110)
HCT VFR BLD AUTO: 37.9 % (ref 37–48.5)
HGB BLD-MCNC: 11.9 GM/DL (ref 12–16)
IMM GRANULOCYTES # BLD AUTO: 0.18 K/UL (ref 0–0.04)
IMM GRANULOCYTES NFR BLD AUTO: 1.1 % (ref 0–0.5)
LYMPHOCYTES # BLD AUTO: 1.34 K/UL (ref 1–4.8)
MAGNESIUM SERPL-MCNC: 1.8 MG/DL (ref 1.6–2.6)
MCH RBC QN AUTO: 24.2 PG (ref 27–31)
MCHC RBC AUTO-ENTMCNC: 31.4 G/DL (ref 32–36)
MCV RBC AUTO: 77 FL (ref 82–98)
NUCLEATED RBC (/100WBC) (SMH): 0 /100 WBC
PLATELET # BLD AUTO: 295 K/UL (ref 150–450)
PMV BLD AUTO: 11.2 FL (ref 9.2–12.9)
POCT GLUCOSE: 195 MG/DL (ref 70–110)
POCT GLUCOSE: 208 MG/DL (ref 70–110)
POCT GLUCOSE: 229 MG/DL (ref 70–110)
POCT GLUCOSE: 230 MG/DL (ref 70–110)
POTASSIUM SERPL-SCNC: 4.2 MMOL/L (ref 3.5–5.1)
PROT SERPL-MCNC: 6.4 GM/DL (ref 6–8.4)
RBC # BLD AUTO: 4.91 M/UL (ref 4–5.4)
RELATIVE EOSINOPHIL (SMH): 0 % (ref 0–8)
RELATIVE LYMPHOCYTE (SMH): 8.2 % (ref 18–48)
RELATIVE MONOCYTE (SMH): 3.6 % (ref 4–15)
RELATIVE NEUTROPHIL (SMH): 86.9 % (ref 38–73)
SODIUM SERPL-SCNC: 136 MMOL/L (ref 136–145)
WBC # BLD AUTO: 16.33 K/UL (ref 3.9–12.7)

## 2025-08-02 PROCEDURE — 25000003 PHARM REV CODE 250

## 2025-08-02 PROCEDURE — 36415 COLL VENOUS BLD VENIPUNCTURE: CPT

## 2025-08-02 PROCEDURE — 80053 COMPREHEN METABOLIC PANEL: CPT

## 2025-08-02 PROCEDURE — 97129 THER IVNTJ 1ST 15 MIN: CPT

## 2025-08-02 PROCEDURE — 82962 GLUCOSE BLOOD TEST: CPT

## 2025-08-02 PROCEDURE — 85025 COMPLETE CBC W/AUTO DIFF WBC: CPT

## 2025-08-02 PROCEDURE — 94761 N-INVAS EAR/PLS OXIMETRY MLT: CPT

## 2025-08-02 PROCEDURE — 63600175 PHARM REV CODE 636 W HCPCS

## 2025-08-02 PROCEDURE — 21400001 HC TELEMETRY ROOM

## 2025-08-02 PROCEDURE — 83735 ASSAY OF MAGNESIUM: CPT

## 2025-08-02 PROCEDURE — 25000003 PHARM REV CODE 250: Performed by: INTERNAL MEDICINE

## 2025-08-02 PROCEDURE — 99900031 HC PATIENT EDUCATION (STAT)

## 2025-08-02 PROCEDURE — 97130 THER IVNTJ EA ADDL 15 MIN: CPT

## 2025-08-02 PROCEDURE — 99900035 HC TECH TIME PER 15 MIN (STAT)

## 2025-08-02 RX ORDER — DEXAMETHASONE SODIUM PHOSPHATE 4 MG/ML
4 INJECTION, SOLUTION INTRA-ARTICULAR; INTRALESIONAL; INTRAMUSCULAR; INTRAVENOUS; SOFT TISSUE EVERY 8 HOURS
Status: DISCONTINUED | OUTPATIENT
Start: 2025-08-02 | End: 2025-08-04

## 2025-08-02 RX ORDER — HYDRALAZINE HYDROCHLORIDE 25 MG/1
25 TABLET, FILM COATED ORAL EVERY 12 HOURS
Status: DISCONTINUED | OUTPATIENT
Start: 2025-08-02 | End: 2025-08-03

## 2025-08-02 RX ADMIN — AMLODIPINE BESYLATE 10 MG: 5 TABLET ORAL at 07:08

## 2025-08-02 RX ADMIN — METHOCARBAMOL 750 MG: 750 TABLET ORAL at 04:08

## 2025-08-02 RX ADMIN — DEXAMETHASONE SODIUM PHOSPHATE 4 MG: 4 INJECTION, SOLUTION INTRA-ARTICULAR; INTRALESIONAL; INTRAMUSCULAR; INTRAVENOUS; SOFT TISSUE at 09:08

## 2025-08-02 RX ADMIN — ENOXAPARIN SODIUM 40 MG: 40 INJECTION SUBCUTANEOUS at 04:08

## 2025-08-02 RX ADMIN — ATORVASTATIN CALCIUM 40 MG: 40 TABLET, FILM COATED ORAL at 07:08

## 2025-08-02 RX ADMIN — METHOCARBAMOL 750 MG: 750 TABLET ORAL at 07:08

## 2025-08-02 RX ADMIN — HYDRALAZINE HYDROCHLORIDE 25 MG: 25 TABLET ORAL at 08:08

## 2025-08-02 RX ADMIN — DEXAMETHASONE SODIUM PHOSPHATE 4 MG: 4 INJECTION, SOLUTION INTRA-ARTICULAR; INTRALESIONAL; INTRAMUSCULAR; INTRAVENOUS; SOFT TISSUE at 01:08

## 2025-08-02 RX ADMIN — ASPIRIN 81 MG: 81 TABLET, DELAYED RELEASE ORAL at 07:08

## 2025-08-02 RX ADMIN — PANTOPRAZOLE SODIUM 40 MG: 40 TABLET, DELAYED RELEASE ORAL at 11:08

## 2025-08-02 RX ADMIN — BISACODYL 10 MG: 10 SUPPOSITORY RECTAL at 04:08

## 2025-08-02 RX ADMIN — CEFTRIAXONE SODIUM 1 G: 1 INJECTION, POWDER, FOR SOLUTION INTRAMUSCULAR; INTRAVENOUS at 11:08

## 2025-08-02 RX ADMIN — DEXAMETHASONE SODIUM PHOSPHATE 4 MG: 4 INJECTION, SOLUTION INTRA-ARTICULAR; INTRALESIONAL; INTRAMUSCULAR; INTRAVENOUS; SOFT TISSUE at 05:08

## 2025-08-02 RX ADMIN — HYDRALAZINE HYDROCHLORIDE 25 MG: 25 TABLET ORAL at 09:08

## 2025-08-02 RX ADMIN — METHOCARBAMOL 750 MG: 750 TABLET ORAL at 08:08

## 2025-08-02 RX ADMIN — DEXAMETHASONE SODIUM PHOSPHATE 4 MG: 4 INJECTION, SOLUTION INTRA-ARTICULAR; INTRALESIONAL; INTRAMUSCULAR; INTRAVENOUS; SOFT TISSUE at 12:08

## 2025-08-02 RX ADMIN — CLOPIDOGREL 75 MG: 75 TABLET ORAL at 07:08

## 2025-08-02 RX ADMIN — VALSARTAN 320 MG: 80 TABLET, FILM COATED ORAL at 07:08

## 2025-08-02 RX ADMIN — HYDRALAZINE HYDROCHLORIDE 10 MG: 20 INJECTION, SOLUTION INTRAMUSCULAR; INTRAVENOUS at 06:08

## 2025-08-02 RX ADMIN — LACTULOSE 30 G: 10 SOLUTION ORAL at 09:08

## 2025-08-02 RX ADMIN — POLYETHYLENE GLYCOL 3350 17 G: 17 POWDER, FOR SOLUTION ORAL at 07:08

## 2025-08-02 RX ADMIN — INSULIN ASPART 1 UNITS: 100 INJECTION, SOLUTION INTRAVENOUS; SUBCUTANEOUS at 08:08

## 2025-08-02 RX ADMIN — HYDRALAZINE HYDROCHLORIDE 10 MG: 20 INJECTION, SOLUTION INTRAMUSCULAR; INTRAVENOUS at 04:08

## 2025-08-02 RX ADMIN — INSULIN ASPART 2 UNITS: 100 INJECTION, SOLUTION INTRAVENOUS; SUBCUTANEOUS at 11:08

## 2025-08-02 NOTE — PLAN OF CARE
Problem: Hospitalized Older Adult  Goal: Effective Bowel Elimination  Outcome: Progressing     Problem: Infection  Goal: Absence of Infection Signs and Symptoms  Outcome: Progressing     Problem: Skin Injury Risk Increased  Goal: Skin Health and Integrity  Outcome: Progressing     Problem: Oral Intake Inadequate  Goal: Improved Oral Intake  Outcome: Progressing

## 2025-08-02 NOTE — PT/OT/SLP PROGRESS
Speech Language Pathology Treatment    Patient Name:  Delilah Williamson   MRN:  [id number]  Admitting Diagnosis: Cervical stenosis of spinal canal    Recommendations:                 General Recommendations:  Speech/language therapy and Cognitive-linguistic therapy  Diet recommendations:  Soft & Bite Sized Diet - IDDSI Level 6, Liquid Diet Level: Thin liquids - IDDSI Level 0   Aspiration Precautions: Standard aspiration precautions   General Precautions: Standard, fall  Communication strategies:  provide increased time to answer and go to room if call light pushed  Discharge recommendations:  Moderate Intensity Therapy   Barriers to Discharge:  None    Assessment:     Delilah Williamson is a 85 y.o. 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  She presents with improved articulation when she uses CSS.  Continues to struggle with cognitive tasks. Cont POC.    Subjective     Yes, lets work  Patient goals: next treatment site     Objective:     Has the patient been evaluated by SLP for swallowing?   Yes  Keep patient NPO? No   Current Respiratory Status:        Pt seen for tx.  Alert and cooperative.  See Care Plan for tx details.    Goals:   Multidisciplinary Problems       SLP Goals          Problem: SLP    Goal Priority Disciplines Outcome   SLP Goal    High SLP Progressing   Description: 1. Use CSS indep in sentences she read, 100% acc, indep.  2. Demo comprehension of complex info indep, 100% acc without repetition.  3. Solve functional word math/time/money calculation problems, 90% acc, modif indep.  4. Use preferred memory strategies SBA to set and recall functional info 90% acc.  5. Sustain attention on simple task, 30 secs, indep.                       Plan:     Patient to be seen:  3 x/week   Plan of Care expires:  08/29/25  Plan of Care reviewed with:  patient   SLP Follow-Up:  Yes       Time Tracking:     SLP Treatment Date:   08/02/25  Speech Start Time:  1200  Speech Stop Time:   1230     Speech Total Time (min):  30 min    Billable Minutes: Speech Therapy Individual 30    08/02/2025

## 2025-08-02 NOTE — ASSESSMENT & PLAN NOTE
Patient's blood pressure range in the last 24 hours was: BP  Min: 155/67  Max: 194/73.The patient's inpatient anti-hypertensive regimen is listed below:  Current Antihypertensives  , Daily, Oral  valsartan tablet 320 mg, Daily, Oral  amLODIPine tablet 10 mg, Daily, Oral  hydrALAZINE injection 10 mg, Every 6 hours PRN, Intravenous  hydrALAZINE tablet 25 mg, Every 12 hours, Oral    Plan  - BP is uncontrolled, will adjust as follows: add PO hydralazine BID

## 2025-08-02 NOTE — PT/OT/SLP PROGRESS
Physical Therapy      Patient Name:  Delilah Williamson   MRN:  [id number]    Patient awaiting brief change x2 attempts.  Will follow-up next scheduled tx.

## 2025-08-02 NOTE — PLAN OF CARE
Problem: SLP  Goal: SLP Goal  Description: 1. Use CSS indep in sentences she read, 100% acc, indep.  2. Demo comprehension of complex info indep, 100% acc without repetition.  3. Solve functional word math/time/money calculation problems, 90% acc, modif indep.  4. Use preferred memory strategies SBA to set and recall functional info 90% acc.  5. Sustain attention on simple task, 30 secs, indep.  Outcome: Progressing   1. 90% acc in sentences repeated. 90% acc in sentences she read.  75% acc in self generated sentences in answer to questions.    2. modA for moderately complex 3-4 sentence paragraphs read to her  4. Insisted she has a good memory and did not want to work on this.  Stated she writes lists or on a calendar to support memory.  5. Attended to task for 20 secs indep, required Georgette for 10 more secs

## 2025-08-02 NOTE — SUBJECTIVE & OBJECTIVE
Interval History:  Patient seen and examined.  NAD.  LUE and LLE weakness about the same as yesterday.  She is able to open and close her hand.  Case management following for SNF placement.  Taper down Decadron to 4mg every 8 hours per neurosurgery recommendations.  Has not had a bowel movement since 7/27, no BM with miralax, add lactulose today.  KUB ordered.  She reports flatus.  BP elevated, add PO hydralazine.     Review of Systems   Respiratory:  Negative for shortness of breath and wheezing.    Cardiovascular:  Negative for chest pain and palpitations.   Gastrointestinal:  Negative for abdominal pain, nausea and vomiting.   Genitourinary:  Negative for dysuria.   Neurological:  Positive for weakness.     Objective:     Vital Signs (Most Recent):  Temp: 97.7 °F (36.5 °C) (08/02/25 0725)  Pulse: 63 (08/02/25 0725)  Resp: 18 (08/02/25 0415)  BP: (!) 171/69 (08/02/25 0725)  SpO2: 100 % (08/02/25 0725) Vital Signs (24h Range):  Temp:  [97.3 °F (36.3 °C)-97.7 °F (36.5 °C)] 97.7 °F (36.5 °C)  Pulse:  [58-78] 63  Resp:  [18] 18  SpO2:  [98 %-100 %] 100 %  BP: (155-194)/(67-75) 171/69     Weight: 76.5 kg (168 lb 10.4 oz)  Body mass index is 26.41 kg/m².    Intake/Output Summary (Last 24 hours) at 8/2/2025 0906  Last data filed at 8/2/2025 0449  Gross per 24 hour   Intake 840 ml   Output 400 ml   Net 440 ml         Physical Exam  Vitals and nursing note reviewed.   Constitutional:       General: She is not in acute distress.  HENT:      Mouth/Throat:      Mouth: Mucous membranes are moist.      Pharynx: Oropharynx is clear.   Cardiovascular:      Rate and Rhythm: Normal rate and regular rhythm.   Pulmonary:      Effort: Pulmonary effort is normal. No respiratory distress.      Breath sounds: Normal breath sounds.   Abdominal:      General: Bowel sounds are normal. There is no distension.      Palpations: Abdomen is soft.      Tenderness: There is no abdominal tenderness.   Neurological:      Mental Status: She is alert  and oriented to person, place, and time.      Motor: Weakness present.      Comments: Unable to hold LUE against gravity, can open and close LUE  Strength LLE 4/5   Psychiatric:         Mood and Affect: Mood normal.               Significant Labs: All pertinent labs within the past 24 hours have been reviewed.  Recent Lab Results  (Last 5 results in the past 24 hours)        08/02/25  0726   08/02/25  0444   08/01/25  2016   08/01/25  1813   08/01/25  1049        Albumin   3.4             ALP   68             ALT   21             Anion Gap   6             AST   21             Baso #   0.03             Basophil %   0.2             BILIRUBIN TOTAL   0.4  Comment: For infants and newborns, interpretation of results should be based   on gestational age, weight and in agreement with clinical   observations.    Premature Infant recommended reference ranges:   0-24 hours:  <8.0 mg/dL   24-48 hours: <12.0 mg/dL   3-5 days:    <15.0 mg/dL   6-29 days:   <15.0 mg/dL             BUN   32             Calcium   9.8             Chloride   103             CO2   27             Creatinine   0.8             eGFR   >60             Eos #   0.00             Eos %   0.0             Glucose   220             Hematocrit   37.9             Hemoglobin   11.9             Immature Grans (Abs)   0.18  Comment: Mild elevation in immature granulocytes is non specific and can be seen in a variety of conditions including stress response, acute inflammation, trauma and pregnancy. Correlation with other laboratory and clinical findings is essential.             Immature Granulocytes   1.1             Lymph #   1.34             LYMPH %   8.2             Magnesium    1.8             MCH   24.2             MCHC   31.4             MCV   77             Mono #   0.59             Mono %   3.6             MPV   11.2             Neut #   14.2             Neut %   86.9             nRBC   0             Platelet Count   295             POCT Glucose 208     277    234   213       Potassium   4.2             PROTEIN TOTAL   6.4             RBC   4.91             RDW   16.1             Sodium   136             WBC   16.33                                    Significant Imaging: I have reviewed all pertinent imaging results/findings within the past 24 hours.

## 2025-08-02 NOTE — PROGRESS NOTES
[facility]  Progress Note    Patient Name: Delilah Williamson  MRN: [id number]  Patient Class: IP- Inpatient   Admission Date: 7/29/2025  Length of Stay: 4 days  Attending Physician: Ramon Vizcarra DO  Primary Care Provider: Juan Abarca MD        Subjective     Principal Problem:Cervical stenosis of spinal canal        HPI:  This is a pleasant 85-year-old lady with comorbid conditions of hyperlipidemia, hypertension, debility related to age who presents to the emergency department after concerns of left-sided weakness.  Patient reportedly had a fall yesterday and daughter-in-law stayed with her, however, upon waking this morning they noticed that she was unable to keep her left arm against gravity.  Code stroke was activated in the emergency department, CT head negative, CTA head and neck with no large vessel occlusion and MRI negative although clinically patient with 1/5 strength of the left upper extremity with no associated musculoskeletal patent.  Left lower extremity 2/5.  This is potentially an MRI negative CVA.  Continue dual antiplatelet therapy and neurology consult to follow.  Urinalysis frankly positive for infection.  Continue ceftriaxone.  Admission to hospital medicine with the above workup.    Overview/Hospital Course:  No notes on file    Interval History:  Patient seen and examined.  NAD.  LUE and LLE weakness about the same as yesterday.  She is able to open and close her hand.  Case management following for SNF placement.  Taper down Decadron to 4mg every 8 hours per neurosurgery recommendations.  Has not had a bowel movement since 7/27, no BM with miralax, add lactulose today.  KUB ordered.  She reports flatus.  BP elevated, add PO hydralazine.     Review of Systems   Respiratory:  Negative for shortness of breath and wheezing.    Cardiovascular:  Negative for chest pain and palpitations.   Gastrointestinal:  Negative for abdominal pain, nausea and vomiting.    Genitourinary:  Negative for dysuria.   Neurological:  Positive for weakness.     Objective:     Vital Signs (Most Recent):  Temp: 97.7 °F (36.5 °C) (08/02/25 0725)  Pulse: 63 (08/02/25 0725)  Resp: 18 (08/02/25 0415)  BP: (!) 171/69 (08/02/25 0725)  SpO2: 100 % (08/02/25 0725) Vital Signs (24h Range):  Temp:  [97.3 °F (36.3 °C)-97.7 °F (36.5 °C)] 97.7 °F (36.5 °C)  Pulse:  [58-78] 63  Resp:  [18] 18  SpO2:  [98 %-100 %] 100 %  BP: (155-194)/(67-75) 171/69     Weight: 76.5 kg (168 lb 10.4 oz)  Body mass index is 26.41 kg/m².    Intake/Output Summary (Last 24 hours) at 8/2/2025 0935  Last data filed at 8/2/2025 0449  Gross per 24 hour   Intake 840 ml   Output 400 ml   Net 440 ml         Physical Exam  Vitals and nursing note reviewed.   Constitutional:       General: She is not in acute distress.  HENT:      Mouth/Throat:      Mouth: Mucous membranes are moist.      Pharynx: Oropharynx is clear.   Cardiovascular:      Rate and Rhythm: Normal rate and regular rhythm.   Pulmonary:      Effort: Pulmonary effort is normal. No respiratory distress.      Breath sounds: Normal breath sounds.   Abdominal:      General: Bowel sounds are normal. There is no distension.      Palpations: Abdomen is soft.      Tenderness: There is no abdominal tenderness.   Neurological:      Mental Status: She is alert and oriented to person, place, and time.      Motor: Weakness present.      Comments: Unable to hold LUE against gravity, can open and close LUE  Strength LLE 4/5   Psychiatric:         Mood and Affect: Mood normal.               Significant Labs: All pertinent labs within the past 24 hours have been reviewed.  Recent Lab Results  (Last 5 results in the past 24 hours)        08/02/25  0726   08/02/25  0444   08/01/25  2016   08/01/25  1813   08/01/25  1049        Albumin   3.4             ALP   68             ALT   21             Anion Gap   6             AST   21             Baso #   0.03             Basophil %   0.2              BILIRUBIN TOTAL   0.4  Comment: For infants and newborns, interpretation of results should be based   on gestational age, weight and in agreement with clinical   observations.    Premature Infant recommended reference ranges:   0-24 hours:  <8.0 mg/dL   24-48 hours: <12.0 mg/dL   3-5 days:    <15.0 mg/dL   6-29 days:   <15.0 mg/dL             BUN   32             Calcium   9.8             Chloride   103             CO2   27             Creatinine   0.8             eGFR   >60             Eos #   0.00             Eos %   0.0             Glucose   220             Hematocrit   37.9             Hemoglobin   11.9             Immature Grans (Abs)   0.18  Comment: Mild elevation in immature granulocytes is non specific and can be seen in a variety of conditions including stress response, acute inflammation, trauma and pregnancy. Correlation with other laboratory and clinical findings is essential.             Immature Granulocytes   1.1             Lymph #   1.34             LYMPH %   8.2             Magnesium    1.8             MCH   24.2             MCHC   31.4             MCV   77             Mono #   0.59             Mono %   3.6             MPV   11.2             Neut #   14.2             Neut %   86.9             nRBC   0             Platelet Count   295             POCT Glucose 208     277   234   213       Potassium   4.2             PROTEIN TOTAL   6.4             RBC   4.91             RDW   16.1             Sodium   136             WBC   16.33                                    Significant Imaging: I have reviewed all pertinent imaging results/findings within the past 24 hours.      Assessment & Plan  Cervical stenosis of spinal canal  -Presented with LUE/LLE weakness  -CT C-spine: Multilevel cervical disc bulging/disc protrusions, including left paracentral disc protrusion at C5-C6, with spinal canal stenosis and left neural foraminal narrowing.   -MRI C-spine: Multifocal cervical stenosis, severe C3-4 C5-6.  No cord signal change   -Neurosurgery consulted, discussed operative intervention, patient would like to pursue nonoperative intervention at this time and SNF placement  -Taper down Decadron 4mg every 8 hours today x 2 days per neurosurgery recommendations  -PT/OT  Hyperlipidemia  Continue statin    UTI (urinary tract infection)  -Urine culture +staph aureus  -Continue rocephin    HTN (hypertension)  Patient's blood pressure range in the last 24 hours was: BP  Min: 155/67  Max: 194/73.The patient's inpatient anti-hypertensive regimen is listed below:  Current Antihypertensives  , Daily, Oral  valsartan tablet 320 mg, Daily, Oral  amLODIPine tablet 10 mg, Daily, Oral  hydrALAZINE injection 10 mg, Every 6 hours PRN, Intravenous  hydrALAZINE tablet 25 mg, Every 12 hours, Oral    Plan  - BP is uncontrolled, will adjust as follows: add PO hydralazine BID   Controlled type 2 diabetes mellitus without complication, without long-term current use of insulin  -Has history of DM 2, reports that she does take metformin 500mg daily outpatient  -Hgba1c 5.7 on 7/29/25  -Blood glucose elevated likely 2/2 decadron  -SSI for coverage while on steroids    Constipation  -KUB ordered  -Daily miralax, add lactulose    VTE Risk Mitigation (From admission, onward)           Ordered     enoxaparin injection 40 mg  Daily         07/29/25 1404     IP VTE HIGH RISK PATIENT  Once         07/29/25 1404                    Discharge Planning   ALINA: 8/5/2025     Code Status: DNR   Medical Readiness for Discharge Date:   Discharge Plan A: Skilled Nursing Facility                  Please place Justification for DME      JACOB Justice  Department of Hospital Medicine   [facility]

## 2025-08-02 NOTE — PLAN OF CARE
Problem: Hospitalized Older Adult  Goal: Optimal Cognitive Function  Outcome: Progressing  Goal: Effective Bowel Elimination  Outcome: Progressing  Goal: Optimal Coping  Outcome: Progressing  Goal: Fluid and Electrolyte Balance  Outcome: Progressing  Goal: Optimal Functional Ability  Outcome: Progressing  Goal: Improved Oral Intake  Outcome: Progressing  Goal: Adequate Sleep/Rest  Outcome: Progressing  Goal: Effective Urinary Elimination  Outcome: Progressing     Problem: Stroke, Ischemic (Includes Transient Ischemic Attack)  Goal: Optimal Coping  Outcome: Progressing  Goal: Effective Bowel Elimination  Outcome: Progressing  Goal: Optimal Cerebral Tissue Perfusion  Outcome: Progressing  Goal: Optimal Cognitive Function  Outcome: Progressing  Goal: Improved Communication Skills  Outcome: Progressing  Goal: Optimal Functional Ability  Outcome: Progressing  Goal: Optimal Nutrition Intake  Outcome: Progressing  Goal: Effective Oxygenation and Ventilation  Outcome: Progressing  Goal: Improved Sensorimotor Function  Outcome: Progressing  Goal: Safe and Effective Swallow  Outcome: Progressing  Goal: Effective Urinary Elimination  Outcome: Progressing     Problem: Fall Injury Risk  Goal: Absence of Fall and Fall-Related Injury  Outcome: Progressing     Problem: Adult Inpatient Plan of Care  Goal: Plan of Care Review  Outcome: Progressing  Goal: Patient-Specific Goal (Individualized)  Outcome: Progressing  Goal: Absence of Hospital-Acquired Illness or Injury  Outcome: Progressing  Goal: Optimal Comfort and Wellbeing  Outcome: Progressing  Goal: Readiness for Transition of Care  Outcome: Progressing

## 2025-08-02 NOTE — ASSESSMENT & PLAN NOTE
-Presented with LUE/LLE weakness  -CT C-spine: Multilevel cervical disc bulging/disc protrusions, including left paracentral disc protrusion at C5-C6, with spinal canal stenosis and left neural foraminal narrowing.   -MRI C-spine: Multifocal cervical stenosis, severe C3-4 C5-6. No cord signal change   -Neurosurgery consulted, discussed operative intervention, patient would like to pursue nonoperative intervention at this time and SNF placement  -Taper down Decadron 4mg every 8 hours today x 2 days per neurosurgery recommendations  -PT/OT

## 2025-08-03 PROBLEM — K59.00 CONSTIPATION: Status: RESOLVED | Noted: 2025-04-28 | Resolved: 2025-08-03

## 2025-08-03 LAB
ABSOLUTE EOSINOPHIL (SMH): 0 K/UL
ABSOLUTE MONOCYTE (SMH): 0.65 K/UL (ref 0.3–1)
ABSOLUTE NEUTROPHIL COUNT (SMH): 11.4 K/UL (ref 1.8–7.7)
ALBUMIN SERPL-MCNC: 3.2 G/DL (ref 3.5–5.2)
ALP SERPL-CCNC: 53 UNIT/L (ref 55–135)
ALT SERPL-CCNC: 24 UNIT/L (ref 10–44)
ANION GAP (SMH): 6 MMOL/L (ref 8–16)
AST SERPL-CCNC: 21 UNIT/L (ref 10–40)
BASOPHILS # BLD AUTO: 0.01 K/UL
BASOPHILS NFR BLD AUTO: 0.1 %
BILIRUB SERPL-MCNC: 0.3 MG/DL (ref 0.1–1)
BUN SERPL-MCNC: 29 MG/DL (ref 8–23)
CALCIUM SERPL-MCNC: 9.4 MG/DL (ref 8.7–10.5)
CHLORIDE SERPL-SCNC: 104 MMOL/L (ref 95–110)
CO2 SERPL-SCNC: 27 MMOL/L (ref 23–29)
CREAT SERPL-MCNC: 0.8 MG/DL (ref 0.5–1.4)
ERYTHROCYTE [DISTWIDTH] IN BLOOD BY AUTOMATED COUNT: 16.3 % (ref 11.5–14.5)
GFR SERPLBLD CREATININE-BSD FMLA CKD-EPI: >60 ML/MIN/1.73/M2
GLUCOSE SERPL-MCNC: 195 MG/DL (ref 70–110)
HCT VFR BLD AUTO: 35.4 % (ref 37–48.5)
HGB BLD-MCNC: 11.5 GM/DL (ref 12–16)
IMM GRANULOCYTES # BLD AUTO: 0.12 K/UL (ref 0–0.04)
IMM GRANULOCYTES NFR BLD AUTO: 0.9 % (ref 0–0.5)
LYMPHOCYTES # BLD AUTO: 1.65 K/UL (ref 1–4.8)
MCH RBC QN AUTO: 24.3 PG (ref 27–31)
MCHC RBC AUTO-ENTMCNC: 32.5 G/DL (ref 32–36)
MCV RBC AUTO: 75 FL (ref 82–98)
NUCLEATED RBC (/100WBC) (SMH): 0 /100 WBC
PLATELET # BLD AUTO: 305 K/UL (ref 150–450)
PMV BLD AUTO: 11.1 FL (ref 9.2–12.9)
POCT GLUCOSE: 172 MG/DL (ref 70–110)
POCT GLUCOSE: 183 MG/DL (ref 70–110)
POCT GLUCOSE: 225 MG/DL (ref 70–110)
POCT GLUCOSE: 238 MG/DL (ref 70–110)
POTASSIUM SERPL-SCNC: 4.4 MMOL/L (ref 3.5–5.1)
PROT SERPL-MCNC: 6 GM/DL (ref 6–8.4)
RBC # BLD AUTO: 4.74 M/UL (ref 4–5.4)
RELATIVE EOSINOPHIL (SMH): 0 % (ref 0–8)
RELATIVE LYMPHOCYTE (SMH): 11.9 % (ref 18–48)
RELATIVE MONOCYTE (SMH): 4.7 % (ref 4–15)
RELATIVE NEUTROPHIL (SMH): 82.4 % (ref 38–73)
SODIUM SERPL-SCNC: 137 MMOL/L (ref 136–145)
TB INDURATION 48 - 72 HR READ: 0 MM
TB SKIN TEST 48 - 72 HR READ: NORMAL
WBC # BLD AUTO: 13.87 K/UL (ref 3.9–12.7)

## 2025-08-03 PROCEDURE — 25000003 PHARM REV CODE 250

## 2025-08-03 PROCEDURE — 63600175 PHARM REV CODE 636 W HCPCS

## 2025-08-03 PROCEDURE — 82962 GLUCOSE BLOOD TEST: CPT

## 2025-08-03 PROCEDURE — 99900035 HC TECH TIME PER 15 MIN (STAT)

## 2025-08-03 PROCEDURE — 25000003 PHARM REV CODE 250: Performed by: INTERNAL MEDICINE

## 2025-08-03 PROCEDURE — 36415 COLL VENOUS BLD VENIPUNCTURE: CPT

## 2025-08-03 PROCEDURE — 21400001 HC TELEMETRY ROOM

## 2025-08-03 PROCEDURE — 85025 COMPLETE CBC W/AUTO DIFF WBC: CPT

## 2025-08-03 PROCEDURE — 94761 N-INVAS EAR/PLS OXIMETRY MLT: CPT

## 2025-08-03 PROCEDURE — 99900031 HC PATIENT EDUCATION (STAT)

## 2025-08-03 PROCEDURE — 80053 COMPREHEN METABOLIC PANEL: CPT

## 2025-08-03 RX ORDER — HYDRALAZINE HYDROCHLORIDE 25 MG/1
25 TABLET, FILM COATED ORAL EVERY 8 HOURS
Status: DISCONTINUED | OUTPATIENT
Start: 2025-08-03 | End: 2025-08-03

## 2025-08-03 RX ORDER — HYDRALAZINE HYDROCHLORIDE 25 MG/1
25 TABLET, FILM COATED ORAL EVERY 8 HOURS
Status: DISCONTINUED | OUTPATIENT
Start: 2025-08-03 | End: 2025-08-05 | Stop reason: HOSPADM

## 2025-08-03 RX ORDER — CLONIDINE HYDROCHLORIDE 0.1 MG/1
0.1 TABLET ORAL EVERY 6 HOURS PRN
Status: DISCONTINUED | OUTPATIENT
Start: 2025-08-03 | End: 2025-08-05 | Stop reason: HOSPADM

## 2025-08-03 RX ADMIN — CLOPIDOGREL 75 MG: 75 TABLET ORAL at 08:08

## 2025-08-03 RX ADMIN — INSULIN ASPART 1 UNITS: 100 INJECTION, SOLUTION INTRAVENOUS; SUBCUTANEOUS at 09:08

## 2025-08-03 RX ADMIN — DEXAMETHASONE SODIUM PHOSPHATE 4 MG: 4 INJECTION, SOLUTION INTRA-ARTICULAR; INTRALESIONAL; INTRAMUSCULAR; INTRAVENOUS; SOFT TISSUE at 05:08

## 2025-08-03 RX ADMIN — METHOCARBAMOL 750 MG: 750 TABLET ORAL at 08:08

## 2025-08-03 RX ADMIN — ASPIRIN 81 MG: 81 TABLET, DELAYED RELEASE ORAL at 08:08

## 2025-08-03 RX ADMIN — HYDRALAZINE HYDROCHLORIDE 25 MG: 25 TABLET, FILM COATED ORAL at 11:08

## 2025-08-03 RX ADMIN — CEFTRIAXONE SODIUM 1 G: 1 INJECTION, POWDER, FOR SOLUTION INTRAMUSCULAR; INTRAVENOUS at 11:08

## 2025-08-03 RX ADMIN — INSULIN ASPART 2 UNITS: 100 INJECTION, SOLUTION INTRAVENOUS; SUBCUTANEOUS at 11:08

## 2025-08-03 RX ADMIN — HYDRALAZINE HYDROCHLORIDE 10 MG: 20 INJECTION, SOLUTION INTRAMUSCULAR; INTRAVENOUS at 03:08

## 2025-08-03 RX ADMIN — ATORVASTATIN CALCIUM 40 MG: 40 TABLET, FILM COATED ORAL at 08:08

## 2025-08-03 RX ADMIN — HYDRALAZINE HYDROCHLORIDE 25 MG: 25 TABLET, FILM COATED ORAL at 10:08

## 2025-08-03 RX ADMIN — DEXAMETHASONE SODIUM PHOSPHATE 4 MG: 4 INJECTION, SOLUTION INTRA-ARTICULAR; INTRALESIONAL; INTRAMUSCULAR; INTRAVENOUS; SOFT TISSUE at 03:08

## 2025-08-03 RX ADMIN — PANTOPRAZOLE SODIUM 40 MG: 40 TABLET, DELAYED RELEASE ORAL at 11:08

## 2025-08-03 RX ADMIN — DEXAMETHASONE SODIUM PHOSPHATE 4 MG: 4 INJECTION, SOLUTION INTRA-ARTICULAR; INTRALESIONAL; INTRAMUSCULAR; INTRAVENOUS; SOFT TISSUE at 09:08

## 2025-08-03 RX ADMIN — POLYETHYLENE GLYCOL 3350 17 G: 17 POWDER, FOR SOLUTION ORAL at 08:08

## 2025-08-03 RX ADMIN — VALSARTAN 320 MG: 80 TABLET, FILM COATED ORAL at 08:08

## 2025-08-03 RX ADMIN — METHOCARBAMOL 750 MG: 750 TABLET ORAL at 03:08

## 2025-08-03 RX ADMIN — HYDRALAZINE HYDROCHLORIDE 25 MG: 25 TABLET, FILM COATED ORAL at 02:08

## 2025-08-03 RX ADMIN — ENOXAPARIN SODIUM 40 MG: 40 INJECTION SUBCUTANEOUS at 06:08

## 2025-08-03 RX ADMIN — METHOCARBAMOL 750 MG: 750 TABLET ORAL at 09:08

## 2025-08-03 RX ADMIN — AMLODIPINE BESYLATE 10 MG: 5 TABLET ORAL at 08:08

## 2025-08-03 NOTE — PLAN OF CARE
Attempted to call family member Melanie Delgado to update her on accepting facilities [facility] and [facility], left a voicemail for her to call me back.       08/03/25 1207   Post-Acute Status   Post-Acute Authorization Placement

## 2025-08-03 NOTE — PLAN OF CARE
Problem: Hospitalized Older Adult  Goal: Optimal Cognitive Function  Outcome: Progressing  Goal: Effective Bowel Elimination  Outcome: Progressing  Goal: Optimal Coping  Outcome: Progressing  Goal: Fluid and Electrolyte Balance  Outcome: Progressing  Goal: Optimal Functional Ability  Outcome: Progressing  Goal: Improved Oral Intake  Outcome: Progressing  Goal: Adequate Sleep/Rest  Outcome: Progressing  Goal: Effective Urinary Elimination  Outcome: Progressing     Problem: Fall Injury Risk  Goal: Absence of Fall and Fall-Related Injury  Outcome: Progressing

## 2025-08-03 NOTE — ASSESSMENT & PLAN NOTE
Patient's blood pressure range in the last 24 hours was: BP  Min: 152/67  Max: 178/75.The patient's inpatient anti-hypertensive regimen is listed below:  Current Antihypertensives  , Daily, Oral  valsartan tablet 320 mg, Daily, Oral  amLODIPine tablet 10 mg, Daily, Oral  hydrALAZINE tablet 25 mg, Every 8 hours, Oral  cloNIDine tablet 0.1 mg, Every 6 hours PRN, Oral    Plan  - BP is uncontrolled, will adjust as follows:  Increase hydralazine to every 8 hours and clonidine p.o. p.r.n.

## 2025-08-03 NOTE — SUBJECTIVE & OBJECTIVE
Interval History:  Patient seen and examined.  NAD.  LUE and LLE weakness with slight improvement today.  She is able to open and close her hand and lift her arm  off the bed with her fingers supporting it.  Case management following for SNF placement.  Continue decadron 4mg every 8 hours, taper down tomorrow.  She had a bowel movement yesterday after lactulose.  BP elevated, increase PO hydralazine to every 8 hours and add clonidine PO PRN.    Review of Systems   Respiratory:  Negative for shortness of breath and wheezing.    Cardiovascular:  Negative for chest pain and palpitations.   Gastrointestinal:  Negative for abdominal pain, nausea and vomiting.   Genitourinary:  Negative for dysuria.   Neurological:  Positive for weakness.     Objective:     Vital Signs (Most Recent):  Temp: 97.5 °F (36.4 °C) (08/03/25 0723)  Pulse: 63 (08/03/25 0813)  Resp: 12 (08/03/25 0813)  BP: (!) 152/67 (08/03/25 0452)  SpO2: 97 % (08/03/25 0813) Vital Signs (24h Range):  Temp:  [97.4 °F (36.3 °C)-97.8 °F (36.6 °C)] 97.5 °F (36.4 °C)  Pulse:  [59-75] 63  Resp:  [12-18] 12  SpO2:  [97 %-98 %] 97 %  BP: (152-178)/(65-79) 152/67     Weight: 76.5 kg (168 lb 10.4 oz)  Body mass index is 26.41 kg/m².    Intake/Output Summary (Last 24 hours) at 8/3/2025 1024  Last data filed at 8/2/2025 1713  Gross per 24 hour   Intake 480 ml   Output 700 ml   Net -220 ml         Physical Exam  Vitals and nursing note reviewed.   Constitutional:       General: She is not in acute distress.  HENT:      Mouth/Throat:      Mouth: Mucous membranes are moist.      Pharynx: Oropharynx is clear.   Cardiovascular:      Rate and Rhythm: Normal rate and regular rhythm.   Pulmonary:      Effort: Pulmonary effort is normal. No respiratory distress.      Breath sounds: Normal breath sounds.   Abdominal:      General: Bowel sounds are normal. There is no distension.      Palpations: Abdomen is soft.      Tenderness: There is no abdominal tenderness.   Neurological:       Mental Status: She is alert and oriented to person, place, and time.      Motor: Weakness present.      Comments: Unable to hold LUE against gravity, can open and close LUE  Strength LLE 4/5   Psychiatric:         Mood and Affect: Mood normal.               Significant Labs: All pertinent labs within the past 24 hours have been reviewed.  Recent Lab Results  (Last 5 results in the past 24 hours)        08/03/25  0720   08/03/25  0439   08/02/25  2034   08/02/25  1537   08/02/25  1128        Albumin   3.2             ALP   53             ALT   24             Anion Gap   6             AST   21             Baso #   0.01             Basophil %   0.1             BILIRUBIN TOTAL   0.3  Comment: For infants and newborns, interpretation of results should be based   on gestational age, weight and in agreement with clinical   observations.    Premature Infant recommended reference ranges:   0-24 hours:  <8.0 mg/dL   24-48 hours: <12.0 mg/dL   3-5 days:    <15.0 mg/dL   6-29 days:   <15.0 mg/dL             BUN   29             Calcium   9.4             Chloride   104             CO2   27             Creatinine   0.8             eGFR   >60             Eos #   0.00             Eos %   0.0             Glucose   195             Hematocrit   35.4             Hemoglobin   11.5             Immature Grans (Abs)   0.12  Comment: Mild elevation in immature granulocytes is non specific and can be seen in a variety of conditions including stress response, acute inflammation, trauma and pregnancy. Correlation with other laboratory and clinical findings is essential.             Immature Granulocytes   0.9             Lymph #   1.65             LYMPH %   11.9             MCH   24.3             MCHC   32.5             MCV   75             Mono #   0.65             Mono %   4.7             MPV   11.1             Neut #   11.4             Neut %   82.4             nRBC   0             Platelet Count   305             POCT Glucose 172     229    195   230       Potassium   4.4             PROTEIN TOTAL   6.0             RBC   4.74             RDW   16.3             Sodium   137             WBC   13.87                                    Significant Imaging: I have reviewed all pertinent imaging results/findings within the past 24 hours.

## 2025-08-03 NOTE — PLAN OF CARE
Patient states she really does not want to go to [facility] due to distance from her family. She will consider [facility] if [facility] does not accept her. She still wants to be in [place] due to the possibility of her sister being placed at [facility]. I explained that her sister has already discharged and that she needs to worry about her discharge plans at this time. But she is persistent that she wants to wait on [facility]. I explained that the CM/SW team would be looking for a solid answer first thing in the morning so that SNF could submit for auth. She verbalized understanding.       08/03/25 1334   Post-Acute Status   Post-Acute Authorization Placement

## 2025-08-03 NOTE — PLAN OF CARE
Patient is medically ready and has been accepted by [facility] and [facility]. I attempted to call family member Melanie to update her and get a decision from her. I left a voicemail. I will try to call again in an hour if I have not heard back. [place] will continue to follow and offer assistance.       08/03/25 1207   Discharge Reassessment   Assessment Type Discharge Planning Reassessment   Did the patient's condition or plan change since previous assessment? Yes   Discharge Plan discussed with: Patient

## 2025-08-03 NOTE — ASSESSMENT & PLAN NOTE
-Presented with LUE/LLE weakness  -CT C-spine: Multilevel cervical disc bulging/disc protrusions, including left paracentral disc protrusion at C5-C6, with spinal canal stenosis and left neural foraminal narrowing.   -MRI C-spine: Multifocal cervical stenosis, severe C3-4 C5-6. No cord signal change   -Neurosurgery consulted, discussed operative intervention, patient would like to pursue nonoperative intervention at this time and SNF placement  -Continue Decadron 4mg every 8 hours, taper down tomorrow per neurosurgery recommendations  -PT/OT

## 2025-08-03 NOTE — PROGRESS NOTES
[facility] Medicine  Progress Note    Patient Name: Delilah Williamson  MRN: [id number]  Patient Class: IP- Inpatient   Admission Date: 7/29/2025  Length of Stay: 5 days  Attending Physician: Ramon Vizcarra DO  Primary Care Provider: Juan Abarca MD        Subjective     Principal Problem:Cervical stenosis of spinal canal        HPI:  This is a pleasant 85-year-old lady with comorbid conditions of hyperlipidemia, hypertension, debility related to age who presents to the emergency department after concerns of left-sided weakness.  Patient reportedly had a fall yesterday and daughter-in-law stayed with her, however, upon waking this morning they noticed that she was unable to keep her left arm against gravity.  Code stroke was activated in the emergency department, CT head negative, CTA head and neck with no large vessel occlusion and MRI negative although clinically patient with 1/5 strength of the left upper extremity with no associated musculoskeletal patent.  Left lower extremity 2/5.  This is potentially an MRI negative CVA.  Continue dual antiplatelet therapy and neurology consult to follow.  Urinalysis frankly positive for infection.  Continue ceftriaxone.  Admission to hospital medicine with the above workup.    Overview/Hospital Course:  No notes on file    Interval History:  Patient seen and examined.  NAD.  LUE and LLE weakness with slight improvement today.  She is able to open and close her hand and lift her arm  off the bed with her fingers supporting it.  Case management following for SNF placement.  Continue decadron 4mg every 8 hours, taper down tomorrow.  She had a bowel movement yesterday after lactulose.  BP elevated, increase PO hydralazine to every 8 hours and add clonidine PO PRN.    Review of Systems   Respiratory:  Negative for shortness of breath and wheezing.    Cardiovascular:  Negative for chest pain and palpitations.   Gastrointestinal:  Negative for abdominal  pain, nausea and vomiting.   Genitourinary:  Negative for dysuria.   Neurological:  Positive for weakness.     Objective:     Vital Signs (Most Recent):  Temp: 97.5 °F (36.4 °C) (08/03/25 0723)  Pulse: 63 (08/03/25 0813)  Resp: 12 (08/03/25 0813)  BP: (!) 152/67 (08/03/25 0452)  SpO2: 97 % (08/03/25 0813) Vital Signs (24h Range):  Temp:  [97.4 °F (36.3 °C)-97.8 °F (36.6 °C)] 97.5 °F (36.4 °C)  Pulse:  [59-75] 63  Resp:  [12-18] 12  SpO2:  [97 %-98 %] 97 %  BP: (152-178)/(65-79) 152/67     Weight: 76.5 kg (168 lb 10.4 oz)  Body mass index is 26.41 kg/m².    Intake/Output Summary (Last 24 hours) at 8/3/2025 1024  Last data filed at 8/2/2025 1713  Gross per 24 hour   Intake 480 ml   Output 700 ml   Net -220 ml         Physical Exam  Vitals and nursing note reviewed.   Constitutional:       General: She is not in acute distress.  HENT:      Mouth/Throat:      Mouth: Mucous membranes are moist.      Pharynx: Oropharynx is clear.   Cardiovascular:      Rate and Rhythm: Normal rate and regular rhythm.   Pulmonary:      Effort: Pulmonary effort is normal. No respiratory distress.      Breath sounds: Normal breath sounds.   Abdominal:      General: Bowel sounds are normal. There is no distension.      Palpations: Abdomen is soft.      Tenderness: There is no abdominal tenderness.   Neurological:      Mental Status: She is alert and oriented to person, place, and time.      Motor: Weakness present.      Comments: Unable to hold LUE against gravity, can open and close LUE  Strength LLE 4/5   Psychiatric:         Mood and Affect: Mood normal.               Significant Labs: All pertinent labs within the past 24 hours have been reviewed.  Recent Lab Results  (Last 5 results in the past 24 hours)        08/03/25  0720   08/03/25  0439   08/02/25  2034   08/02/25  1537   08/02/25  1128        Albumin   3.2             ALP   53             ALT   24             Anion Gap   6             AST   21             Baso #   0.01              Basophil %   0.1             BILIRUBIN TOTAL   0.3  Comment: For infants and newborns, interpretation of results should be based   on gestational age, weight and in agreement with clinical   observations.    Premature Infant recommended reference ranges:   0-24 hours:  <8.0 mg/dL   24-48 hours: <12.0 mg/dL   3-5 days:    <15.0 mg/dL   6-29 days:   <15.0 mg/dL             BUN   29             Calcium   9.4             Chloride   104             CO2   27             Creatinine   0.8             eGFR   >60             Eos #   0.00             Eos %   0.0             Glucose   195             Hematocrit   35.4             Hemoglobin   11.5             Immature Grans (Abs)   0.12  Comment: Mild elevation in immature granulocytes is non specific and can be seen in a variety of conditions including stress response, acute inflammation, trauma and pregnancy. Correlation with other laboratory and clinical findings is essential.             Immature Granulocytes   0.9             Lymph #   1.65             LYMPH %   11.9             MCH   24.3             MCHC   32.5             MCV   75             Mono #   0.65             Mono %   4.7             MPV   11.1             Neut #   11.4             Neut %   82.4             nRBC   0             Platelet Count   305             POCT Glucose 172     229   195   230       Potassium   4.4             PROTEIN TOTAL   6.0             RBC   4.74             RDW   16.3             Sodium   137             WBC   13.87                                    Significant Imaging: I have reviewed all pertinent imaging results/findings within the past 24 hours.      Assessment & Plan  Cervical stenosis of spinal canal  -Presented with LUE/LLE weakness  -CT C-spine: Multilevel cervical disc bulging/disc protrusions, including left paracentral disc protrusion at C5-C6, with spinal canal stenosis and left neural foraminal narrowing.   -MRI C-spine: Multifocal cervical stenosis, severe C3-4 C5-6. No  cord signal change   -Neurosurgery consulted, discussed operative intervention, patient would like to pursue nonoperative intervention at this time and SNF placement  -Continue Decadron 4mg every 8 hours, taper down tomorrow per neurosurgery recommendations  -PT/OT  Hyperlipidemia  Continue statin    UTI (urinary tract infection)  -Urine culture +staph aureus  -Continue rocephin    HTN (hypertension)  Patient's blood pressure range in the last 24 hours was: BP  Min: 152/67  Max: 178/75.The patient's inpatient anti-hypertensive regimen is listed below:  Current Antihypertensives  , Daily, Oral  valsartan tablet 320 mg, Daily, Oral  amLODIPine tablet 10 mg, Daily, Oral  hydrALAZINE tablet 25 mg, Every 8 hours, Oral  cloNIDine tablet 0.1 mg, Every 6 hours PRN, Oral    Plan  - BP is uncontrolled, will adjust as follows:  Increase hydralazine to every 8 hours and clonidine p.o. p.r.n.   Controlled type 2 diabetes mellitus without complication, without long-term current use of insulin  -Has history of DM 2, reports that she does take metformin 500mg daily outpatient  -Hgba1c 5.7 on 7/29/25  -Blood glucose elevated likely 2/2 decadron  -SSI for coverage while on steroids    VTE Risk Mitigation (From admission, onward)           Ordered     enoxaparin injection 40 mg  Daily         07/29/25 1404     IP VTE HIGH RISK PATIENT  Once         07/29/25 1404                    Discharge Planning   ALINA: 8/5/2025     Code Status: DNR   Medical Readiness for Discharge Date:   Discharge Plan A: Skilled Nursing Facility                  Please place Justification for DME      JACOB Justice  Department of Hospital Medicine   [facility]

## 2025-08-03 NOTE — CARE UPDATE
08/03/25 0813   Patient Assessment/Suction   Level of Consciousness (AVPU) alert   Respiratory Effort Unlabored   Expansion/Accessory Muscles/Retractions no use of accessory muscles   Rhythm/Pattern, Respiratory no shortness of breath reported   Cough Frequency no cough   PRE-TX-O2   Device (Oxygen Therapy) room air   SpO2 97 %   Pulse Oximetry Type Intermittent   $ Pulse Oximetry - Multiple Charge Pulse Oximetry - Multiple   Pulse 63   Resp 12   Education   $ Education 15 min;Oxygen   Respiratory Evaluation   $ Care Plan Tech Time 15 min

## 2025-08-04 VITALS
TEMPERATURE: 98 F | HEART RATE: 65 BPM | OXYGEN SATURATION: 99 % | HEIGHT: 67 IN | BODY MASS INDEX: 26.47 KG/M2 | WEIGHT: 168.63 LBS | RESPIRATION RATE: 18 BRPM | SYSTOLIC BLOOD PRESSURE: 149 MMHG | DIASTOLIC BLOOD PRESSURE: 54 MMHG

## 2025-08-04 LAB
ABSOLUTE EOSINOPHIL (SMH): 0 K/UL
ABSOLUTE MONOCYTE (SMH): 0.45 K/UL (ref 0.3–1)
ABSOLUTE NEUTROPHIL COUNT (SMH): 7.4 K/UL (ref 1.8–7.7)
ANION GAP (SMH): 3 MMOL/L (ref 8–16)
BASOPHILS # BLD AUTO: 0.02 K/UL
BASOPHILS NFR BLD AUTO: 0.2 %
BUN SERPL-MCNC: 34 MG/DL (ref 8–23)
CALCIUM SERPL-MCNC: 9.2 MG/DL (ref 8.7–10.5)
CHLORIDE SERPL-SCNC: 104 MMOL/L (ref 95–110)
CO2 SERPL-SCNC: 28 MMOL/L (ref 23–29)
CREAT SERPL-MCNC: 0.8 MG/DL (ref 0.5–1.4)
ERYTHROCYTE [DISTWIDTH] IN BLOOD BY AUTOMATED COUNT: 16.3 % (ref 11.5–14.5)
GFR SERPLBLD CREATININE-BSD FMLA CKD-EPI: >60 ML/MIN/1.73/M2
GLUCOSE SERPL-MCNC: 214 MG/DL (ref 70–110)
HCT VFR BLD AUTO: 36.8 % (ref 37–48.5)
HGB BLD-MCNC: 11.5 GM/DL (ref 12–16)
IMM GRANULOCYTES # BLD AUTO: 0.08 K/UL (ref 0–0.04)
IMM GRANULOCYTES NFR BLD AUTO: 0.8 % (ref 0–0.5)
LYMPHOCYTES # BLD AUTO: 1.49 K/UL (ref 1–4.8)
MAGNESIUM SERPL-MCNC: 1.7 MG/DL (ref 1.6–2.6)
MCH RBC QN AUTO: 23.9 PG (ref 27–31)
MCHC RBC AUTO-ENTMCNC: 31.3 G/DL (ref 32–36)
MCV RBC AUTO: 77 FL (ref 82–98)
NUCLEATED RBC (/100WBC) (SMH): 0 /100 WBC
PLATELET # BLD AUTO: 283 K/UL (ref 150–450)
PMV BLD AUTO: 10.9 FL (ref 9.2–12.9)
POCT GLUCOSE: 179 MG/DL (ref 70–110)
POCT GLUCOSE: 209 MG/DL (ref 70–110)
POCT GLUCOSE: 273 MG/DL (ref 70–110)
POTASSIUM SERPL-SCNC: 4.7 MMOL/L (ref 3.5–5.1)
RBC # BLD AUTO: 4.81 M/UL (ref 4–5.4)
RELATIVE EOSINOPHIL (SMH): 0 % (ref 0–8)
RELATIVE LYMPHOCYTE (SMH): 15.8 % (ref 18–48)
RELATIVE MONOCYTE (SMH): 4.8 % (ref 4–15)
RELATIVE NEUTROPHIL (SMH): 78.4 % (ref 38–73)
SODIUM SERPL-SCNC: 135 MMOL/L (ref 136–145)
WBC # BLD AUTO: 9.42 K/UL (ref 3.9–12.7)

## 2025-08-04 PROCEDURE — 94761 N-INVAS EAR/PLS OXIMETRY MLT: CPT

## 2025-08-04 PROCEDURE — 25000003 PHARM REV CODE 250

## 2025-08-04 PROCEDURE — 80048 BASIC METABOLIC PNL TOTAL CA: CPT

## 2025-08-04 PROCEDURE — 82962 GLUCOSE BLOOD TEST: CPT

## 2025-08-04 PROCEDURE — 83735 ASSAY OF MAGNESIUM: CPT

## 2025-08-04 PROCEDURE — 63600175 PHARM REV CODE 636 W HCPCS

## 2025-08-04 PROCEDURE — 97530 THERAPEUTIC ACTIVITIES: CPT

## 2025-08-04 PROCEDURE — 21400001 HC TELEMETRY ROOM

## 2025-08-04 PROCEDURE — 36415 COLL VENOUS BLD VENIPUNCTURE: CPT

## 2025-08-04 PROCEDURE — 25000003 PHARM REV CODE 250: Performed by: INTERNAL MEDICINE

## 2025-08-04 PROCEDURE — 97110 THERAPEUTIC EXERCISES: CPT

## 2025-08-04 PROCEDURE — 85025 COMPLETE CBC W/AUTO DIFF WBC: CPT

## 2025-08-04 RX ORDER — CLONIDINE HYDROCHLORIDE 0.1 MG/1
0.1 TABLET ORAL EVERY 12 HOURS PRN
Start: 2025-08-04 | End: 2026-08-04

## 2025-08-04 RX ORDER — PANTOPRAZOLE SODIUM 40 MG/1
40 TABLET, DELAYED RELEASE ORAL DAILY
Start: 2025-08-05 | End: 2025-08-08

## 2025-08-04 RX ORDER — AMLODIPINE BESYLATE 5 MG/1
10 TABLET ORAL DAILY
Start: 2025-08-04

## 2025-08-04 RX ORDER — HYDRALAZINE HYDROCHLORIDE 25 MG/1
25 TABLET, FILM COATED ORAL EVERY 8 HOURS
Start: 2025-08-04 | End: 2026-08-04

## 2025-08-04 RX ORDER — DEXAMETHASONE 1 MG/1
TABLET ORAL
Start: 2025-08-04 | End: 2025-08-07

## 2025-08-04 RX ORDER — DEXAMETHASONE SODIUM PHOSPHATE 4 MG/ML
4 INJECTION, SOLUTION INTRA-ARTICULAR; INTRALESIONAL; INTRAMUSCULAR; INTRAVENOUS; SOFT TISSUE EVERY 12 HOURS
Status: DISCONTINUED | OUTPATIENT
Start: 2025-08-04 | End: 2025-08-05 | Stop reason: HOSPADM

## 2025-08-04 RX ADMIN — DEXAMETHASONE SODIUM PHOSPHATE 4 MG: 4 INJECTION, SOLUTION INTRA-ARTICULAR; INTRALESIONAL; INTRAMUSCULAR; INTRAVENOUS; SOFT TISSUE at 05:08

## 2025-08-04 RX ADMIN — AMLODIPINE BESYLATE 10 MG: 5 TABLET ORAL at 09:08

## 2025-08-04 RX ADMIN — HYDRALAZINE HYDROCHLORIDE 25 MG: 25 TABLET, FILM COATED ORAL at 05:08

## 2025-08-04 RX ADMIN — PANTOPRAZOLE SODIUM 40 MG: 40 TABLET, DELAYED RELEASE ORAL at 09:08

## 2025-08-04 RX ADMIN — METHOCARBAMOL 750 MG: 750 TABLET ORAL at 02:08

## 2025-08-04 RX ADMIN — CEFTRIAXONE SODIUM 1 G: 1 INJECTION, POWDER, FOR SOLUTION INTRAMUSCULAR; INTRAVENOUS at 11:08

## 2025-08-04 RX ADMIN — INSULIN ASPART 2 UNITS: 100 INJECTION, SOLUTION INTRAVENOUS; SUBCUTANEOUS at 08:08

## 2025-08-04 RX ADMIN — ATORVASTATIN CALCIUM 40 MG: 40 TABLET, FILM COATED ORAL at 09:08

## 2025-08-04 RX ADMIN — VALSARTAN 320 MG: 80 TABLET, FILM COATED ORAL at 09:08

## 2025-08-04 RX ADMIN — POLYETHYLENE GLYCOL 3350 17 G: 17 POWDER, FOR SOLUTION ORAL at 09:08

## 2025-08-04 RX ADMIN — ASPIRIN 81 MG: 81 TABLET, DELAYED RELEASE ORAL at 09:08

## 2025-08-04 RX ADMIN — METHOCARBAMOL 750 MG: 750 TABLET ORAL at 09:08

## 2025-08-04 RX ADMIN — CLOPIDOGREL 75 MG: 75 TABLET ORAL at 09:08

## 2025-08-04 RX ADMIN — HYDRALAZINE HYDROCHLORIDE 25 MG: 25 TABLET, FILM COATED ORAL at 02:08

## 2025-08-04 NOTE — PLAN OF CARE
SARA unable to submit auth through [facility] website. SARA called Evie with [facility] and she stated she would req auth. SARA has emailed Sandra with [facility] to let her know auth will be coming soon. SARA has sent updated clinicals to [facility].     3116- Due to [facility]'s website being down [facility] is not able to submit auth either. SARA called MISAEL @ [phone] to call SNF auth in. SARA faxed clinicals to [phone]. Evie with [facility] notified.       0870- [facility] auth approved [id number]. SARA sent Rimma with [facility] a message to inquire if they could accept pt today.

## 2025-08-04 NOTE — ASSESSMENT & PLAN NOTE
Patient's blood pressure range in the last 24 hours was: BP  Min: 153/76  Max: 167/72.The patient's inpatient anti-hypertensive regimen is listed below:  Current Antihypertensives  , Daily, Oral  valsartan tablet 320 mg, Daily, Oral  amLODIPine tablet 10 mg, Daily, Oral  hydrALAZINE tablet 25 mg, Every 8 hours, Oral  cloNIDine tablet 0.1 mg, Every 6 hours PRN, Oral    Plan  - BP better controlled today

## 2025-08-04 NOTE — PLAN OF CARE
Charts and orders reviewed. Pt to discharge SNF @ [facility].  gave pt's floor nurse JOSEFINA Koenig information to call report ([phone] nurse for [facility]). Pt has no other needs to be addressed by case management. Pt cleared to discharge from case management standpoint.    SW has called Melanie (pt's niece) @ [phone] and notified her of pt getting transferred to [facility].  SW is placing ADT 30 for a wheelchair van to get pt [facility] to [facility].      08/04/25 1538   Final Note   Assessment Type Final Discharge Note   Anticipated Discharge Disposition SNF   What phone number can be called within the next 1-3 days to see how you are doing after discharge? [phone]   Hospital Resources/Appts/Education Provided Post-Acute resouces added to AVS   Post-Acute Status   Post-Acute Authorization Placement   Post-Acute Placement Status Set-up Complete/Auth obtained   Patient choice form signed by patient/caregiver List with quality metrics by geographic area provided   Discharge Delays None known at this time

## 2025-08-04 NOTE — PLAN OF CARE
Problem: Oral Intake Inadequate  Goal: Improved Oral Intake  Outcome: Progressing  Intervention: Promote and Optimize Oral Intake  Flowsheets (Taken 8/4/2025 1401)  Nutrition Interventions: supplemental drinks provided

## 2025-08-04 NOTE — PT/OT/SLP PROGRESS
"Physical Therapy Treatment    Patient Name:  Delilah Williamson   MRN:  [id number]    Recommendations:     Discharge Recommendations: Moderate Intensity Therapy  Discharge Equipment Recommendations: to be determined by next level of care  Barriers to discharge: Decreased caregiver support    Assessment:     Delilah Williamson is a 85 y.o. female admitted with a medical diagnosis of Cervical stenosis of spinal canal.  She presents with the following impairments/functional limitations: weakness, impaired endurance, impaired self care skills, impaired functional mobility, gait instability, impaired balance, decreased upper extremity function, decreased lower extremity function, impaired coordination, abnormal tone.    Rehab Prognosis: Fair; patient would benefit from acute skilled PT services to address these deficits and reach maximum level of function.    Recent Surgery: * No surgery found *      Plan:     During this hospitalization, patient to be seen 6 x/week to address the identified rehab impairments via gait training, therapeutic activities, therapeutic exercises, neuromuscular re-education and progress toward the following goals:    Plan of Care Expires:  08/15/25    Subjective     Chief Complaint: "I don't think I can stand."  Patient/Family Comments/goals: Return to prior level of functional mobility.    Pain/Comfort:  Pain Rating 1:  (pt did not report pain)      Objective:     Communicated with JOSEFINA Koenig prior to and during session.  Patient found HOB elevated with bed alarm, telemetry, peripheral IV upon PT entry to room.     General Precautions: Standard, fall, diabetic  Orthopedic Precautions: N/A  Braces: N/A  Respiratory Status: Room air     Functional Mobility:  Bed Mobility:     Rolling Left:  moderate assistance and vc  Rolling Right: moderate assistance and vc  Scooting: moderate assistance, maximal assistance, and of 2 persons  Supine to Sit: moderate assistance and of 2 persons  Sit to Supine: moderate " assistance, maximal assistance, and of 2 persons  Transfers:     Sit to Stand:  moderate assistance, maximal assistance, and of 2 persons with rolling walker; upon standing pt noted to be wet/soiled and once returned to supine PT/tech assisted RN with cleaning pt/donning clean brief and pads.   Gait: x 4 side steps with rolling walker and moderate assistance of 2 persons plus vc for technique and sequencing. Pt unable to take a step to side with L LE, but was noted to pivot L foot on the floor.      AM-PAC 6 CLICK MOBILITY          Treatment & Education:  Pt was educated on the following: call light use, importance of OOB activity and functional mobility to negate the negative effects of prolonged bed rest during this hospitalization, safe transfers/ambulation and discharge planning recommendations/options.      Patient left HOB elevated with all lines intact, call button in reach, bed alarm on, and RN present..    GOALS:   Multidisciplinary Problems       Physical Therapy Goals          Problem: Physical Therapy    Goal Priority Disciplines Outcome Interventions   Physical Therapy Goal     PT, PT/OT Progressing    Description: Goals to be met by: 08-     Patient will increase functional independence with mobility by performin. Supine to sit with MInimal Assistance  2. Sit to stand transfer with Minimal Assistance  3. Bed to chair transfer with Minimal Assistance using Rolling Walker  4. Gait  x 50 feet with Minimal Assistance using Rolling Walker.   5. Lower extremity exercise program x20 reps                        DME Justifications:  No DME recommended requiring DME justifications    Time Tracking:     PT Received On: 25  PT Start Time: 1119     PT Stop Time: 1143  PT Total Time (min): 24 min     Billable Minutes: Therapeutic Activity 24    Treatment Type: Treatment  PT/PTA: PT     Number of PTA visits since last PT visit: 0     2025

## 2025-08-04 NOTE — SUBJECTIVE & OBJECTIVE
Interval History:  Patient seen and examined.  NAD.  LUE and LLE weakness about the same.  Taper down decadron to 4mg every 12 hours per neurosurgery recs.  BP better controlled today.  He / following for SNF placement.  Patient and family would like [facility].    Review of Systems   Respiratory:  Negative for shortness of breath and wheezing.    Cardiovascular:  Negative for chest pain and palpitations.   Gastrointestinal:  Negative for abdominal pain, nausea and vomiting.   Genitourinary:  Negative for dysuria.   Neurological:  Positive for weakness.     Objective:     Vital Signs (Most Recent):  Temp: 98.1 °F (36.7 °C) (08/04/25 1154)  Pulse: 65 (08/04/25 1154)  Resp: 18 (08/04/25 1154)  BP: (!) 164/67 (08/04/25 1154)  SpO2: 98 % (08/04/25 1154) Vital Signs (24h Range):  Temp:  [97.5 °F (36.4 °C)-98.1 °F (36.7 °C)] 98.1 °F (36.7 °C)  Pulse:  [57-75] 65  Resp:  [14-18] 18  SpO2:  [95 %-98 %] 98 %  BP: (153-167)/(62-76) 164/67     Weight: 76.5 kg (168 lb 10.4 oz)  Body mass index is 26.41 kg/m².    Intake/Output Summary (Last 24 hours) at 8/4/2025 1309  Last data filed at 8/4/2025 0745  Gross per 24 hour   Intake 240 ml   Output --   Net 240 ml         Physical Exam  Vitals and nursing note reviewed.   Constitutional:       General: She is not in acute distress.  HENT:      Mouth/Throat:      Mouth: Mucous membranes are moist.      Pharynx: Oropharynx is clear.   Cardiovascular:      Rate and Rhythm: Normal rate and regular rhythm.   Pulmonary:      Effort: Pulmonary effort is normal. No respiratory distress.      Breath sounds: Normal breath sounds.   Abdominal:      General: Bowel sounds are normal. There is no distension.      Palpations: Abdomen is soft.      Tenderness: There is no abdominal tenderness.   Neurological:      Mental Status: She is alert and oriented to person, place, and time.      Motor: Weakness present.      Comments: Unable to hold LUE against gravity, can open and  close LUE  Strength LLE 4/5   Psychiatric:         Mood and Affect: Mood normal.               Significant Labs: All pertinent labs within the past 24 hours have been reviewed.  Recent Lab Results  (Last 5 results in the past 24 hours)        08/04/25  1155   08/04/25  0809   08/04/25  0508   08/03/25 2027   08/03/25  1819        TB Skin Test 48 - 72 hr read               TB Induration 48 - 72 hr read         0       Anion Gap     3           Baso #     0.02           Basophil %     0.2           BUN     34           Calcium     9.2           Chloride     104           CO2     28           Creatinine     0.8           eGFR     >60           Eos #     0.00           Eos %     0.0           Glucose     214           Hematocrit     36.8           Hemoglobin     11.5           Immature Grans (Abs)     0.08  Comment: Mild elevation in immature granulocytes is non specific and can be seen in a variety of conditions including stress response, acute inflammation, trauma and pregnancy. Correlation with other laboratory and clinical findings is essential.           Immature Granulocytes     0.8           Lymph #     1.49           LYMPH %     15.8           Magnesium      1.7           MCH     23.9           MCHC     31.3           MCV     77           Mono #     0.45           Mono %     4.8           MPV     10.9           Neut #     7.4           Neut %     78.4           nRBC     0           Platelet Count     283           POCT Glucose 273   179     238         Potassium     4.7           RBC     4.81           RDW     16.3           Sodium     135           WBC     9.42                                  Significant Imaging: I have reviewed all pertinent imaging results/findings within the past 24 hours.

## 2025-08-04 NOTE — DISCHARGE INSTRUCTIONS
[facility]  Facility Transfer Orders        [facility]    Diagnoses:   Active Hospital Problems    Diagnosis  POA    *Cervical stenosis of spinal canal [M48.02]  Yes    UTI (urinary tract infection) [N39.0]  Yes    Hyperlipidemia [E78.5]  Yes    HTN (hypertension) [I10]  Yes    Controlled type 2 diabetes mellitus without complication, without long-term current use of insulin [E11.9]  Yes      Resolved Hospital Problems    Diagnosis Date Resolved POA    Conjunctivitis [H10.9] 08/01/2025 Yes    Constipation [K59.00] 08/03/2025 Yes     Allergies: Review of patient's allergies indicates:  No Known Allergies    Code Status: DNR    Vitals: Routine       Diet: diabetic diet, soft and bite sized    Activity: Activity as tolerated    Nursing Precautions: Aspiration , Fall, and Pressure ulcer prevention    Bed/Surface: Low Air Loss    Consults: PT to evaluate and treat- 5 times a week, OT to evaluate and treat- 5 times a week, ST to evaluate and treat- 5 times a week, Wound Care, and Nutrition to evaluate and recommend diet    Oxygen: room air    Dialysis: Patient is not on dialysis.     Labs: as needed  Pending Diagnostic Studies:       None          Imaging: n/a    Miscellaneous Care:   Accuchecks before meals and at bedtime  Wound care:   Clean bilateral knees and bilateral buttocks with hibiclens soap and water and dry. Apply a thin layer of Triad and leave open to air every shift.       IV Access: n/a     Medications: Discontinue all previous medication orders, if any. See new list below.  Current Discharge Medication List        START taking these medications    Details   cloNIDine (CATAPRES) 0.1 MG tablet Take 1 tablet (0.1 mg total) by mouth every 12 (twelve) hours as needed (SBP >160).    Comments: .      dexAMETHasone (DECADRON) 1 MG Tab Take 4 tablets (4 mg total) by mouth every 12 (twelve) hours for 2 days, THEN 4 tablets (4 mg total) once daily for 1 day.      hydrALAZINE  (APRESOLINE) 25 MG tablet Take 1 tablet (25 mg total) by mouth every 8 (eight) hours.    Comments: .      pantoprazole (PROTONIX) 40 MG tablet Take 1 tablet (40 mg total) by mouth once daily. for 3 days           CONTINUE these medications which have CHANGED    Details   amLODIPine (NORVASC) 5 MG tablet Take 2 tablets (10 mg total) by mouth once daily.    Comments: .           CONTINUE these medications which have NOT CHANGED    Details   acac/inulin/c-lose/mv-mn/folic (FIBER PLUS MULITVITAMIN ORAL) Take by mouth.      aspirin 81 MG Chew Take 1 tablet (81 mg total) by mouth once daily.  Qty: 30 tablet, Refills: 11      atorvastatin (LIPITOR) 40 MG tablet Take 40 mg by mouth once daily.      latanoprost 0.005 % ophthalmic solution INSTILL 1 DROP INTO BOTH EYES AT BEDTIME AS DIRECTED INSTILL 1 DROP INTO BOTH EYES AT BEDTIME      metFORMIN (GLUCOPHAGE) 500 MG tablet Take 500 mg by mouth daily with breakfast.      glimepiride (AMARYL) 1 MG tablet Take 1 tablet (1 mg total) by mouth before breakfast.  Qty: 90 tablet, Refills: 3      telmisartan (MICARDIS) 80 MG Tab Take 80 mg by mouth once daily.           STOP taking these medications       furosemide (LASIX) 20 MG tablet Comments:   Reason for Stopping:         metoprolol succinate (TOPROL-XL) 50 MG 24 hr tablet Comments:   Reason for Stopping:         potassium chloride SA (K-DUR,KLOR-CON M) 10 MEQ tablet Comments:   Reason for Stopping:             Follow up:    Follow-up Information       Juan Abarca MD Follow up in 1 week(s).    Specialty: Family Medicine  Contact information:  [address]  [place] [address]  [phone]               Ryan Walls, DO Follow up in 1 week(s).    Specialty: Neurosurgery  Contact information:  [address] [place] [address] [address]  [place] [address]  [phone]                               Immunizations Administered as of 8/4/2025       Name Date Dose VIS Date Route Exp Date    COVID-19, MRNA, LN-S, PF (Pfizer) (Purple  Cap) 10/7/2021  9:20 AM 0.3 mL 12/12/2020 Intramuscular 10/31/2021    Site: Left deltoid     Given By: Jaycee Patiño LPN     : [employer]     Lot: [id number]     COVID-19, MRNA, LN-S, PF (Pfizer) (Purple Cap) 2/2/2021  9:27 AM 0.3 mL 12/12/2020 Intramuscular 5/31/2021    Site: Left deltoid     Given By: Pascale Zamora LPN     : [employer]     Lot: [id number]     COVID-19, MRNA, LN-S, PF (Pfizer) (Purple Cap) 1/12/2021  9:22 AM 0.3 mL 12/12/2020 Intramuscular 4/30/2021    Site: Left deltoid     Given By: Mima Hawkins LPN     : [employer]     Lot: [id number]               Some patients may experience side effects after vaccination.  These may include fever, headache, muscle or joint aches.  Most symptoms resolve with 24-48 hours and do not require urgent medical evaluation unless they persist for more than 72 hours or symptoms are concerning for an unrelated medical condition.          JACOB Justice

## 2025-08-04 NOTE — PLAN OF CARE
SW met with pt and pt's niece at bedside to review discharge recommendation of SNF and is agreeable to plan    Patient/family provided list of accepting facilities in-network with patient's payor plan. Providers that are owned, operated, or affiliated with [facility] are included on the list.     Notified that referral sent to below listed facilities from in-network list based on proximity to home/family support:   [facility]  2.  3.  4.  5. (can send more than 5)    Patient/family instructed to identify preference.    Preferred Facility: (if more than 1, listed in order of descending preference)  [facility]      If an additional preferred facility not listed above is identified, additional referral to be sent. If above facilities unable to accept, will send additional referrals to in-network providers.

## 2025-08-04 NOTE — PROGRESS NOTES
[facility] Medicine  Progress Note    Patient Name: Delilah Williamson  MRN: [id number]  Patient Class: IP- Inpatient   Admission Date: 7/29/2025  Length of Stay: 6 days  Attending Physician: Ramon Vizcarra DO  Primary Care Provider: Juan Abarca MD        Subjective     Principal Problem:Cervical stenosis of spinal canal        HPI:  This is a pleasant 85-year-old lady with comorbid conditions of hyperlipidemia, hypertension, debility related to age who presents to the emergency department after concerns of left-sided weakness.  Patient reportedly had a fall yesterday and daughter-in-law stayed with her, however, upon waking this morning they noticed that she was unable to keep her left arm against gravity.  Code stroke was activated in the emergency department, CT head negative, CTA head and neck with no large vessel occlusion and MRI negative although clinically patient with 1/5 strength of the left upper extremity with no associated musculoskeletal patent.  Left lower extremity 2/5.  This is potentially an MRI negative CVA.  Continue dual antiplatelet therapy and neurology consult to follow.  Urinalysis frankly positive for infection.  Continue ceftriaxone.  Admission to hospital medicine with the above workup.    Overview/Hospital Course:  No notes on file    Interval History:  Patient seen and examined.  NAD.  LUE and LLE weakness about the same.  Taper down decadron to 4mg every 12 hours per neurosurgery recs.  BP better controlled today.  He / following for SNF placement.  Patient and family would like [facility].    Review of Systems   Respiratory:  Negative for shortness of breath and wheezing.    Cardiovascular:  Negative for chest pain and palpitations.   Gastrointestinal:  Negative for abdominal pain, nausea and vomiting.   Genitourinary:  Negative for dysuria.   Neurological:  Positive for weakness.     Objective:     Vital Signs (Most  Recent):  Temp: 98.1 °F (36.7 °C) (08/04/25 1154)  Pulse: 65 (08/04/25 1154)  Resp: 18 (08/04/25 1154)  BP: (!) 164/67 (08/04/25 1154)  SpO2: 98 % (08/04/25 1154) Vital Signs (24h Range):  Temp:  [97.5 °F (36.4 °C)-98.1 °F (36.7 °C)] 98.1 °F (36.7 °C)  Pulse:  [57-75] 65  Resp:  [14-18] 18  SpO2:  [95 %-98 %] 98 %  BP: (153-167)/(62-76) 164/67     Weight: 76.5 kg (168 lb 10.4 oz)  Body mass index is 26.41 kg/m².    Intake/Output Summary (Last 24 hours) at 8/4/2025 1309  Last data filed at 8/4/2025 0745  Gross per 24 hour   Intake 240 ml   Output --   Net 240 ml         Physical Exam  Vitals and nursing note reviewed.   Constitutional:       General: She is not in acute distress.  HENT:      Mouth/Throat:      Mouth: Mucous membranes are moist.      Pharynx: Oropharynx is clear.   Cardiovascular:      Rate and Rhythm: Normal rate and regular rhythm.   Pulmonary:      Effort: Pulmonary effort is normal. No respiratory distress.      Breath sounds: Normal breath sounds.   Abdominal:      General: Bowel sounds are normal. There is no distension.      Palpations: Abdomen is soft.      Tenderness: There is no abdominal tenderness.   Neurological:      Mental Status: She is alert and oriented to person, place, and time.      Motor: Weakness present.      Comments: Unable to hold LUE against gravity, can open and close LUE  Strength LLE 4/5   Psychiatric:         Mood and Affect: Mood normal.               Significant Labs: All pertinent labs within the past 24 hours have been reviewed.  Recent Lab Results  (Last 5 results in the past 24 hours)        08/04/25  1155   08/04/25  0809   08/04/25  0508   08/03/25 2027 08/03/25  1819        TB Skin Test 48 - 72 hr read               TB Induration 48 - 72 hr read         0       Anion Gap     3           Baso #     0.02           Basophil %     0.2           BUN     34           Calcium     9.2           Chloride     104           CO2     28           Creatinine     0.8            eGFR     >60           Eos #     0.00           Eos %     0.0           Glucose     214           Hematocrit     36.8           Hemoglobin     11.5           Immature Grans (Abs)     0.08  Comment: Mild elevation in immature granulocytes is non specific and can be seen in a variety of conditions including stress response, acute inflammation, trauma and pregnancy. Correlation with other laboratory and clinical findings is essential.           Immature Granulocytes     0.8           Lymph #     1.49           LYMPH %     15.8           Magnesium      1.7           MCH     23.9           MCHC     31.3           MCV     77           Mono #     0.45           Mono %     4.8           MPV     10.9           Neut #     7.4           Neut %     78.4           nRBC     0           Platelet Count     283           POCT Glucose 273   179     238         Potassium     4.7           RBC     4.81           RDW     16.3           Sodium     135           WBC     9.42                                  Significant Imaging: I have reviewed all pertinent imaging results/findings within the past 24 hours.      Assessment & Plan  Cervical stenosis of spinal canal  -Presented with LUE/LLE weakness  -CT C-spine: Multilevel cervical disc bulging/disc protrusions, including left paracentral disc protrusion at C5-C6, with spinal canal stenosis and left neural foraminal narrowing.   -MRI C-spine: Multifocal cervical stenosis, severe C3-4 C5-6. No cord signal change   -Neurosurgery consulted, discussed operative intervention, patient would like to pursue nonoperative intervention at this time and SNF placement  -Wean Decadron to 4mg every 12 hours per neurosurgery recommendations  -PT/OT  Hyperlipidemia  Continue statin    UTI (urinary tract infection)  -Urine culture +staph aureus  -Completed course of rocephin    HTN (hypertension)  Patient's blood pressure range in the last 24 hours was: BP  Min: 153/76  Max: 167/72.The patient's  inpatient anti-hypertensive regimen is listed below:  Current Antihypertensives  , Daily, Oral  valsartan tablet 320 mg, Daily, Oral  amLODIPine tablet 10 mg, Daily, Oral  hydrALAZINE tablet 25 mg, Every 8 hours, Oral  cloNIDine tablet 0.1 mg, Every 6 hours PRN, Oral    Plan  - BP better controlled today  Controlled type 2 diabetes mellitus without complication, without long-term current use of insulin  -Has history of DM 2, reports that she does take metformin 500mg daily outpatient  -Hgba1c 5.7 on 7/29/25  -Blood glucose elevated likely 2/2 decadron  -SSI for coverage while on steroids    VTE Risk Mitigation (From admission, onward)           Ordered     enoxaparin injection 40 mg  Daily         07/29/25 1404     IP VTE HIGH RISK PATIENT  Once         07/29/25 1404                    Discharge Planning   ALINA: 8/5/2025     Code Status: DNR   Medical Readiness for Discharge Date:   Discharge Plan A: Skilled Nursing Facility                  Please place Justification for DME      JACOB Justice  Department of Hospital Medicine   [facility]

## 2025-08-04 NOTE — PLAN OF CARE
Patient is medically stable for discharge - awaiting placement at this time          08/04/25 1402   Discharge Reassessment   Assessment Type Discharge Planning Reassessment   Did the patient's condition or plan change since previous assessment? No   Discharge Plan discussed with: Patient   Discharge Plan A Skilled Nursing Facility   Discharge Plan B Home Health

## 2025-08-04 NOTE — PROGRESS NOTES
[facility]  Inpatient Nutrition Assessment    Admit Date: 7/29/2025   Total duration of encounter: 6 days     Nutrition Recommendation/Prescription   1. Continue Soft diet per SLP. RD to monitor glucose labs and adjust diet PRN.   2. Consider Boost Glucose control chocolate with meals due to history and wound care needs.    Communication of RD recs: reviewed with RN  Nutrition Discharge Planning:   Diet texture per SLP, Therapeutic diet (comments)  Therapeutic diet (comments): Diabetic Soft & Bite sized (IDDSI Level 6) diet  Oral supplement regimen (comments): Boost Glucose control with meals. Chocolate.     NUTRITION ASSESSMENT    MEDICAL CHART REVIEW:  Patient Age: 85 y.o. female  Reason For Assessment: RD follow-up  Diagnosis: stroke/CVA  General Information Comments:   8/4: Pt continues on IDDSI 6 per SLP. Pt with improved intake. Pt awaiting rehab placement.   7/31: Patient with prior stroke,HTN, HLD, DM2, glaucoma presented with left arm weakness since 7/28/2025. Stroke code was activated . Also noted to have + UA.    Nutrition Risk Screen  MST Score: 2  Have you recently lost weight without trying?: Unsure  Weight loss score: 2  Have you been eating poorly because of a decreased appetite?: No  Appetite score: 0    The primary encounter diagnosis was Left-sided weakness. Diagnoses of Acute focal neurological deficit, CVA (cerebral vascular accident), Bladder infection, Acute ischemic stroke, Primary hypertension, Controlled type 2 diabetes mellitus without complication, without long-term current use of insulin, Hyperlipidemia, unspecified hyperlipidemia type, and Cervical stenosis of spinal canal were also pertinent to this visit.     Past Medical History:   Diagnosis Date    Diabetes mellitus, type 2     Glaucoma     Hyperlipidemia     Hypertension     Stroke          SOCIAL AND DIET HISTORY:  Nutrition/Diet History  Nutrition Intake History: No reported poor intake/appetite prior to admit per  MST 2.  Food Preferences: Chocolate supplement.  Spiritual, Cultural Beliefs, Evangelical Practices, Values that Affect Care: no  Food Allergies: NKFA  Factors Affecting Nutritional Intake: difficulty/impaired swallowing, chewing difficulties/inability to chew food    Pertinent Labs Reviewed: reviewed Pertinent Labs Comments: glucose < 170 mg/dL on regular diet.  Recent Labs   Lab 07/29/25  1142 07/29/25  1447 07/30/25  0448 07/31/25  0504 08/01/25  0502 08/02/25  0444 08/03/25  0439 08/04/25  0508     --  141 138 136 136 137 135*   K 3.5  --  3.0* 4.0 3.7 4.2 4.4 4.7   CALCIUM 11.4*  --  11.2* 10.5 10.4 9.8 9.4 9.2   PHOS  --   --  2.5*  --   --   --   --   --    MG  --   --  1.2* 1.7 1.8 1.8  --  1.7     --  105 104 102 103 104 104   CO2 29  --  28 28 25 27 27 28   BUN 18  --  15 15 26* 32* 29* 34*   CREATININE 0.9  --  0.8 0.7 0.8 0.8 0.8 0.8   EGFRNORACEVR >60  --  >60 >60 >60 >60 >60 >60   *  --  90 170* 232* 220* 195* 214*   BILITOT 0.8  --  0.8 0.6 0.5 0.4 0.3  --    ALKPHOS 57  --  58 65 67 68 53*  --    ALT 25  --  25 25 24 21 24  --    AST 57*  --  49* 42* 28 21 21  --    ALBUMIN 3.7  --  3.4* 3.6 3.5 3.4* 3.2*  --    TRIG 63  --   --   --   --   --   --   --    HGBA1C  --  5.7  --   --   --   --   --   --        Pertinent Medications Reviewed: reviewed    Scheduled Meds:   amLODIPine  10 mg Oral Daily    aspirin  81 mg Oral Daily    atorvastatin  40 mg Oral Daily    clopidogreL  75 mg Oral Daily    dexAMETHasone (Decadron) IV (PEDS and ADULTS)  4 mg Intravenous Q12H    enoxparin  40 mg Subcutaneous Daily    hydrALAZINE  25 mg Oral Q8H    methocarbamoL  750 mg Oral TID    pantoprazole  40 mg Oral Daily    polyethylene glycol  17 g Oral Daily    valsartan  320 mg Oral Daily       NUTRITION FOCUSED PHYSICAL EXAM (NFPE):     Skin (Micronutrient): wounds unhealed       Weight Loss (Malnutrition): greater than 5% in 1 month   Pt does not meet criteria at this time.   A minimum of two  "characteristics is recommended for diagnosis of either severe or non-severe malnutrition.  Wound(s):     Wound 07/29/25 2020 Abrasion(s) Right anterior Knee-Wound Image: Images linked       Wound 07/29/25 2020 Pressure Injury Left Buttocks-Wound Image: Images linked       Wound 07/29/25 Abrasion(s) Left anterior Knee-Wound Image: Images linked       Wound 07/29/25 Pressure Injury Right Buttocks-Wound Image: Images linked    Anthropometrics  Height: 5' 7" (170.2 cm)  Height (inches): 67 in Height Method: Stated   Weight: 76.5 kg (168 lb 10.4 oz) Weight (lb): 168.65 lb Weight Method: Bed Scale           BMI (Calculated): 26.4 BMI Grade: 25 - 29.9 - overweight      Weight History:  Wt Readings from Last 5 Encounters:   07/29/25 76.5 kg (168 lb 10.4 oz)   06/18/25 81.2 kg (179 lb)   06/05/25 87 kg (191 lb 12.8 oz)   04/28/25 87.4 kg (192 lb 10.9 oz)   04/23/25 102.1 kg (225 lb)     Weight Change(s) Since Admission: n/a  Admit Weight: 81.2 kg (179 lb) (07/29/25 1133), Weight Method: Stated    Diet order:   Diet Soft & Bite Sized (IDDSI Level 6)  Dietary nutrition supplements By Mouth; All Meals; Boost Glucose Control - Chocolate,Dietary nutrition supplements By Mouth; All Meals; Boost Glucose Control - Chocolate   Oral Nutrition Supplement: Boost VHC BID    ESTIMATED NUTRITION REQUIREMENTS:  Weight Used For Calorie Calculations: 76.5 kg (168 lb 10.4 oz)  Energy Calorie Requirements (kcal): 1222-6060 kcal/day (25-30 kcal/kg).  Energy Need Method: Kcal/kg  Weight Used For Protein Calculations: 76.5 kg (168 lb 10.4 oz)  Protein Requirements: 114-153 gm/day (1.5-2.0 gm/kg).     CHO Requirement: 239-287 gm/day (15-19 CHO servings/day).    Evaluation of Received Nutrient/Fluid Intake  % Intake of Estimated Energy Needs: 50 - 75 %  % Meal Intake: 50 - 75 %    Intake/Output Summary (Last 24 hours) at 8/4/2025 1346  Last data filed at 8/4/2025 0745  Gross per 24 hour   Intake 240 ml   Output --   Net 240 ml      Last Bowel " Movement: 08/03/25    NUTRITION DIAGNOSIS   Inadequate protein energy intake related to Acute illness, Swallowing difficulty as evidence by Weight loss greater than or equal to 5% in 1 month, Intake <75% estimated needs, Swallow study results  Status: New    NUTRITION INTERVENTION    Nutrition Interventions: Texture modified diet, Medical food supplement therapy    NUTRITION MONITORING AND EVALUATION    Monitor and Evaluation: Glucose/endocrine profile, Diet order, Food and beverage intake     Nutrition Goals:   Goal #1: PO intake will meet greater than or equal to 50% of estimated needs by RD follow up   Nutrition Goal Status #1: progressing towards goal  Goal #2: Lab values to trend toward normal range by RD follow up  Nutrition Goal Status #2: progressing towards goal    Nutrition Risk  Level of Risk/Frequency of Follow-up:  (f/u weekly)     Nutrition Follow-Up  RD Follow-up?: Yes    Please consult if re-assessment needed sooner.

## 2025-08-04 NOTE — PT/OT/SLP PROGRESS
Occupational Therapy   Treatment    Name: Delilah Williamson  MRN: [id number]  Admitting Diagnosis:  Cervical stenosis of spinal canal       Recommendations:     Discharge Recommendations: Moderate Intensity Therapy  Discharge Equipment Recommendations:  to be determined by next level of care  Barriers to discharge:  Decreased caregiver support    Assessment:     Delilah Williamson is a 85 y.o. female with a medical diagnosis of Cervical stenosis of spinal canal.   Performance deficits affecting function are impaired endurance, weakness, impaired self care skills, impaired functional mobility, gait instability, impaired balance, decreased upper extremity function.     Rehab Prognosis:  Fair; patient would benefit from acute skilled OT services to address these deficits and reach maximum level of function.       Plan:     Patient to be seen 5 x/week to address the above listed problems via self-care/home management, therapeutic activities, therapeutic exercises  Plan of Care Expires:    Plan of Care Reviewed with: patient    Subjective     Pain/Comfort:  Pain Rating 1: 0/10  Pain Rating Post-Intervention 1: 0/10    Objective:     Communicated with: nurse prior to session.  Patient found HOB elevated with telemetry upon OT entry to room.    General Precautions: Standard, fall    Orthopedic Precautions:N/A  Braces: N/A     Occupational Performance:     Activities of Daily Living:  Feeding:  pt required set-up assistance with her lunch tray; OT encouraged pt to incorporate her LUE into the task as able as an assist; pt demonstrated understanding      Treatment & Education:  Pt completed AAROM exercises x10 in all major joints/planes of LUE from shoulder to elbow; pt completed AROM exercises x10 in all major joints/planes of LUE from forearm to digits     Patient left HOB elevated with all lines intact, call button in reach, bed alarm on, and LUE supported via pillows    GOALS:   Multidisciplinary Problems       Occupational  Therapy Goals          Problem: Occupational Therapy    Goal Priority Disciplines Outcome Interventions   Occupational Therapy Goal     OT, PT/OT     Description: Goals to be met by: 8/30/25     Patient will increase functional independence with ADLs by performing:    UE Dressing with Supervision.  LE Dressing with Supervision.  Grooming while standing at sink with Supervision.  Toileting from toilet with Supervision for hygiene and clothing management.   Bathing from  shower chair/bench with Supervision.  Toilet transfer to toilet with Supervision.                         Time Tracking:     OT Date of Treatment: 08/04/25  OT Start Time: 1414  OT Stop Time: 1422  OT Total Time (min): 8 min    Billable Minutes:Therapeutic Exercise 08    OT/NILAY: OT          8/4/2025

## 2025-08-04 NOTE — ASSESSMENT & PLAN NOTE
-Presented with LUE/LLE weakness  -CT C-spine: Multilevel cervical disc bulging/disc protrusions, including left paracentral disc protrusion at C5-C6, with spinal canal stenosis and left neural foraminal narrowing.   -MRI C-spine: Multifocal cervical stenosis, severe C3-4 C5-6. No cord signal change   -Neurosurgery consulted, discussed operative intervention, patient would like to pursue nonoperative intervention at this time and SNF placement  -Wean Decadron to 4mg every 12 hours per neurosurgery recommendations  -PT/OT

## 2025-08-04 NOTE — HOSPITAL COURSE
85-year-old female was admitted with LUE/LLE weakness.  Stroke workup was initiated on admission, which was negative.  MRI C-spine showed multifocal cervical stenosis, severe C3-4 C5-6, No cord signal change.  Neurosurgery (Dr. Walls) was consulted.  Operative intervention was discussed with patient, but she would like to hold off for now and continue with conservative management.  She was started on decadron taper with some improvement noted in left sided weakness.  She worked with PT/OT/ST, moderate intensity therapy was recommended.  She was accepted to [facility].    Patient was seen and examined on the day of discharge.

## 2025-08-05 NOTE — DISCHARGE SUMMARY
[facility]  Discharge Summary      Patient Name: Delilah Williamson  MRN: [id number]  [place]: [id number]  Patient Class: IP- Inpatient  Admission Date: 7/29/2025  Hospital Length of Stay: 7 days  Discharge Date and Time: 8/5/2025  1:30 AM  Attending Physician: Ramon Vizcarra DO  Discharging Provider: JACOB Justice  Primary Care Provider: Juan Abarca MD    Primary Care Team: Networked reference to record PCT     HPI:   This is a pleasant 85-year-old lady with comorbid conditions of hyperlipidemia, hypertension, debility related to age who presents to the emergency department after concerns of left-sided weakness.  Patient reportedly had a fall yesterday and daughter-in-law stayed with her, however, upon waking this morning they noticed that she was unable to keep her left arm against gravity.  Code stroke was activated in the emergency department, CT head negative, CTA head and neck with no large vessel occlusion and MRI negative although clinically patient with 1/5 strength of the left upper extremity with no associated musculoskeletal patent.  Left lower extremity 2/5.  This is potentially an MRI negative CVA.  Continue dual antiplatelet therapy and neurology consult to follow.  Urinalysis frankly positive for infection.  Continue ceftriaxone.  Admission to hospital medicine with the above workup.    * No surgery found *      Hospital Course:   85-year-old female was admitted with LUE/LLE weakness.  Stroke workup was initiated on admission, which was negative.  MRI C-spine showed multifocal cervical stenosis, severe C3-4 C5-6, No cord signal change.  Neurosurgery (Dr. Walls) was consulted.  Operative intervention was discussed with patient, but she would like to hold off for now and continue with conservative management.  She was started on decadron taper with some improvement noted in left sided weakness.  She worked with PT/OT/ST, moderate intensity therapy was recommended.   She was accepted to [facility].    Patient was seen and examined on the day of discharge.     Goals of Care Treatment Preferences:  Code Status: DNR         Consults:   Consults (From admission, onward)          Status Ordering Provider     Inpatient consult to Midline team  Once        Provider:  (Not yet assigned)    Completed LAMIN VERDUZCO     Inpatient Consult to Neurology Services (General Neurology)  Once        Provider:  (Not yet assigned)    Completed CODY PAULA     Inpatient consult to Registered Dietitian/Nutritionist  Once        Provider:  (Not yet assigned)    Completed CODY PAULA     Consult to Telemedicine - Acute Stroke  Once        Provider:  (Not yet assigned)    Completed SENDY GARRETT            Final Active Diagnoses:    Diagnosis Date Noted POA    PRINCIPAL PROBLEM:  Cervical stenosis of spinal canal [M48.02] 07/31/2025 Yes    UTI (urinary tract infection) [N39.0] 04/25/2025 Yes    Hyperlipidemia [E78.5] 04/23/2025 Yes    HTN (hypertension) [I10] 04/23/2025 Yes    Controlled type 2 diabetes mellitus without complication, without long-term current use of insulin [E11.9] 04/23/2025 Yes      Problems Resolved During this Admission:    Diagnosis Date Noted Date Resolved POA    Conjunctivitis [H10.9] 07/29/2025 08/01/2025 Yes    Constipation [K59.00] 04/28/2025 08/03/2025 Yes       Discharged Condition: good    Disposition: Skilled Nursing Facility    Follow Up:   Contact information for follow-up providers       Juan Abarca MD Follow up in 1 week(s).    Specialty: Family Medicine  Contact information:  [address]  [place] [address]  [phone]               Ryan Walls,  Follow up in 1 week(s).    Specialty: Neurosurgery  Contact information:  [address] [place] [address]   [address]  [place] [address]  [phone]                       Contact information for after-discharge care       Destination       [facility] .     Service: Skilled Nursing  Contact information:  [place] [facility] [address]  [phone]                                 Patient Instructions:      Notify your health care provider if you experience any of the following:  temperature >100.4     Notify your health care provider if you experience any of the following:  persistent nausea and vomiting or diarrhea     Notify your health care provider if you experience any of the following:  severe uncontrolled pain     Notify your health care provider if you experience any of the following:  difficulty breathing or increased cough     Notify your health care provider if you experience any of the following:  severe persistent headache     Notify your health care provider if you experience any of the following:  persistent dizziness, light-headedness, or visual disturbances     Notify your health care provider if you experience any of the following:  increased confusion or weakness       Significant Diagnostic Studies: Labs: CMP   Recent Labs   Lab 08/04/25  0508   *   K 4.7      CO2 28   *   BUN 34*   CREATININE 0.8   CALCIUM 9.2   ANIONGAP 3*    and CBC   Recent Labs   Lab 08/04/25  0508   WBC 9.42   HGB 11.5*   HCT 36.8*          Pending Diagnostic Studies:       None           Medications:  Reconciled Home Medications:      Medication List        START taking these medications      cloNIDine 0.1 MG tablet  Commonly known as: CATAPRES  Take 1 tablet (0.1 mg total) by mouth every 12 (twelve) hours as needed (SBP >160).     dexAMETHasone 1 MG Tab  Commonly known as: DECADRON  Take 4 tablets (4 mg total) by mouth every 12 (twelve) hours for 2 days, THEN 4 tablets (4 mg total) once daily for 1 day.  Start taking on: August 4, 2025     hydrALAZINE 25 MG tablet  Commonly known as: APRESOLINE  Take 1 tablet (25 mg total) by mouth every 8 (eight) hours.     pantoprazole 40 MG tablet  Commonly known as: PROTONIX  Take 1 tablet  (40 mg total) by mouth once daily. for 3 days            CHANGE how you take these medications      amLODIPine 5 MG tablet  Commonly known as: NORVASC  Take 2 tablets (10 mg total) by mouth once daily.  What changed: how much to take            CONTINUE taking these medications      aspirin 81 MG Chew  Take 1 tablet (81 mg total) by mouth once daily.     atorvastatin 40 MG tablet  Commonly known as: LIPITOR  Take 40 mg by mouth once daily.     FIBER PLUS MULITVITAMIN ORAL  Take by mouth.     glimepiride 1 MG tablet  Commonly known as: AMARYL  Take 1 tablet (1 mg total) by mouth before breakfast.     latanoprost 0.005 % ophthalmic solution  INSTILL 1 DROP INTO BOTH EYES AT BEDTIME AS DIRECTED INSTILL 1 DROP INTO BOTH EYES AT BEDTIME     metFORMIN 500 MG tablet  Commonly known as: GLUCOPHAGE  Take 500 mg by mouth daily with breakfast.     telmisartan 80 MG Tab  Commonly known as: MICARDIS  Take 80 mg by mouth once daily.            STOP taking these medications      furosemide 20 MG tablet  Commonly known as: LASIX     metoprolol succinate 50 MG 24 hr tablet  Commonly known as: TOPROL-XL     potassium chloride SA 10 MEQ tablet  Commonly known as: K-DUR,KLOR-CON M              Indwelling Lines/Drains at time of discharge:   Lines/Drains/Airways       None                       Time spent on the discharge of patient: 35 minutes         JACOB Justice  Department of Hospital Medicine  [facility]

## 2025-08-05 NOTE — NURSING
Report called in to Oralia at [facility] to let her know that pt is on her way with transportation [facility]. Pt has cell phone and other personal belongings in her pink travel bag. Discharge information has been given and pt verbalized understanding. AVS has been read over and printed out.

## 2025-08-07 ENCOUNTER — TELEPHONE (OUTPATIENT)
Dept: NEUROSURGERY | Facility: CLINIC | Age: 85
End: 2025-08-07
Payer: MEDICARE

## 2025-08-07 NOTE — TELEPHONE ENCOUNTER
Returned call to [facility] where pt currently resides. Spoke with . Offered 1 wk hospital follow up but she was not able to accept an appt until 8/26/25. Appt scheduled.

## 2025-08-07 NOTE — TELEPHONE ENCOUNTER
Copied from CRM #[id number]. Topic: Appointments - Appointment Access  >> Aug 7, 2025  9:53 AM Gayathri wrote:  Type:  Sooner Appointment Request    Caller is requesting a sooner appointment.  Caller declined first available appointment listed below.  Caller will not accept being placed on the waitlist and is requesting a message be sent to doctor.    Name of Caller:  Tatiana Trace  When is the first available appointment?  N/A  Symptoms:  Hospital f/u  Would the patient rather a call back or a response via [facility]? Call  Best Call Back Number:  [phone]  Additional Information:  Please call back to advise. Thanks!

## 2025-08-07 NOTE — TELEPHONE ENCOUNTER
Copied from CRM #[id number]. Topic: Appointments - Hospital Follow Up  >> Aug 6, 2025 10:09 AM Gayathri wrote:  Type:  Sooner Apoointment Request    Caller is requesting a sooner appointment.  Caller declined first available appointment listed below.  Caller will not accept being placed on the waitlist and is requesting a message be sent to doctor.    Name of Caller: Tatiana Trace  Symptoms:1 week Hospital f/u  Would the patient rather a call back or a response via [facility]? Call  Best Call Back Number:[phone] ext [phone]  Additional Information: Please call back to advise. Thanks!  >> Aug 6, 2025  4:03 PM JOSEFINA Shetty wrote:    ----- Message -----  From: Gayathri Bhatti  Sent: 8/6/2025  10:10 AM CDT  To: Titi Nogueira

## (undated) DEVICE — UNDERGLOVES BIOGEL PI SIZE 7.5

## (undated) DEVICE — ADAPTER HOSE 10FT 8MM

## (undated) DEVICE — SOL NACL IRR 3000ML

## (undated) DEVICE — SYR ONLY LUER LOCK 20CC

## (undated) DEVICE — GUIDE WIRE MOTION .035 X 150CM

## (undated) DEVICE — FIBER QUANTA OPT SDT 272UM

## (undated) DEVICE — SHEATH FLEXOR URET 10.7FRX35CM

## (undated) DEVICE — TRAY CYSTO BASIN OMC

## (undated) DEVICE — SALINE INTRAVENOUS 1L VITEK2

## (undated) DEVICE — CATH POLLACK OPEN-END FLEXI-TI

## (undated) DEVICE — GUIDEWIRE STR TIP HIWIRE 150CM

## (undated) DEVICE — PACK CYSTOSCOPY III SIRUS

## (undated) DEVICE — SET CYSTO IRRIGATION UNIV SPIK

## (undated) DEVICE — SYR 10CC LUER LOCK